# Patient Record
Sex: MALE | Race: BLACK OR AFRICAN AMERICAN | Employment: OTHER | URBAN - METROPOLITAN AREA
[De-identification: names, ages, dates, MRNs, and addresses within clinical notes are randomized per-mention and may not be internally consistent; named-entity substitution may affect disease eponyms.]

---

## 2017-04-11 ENCOUNTER — OFFICE VISIT (OUTPATIENT)
Dept: INTERNAL MEDICINE | Age: 42
End: 2017-04-11
Payer: MEDICARE

## 2017-04-11 ENCOUNTER — HOSPITAL ENCOUNTER (OUTPATIENT)
Age: 42
Setting detail: SPECIMEN
Discharge: HOME | End: 2017-04-11
Payer: MEDICARE

## 2017-04-11 VITALS
BODY MASS INDEX: 24.1 KG/M2 | HEIGHT: 63 IN | SYSTOLIC BLOOD PRESSURE: 146 MMHG | DIASTOLIC BLOOD PRESSURE: 105 MMHG | HEART RATE: 87 BPM | WEIGHT: 136 LBS

## 2017-04-11 DIAGNOSIS — N18.30 CKD (CHRONIC KIDNEY DISEASE) STAGE 3, GFR 30-59 ML/MIN (HCC): Chronic | ICD-10-CM

## 2017-04-11 DIAGNOSIS — K21.9 GASTROESOPHAGEAL REFLUX DISEASE WITHOUT ESOPHAGITIS: ICD-10-CM

## 2017-04-11 DIAGNOSIS — G89.29 CHRONIC BILATERAL LOW BACK PAIN WITHOUT SCIATICA: ICD-10-CM

## 2017-04-11 DIAGNOSIS — M54.50 CHRONIC BILATERAL LOW BACK PAIN WITHOUT SCIATICA: ICD-10-CM

## 2017-04-11 DIAGNOSIS — K56.609 SMALL BOWEL OBSTRUCTION (HCC): ICD-10-CM

## 2017-04-11 DIAGNOSIS — I10 ESSENTIAL HYPERTENSION: Primary | ICD-10-CM

## 2017-04-11 LAB
ANION GAP SERPL CALCULATED.3IONS-SCNC: 12 MMOL/L (ref 9–17)
BUN BLDV-MCNC: 19 MG/DL (ref 6–20)
BUN/CREAT BLD: NORMAL (ref 9–20)
CALCIUM SERPL-MCNC: 9 MG/DL (ref 8.6–10.4)
CHLORIDE BLD-SCNC: 103 MMOL/L (ref 98–107)
CO2: 23 MMOL/L (ref 20–31)
CREAT SERPL-MCNC: 1.11 MG/DL (ref 0.7–1.2)
GFR AFRICAN AMERICAN: >60 ML/MIN
GFR NON-AFRICAN AMERICAN: >60 ML/MIN
GFR SERPL CREATININE-BSD FRML MDRD: NORMAL ML/MIN/{1.73_M2}
GFR SERPL CREATININE-BSD FRML MDRD: NORMAL ML/MIN/{1.73_M2}
GLUCOSE BLD-MCNC: 96 MG/DL (ref 70–99)
POTASSIUM SERPL-SCNC: 4.2 MMOL/L (ref 3.7–5.3)
SODIUM BLD-SCNC: 138 MMOL/L (ref 135–144)

## 2017-04-11 PROCEDURE — 80048 BASIC METABOLIC PNL TOTAL CA: CPT

## 2017-04-11 PROCEDURE — 99213 OFFICE O/P EST LOW 20 MIN: CPT | Performed by: INTERNAL MEDICINE

## 2017-04-11 PROCEDURE — 36415 COLL VENOUS BLD VENIPUNCTURE: CPT

## 2017-04-11 RX ORDER — AMLODIPINE BESYLATE 10 MG/1
10 TABLET ORAL DAILY
Qty: 30 TABLET | Refills: 6 | Status: SHIPPED | OUTPATIENT
Start: 2017-04-11 | End: 2017-07-18 | Stop reason: SDUPTHER

## 2017-04-11 ASSESSMENT — PATIENT HEALTH QUESTIONNAIRE - PHQ9
2. FEELING DOWN, DEPRESSED OR HOPELESS: 0
SUM OF ALL RESPONSES TO PHQ QUESTIONS 1-9: 0
1. LITTLE INTEREST OR PLEASURE IN DOING THINGS: 0
SUM OF ALL RESPONSES TO PHQ9 QUESTIONS 1 & 2: 0

## 2017-06-10 ENCOUNTER — HOSPITAL ENCOUNTER (EMERGENCY)
Age: 42
Discharge: HOME | End: 2017-06-10
Attending: EMERGENCY MEDICINE
Payer: MEDICARE

## 2017-06-10 VITALS
HEART RATE: 98 BPM | BODY MASS INDEX: 24.11 KG/M2 | TEMPERATURE: 99 F | HEIGHT: 62 IN | SYSTOLIC BLOOD PRESSURE: 151 MMHG | RESPIRATION RATE: 18 BRPM | OXYGEN SATURATION: 98 % | DIASTOLIC BLOOD PRESSURE: 87 MMHG | WEIGHT: 131 LBS

## 2017-06-10 DIAGNOSIS — M62.838 MUSCLE SPASMS OF BOTH LOWER EXTREMITIES: Primary | ICD-10-CM

## 2017-06-10 LAB
ABSOLUTE EOS #: 0.1 K/UL (ref 0–0.4)
ABSOLUTE LYMPH #: 1.9 K/UL (ref 1–4.8)
ABSOLUTE MONO #: 0.6 K/UL (ref 0.2–0.8)
ANION GAP SERPL CALCULATED.3IONS-SCNC: 14 MMOL/L (ref 9–17)
BASOPHILS # BLD: 1 %
BASOPHILS ABSOLUTE: 0.1 K/UL (ref 0–0.2)
BUN BLDV-MCNC: 16 MG/DL (ref 6–20)
BUN/CREAT BLD: 14 (ref 9–20)
CALCIUM SERPL-MCNC: 9.5 MG/DL (ref 8.6–10.4)
CHLORIDE BLD-SCNC: 100 MMOL/L (ref 98–107)
CO2: 23 MMOL/L (ref 20–31)
CREAT SERPL-MCNC: 1.11 MG/DL (ref 0.7–1.2)
DIFFERENTIAL TYPE: NORMAL
EOSINOPHILS RELATIVE PERCENT: 2 %
GFR AFRICAN AMERICAN: >60 ML/MIN
GFR NON-AFRICAN AMERICAN: >60 ML/MIN
GFR SERPL CREATININE-BSD FRML MDRD: ABNORMAL ML/MIN/{1.73_M2}
GFR SERPL CREATININE-BSD FRML MDRD: ABNORMAL ML/MIN/{1.73_M2}
GLUCOSE BLD-MCNC: 105 MG/DL (ref 70–99)
HCT VFR BLD CALC: 41.4 % (ref 41–53)
HEMOGLOBIN: 13.8 G/DL (ref 13.5–17.5)
LYMPHOCYTES # BLD: 26 %
MCH RBC QN AUTO: 28.6 PG (ref 26–34)
MCHC RBC AUTO-ENTMCNC: 33.3 G/DL (ref 31–37)
MCV RBC AUTO: 85.9 FL (ref 80–100)
MONOCYTES # BLD: 8 %
PDW BLD-RTO: 14.1 % (ref 11.5–14.5)
PLATELET # BLD: 332 K/UL (ref 130–400)
PLATELET ESTIMATE: NORMAL
PMV BLD AUTO: 6.5 FL (ref 6–12)
POTASSIUM SERPL-SCNC: 4 MMOL/L (ref 3.7–5.3)
RBC # BLD: 4.82 M/UL (ref 4.5–5.9)
RBC # BLD: NORMAL 10*6/UL
SEG NEUTROPHILS: 63 %
SEGMENTED NEUTROPHILS ABSOLUTE COUNT: 4.6 K/UL (ref 1.8–7.7)
SODIUM BLD-SCNC: 137 MMOL/L (ref 135–144)
WBC # BLD: 7.4 K/UL (ref 3.5–11)
WBC # BLD: NORMAL 10*3/UL

## 2017-06-10 PROCEDURE — 6360000002 HC RX W HCPCS: Performed by: NURSE PRACTITIONER

## 2017-06-10 PROCEDURE — 96372 THER/PROPH/DIAG INJ SC/IM: CPT

## 2017-06-10 PROCEDURE — 99283 EMERGENCY DEPT VISIT LOW MDM: CPT

## 2017-06-10 PROCEDURE — 85025 COMPLETE CBC W/AUTO DIFF WBC: CPT

## 2017-06-10 PROCEDURE — 80048 BASIC METABOLIC PNL TOTAL CA: CPT

## 2017-06-10 RX ORDER — DIAZEPAM 5 MG/ML
10 INJECTION, SOLUTION INTRAMUSCULAR; INTRAVENOUS ONCE
Status: COMPLETED | OUTPATIENT
Start: 2017-06-10 | End: 2017-06-10

## 2017-06-10 RX ORDER — DIAZEPAM 5 MG/1
5 TABLET ORAL EVERY 8 HOURS PRN
Qty: 20 TABLET | Refills: 0 | Status: SHIPPED | OUTPATIENT
Start: 2017-06-10 | End: 2017-06-20

## 2017-06-10 RX ADMIN — DIAZEPAM 10 MG: 5 INJECTION, SOLUTION INTRAMUSCULAR; INTRAVENOUS at 02:04

## 2017-06-10 RX ADMIN — HYDROMORPHONE HYDROCHLORIDE 1 MG: 1 INJECTION, SOLUTION INTRAMUSCULAR; INTRAVENOUS; SUBCUTANEOUS at 02:04

## 2017-06-10 ASSESSMENT — PAIN DESCRIPTION - DESCRIPTORS: DESCRIPTORS: SPASM

## 2017-06-10 ASSESSMENT — ENCOUNTER SYMPTOMS
ABDOMINAL PAIN: 0
SORE THROAT: 0
WHEEZING: 0
SINUS PRESSURE: 0
NAUSEA: 0
RHINORRHEA: 0
SHORTNESS OF BREATH: 0
DIARRHEA: 0
CONSTIPATION: 0
VOMITING: 0
COUGH: 0
COLOR CHANGE: 0

## 2017-06-10 ASSESSMENT — PAIN SCALES - GENERAL: PAINLEVEL_OUTOF10: 10

## 2017-06-10 ASSESSMENT — PAIN DESCRIPTION - LOCATION: LOCATION: LEG

## 2017-06-10 ASSESSMENT — PAIN DESCRIPTION - PAIN TYPE: TYPE: ACUTE PAIN

## 2017-06-10 ASSESSMENT — PAIN DESCRIPTION - ORIENTATION: ORIENTATION: RIGHT

## 2017-07-18 ENCOUNTER — OFFICE VISIT (OUTPATIENT)
Dept: INTERNAL MEDICINE | Age: 42
End: 2017-07-18
Payer: MEDICARE

## 2017-07-18 VITALS
SYSTOLIC BLOOD PRESSURE: 135 MMHG | WEIGHT: 132 LBS | DIASTOLIC BLOOD PRESSURE: 92 MMHG | HEART RATE: 87 BPM | HEIGHT: 62 IN | BODY MASS INDEX: 24.29 KG/M2

## 2017-07-18 DIAGNOSIS — S88.912S AMPUTATION OF LEFT LOWER EXTREMITY, SEQUELA (HCC): ICD-10-CM

## 2017-07-18 DIAGNOSIS — E78.00 PURE HYPERCHOLESTEROLEMIA: ICD-10-CM

## 2017-07-18 DIAGNOSIS — I10 ESSENTIAL HYPERTENSION: Primary | ICD-10-CM

## 2017-07-18 DIAGNOSIS — K64.4 HEMORRHOIDS, EXTERNAL: ICD-10-CM

## 2017-07-18 DIAGNOSIS — K64.9 BLEEDING HEMORRHOID: ICD-10-CM

## 2017-07-18 DIAGNOSIS — B18.2 HEP C W/O COMA, CHRONIC (HCC): ICD-10-CM

## 2017-07-18 DIAGNOSIS — K21.9 GASTROESOPHAGEAL REFLUX DISEASE WITHOUT ESOPHAGITIS: ICD-10-CM

## 2017-07-18 PROCEDURE — G8420 CALC BMI NORM PARAMETERS: HCPCS | Performed by: STUDENT IN AN ORGANIZED HEALTH CARE EDUCATION/TRAINING PROGRAM

## 2017-07-18 PROCEDURE — 99213 OFFICE O/P EST LOW 20 MIN: CPT

## 2017-07-18 PROCEDURE — G8427 DOCREV CUR MEDS BY ELIG CLIN: HCPCS | Performed by: STUDENT IN AN ORGANIZED HEALTH CARE EDUCATION/TRAINING PROGRAM

## 2017-07-18 PROCEDURE — 1036F TOBACCO NON-USER: CPT | Performed by: STUDENT IN AN ORGANIZED HEALTH CARE EDUCATION/TRAINING PROGRAM

## 2017-07-18 PROCEDURE — 99214 OFFICE O/P EST MOD 30 MIN: CPT | Performed by: STUDENT IN AN ORGANIZED HEALTH CARE EDUCATION/TRAINING PROGRAM

## 2017-07-18 RX ORDER — ATORVASTATIN CALCIUM 40 MG/1
40 TABLET, FILM COATED ORAL DAILY
Qty: 30 TABLET | Refills: 5 | Status: SHIPPED | OUTPATIENT
Start: 2017-07-18 | End: 2017-10-24

## 2017-07-18 RX ORDER — OMEPRAZOLE 20 MG/1
TABLET, DELAYED RELEASE ORAL
Qty: 30 TABLET | Refills: 3 | Status: SHIPPED | OUTPATIENT
Start: 2017-07-18 | End: 2017-10-24

## 2017-07-18 RX ORDER — OMEPRAZOLE 20 MG/1
20 CAPSULE, DELAYED RELEASE ORAL DAILY
Qty: 30 CAPSULE | Refills: 3 | Status: CANCELLED | OUTPATIENT
Start: 2017-07-18

## 2017-07-18 RX ORDER — FAMOTIDINE 40 MG/1
40 TABLET, FILM COATED ORAL EVERY EVENING
Qty: 30 TABLET | Refills: 3 | Status: SHIPPED | OUTPATIENT
Start: 2017-07-18 | End: 2017-10-24

## 2017-07-18 RX ORDER — AMLODIPINE BESYLATE 10 MG/1
10 TABLET ORAL DAILY
Qty: 30 TABLET | Refills: 6 | Status: SHIPPED | OUTPATIENT
Start: 2017-07-18 | End: 2018-01-04 | Stop reason: SDUPTHER

## 2017-08-21 ENCOUNTER — HOSPITAL ENCOUNTER (OUTPATIENT)
Age: 42
Setting detail: SPECIMEN
Discharge: HOME | End: 2017-08-21
Payer: MEDICAID

## 2017-08-22 ENCOUNTER — HOSPITAL ENCOUNTER (OUTPATIENT)
Age: 42
Setting detail: SPECIMEN
Discharge: HOME | End: 2017-08-22
Payer: MEDICARE

## 2017-08-22 ENCOUNTER — APPOINTMENT (OUTPATIENT)
Dept: GENERAL RADIOLOGY | Age: 42
End: 2017-08-22
Payer: MEDICARE

## 2017-08-22 ENCOUNTER — HOSPITAL ENCOUNTER (OUTPATIENT)
Age: 42
Setting detail: OBSERVATION
Discharge: HOME | End: 2017-08-23
Attending: EMERGENCY MEDICINE | Admitting: EMERGENCY MEDICINE
Payer: MEDICARE

## 2017-08-22 DIAGNOSIS — B18.2 HEP C W/O COMA, CHRONIC (HCC): ICD-10-CM

## 2017-08-22 DIAGNOSIS — E78.00 PURE HYPERCHOLESTEROLEMIA: ICD-10-CM

## 2017-08-22 DIAGNOSIS — R07.9 CHEST PAIN, UNSPECIFIED TYPE: Primary | ICD-10-CM

## 2017-08-22 LAB
ABSOLUTE EOS #: 0.3 K/UL (ref 0–0.4)
ABSOLUTE LYMPH #: 3.1 K/UL (ref 1–4.8)
ABSOLUTE MONO #: 0.5 K/UL (ref 0.1–1.2)
ANION GAP SERPL CALCULATED.3IONS-SCNC: 18 MMOL/L (ref 9–17)
BASOPHILS # BLD: 1 %
BASOPHILS ABSOLUTE: 0.1 K/UL (ref 0–0.2)
BNP INTERPRETATION: NORMAL
BUN BLDV-MCNC: 22 MG/DL (ref 6–20)
BUN/CREAT BLD: ABNORMAL (ref 9–20)
CALCIUM SERPL-MCNC: 9.5 MG/DL (ref 8.6–10.4)
CHLORIDE BLD-SCNC: 101 MMOL/L (ref 98–107)
CHOLESTEROL/HDL RATIO: 3.3
CHOLESTEROL: 192 MG/DL
CO2: 23 MMOL/L (ref 20–31)
CREAT SERPL-MCNC: 1.58 MG/DL (ref 0.7–1.2)
DIFFERENTIAL TYPE: ABNORMAL
EOSINOPHILS RELATIVE PERCENT: 4 %
GFR AFRICAN AMERICAN: 58 ML/MIN
GFR NON-AFRICAN AMERICAN: 48 ML/MIN
GFR SERPL CREATININE-BSD FRML MDRD: ABNORMAL ML/MIN/{1.73_M2}
GFR SERPL CREATININE-BSD FRML MDRD: ABNORMAL ML/MIN/{1.73_M2}
GLUCOSE BLD-MCNC: 111 MG/DL (ref 70–99)
HCT VFR BLD CALC: 39 % (ref 41–53)
HDLC SERPL-MCNC: 58 MG/DL
HEMOGLOBIN: 13.4 G/DL (ref 13.5–17.5)
LDL CHOLESTEROL: 122 MG/DL (ref 0–130)
LYMPHOCYTES # BLD: 42 %
MCH RBC QN AUTO: 29.3 PG (ref 26–34)
MCHC RBC AUTO-ENTMCNC: 34.3 G/DL (ref 31–37)
MCV RBC AUTO: 85.4 FL (ref 80–100)
MONOCYTES # BLD: 7 %
PDW BLD-RTO: 14.9 % (ref 12.5–15.4)
PLATELET # BLD: 363 K/UL (ref 140–450)
PLATELET ESTIMATE: ABNORMAL
PMV BLD AUTO: 6.8 FL (ref 6–12)
POC TROPONIN I: 0 NG/ML (ref 0–0.1)
POC TROPONIN INTERP: NORMAL
POTASSIUM SERPL-SCNC: 4.1 MMOL/L (ref 3.7–5.3)
PRO-BNP: <20 PG/ML
RBC # BLD: 4.56 M/UL (ref 4.5–5.9)
RBC # BLD: ABNORMAL 10*6/UL
SEG NEUTROPHILS: 46 %
SEGMENTED NEUTROPHILS ABSOLUTE COUNT: 3.4 K/UL (ref 1.8–7.7)
SODIUM BLD-SCNC: 142 MMOL/L (ref 135–144)
TRIGL SERPL-MCNC: 62 MG/DL
VLDLC SERPL CALC-MCNC: NORMAL MG/DL (ref 1–30)
WBC # BLD: 7.3 K/UL (ref 3.5–11)
WBC # BLD: ABNORMAL 10*3/UL

## 2017-08-22 PROCEDURE — 87522 HEPATITIS C REVRS TRNSCRPJ: CPT

## 2017-08-22 PROCEDURE — 99285 EMERGENCY DEPT VISIT HI MDM: CPT

## 2017-08-22 PROCEDURE — 83880 ASSAY OF NATRIURETIC PEPTIDE: CPT

## 2017-08-22 PROCEDURE — 85025 COMPLETE CBC W/AUTO DIFF WBC: CPT

## 2017-08-22 PROCEDURE — 84484 ASSAY OF TROPONIN QUANT: CPT

## 2017-08-22 PROCEDURE — 36415 COLL VENOUS BLD VENIPUNCTURE: CPT

## 2017-08-22 PROCEDURE — 71020 XR CHEST STANDARD TWO VW: CPT

## 2017-08-22 PROCEDURE — 6370000000 HC RX 637 (ALT 250 FOR IP): Performed by: EMERGENCY MEDICINE

## 2017-08-22 PROCEDURE — 80061 LIPID PANEL: CPT

## 2017-08-22 PROCEDURE — 80048 BASIC METABOLIC PNL TOTAL CA: CPT

## 2017-08-22 PROCEDURE — 93005 ELECTROCARDIOGRAM TRACING: CPT

## 2017-08-22 RX ORDER — ASPIRIN 81 MG/1
324 TABLET, CHEWABLE ORAL ONCE
Status: COMPLETED | OUTPATIENT
Start: 2017-08-22 | End: 2017-08-22

## 2017-08-22 RX ADMIN — ASPIRIN 81 MG 324 MG: 81 TABLET ORAL at 22:56

## 2017-08-22 ASSESSMENT — PAIN SCALES - GENERAL
PAINLEVEL_OUTOF10: 8
PAINLEVEL_OUTOF10: 9

## 2017-08-22 ASSESSMENT — PAIN DESCRIPTION - PAIN TYPE: TYPE: ACUTE PAIN

## 2017-08-22 ASSESSMENT — PAIN DESCRIPTION - FREQUENCY: FREQUENCY: CONTINUOUS

## 2017-08-22 ASSESSMENT — PAIN DESCRIPTION - DESCRIPTORS: DESCRIPTORS: PRESSURE

## 2017-08-22 ASSESSMENT — PAIN DESCRIPTION - ORIENTATION: ORIENTATION: LEFT

## 2017-08-22 ASSESSMENT — HEART SCORE: ECG: 0

## 2017-08-22 ASSESSMENT — PAIN DESCRIPTION - LOCATION: LOCATION: CHEST

## 2017-08-23 VITALS
OXYGEN SATURATION: 98 % | TEMPERATURE: 97.7 F | SYSTOLIC BLOOD PRESSURE: 126 MMHG | WEIGHT: 129 LBS | DIASTOLIC BLOOD PRESSURE: 90 MMHG | HEIGHT: 62 IN | HEART RATE: 83 BPM | BODY MASS INDEX: 23.74 KG/M2 | RESPIRATION RATE: 16 BRPM

## 2017-08-23 LAB
ANION GAP SERPL CALCULATED.3IONS-SCNC: 15 MMOL/L (ref 9–17)
BUN BLDV-MCNC: 24 MG/DL (ref 6–20)
BUN/CREAT BLD: ABNORMAL (ref 9–20)
CALCIUM SERPL-MCNC: 9 MG/DL (ref 8.6–10.4)
CHLORIDE BLD-SCNC: 100 MMOL/L (ref 98–107)
CO2: 21 MMOL/L (ref 20–31)
CREAT SERPL-MCNC: 1.22 MG/DL (ref 0.7–1.2)
DIRECT EXAM: NORMAL
EKG ATRIAL RATE: 79 BPM
EKG P AXIS: 38 DEGREES
EKG P-R INTERVAL: 152 MS
EKG Q-T INTERVAL: 340 MS
EKG QRS DURATION: 78 MS
EKG QTC CALCULATION (BAZETT): 389 MS
EKG R AXIS: 11 DEGREES
EKG T AXIS: 22 DEGREES
EKG VENTRICULAR RATE: 79 BPM
GFR AFRICAN AMERICAN: >60 ML/MIN
GFR NON-AFRICAN AMERICAN: >60 ML/MIN
GFR SERPL CREATININE-BSD FRML MDRD: ABNORMAL ML/MIN/{1.73_M2}
GFR SERPL CREATININE-BSD FRML MDRD: ABNORMAL ML/MIN/{1.73_M2}
GLUCOSE BLD-MCNC: 91 MG/DL (ref 70–99)
Lab: NORMAL
POC TROPONIN I: 0 NG/ML (ref 0–0.1)
POC TROPONIN INTERP: NORMAL
POTASSIUM SERPL-SCNC: 5.3 MMOL/L (ref 3.7–5.3)
SODIUM BLD-SCNC: 136 MMOL/L (ref 135–144)
SPECIMEN DESCRIPTION: NORMAL
STATUS: NORMAL

## 2017-08-23 PROCEDURE — 6370000000 HC RX 637 (ALT 250 FOR IP): Performed by: EMERGENCY MEDICINE

## 2017-08-23 PROCEDURE — 93005 ELECTROCARDIOGRAM TRACING: CPT

## 2017-08-23 PROCEDURE — 36415 COLL VENOUS BLD VENIPUNCTURE: CPT

## 2017-08-23 PROCEDURE — 6360000002 HC RX W HCPCS: Performed by: EMERGENCY MEDICINE

## 2017-08-23 PROCEDURE — 80048 BASIC METABOLIC PNL TOTAL CA: CPT

## 2017-08-23 PROCEDURE — 96374 THER/PROPH/DIAG INJ IV PUSH: CPT

## 2017-08-23 PROCEDURE — G0378 HOSPITAL OBSERVATION PER HR: HCPCS

## 2017-08-23 PROCEDURE — 84484 ASSAY OF TROPONIN QUANT: CPT

## 2017-08-23 RX ORDER — AMLODIPINE BESYLATE 10 MG/1
10 TABLET ORAL DAILY
Status: DISCONTINUED | OUTPATIENT
Start: 2017-08-23 | End: 2017-08-23 | Stop reason: HOSPADM

## 2017-08-23 RX ORDER — FENTANYL CITRATE 50 UG/ML
25 INJECTION, SOLUTION INTRAMUSCULAR; INTRAVENOUS
Status: DISCONTINUED | OUTPATIENT
Start: 2017-08-23 | End: 2017-08-23 | Stop reason: HOSPADM

## 2017-08-23 RX ORDER — SODIUM CHLORIDE 0.9 % (FLUSH) 0.9 %
10 SYRINGE (ML) INJECTION EVERY 12 HOURS SCHEDULED
Status: DISCONTINUED | OUTPATIENT
Start: 2017-08-23 | End: 2017-08-23 | Stop reason: HOSPADM

## 2017-08-23 RX ORDER — ATORVASTATIN CALCIUM 40 MG/1
40 TABLET, FILM COATED ORAL NIGHTLY
Status: DISCONTINUED | OUTPATIENT
Start: 2017-08-23 | End: 2017-08-23 | Stop reason: HOSPADM

## 2017-08-23 RX ORDER — CYCLOBENZAPRINE HCL 10 MG
10 TABLET ORAL 2 TIMES DAILY PRN
Status: DISCONTINUED | OUTPATIENT
Start: 2017-08-23 | End: 2017-08-23 | Stop reason: HOSPADM

## 2017-08-23 RX ORDER — OXYCODONE HYDROCHLORIDE AND ACETAMINOPHEN 5; 325 MG/1; MG/1
1 TABLET ORAL EVERY 6 HOURS PRN
Status: DISCONTINUED | OUTPATIENT
Start: 2017-08-23 | End: 2017-08-23 | Stop reason: HOSPADM

## 2017-08-23 RX ORDER — SODIUM CHLORIDE 0.9 % (FLUSH) 0.9 %
10 SYRINGE (ML) INJECTION PRN
Status: DISCONTINUED | OUTPATIENT
Start: 2017-08-23 | End: 2017-08-23 | Stop reason: HOSPADM

## 2017-08-23 RX ORDER — ACETAMINOPHEN 325 MG/1
650 TABLET ORAL EVERY 4 HOURS PRN
Status: DISCONTINUED | OUTPATIENT
Start: 2017-08-23 | End: 2017-08-23 | Stop reason: HOSPADM

## 2017-08-23 RX ORDER — ONDANSETRON 2 MG/ML
4 INJECTION INTRAMUSCULAR; INTRAVENOUS EVERY 8 HOURS PRN
Status: DISCONTINUED | OUTPATIENT
Start: 2017-08-23 | End: 2017-08-23 | Stop reason: HOSPADM

## 2017-08-23 RX ADMIN — OXYCODONE HYDROCHLORIDE AND ACETAMINOPHEN 1 TABLET: 5; 325 TABLET ORAL at 00:44

## 2017-08-23 RX ADMIN — AMLODIPINE BESYLATE 10 MG: 10 TABLET ORAL at 08:52

## 2017-08-23 RX ADMIN — CYCLOBENZAPRINE HYDROCHLORIDE 10 MG: 10 TABLET, FILM COATED ORAL at 08:52

## 2017-08-23 RX ADMIN — FENTANYL CITRATE 25 MCG: 50 INJECTION INTRAMUSCULAR; INTRAVENOUS at 01:39

## 2017-08-23 RX ADMIN — OXYCODONE HYDROCHLORIDE AND ACETAMINOPHEN 1 TABLET: 5; 325 TABLET ORAL at 10:25

## 2017-08-23 RX ADMIN — ATORVASTATIN CALCIUM 40 MG: 40 TABLET, FILM COATED ORAL at 01:03

## 2017-08-23 ASSESSMENT — ENCOUNTER SYMPTOMS
CONSTIPATION: 0
ABDOMINAL DISTENTION: 0
CHEST TIGHTNESS: 0
COUGH: 0
NAUSEA: 0
SHORTNESS OF BREATH: 0
ABDOMINAL PAIN: 0
DIARRHEA: 0
VOMITING: 0
BACK PAIN: 0
COLOR CHANGE: 0
TROUBLE SWALLOWING: 0
PHOTOPHOBIA: 0
RHINORRHEA: 0

## 2017-08-23 ASSESSMENT — PAIN SCALES - GENERAL
PAINLEVEL_OUTOF10: 8
PAINLEVEL_OUTOF10: 6
PAINLEVEL_OUTOF10: 7
PAINLEVEL_OUTOF10: 8

## 2017-08-24 ENCOUNTER — CARE COORDINATION (OUTPATIENT)
Dept: CASE MANAGEMENT | Age: 42
End: 2017-08-24

## 2017-08-24 LAB
EKG ATRIAL RATE: 99 BPM
EKG P AXIS: 44 DEGREES
EKG P-R INTERVAL: 144 MS
EKG Q-T INTERVAL: 312 MS
EKG QRS DURATION: 72 MS
EKG QTC CALCULATION (BAZETT): 400 MS
EKG R AXIS: 9 DEGREES
EKG T AXIS: 36 DEGREES
EKG VENTRICULAR RATE: 99 BPM

## 2017-08-30 ENCOUNTER — CARE COORDINATION (OUTPATIENT)
Dept: CASE MANAGEMENT | Age: 42
End: 2017-08-30

## 2017-09-19 ENCOUNTER — TELEPHONE (OUTPATIENT)
Dept: ADMINISTRATIVE | Age: 42
End: 2017-09-19

## 2017-09-25 ENCOUNTER — TELEPHONE (OUTPATIENT)
Dept: ADMINISTRATIVE | Age: 42
End: 2017-09-25

## 2017-10-24 ENCOUNTER — OFFICE VISIT (OUTPATIENT)
Dept: INTERNAL MEDICINE | Age: 42
End: 2017-10-24
Payer: MEDICARE

## 2017-10-24 VITALS
HEART RATE: 94 BPM | BODY MASS INDEX: 24.66 KG/M2 | HEIGHT: 62 IN | SYSTOLIC BLOOD PRESSURE: 126 MMHG | DIASTOLIC BLOOD PRESSURE: 85 MMHG | WEIGHT: 134 LBS

## 2017-10-24 DIAGNOSIS — K21.9 GASTROESOPHAGEAL REFLUX DISEASE WITHOUT ESOPHAGITIS: ICD-10-CM

## 2017-10-24 DIAGNOSIS — Z11.4 SCREENING FOR HIV (HUMAN IMMUNODEFICIENCY VIRUS): ICD-10-CM

## 2017-10-24 DIAGNOSIS — Z23 NEED FOR IMMUNIZATION AGAINST INFLUENZA: ICD-10-CM

## 2017-10-24 DIAGNOSIS — Z23 NEED FOR 23-POLYVALENT PNEUMOCOCCAL POLYSACCHARIDE VACCINE: ICD-10-CM

## 2017-10-24 DIAGNOSIS — B18.2 HEP C W/O COMA, CHRONIC (HCC): ICD-10-CM

## 2017-10-24 DIAGNOSIS — E78.00 PURE HYPERCHOLESTEROLEMIA: ICD-10-CM

## 2017-10-24 DIAGNOSIS — K64.4 HEMORRHOIDS, EXTERNAL: Primary | ICD-10-CM

## 2017-10-24 DIAGNOSIS — I10 ESSENTIAL HYPERTENSION: ICD-10-CM

## 2017-10-24 PROCEDURE — G0008 ADMIN INFLUENZA VIRUS VAC: HCPCS | Performed by: STUDENT IN AN ORGANIZED HEALTH CARE EDUCATION/TRAINING PROGRAM

## 2017-10-24 PROCEDURE — 1036F TOBACCO NON-USER: CPT | Performed by: STUDENT IN AN ORGANIZED HEALTH CARE EDUCATION/TRAINING PROGRAM

## 2017-10-24 PROCEDURE — 99213 OFFICE O/P EST LOW 20 MIN: CPT

## 2017-10-24 PROCEDURE — G8420 CALC BMI NORM PARAMETERS: HCPCS | Performed by: STUDENT IN AN ORGANIZED HEALTH CARE EDUCATION/TRAINING PROGRAM

## 2017-10-24 PROCEDURE — 99214 OFFICE O/P EST MOD 30 MIN: CPT | Performed by: STUDENT IN AN ORGANIZED HEALTH CARE EDUCATION/TRAINING PROGRAM

## 2017-10-24 PROCEDURE — G8484 FLU IMMUNIZE NO ADMIN: HCPCS | Performed by: STUDENT IN AN ORGANIZED HEALTH CARE EDUCATION/TRAINING PROGRAM

## 2017-10-24 PROCEDURE — G8427 DOCREV CUR MEDS BY ELIG CLIN: HCPCS | Performed by: STUDENT IN AN ORGANIZED HEALTH CARE EDUCATION/TRAINING PROGRAM

## 2017-10-24 PROCEDURE — 90732 PPSV23 VACC 2 YRS+ SUBQ/IM: CPT

## 2017-10-24 PROCEDURE — G0009 ADMIN PNEUMOCOCCAL VACCINE: HCPCS

## 2017-10-24 PROCEDURE — 90688 IIV4 VACCINE SPLT 0.5 ML IM: CPT

## 2017-10-24 RX ORDER — ATORVASTATIN CALCIUM 40 MG/1
40 TABLET, FILM COATED ORAL DAILY
Qty: 30 TABLET | Refills: 1 | Status: SHIPPED | OUTPATIENT
Start: 2017-10-24 | End: 2018-02-01 | Stop reason: SDUPTHER

## 2017-10-24 RX ORDER — FAMOTIDINE 40 MG/1
40 TABLET, FILM COATED ORAL EVERY EVENING
Qty: 30 TABLET | Refills: 3 | Status: SHIPPED | OUTPATIENT
Start: 2017-10-24 | End: 2018-02-13 | Stop reason: SDUPTHER

## 2017-11-09 ENCOUNTER — HOSPITAL ENCOUNTER (EMERGENCY)
Age: 42
Discharge: HOME | End: 2017-11-09
Payer: MEDICARE

## 2017-11-09 VITALS
TEMPERATURE: 97.9 F | HEART RATE: 104 BPM | DIASTOLIC BLOOD PRESSURE: 84 MMHG | OXYGEN SATURATION: 95 % | HEIGHT: 62 IN | WEIGHT: 130 LBS | RESPIRATION RATE: 16 BRPM | BODY MASS INDEX: 23.92 KG/M2 | SYSTOLIC BLOOD PRESSURE: 133 MMHG

## 2017-11-09 DIAGNOSIS — H60.392 INFECTIVE OTITIS EXTERNA OF LEFT EAR: Primary | ICD-10-CM

## 2017-11-09 PROCEDURE — 99282 EMERGENCY DEPT VISIT SF MDM: CPT

## 2017-11-09 RX ORDER — NEOMYCIN SULFATE, POLYMYXIN B SULFATE, HYDROCORTISONE 3.5; 10000; 1 MG/ML; [USP'U]/ML; MG/ML
4 SOLUTION/ DROPS AURICULAR (OTIC) EVERY 8 HOURS SCHEDULED
Qty: 10 ML | Refills: 0 | Status: SHIPPED | OUTPATIENT
Start: 2017-11-09 | End: 2017-11-19

## 2017-11-09 ASSESSMENT — PAIN SCALES - GENERAL: PAINLEVEL_OUTOF10: 6

## 2017-11-09 ASSESSMENT — PAIN DESCRIPTION - ORIENTATION: ORIENTATION: LEFT

## 2017-11-09 ASSESSMENT — PAIN DESCRIPTION - FREQUENCY: FREQUENCY: INTERMITTENT

## 2017-11-09 ASSESSMENT — PAIN DESCRIPTION - DESCRIPTORS: DESCRIPTORS: ACHING;SORE

## 2017-11-09 ASSESSMENT — PAIN DESCRIPTION - LOCATION: LOCATION: EAR;THROAT

## 2017-11-09 ASSESSMENT — PAIN SCALES - WONG BAKER: WONGBAKER_NUMERICALRESPONSE: 6

## 2017-11-10 ENCOUNTER — CARE COORDINATION (OUTPATIENT)
Dept: CARE COORDINATION | Age: 42
End: 2017-11-10

## 2017-11-10 NOTE — ED PROVIDER NOTES
Sclera anicteric. ENT: Mucous membranes moist. Nasal exam reveals no swollen turbinates, septum midline. Posterior pharynx pink, without swelling or exudate. Right ear is unremarkable with a flat gray tympanic membrane. Landmarks are easily visualized. Exam of left ear reveals a swollen canal.  There is small amount of purulence. The TM is visualized and is gray. Patient complains of pain with palpation of the tragus. There is no pre-or postauricular lymphadenopathy. Neck: Supple, symmetrical, trachea midline, no adenopathy. Lungs:   Respirations eupneic. Lungs CTA   Chest Wall:  Nontender   Heart:   Abdomen:     Extremities:   Pulses:   Skin:   Neurologic:      RRR  Soft, nontender   Full range of motion. Radial/ulnar pulses 3+   No rashes or lesions to exposed skin. Alert and oriented X 3. Motor grossly normal.  Speech clear. DIAGNOSTIC RESULTS         LABS:  Labs Reviewed - No data to display    All other labs were within normal range or not returned as of this dictation. EMERGENCY DEPARTMENT COURSE and DIFFERENTIAL DIAGNOSIS/MDM:   Vitals:    Vitals:    17 2038   BP: 133/84   Pulse: 104   Resp: 16   Temp: 97.9 °F (36.6 °C)   TempSrc: Oral   SpO2: 95%   Weight: 130 lb (59 kg)   Height: 5' 2\" (1.575 m)       Medical Decision Makin-year-old male with left ear pain. Exam reveals an external otitis. He'll be discharged home with Cortisporin eardrops. He has Tylenol and ibuprofen at home for pain relief. He will follow up with his primary care provider. He has also been referred to ENT for any complications. CONSULTS:  None    PROCEDURES:  None    FINAL IMPRESSION      1.  Infective otitis externa of left ear          DISPOSITION/PLAN   DISPOSITION     PATIENT REFERRED TO:   Keven Jaramillo MD  [address] [facility] [address]  [facility] [address] [place] [address]  [phone]    Schedule an appointment as soon as possible for a visit   If symptoms worsen      DISCHARGE MEDICATIONS:     New Prescriptions    NEOMYCIN-POLYMYXIN-HYDROCORTISONE 1 % SOLN OTIC SOLUTION    Place 4 drops into the left ear every 8 hours for 10 days           (Please note that portions of this note were completed with a voice recognition program.  Efforts were made to edit the dictations but occasionally words are mis-transcribed.)    Daniel Nguyen NP  Certified Nurse Practitioner          Daniel Nguyen NP  11/09/17 2100

## 2018-01-04 ENCOUNTER — HOSPITAL ENCOUNTER (OUTPATIENT)
Age: 43
Setting detail: SPECIMEN
Discharge: HOME | End: 2018-01-04
Payer: COMMERCIAL

## 2018-01-04 ENCOUNTER — OFFICE VISIT (OUTPATIENT)
Dept: INTERNAL MEDICINE | Age: 43
End: 2018-01-04
Payer: MEDICARE

## 2018-01-04 VITALS
SYSTOLIC BLOOD PRESSURE: 144 MMHG | HEART RATE: 92 BPM | WEIGHT: 140 LBS | DIASTOLIC BLOOD PRESSURE: 102 MMHG | BODY MASS INDEX: 25.76 KG/M2 | HEIGHT: 62 IN

## 2018-01-04 DIAGNOSIS — Z00.00 MEDICARE ANNUAL WELLNESS VISIT, INITIAL: ICD-10-CM

## 2018-01-04 DIAGNOSIS — Z89.511 S/P BKA (BELOW KNEE AMPUTATION), RIGHT (HCC): ICD-10-CM

## 2018-01-04 DIAGNOSIS — E78.00 PURE HYPERCHOLESTEROLEMIA: ICD-10-CM

## 2018-01-04 DIAGNOSIS — K21.9 GASTROESOPHAGEAL REFLUX DISEASE WITHOUT ESOPHAGITIS: ICD-10-CM

## 2018-01-04 DIAGNOSIS — B18.2 HEP C W/O COMA, CHRONIC (HCC): ICD-10-CM

## 2018-01-04 DIAGNOSIS — N18.30 STAGE 3 CHRONIC KIDNEY DISEASE (HCC): ICD-10-CM

## 2018-01-04 DIAGNOSIS — Z00.00 HEALTH CARE MAINTENANCE: ICD-10-CM

## 2018-01-04 DIAGNOSIS — Z00.00 ROUTINE GENERAL MEDICAL EXAMINATION AT A HEALTH CARE FACILITY: ICD-10-CM

## 2018-01-04 DIAGNOSIS — I10 ESSENTIAL HYPERTENSION: ICD-10-CM

## 2018-01-04 DIAGNOSIS — I10 ESSENTIAL HYPERTENSION: Primary | ICD-10-CM

## 2018-01-04 LAB
ALBUMIN SERPL-MCNC: 4.1 G/DL (ref 3.5–5.2)
ALBUMIN/GLOBULIN RATIO: 1.2 (ref 1–2.5)
ALP BLD-CCNC: 63 U/L (ref 40–129)
ALT SERPL-CCNC: 12 U/L (ref 5–41)
ANION GAP SERPL CALCULATED.3IONS-SCNC: 17 MMOL/L (ref 9–17)
AST SERPL-CCNC: 19 U/L
BILIRUB SERPL-MCNC: 0.44 MG/DL (ref 0.3–1.2)
BUN BLDV-MCNC: 14 MG/DL (ref 6–20)
BUN/CREAT BLD: NORMAL (ref 9–20)
CALCIUM SERPL-MCNC: 9.2 MG/DL (ref 8.6–10.4)
CHLORIDE BLD-SCNC: 103 MMOL/L (ref 98–107)
CO2: 20 MMOL/L (ref 20–31)
CREAT SERPL-MCNC: 1.14 MG/DL (ref 0.7–1.2)
GFR AFRICAN AMERICAN: >60 ML/MIN
GFR NON-AFRICAN AMERICAN: >60 ML/MIN
GFR SERPL CREATININE-BSD FRML MDRD: NORMAL ML/MIN/{1.73_M2}
GFR SERPL CREATININE-BSD FRML MDRD: NORMAL ML/MIN/{1.73_M2}
GLUCOSE BLD-MCNC: 85 MG/DL (ref 70–99)
HEPATITIS C ANTIBODY: NONREACTIVE
HIV AG/AB: NONREACTIVE
POTASSIUM SERPL-SCNC: 4 MMOL/L (ref 3.7–5.3)
SODIUM BLD-SCNC: 140 MMOL/L (ref 135–144)
TOTAL PROTEIN: 7.6 G/DL (ref 6.4–8.3)

## 2018-01-04 PROCEDURE — G0438 PPPS, INITIAL VISIT: HCPCS | Performed by: INTERNAL MEDICINE

## 2018-01-04 PROCEDURE — 36415 COLL VENOUS BLD VENIPUNCTURE: CPT

## 2018-01-04 PROCEDURE — 87522 HEPATITIS C REVRS TRNSCRPJ: CPT

## 2018-01-04 PROCEDURE — 87389 HIV-1 AG W/HIV-1&-2 AB AG IA: CPT

## 2018-01-04 PROCEDURE — 80053 COMPREHEN METABOLIC PANEL: CPT

## 2018-01-04 PROCEDURE — 87902 NFCT AGT GNTYP ALYS HEP C: CPT

## 2018-01-04 PROCEDURE — 86803 HEPATITIS C AB TEST: CPT

## 2018-01-04 RX ORDER — AMLODIPINE BESYLATE 10 MG/1
10 TABLET ORAL DAILY
Qty: 30 TABLET | Refills: 5 | Status: SHIPPED | OUTPATIENT
Start: 2018-01-04 | End: 2018-02-13 | Stop reason: SDUPTHER

## 2018-01-04 ASSESSMENT — PATIENT HEALTH QUESTIONNAIRE - PHQ9: SUM OF ALL RESPONSES TO PHQ QUESTIONS 1-9: 0

## 2018-01-04 NOTE — PATIENT INSTRUCTIONS
Advance Directives: Care Instructions  Your Care Instructions  An advance directive is a legal way to state your wishes at the end of your life. It tells your family and your doctor what to do if you can no longer say what you want. There are two main types of advance directives. You can change them any time that your wishes change. · A living will tells your family and your doctor your wishes about life support and other treatment. · A durable power of  for health care lets you name a person to make treatment decisions for you when you can't speak for yourself. This person is called a health care agent. If you do not have an advance directive, decisions about your medical care may be made by a doctor or a  who doesn't know you. It may help to think of an advance directive as a gift to the people who care for you. If you have one, they won't have to make tough decisions by themselves. Follow-up care is a key part of your treatment and safety. Be sure to make and go to all appointments, and call your doctor if you are having problems. It's also a good idea to know your test results and keep a list of the medicines you take. How can you care for yourself at home? · Discuss your wishes with your loved ones and your doctor. This way, there are no surprises. · Many states have a unique form. Or you might use a universal form that has been approved by many states. This kind of form can sometimes be completed and stored online. Your electronic copy will then be available wherever you have a connection to the Internet. In most cases, doctors will respect your wishes even if you have a form from a different state. · You don't need a  to do an advance directive. But you may want to get legal advice. · Think about these questions when you prepare an advance directive:  ¨ Who do you want to make decisions about your medical care if you are not able to?  Many people choose a family member or require you to get the form notarized. This means that a person called a  watches you sign the form and then he or she signs the form. Some states also require that two or more witnesses sign the form. Be sure to tell your family members and doctors who your health care agent is. Keep your forms in a safe place. But make sure that your loved ones know where the forms are. This could be in your desk where you keep other important papers. Make sure your doctor has a copy of your forms. Where can you learn more? Go to https://[email or web address].[email or web address] org and sign in to your [employer] account. Enter [id number] in the Cognia box to learn more about \"Learning About Durable Power of  for Health Care. \"     If you do not have an account, please click on the \"Sign Up Now\" link. Current as of: September 24, 2016  Content Version: 11.5  © 0341-1830 [employer]. Care instructions adapted under license by [facility] ([facility]). If you have questions about a medical condition or this instruction, always ask your healthcare professional. [place] [address] any warranty or liability for your use of this information. Learning About Living [place]  What is a living will? A living will is a legal form you use to write down the kind of care you want at the end of your life. It is used by the health professionals who will treat you if you aren't able to decide for yourself. If you put your wishes in writing, your loved ones and others will know what kind of care you want. They won't need to guess. This can ease your mind and be helpful to others. A living will is not the same as an estate or property will. An estate will explains what you want to happen with your money and property after you die. Is a living will a legal document? A living will is a legal document. Each state has its own laws about living [place].  If you move to another state, make sure that your copay.    Some of these benefits include a comprehensive review of your medical history including lifestyle, illnesses that may run in your family, and various assessments and screenings as appropriate. After reviewing your medical record and screening and assessments performed today your provider may have ordered immunizations, labs, imaging, and/or referrals for you. A list of these orders (if applicable) as well as your Preventive Care list are included within your After Visit Summary for your review. Other Preventive Recommendations:    · A preventive eye exam performed by an eye specialist is recommended every 1-2 years to screen for glaucoma; cataracts, macular degeneration, and other eye disorders. · A preventive dental visit is recommended every 6 months. · Try to get at least 150 minutes of exercise per week or 10,000 steps per day on a pedometer . · Order or download the FREE \"Exercise & Physical Activity: Your Everyday Guide\" from [employer]. Call 2-224.543.2658 or search [employer] online. · You need 9777-6493 mg of calcium and 3767-9991 IU of vitamin D per day. It is possible to meet your calcium requirement with diet alone, but a vitamin D supplement is usually necessary to meet this goal.  · When exposed to the sun, use a sunscreen that protects against both UVA and UVB radiation with an SPF of 30 or greater. Reapply every 2 to 3 hours or after sweating, drying off with a towel, or swimming. · Always wear a seat belt when traveling in a car. Always wear a helmet when riding a bicycle or motorcycle. Follow-up appointment scheduled for 2/1/18, AVS given to patient. AWV Forms given to patient to return at next visit. Labs mailed to patient.     Cristopher Gann

## 2018-01-04 NOTE — PROGRESS NOTES
jimy or an eligible witness and provide a signed copy to the practice office.   Time spent (minutes): 30

## 2018-01-08 LAB
DIRECT EXAM: NORMAL
HCV QUANTITATIVE: NORMAL
HEPATITIS C GENOTYPE: NORMAL
Lab: NORMAL
SPECIMEN DESCRIPTION: NORMAL
STATUS: NORMAL

## 2018-02-01 ENCOUNTER — OFFICE VISIT (OUTPATIENT)
Dept: INTERNAL MEDICINE | Age: 43
End: 2018-02-01
Payer: MEDICARE

## 2018-02-01 VITALS
HEART RATE: 96 BPM | SYSTOLIC BLOOD PRESSURE: 127 MMHG | BODY MASS INDEX: 25.21 KG/M2 | HEIGHT: 62 IN | WEIGHT: 137 LBS | DIASTOLIC BLOOD PRESSURE: 83 MMHG

## 2018-02-01 DIAGNOSIS — K64.9 HEMORRHOIDS, UNSPECIFIED HEMORRHOID TYPE: ICD-10-CM

## 2018-02-01 DIAGNOSIS — Z00.00 MEDICARE ANNUAL WELLNESS VISIT, INITIAL: Primary | ICD-10-CM

## 2018-02-01 DIAGNOSIS — Z00.00 ROUTINE GENERAL MEDICAL EXAMINATION AT A HEALTH CARE FACILITY: ICD-10-CM

## 2018-02-01 DIAGNOSIS — E78.00 PURE HYPERCHOLESTEROLEMIA: ICD-10-CM

## 2018-02-01 PROCEDURE — 99214 OFFICE O/P EST MOD 30 MIN: CPT | Performed by: INTERNAL MEDICINE

## 2018-02-01 RX ORDER — ATORVASTATIN CALCIUM 40 MG/1
40 TABLET, FILM COATED ORAL DAILY
Qty: 30 TABLET | Refills: 2 | Status: SHIPPED | OUTPATIENT
Start: 2018-02-01 | End: 2018-02-13 | Stop reason: SDUPTHER

## 2018-02-01 ASSESSMENT — ANXIETY QUESTIONNAIRES: GAD7 TOTAL SCORE: 0

## 2018-02-01 ASSESSMENT — LIFESTYLE VARIABLES: HOW OFTEN DO YOU HAVE A DRINK CONTAINING ALCOHOL: 0

## 2018-02-01 NOTE — PROGRESS NOTES
Visit Information    Have you changed or started any medications since your last visit including any over-the-counter medicines, vitamins, or herbal medicines? no   Are you having any side effects from any of your medications? -  no  Have you stopped taking any of your medications? Is so, why? -  no    Have you seen any other physician or provider since your last visit? No  Have you had any other diagnostic tests since your last visit? No  Have you been seen in the emergency room and/or had an admission to a hospital since we last saw you? No  Have you had your routine dental cleaning in the past 6 months? no    Have you activated your Ever[employer] account? If not, what are your barriers?  Yes     Patient Care Team:  Irasema Cruz MD as PCP - General (Internal Medicine)  Monico Fuentes MD as PCP - [facility] Attributed Provider  Ken Curry MD    Medical History Review  Past Medical, Family, and Social History reviewed and does not contribute to the patient presenting condition    Health Maintenance   Topic Date Due    Potassium monitoring  01/04/2019    Creatinine monitoring  01/04/2019    Lipid screen  08/22/2022    DTaP/Tdap/Td vaccine (2 - Td) 10/19/2025    Flu vaccine  Completed    Pneumococcal med risk  Completed    HIV screen  Completed
FIBULA      SMALL INTESTINE SURGERY       Family History   Problem Relation Age of Onset    High Blood Pressure Mother     Diabetes Mother     High Blood Pressure Father     Cancer Father      prostate    Diabetes Father     Coronary Art Dis Father     Heart Attack Father     Heart Disease Father        CareTeam (Including outside providers/suppliers regularly involved in providing care):   Patient Care Team:  Leila Schaeffer MD as PCP - General (Internal Medicine)  Gemma Ortega MD as PCP - S Attributed Provider  Thomas Farooq MD    Wt Readings from Last 3 Encounters:   02/01/18 137 lb (62.1 kg)   01/04/18 140 lb (63.5 kg)   11/09/17 130 lb (59 kg)     Vitals:    02/01/18 1004   BP: 127/83   Site: Left Arm   Position: Sitting   Cuff Size: Medium Adult   Pulse: 96   Weight: 137 lb (62.1 kg)   Height: 5' 2\" (1.575 m)       General Appearance: alert and oriented to person, place and time, well developed and well- nourished, in no acute distress  Skin: warm and dry, no rash or erythema  Head: normocephalic and atraumatic  Eyes: pupils equal, round, and reactive to light, extraocular eye movements intact, conjunctivae normal  ENT: tympanic membrane, external ear and ear canal normal bilaterally, nose without deformity, nasal mucosa and turbinates normal without polyps  Neck: supple and non-tender without mass, no thyromegaly or thyroid nodules, no cervical lymphadenopathy  Pulmonary/Chest: clear to auscultation bilaterally- no wheezes, rales or rhonchi, normal air movement, no respiratory distress  Cardiovascular: normal rate, regular rhythm, normal S1 and S2, no murmurs, rubs, clicks, or gallops, distal pulses intact, no carotid bruits  Abdomen: soft, non-tender, non-distended, normal bowel sounds, no masses or organomegaly , Healed scar of old Exp laparotomy   Extremities: no cyanosis, clubbing or edema  Musculoskeletal: normal range of motion, no joint swelling, deformity or tenderness.  Right below

## 2018-02-13 ENCOUNTER — OFFICE VISIT (OUTPATIENT)
Dept: INTERNAL MEDICINE | Age: 43
End: 2018-02-13
Payer: MEDICARE

## 2018-02-13 VITALS
SYSTOLIC BLOOD PRESSURE: 132 MMHG | BODY MASS INDEX: 26.9 KG/M2 | WEIGHT: 137 LBS | HEIGHT: 60 IN | HEART RATE: 104 BPM | DIASTOLIC BLOOD PRESSURE: 87 MMHG

## 2018-02-13 DIAGNOSIS — E78.00 PURE HYPERCHOLESTEROLEMIA: ICD-10-CM

## 2018-02-13 DIAGNOSIS — I10 ESSENTIAL HYPERTENSION: ICD-10-CM

## 2018-02-13 DIAGNOSIS — K21.9 GASTROESOPHAGEAL REFLUX DISEASE WITHOUT ESOPHAGITIS: ICD-10-CM

## 2018-02-13 PROBLEM — B18.2 HEP C W/O COMA, CHRONIC (HCC): Chronic | Status: ACTIVE | Noted: 2017-07-18

## 2018-02-13 PROCEDURE — G8419 CALC BMI OUT NRM PARAM NOF/U: HCPCS | Performed by: STUDENT IN AN ORGANIZED HEALTH CARE EDUCATION/TRAINING PROGRAM

## 2018-02-13 PROCEDURE — 1036F TOBACCO NON-USER: CPT | Performed by: STUDENT IN AN ORGANIZED HEALTH CARE EDUCATION/TRAINING PROGRAM

## 2018-02-13 PROCEDURE — G8427 DOCREV CUR MEDS BY ELIG CLIN: HCPCS | Performed by: STUDENT IN AN ORGANIZED HEALTH CARE EDUCATION/TRAINING PROGRAM

## 2018-02-13 PROCEDURE — 99213 OFFICE O/P EST LOW 20 MIN: CPT | Performed by: STUDENT IN AN ORGANIZED HEALTH CARE EDUCATION/TRAINING PROGRAM

## 2018-02-13 PROCEDURE — G8484 FLU IMMUNIZE NO ADMIN: HCPCS | Performed by: STUDENT IN AN ORGANIZED HEALTH CARE EDUCATION/TRAINING PROGRAM

## 2018-02-13 RX ORDER — FAMOTIDINE 40 MG/1
40 TABLET, FILM COATED ORAL EVERY EVENING
Qty: 30 TABLET | Refills: 5 | Status: SHIPPED | OUTPATIENT
Start: 2018-02-13 | End: 2018-06-05 | Stop reason: ALTCHOICE

## 2018-02-13 RX ORDER — ATORVASTATIN CALCIUM 40 MG/1
40 TABLET, FILM COATED ORAL DAILY
Qty: 30 TABLET | Refills: 5 | Status: SHIPPED | OUTPATIENT
Start: 2018-02-13 | End: 2018-04-30 | Stop reason: ALTCHOICE

## 2018-02-13 RX ORDER — AMLODIPINE BESYLATE 10 MG/1
10 TABLET ORAL DAILY
Qty: 30 TABLET | Refills: 5 | Status: SHIPPED | OUTPATIENT
Start: 2018-02-13 | End: 2018-04-30 | Stop reason: ALTCHOICE

## 2018-02-13 ASSESSMENT — ENCOUNTER SYMPTOMS
BLURRED VISION: 0
ORTHOPNEA: 0
VOMITING: 0
COUGH: 0
BACK PAIN: 0
HEARTBURN: 1
SHORTNESS OF BREATH: 0
DOUBLE VISION: 0
NAUSEA: 0
SPUTUM PRODUCTION: 0
PHOTOPHOBIA: 0
HEMOPTYSIS: 0
ABDOMINAL PAIN: 0

## 2018-02-13 NOTE — PROGRESS NOTES
[facility] [address] [facility]  [address] [facility] [address] [facility]  Dept: [phone]  Dept Fax: [phone]    Office Progress/Follow Up Note  Date of patient's visit: 2/13/2018  Patient's Name:  Natty Gutierrez YOB: 1975            Patient Care Team:  Jonathan Chou MD as PCP - General (Internal Medicine)  Luisa Fuentes MD as PCP - [facility] Attributed Provider  Yanni Alvarez MD  ================================================================    REASON FOR VISIT/CHIEF COMPLAINT:  Hepatitis C (pt states no concerns )    HISTORY OF PRESENTING ILLNESS:  History was obtained from: patient. Natty Gutierrez is a 36 y.o. is here for a 3 month follow up appointment for HTN. The patient denies any headache, chest pain, SOB or visual disturbances. He is compliant with his medications. His BP in the clinic today was 132/87 mmHg. His BP at home in the range of 120s-130s. He still has chronic back pain for which he goes to the pain clinic. He has an appointment with surgery next week for hemorrhoids. He still has symptoms of acid reflux here and there for which he takes pepcid. No other complaints as of now. Patient Active Problem List   Diagnosis    History of neuroblastoma    RSD (reflex sympathetic dystrophy)    GERD (gastroesophageal reflux disease)    Chronic low back pain    Lumbar disc disease    Hemorrhoids, external    History of resection of small bowel    History of nephrolithiasis    Essential hypertension    Pure hypercholesterolemia    Hep C w/o coma, chronic (HCC)       There are no preventive care reminders to display for this patient.     Allergies   Allergen Reactions    Penicillins     Shellfish-Derived Products          Current Outpatient Prescriptions   Medication Sig Dispense Refill    atorvastatin (LIPITOR) 40 MG tablet Take 1 tablet by mouth daily 30 tablet 2    amLODIPine (NORVASC) 10 MG tablet Take 1 tablet by mouth daily 30 tablet 5    famotidine (PEPCID) 40 MG tablet Take 1 tablet by mouth every evening 30 tablet 3    Handicap Placard MISC by Does not apply route Patient requires a handicap placard due to right leg below knee amputation    Duration of need:10/24/17 to 10/23/22 1 each 0     No current facility-administered medications for this visit. Social History   Substance Use Topics    Smoking status: Never Smoker    Smokeless tobacco: Never Used    Alcohol use No       Family History   Problem Relation Age of Onset    High Blood Pressure Mother     Diabetes Mother     High Blood Pressure Father     Cancer Father      prostate    Diabetes Father     Coronary Art Dis Father     Heart Attack Father     Heart Disease Father         REVIEW OF SYSTEMS:  Review of Systems   Constitutional: Negative for chills, fever, malaise/fatigue and weight loss. HENT: Negative for ear discharge, ear pain, hearing loss and tinnitus. Eyes: Negative for blurred vision, double vision and photophobia. Respiratory: Negative for cough, hemoptysis, sputum production and shortness of breath. Cardiovascular: Negative for chest pain, palpitations, orthopnea, leg swelling and PND. Gastrointestinal: Positive for heartburn. Negative for abdominal pain, melena, nausea and vomiting. Genitourinary: Negative. Musculoskeletal: Negative for back pain, myalgias and neck pain. Skin: Negative for itching and rash. Neurological: Negative for dizziness, tingling, tremors, focal weakness, seizures and headaches. Endo/Heme/Allergies: Negative. Psychiatric/Behavioral: Negative.         PHYSICAL EXAM:  Vitals:    02/13/18 0946   BP: 132/87   Site: Left Arm   Position: Sitting   Cuff Size: Medium Adult   Pulse: 104   Weight: 137 lb (62.1 kg)   Height: 5' (1.524 m)     BP Readings from Last 3 Encounters:   02/13/18 132/87   02/01/18 127/83   01/04/18 (!) 144/102        Physical Exam   Constitutional: He is oriented to person,

## 2018-02-13 NOTE — PROGRESS NOTES
Attending Physician Statement (Cassandra Figueroa)  Internal Medicine Clinic Progress Note    I have discussed the care of Mary Ortiz, including pertinent history and exam findings,  with the resident. I have reviewed the key elements of all parts of the encounter with the resident. I agree with the assessment, plan and orders as documented by the resident.      Farrukhu Jose Rafael Chandler MD, HILARY  Attending, Internal Medicine  [address] [place] [address]  2/13/2018, 10:24 AM

## 2018-02-13 NOTE — PATIENT INSTRUCTIONS
Return To Clinic in 6 months, you will be called to schedule an appointment, please call if needed any sooner. After Visit Summary given and reviewed. TV    It is very important for your care that you keep your appointment. If for some reason you are unable to keep your appointment it is equally important that you call our office at [phone] to cancel your appointment and reschedule. Failure to do so may result in your termination from our practice.

## 2018-02-19 DIAGNOSIS — K64.9 HEMORRHOIDS, UNSPECIFIED HEMORRHOID TYPE: Primary | ICD-10-CM

## 2018-03-07 ENCOUNTER — OFFICE VISIT (OUTPATIENT)
Dept: SURGERY | Age: 43
End: 2018-03-07
Payer: MEDICARE

## 2018-03-07 VITALS
BODY MASS INDEX: 27.25 KG/M2 | DIASTOLIC BLOOD PRESSURE: 86 MMHG | TEMPERATURE: 98.6 F | WEIGHT: 138.8 LBS | HEART RATE: 109 BPM | HEIGHT: 60 IN | SYSTOLIC BLOOD PRESSURE: 139 MMHG

## 2018-03-07 DIAGNOSIS — Z90.49 HISTORY OF RESECTION OF SMALL BOWEL: ICD-10-CM

## 2018-03-07 DIAGNOSIS — K59.01 CONSTIPATION BY DELAYED COLONIC TRANSIT: ICD-10-CM

## 2018-03-07 DIAGNOSIS — Z80.0 FAMILY HISTORY OF COLON CANCER IN FATHER: ICD-10-CM

## 2018-03-07 DIAGNOSIS — K64.4 EXTERNAL HEMORRHOIDS: Primary | ICD-10-CM

## 2018-03-07 DIAGNOSIS — Z87.19 HISTORY OF SMALL BOWEL OBSTRUCTION: ICD-10-CM

## 2018-03-07 DIAGNOSIS — Z85.858 PERSONAL HISTORY OF NEUROBLASTOMA: ICD-10-CM

## 2018-03-07 PROCEDURE — 99213 OFFICE O/P EST LOW 20 MIN: CPT

## 2018-03-07 PROCEDURE — 1036F TOBACCO NON-USER: CPT | Performed by: SURGERY

## 2018-03-07 PROCEDURE — G8419 CALC BMI OUT NRM PARAM NOF/U: HCPCS | Performed by: SURGERY

## 2018-03-07 PROCEDURE — G8427 DOCREV CUR MEDS BY ELIG CLIN: HCPCS | Performed by: SURGERY

## 2018-03-07 PROCEDURE — G8482 FLU IMMUNIZE ORDER/ADMIN: HCPCS | Performed by: SURGERY

## 2018-03-07 PROCEDURE — 99205 OFFICE O/P NEW HI 60 MIN: CPT | Performed by: SURGERY

## 2018-03-07 RX ORDER — POLYETHYLENE GLYCOL 3350 17 G/17G
POWDER, FOR SOLUTION ORAL
Qty: 238 G | Refills: 0 | Status: SHIPPED | OUTPATIENT
Start: 2018-03-07 | End: 2019-02-01 | Stop reason: ALTCHOICE

## 2018-03-07 RX ORDER — CYCLOBENZAPRINE HCL 10 MG
10 TABLET ORAL 3 TIMES DAILY PRN
COMMUNITY
End: 2019-10-16 | Stop reason: SDUPTHER

## 2018-03-07 RX ORDER — IBUPROFEN 400 MG/1
400 TABLET ORAL EVERY 6 HOURS PRN
COMMUNITY
End: 2019-10-16 | Stop reason: ALTCHOICE

## 2018-03-07 RX ORDER — OXYCODONE AND ACETAMINOPHEN 10; 325 MG/1; MG/1
1 TABLET ORAL EVERY 4 HOURS PRN
COMMUNITY
End: 2019-10-16 | Stop reason: ALTCHOICE

## 2018-03-07 NOTE — PROGRESS NOTES
hot flashes, malaise/lethargy, mood swings, palpitations, polydipsia/polyuria,   Respiratory ROS: no cough, shortness of breath, or wheezing  Cardiovascular ROS: no chest pain or dyspnea on exertion  Gastrointestinal ROS: occasional constipation, hemorrhoids  Genito-Urinary ROS: no dysuria, trouble voiding, or hematuria  Musculoskeletal ROS: negative for - gait disturbance, joint pain, joint stiffness, joint swelling, muscle pain, muscular weakness  Neurological ROS: no TIA or stroke symptoms  Dermatological ROS: negative for - rash or skin lesion changes    Physical Exam:  Vitals:    03/07/18 1025   BP: 139/86   Pulse: 109   Temp: 98.6 °F (37 °C)       General:A & O x3  HEENT:  NCAT, PERRL, EMOI, oral mucus membrane pink and moist, no mass palpated on neck exam  Heart: S1S2  Lungs: bilateral chest rise with normal respiratory effort  Abdomen: soft, nontender, no guarding, no rebound, no masses  RECTAL: external hemorrhoids present without active bleeding or thrombosis, no evidence of ulceration or infection. No internal masses palpated on KACY  Extremity: No peripheral edema  SKIN: Warm, dry  Neuro: CN II-XII grossly intact. No focal deficits. Assessment     1. External hemorrhoids     2. Constipation by delayed colonic transit     3. History of small bowel obstruction     4. Family history of colon cancer in father     11. Personal history of neuroblastoma     6. History of resection of small bowel         Plan   1. Recommend colonoscopy given history of hemorrhoids, constipation and [address] colon cancer. Consent obtained, all questions answered  2. Advised increase dietary fiber and water to soften stools and improve regularity. Discussed with Dr. Víctor Ruiz  3/7/2018     I have reviewed the resident's note and I either performed the key elements of the medical history and physical exam or was present with the resident when the key elements of the medical history and physical exam were performed.  I

## 2018-03-07 NOTE — PATIENT INSTRUCTIONS
Thank you letting us take care of you today. We hope that all your questions were addressed. If a question was overlooked or something else comes to mind after you return home, please call our office at [phone]. If you need to cancel or change an appointment, surgery or procedure, please contact the office at [phone].

## 2018-03-21 ENCOUNTER — TELEPHONE (OUTPATIENT)
Dept: FAMILY MEDICINE CLINIC | Age: 43
End: 2018-03-21

## 2018-04-17 ENCOUNTER — NURSE ONLY (OUTPATIENT)
Dept: INTERNAL MEDICINE | Age: 43
End: 2018-04-17
Payer: MEDICARE

## 2018-04-17 VITALS — HEART RATE: 83 BPM | DIASTOLIC BLOOD PRESSURE: 82 MMHG | SYSTOLIC BLOOD PRESSURE: 130 MMHG

## 2018-04-17 DIAGNOSIS — I10 ESSENTIAL HYPERTENSION: Primary | ICD-10-CM

## 2018-04-17 PROCEDURE — 99211 OFF/OP EST MAY X REQ PHY/QHP: CPT | Performed by: INTERNAL MEDICINE

## 2018-04-17 PROCEDURE — 99213 OFFICE O/P EST LOW 20 MIN: CPT | Performed by: INTERNAL MEDICINE

## 2018-04-30 ENCOUNTER — OFFICE VISIT (OUTPATIENT)
Dept: INTERNAL MEDICINE | Age: 43
End: 2018-04-30
Payer: MEDICARE

## 2018-04-30 VITALS
SYSTOLIC BLOOD PRESSURE: 129 MMHG | DIASTOLIC BLOOD PRESSURE: 83 MMHG | HEART RATE: 98 BPM | BODY MASS INDEX: 26.72 KG/M2 | WEIGHT: 136.8 LBS

## 2018-04-30 DIAGNOSIS — E78.00 PURE HYPERCHOLESTEROLEMIA: ICD-10-CM

## 2018-04-30 DIAGNOSIS — K64.4 HEMORRHOIDS, EXTERNAL: ICD-10-CM

## 2018-04-30 DIAGNOSIS — I10 ESSENTIAL HYPERTENSION: Primary | ICD-10-CM

## 2018-04-30 DIAGNOSIS — K21.9 GASTROESOPHAGEAL REFLUX DISEASE WITHOUT ESOPHAGITIS: ICD-10-CM

## 2018-04-30 PROBLEM — B18.2 HEP C W/O COMA, CHRONIC (HCC): Chronic | Status: RESOLVED | Noted: 2017-07-18 | Resolved: 2018-04-30

## 2018-04-30 PROCEDURE — G8427 DOCREV CUR MEDS BY ELIG CLIN: HCPCS | Performed by: STUDENT IN AN ORGANIZED HEALTH CARE EDUCATION/TRAINING PROGRAM

## 2018-04-30 PROCEDURE — G8419 CALC BMI OUT NRM PARAM NOF/U: HCPCS | Performed by: STUDENT IN AN ORGANIZED HEALTH CARE EDUCATION/TRAINING PROGRAM

## 2018-04-30 PROCEDURE — 99213 OFFICE O/P EST LOW 20 MIN: CPT | Performed by: STUDENT IN AN ORGANIZED HEALTH CARE EDUCATION/TRAINING PROGRAM

## 2018-04-30 PROCEDURE — 99212 OFFICE O/P EST SF 10 MIN: CPT

## 2018-04-30 PROCEDURE — 1036F TOBACCO NON-USER: CPT | Performed by: STUDENT IN AN ORGANIZED HEALTH CARE EDUCATION/TRAINING PROGRAM

## 2018-04-30 RX ORDER — HYDROCHLOROTHIAZIDE 12.5 MG/1
12.5 TABLET ORAL DAILY
Qty: 30 TABLET | Refills: 3 | Status: SHIPPED | OUTPATIENT
Start: 2018-04-30 | End: 2018-10-31 | Stop reason: SDUPTHER

## 2018-04-30 RX ORDER — PANTOPRAZOLE SODIUM 40 MG/1
40 TABLET, DELAYED RELEASE ORAL DAILY
Qty: 30 TABLET | Refills: 0 | Status: SHIPPED | OUTPATIENT
Start: 2018-04-30 | End: 2018-10-31 | Stop reason: SDUPTHER

## 2018-04-30 RX ORDER — AMLODIPINE BESYLATE AND ATORVASTATIN CALCIUM 10; 40 MG/1; MG/1
1 TABLET, FILM COATED ORAL DAILY
Qty: 30 TABLET | Refills: 5 | Status: SHIPPED | OUTPATIENT
Start: 2018-04-30 | End: 2018-10-31 | Stop reason: SDUPTHER

## 2018-06-05 ENCOUNTER — OFFICE VISIT (OUTPATIENT)
Dept: INTERNAL MEDICINE | Age: 43
End: 2018-06-05
Payer: MEDICARE

## 2018-06-05 VITALS
WEIGHT: 133 LBS | DIASTOLIC BLOOD PRESSURE: 88 MMHG | HEIGHT: 62 IN | BODY MASS INDEX: 24.48 KG/M2 | SYSTOLIC BLOOD PRESSURE: 130 MMHG | HEART RATE: 102 BPM

## 2018-06-05 DIAGNOSIS — I10 ESSENTIAL HYPERTENSION: Primary | ICD-10-CM

## 2018-06-05 DIAGNOSIS — M51.9 LUMBAR DISC DISEASE: ICD-10-CM

## 2018-06-05 DIAGNOSIS — K64.4 HEMORRHOIDS, EXTERNAL: ICD-10-CM

## 2018-06-05 DIAGNOSIS — K21.9 GASTROESOPHAGEAL REFLUX DISEASE WITHOUT ESOPHAGITIS: ICD-10-CM

## 2018-06-05 PROCEDURE — 99213 OFFICE O/P EST LOW 20 MIN: CPT | Performed by: STUDENT IN AN ORGANIZED HEALTH CARE EDUCATION/TRAINING PROGRAM

## 2018-06-05 PROCEDURE — 1036F TOBACCO NON-USER: CPT | Performed by: STUDENT IN AN ORGANIZED HEALTH CARE EDUCATION/TRAINING PROGRAM

## 2018-06-05 PROCEDURE — G8420 CALC BMI NORM PARAMETERS: HCPCS | Performed by: STUDENT IN AN ORGANIZED HEALTH CARE EDUCATION/TRAINING PROGRAM

## 2018-06-05 PROCEDURE — G8427 DOCREV CUR MEDS BY ELIG CLIN: HCPCS | Performed by: STUDENT IN AN ORGANIZED HEALTH CARE EDUCATION/TRAINING PROGRAM

## 2018-06-05 ASSESSMENT — ENCOUNTER SYMPTOMS
DIARRHEA: 0
NAUSEA: 0
ABDOMINAL PAIN: 0
HEARTBURN: 0
EYE DISCHARGE: 0
BLURRED VISION: 0
BACK PAIN: 1
SORE THROAT: 0
DOUBLE VISION: 0
ORTHOPNEA: 0
SPUTUM PRODUCTION: 0
COUGH: 0
SINUS PAIN: 0
VOMITING: 0
BLOOD IN STOOL: 0
CONSTIPATION: 0
EYE PAIN: 0

## 2018-06-27 ENCOUNTER — TELEPHONE (OUTPATIENT)
Dept: INTERNAL MEDICINE | Age: 43
End: 2018-06-27

## 2018-06-27 NOTE — TELEPHONE ENCOUNTER
PC from patient requesting new script for Handicap [place] pt last seen on 6/5/18, next appt is 10/5/18. Phone Number confirmed. Order Pended. Next Visit Date:  Future Appointments  Date Time Provider Ximena Goodman   10/5/2018 10:15 AM Kianna Barba MD [address] [facility] Maintenance   Topic Date Due    Potassium monitoring  01/04/2019    Creatinine monitoring  01/04/2019    Lipid screen  08/22/2022    DTaP/Tdap/Td vaccine (2 - Td) 10/19/2025    Flu vaccine  Completed    Pneumococcal med risk  Completed    HIV screen  Completed       Hemoglobin A1C (%)   Date Value   01/13/2015 5.2             ( goal A1C is < 7)   Microalb/Crt.  Ratio (mcg/mg creat)   Date Value   10/20/2015 23     LDL Cholesterol (mg/dL)   Date Value   08/22/2017 122       (goal LDL is <100)   AST (U/L)   Date Value   01/04/2018 19     ALT (U/L)   Date Value   01/04/2018 12     BUN (mg/dL)   Date Value   01/04/2018 14     BP Readings from Last 3 Encounters:   06/05/18 130/88   04/30/18 129/83   04/17/18 130/82          (goal 120/80)    All Future Testing planned in [facility]  Lab Frequency Next Occurrence   Basic Metabolic Panel Once 96/58/7116               Patient Active Problem List:     RSD (reflex sympathetic dystrophy)     GERD (gastroesophageal reflux disease)     Lumbar disc disease     Hemorrhoids, external     Essential hypertension     Pure hypercholesterolemia

## 2018-10-31 ENCOUNTER — OFFICE VISIT (OUTPATIENT)
Dept: INTERNAL MEDICINE | Age: 43
End: 2018-10-31
Payer: MEDICARE

## 2018-10-31 VITALS
DIASTOLIC BLOOD PRESSURE: 91 MMHG | HEART RATE: 96 BPM | BODY MASS INDEX: 23.92 KG/M2 | WEIGHT: 130 LBS | HEIGHT: 62 IN | SYSTOLIC BLOOD PRESSURE: 139 MMHG

## 2018-10-31 DIAGNOSIS — E78.00 PURE HYPERCHOLESTEROLEMIA: ICD-10-CM

## 2018-10-31 DIAGNOSIS — I10 ESSENTIAL HYPERTENSION: Primary | ICD-10-CM

## 2018-10-31 DIAGNOSIS — Z23 NEEDS FLU SHOT: ICD-10-CM

## 2018-10-31 DIAGNOSIS — K21.9 GASTROESOPHAGEAL REFLUX DISEASE WITHOUT ESOPHAGITIS: ICD-10-CM

## 2018-10-31 PROCEDURE — G8482 FLU IMMUNIZE ORDER/ADMIN: HCPCS | Performed by: INTERNAL MEDICINE

## 2018-10-31 PROCEDURE — 99213 OFFICE O/P EST LOW 20 MIN: CPT | Performed by: INTERNAL MEDICINE

## 2018-10-31 PROCEDURE — 99211 OFF/OP EST MAY X REQ PHY/QHP: CPT | Performed by: INTERNAL MEDICINE

## 2018-10-31 PROCEDURE — G8420 CALC BMI NORM PARAMETERS: HCPCS | Performed by: INTERNAL MEDICINE

## 2018-10-31 PROCEDURE — 1036F TOBACCO NON-USER: CPT | Performed by: INTERNAL MEDICINE

## 2018-10-31 PROCEDURE — G8427 DOCREV CUR MEDS BY ELIG CLIN: HCPCS | Performed by: INTERNAL MEDICINE

## 2018-10-31 PROCEDURE — 90686 IIV4 VACC NO PRSV 0.5 ML IM: CPT | Performed by: INTERNAL MEDICINE

## 2018-10-31 RX ORDER — PANTOPRAZOLE SODIUM 40 MG/1
40 TABLET, DELAYED RELEASE ORAL DAILY
Qty: 30 TABLET | Refills: 3 | Status: SHIPPED | OUTPATIENT
Start: 2018-10-31 | End: 2019-02-01 | Stop reason: SDUPTHER

## 2018-10-31 RX ORDER — AMLODIPINE BESYLATE AND ATORVASTATIN CALCIUM 10; 40 MG/1; MG/1
1 TABLET, FILM COATED ORAL DAILY
Qty: 30 TABLET | Refills: 5 | Status: SHIPPED | OUTPATIENT
Start: 2018-10-31 | End: 2019-02-01 | Stop reason: SDUPTHER

## 2018-10-31 RX ORDER — HYDROCHLOROTHIAZIDE 12.5 MG/1
12.5 TABLET ORAL DAILY
Qty: 30 TABLET | Refills: 3 | Status: SHIPPED | OUTPATIENT
Start: 2018-10-31 | End: 2019-02-01 | Stop reason: SDUPTHER

## 2018-10-31 NOTE — PATIENT INSTRUCTIONS
Your medications for this visit were escribed to your preferred pharmacy. You were given your flu vaccination in office today. Avs was given and reviewed appt card given with next appt. MS    Patient Education        Constipation: Care Instructions  Your Care Instructions    Constipation means that you have a hard time passing stools (bowel movements). People pass stools from 3 times a day to once every 3 days. What is normal for you may be different. Constipation may occur with pain in the rectum and cramping. The pain may get worse when you try to pass stools. Sometimes there are small amounts of bright red blood on toilet paper or the surface of stools. This is because of enlarged veins near the rectum (hemorrhoids). A few changes in your diet and lifestyle may help you avoid ongoing constipation. Your doctor may also prescribe medicine to help loosen your stool. Some medicines can cause constipation. These include pain medicines and antidepressants. Tell your doctor about all the medicines you take. Your doctor may want to make a medicine change to ease your symptoms. Follow-up care is a key part of your treatment and safety. Be sure to make and go to all appointments, and call your doctor if you are having problems. It's also a good idea to know your test results and keep a list of the medicines you take. How can you care for yourself at home? · Drink plenty of fluids, enough so that your urine is light yellow or clear like water. If you have kidney, heart, or liver disease and have to limit fluids, talk with your doctor before you increase the amount of fluids you drink. · Include high-fiber foods in your diet each day. These include fruits, vegetables, beans, and whole grains. · Get at least 30 minutes of exercise on most days of the week. Walking is a good choice. You also may want to do other activities, such as running, swimming, cycling, or playing tennis or team sports.   · Take a fiber

## 2018-11-01 ENCOUNTER — TELEPHONE (OUTPATIENT)
Dept: INTERNAL MEDICINE | Age: 43
End: 2018-11-01

## 2018-11-01 NOTE — TELEPHONE ENCOUNTER
PA request received for Amlodipine-Atrovastatin 10-40 mg. This is non-formulary. Pt must try/fail/have contraindication to formulary agent(s): please order medications separately.

## 2019-02-01 ENCOUNTER — OFFICE VISIT (OUTPATIENT)
Dept: INTERNAL MEDICINE | Age: 44
End: 2019-02-01
Payer: MEDICARE

## 2019-02-01 VITALS
HEIGHT: 62 IN | BODY MASS INDEX: 24.8 KG/M2 | HEART RATE: 100 BPM | WEIGHT: 134.8 LBS | SYSTOLIC BLOOD PRESSURE: 130 MMHG | DIASTOLIC BLOOD PRESSURE: 96 MMHG

## 2019-02-01 DIAGNOSIS — I10 ESSENTIAL HYPERTENSION: Primary | ICD-10-CM

## 2019-02-01 DIAGNOSIS — K21.9 GASTROESOPHAGEAL REFLUX DISEASE WITHOUT ESOPHAGITIS: ICD-10-CM

## 2019-02-01 DIAGNOSIS — M51.9 LUMBAR DISC DISEASE: ICD-10-CM

## 2019-02-01 DIAGNOSIS — Z89.619 HISTORY OF LEG AMPUTATION (HCC): ICD-10-CM

## 2019-02-01 DIAGNOSIS — E78.00 PURE HYPERCHOLESTEROLEMIA: ICD-10-CM

## 2019-02-01 DIAGNOSIS — Z89.619 HX OF LEG AMPUTATION (HCC): ICD-10-CM

## 2019-02-01 PROCEDURE — G8482 FLU IMMUNIZE ORDER/ADMIN: HCPCS | Performed by: STUDENT IN AN ORGANIZED HEALTH CARE EDUCATION/TRAINING PROGRAM

## 2019-02-01 PROCEDURE — 1036F TOBACCO NON-USER: CPT | Performed by: STUDENT IN AN ORGANIZED HEALTH CARE EDUCATION/TRAINING PROGRAM

## 2019-02-01 PROCEDURE — G8427 DOCREV CUR MEDS BY ELIG CLIN: HCPCS | Performed by: STUDENT IN AN ORGANIZED HEALTH CARE EDUCATION/TRAINING PROGRAM

## 2019-02-01 PROCEDURE — 99213 OFFICE O/P EST LOW 20 MIN: CPT | Performed by: STUDENT IN AN ORGANIZED HEALTH CARE EDUCATION/TRAINING PROGRAM

## 2019-02-01 PROCEDURE — 99211 OFF/OP EST MAY X REQ PHY/QHP: CPT | Performed by: INTERNAL MEDICINE

## 2019-02-01 PROCEDURE — G8420 CALC BMI NORM PARAMETERS: HCPCS | Performed by: STUDENT IN AN ORGANIZED HEALTH CARE EDUCATION/TRAINING PROGRAM

## 2019-02-01 RX ORDER — AMLODIPINE BESYLATE AND ATORVASTATIN CALCIUM 10; 40 MG/1; MG/1
1 TABLET, FILM COATED ORAL DAILY
Qty: 30 TABLET | Refills: 5 | Status: SHIPPED | OUTPATIENT
Start: 2019-02-01 | End: 2019-02-15 | Stop reason: ALTCHOICE

## 2019-02-01 RX ORDER — HYDROCHLOROTHIAZIDE 12.5 MG/1
12.5 TABLET ORAL DAILY
Qty: 30 TABLET | Refills: 3 | Status: SHIPPED | OUTPATIENT
Start: 2019-02-01 | End: 2019-10-16 | Stop reason: SDUPTHER

## 2019-02-01 RX ORDER — PANTOPRAZOLE SODIUM 40 MG/1
40 TABLET, DELAYED RELEASE ORAL DAILY
Qty: 30 TABLET | Refills: 3 | Status: SHIPPED | OUTPATIENT
Start: 2019-02-01 | End: 2019-10-16 | Stop reason: SDUPTHER

## 2019-02-01 ASSESSMENT — ENCOUNTER SYMPTOMS
SHORTNESS OF BREATH: 0
COLOR CHANGE: 0
ABDOMINAL PAIN: 0
SINUS PAIN: 0
VOMITING: 0
BACK PAIN: 0
RHINORRHEA: 0
EYE DISCHARGE: 0
CHEST TIGHTNESS: 0
SINUS PRESSURE: 0
NAUSEA: 0
DIARRHEA: 0
EYE PAIN: 0
FACIAL SWELLING: 0
STRIDOR: 0
CHOKING: 0
ABDOMINAL DISTENTION: 0

## 2019-02-11 ENCOUNTER — TELEPHONE (OUTPATIENT)
Dept: INTERNAL MEDICINE | Age: 44
End: 2019-02-11

## 2019-02-12 ENCOUNTER — TELEPHONE (OUTPATIENT)
Dept: INTERNAL MEDICINE | Age: 44
End: 2019-02-12

## 2019-02-12 DIAGNOSIS — I10 ESSENTIAL HYPERTENSION: Primary | ICD-10-CM

## 2019-02-12 DIAGNOSIS — E78.00 PURE HYPERCHOLESTEROLEMIA: ICD-10-CM

## 2019-02-15 RX ORDER — AMLODIPINE BESYLATE 5 MG/1
10 TABLET ORAL DAILY
Qty: 30 TABLET | Refills: 3 | Status: SHIPPED | OUTPATIENT
Start: 2019-02-15 | End: 2019-10-16 | Stop reason: SDUPTHER

## 2019-02-15 RX ORDER — ATORVASTATIN CALCIUM 40 MG/1
40 TABLET, FILM COATED ORAL DAILY
Qty: 90 TABLET | Refills: 1 | Status: SHIPPED | OUTPATIENT
Start: 2019-02-15 | End: 2019-10-16 | Stop reason: SDUPTHER

## 2019-03-19 ENCOUNTER — TELEPHONE (OUTPATIENT)
Dept: INTERNAL MEDICINE | Age: 44
End: 2019-03-19

## 2019-10-16 ENCOUNTER — OFFICE VISIT (OUTPATIENT)
Dept: INTERNAL MEDICINE | Age: 44
End: 2019-10-16
Payer: MEDICARE

## 2019-10-16 VITALS
HEART RATE: 71 BPM | HEIGHT: 62 IN | WEIGHT: 131 LBS | SYSTOLIC BLOOD PRESSURE: 143 MMHG | DIASTOLIC BLOOD PRESSURE: 102 MMHG | BODY MASS INDEX: 24.11 KG/M2

## 2019-10-16 DIAGNOSIS — E78.00 PURE HYPERCHOLESTEROLEMIA: ICD-10-CM

## 2019-10-16 DIAGNOSIS — Z23 NEEDS FLU SHOT: Primary | ICD-10-CM

## 2019-10-16 DIAGNOSIS — K21.9 GASTROESOPHAGEAL REFLUX DISEASE WITHOUT ESOPHAGITIS: ICD-10-CM

## 2019-10-16 DIAGNOSIS — M51.9 LUMBAR DISC DISEASE: ICD-10-CM

## 2019-10-16 DIAGNOSIS — I10 ESSENTIAL HYPERTENSION: ICD-10-CM

## 2019-10-16 DIAGNOSIS — Z13.220 LIPID SCREENING: ICD-10-CM

## 2019-10-16 PROCEDURE — G8482 FLU IMMUNIZE ORDER/ADMIN: HCPCS | Performed by: STUDENT IN AN ORGANIZED HEALTH CARE EDUCATION/TRAINING PROGRAM

## 2019-10-16 PROCEDURE — G8427 DOCREV CUR MEDS BY ELIG CLIN: HCPCS | Performed by: STUDENT IN AN ORGANIZED HEALTH CARE EDUCATION/TRAINING PROGRAM

## 2019-10-16 PROCEDURE — 99211 OFF/OP EST MAY X REQ PHY/QHP: CPT | Performed by: INTERNAL MEDICINE

## 2019-10-16 PROCEDURE — G8420 CALC BMI NORM PARAMETERS: HCPCS | Performed by: STUDENT IN AN ORGANIZED HEALTH CARE EDUCATION/TRAINING PROGRAM

## 2019-10-16 PROCEDURE — 99213 OFFICE O/P EST LOW 20 MIN: CPT | Performed by: STUDENT IN AN ORGANIZED HEALTH CARE EDUCATION/TRAINING PROGRAM

## 2019-10-16 PROCEDURE — 90688 IIV4 VACCINE SPLT 0.5 ML IM: CPT | Performed by: STUDENT IN AN ORGANIZED HEALTH CARE EDUCATION/TRAINING PROGRAM

## 2019-10-16 PROCEDURE — 1036F TOBACCO NON-USER: CPT | Performed by: STUDENT IN AN ORGANIZED HEALTH CARE EDUCATION/TRAINING PROGRAM

## 2019-10-16 RX ORDER — PANTOPRAZOLE SODIUM 40 MG/1
40 TABLET, DELAYED RELEASE ORAL DAILY
Qty: 30 TABLET | Refills: 3 | Status: SHIPPED | OUTPATIENT
Start: 2019-10-16 | End: 2020-02-10

## 2019-10-16 RX ORDER — CYCLOBENZAPRINE HCL 10 MG
10 TABLET ORAL 3 TIMES DAILY PRN
Qty: 30 TABLET | Refills: 3 | Status: SHIPPED | OUTPATIENT
Start: 2019-10-16 | End: 2020-01-08 | Stop reason: SDUPTHER

## 2019-10-16 RX ORDER — HYDROCHLOROTHIAZIDE 12.5 MG/1
12.5 TABLET ORAL DAILY
Qty: 30 TABLET | Refills: 3 | Status: SHIPPED | OUTPATIENT
Start: 2019-10-16 | End: 2020-01-09

## 2019-10-16 RX ORDER — AMLODIPINE BESYLATE 10 MG/1
10 TABLET ORAL DAILY
Qty: 30 TABLET | Refills: 3 | Status: SHIPPED | OUTPATIENT
Start: 2019-10-16 | End: 2020-02-10

## 2019-10-16 RX ORDER — IBUPROFEN 400 MG/1
400 TABLET ORAL EVERY 6 HOURS PRN
Qty: 120 TABLET | Status: CANCELLED | OUTPATIENT
Start: 2019-10-16

## 2019-10-16 RX ORDER — ATORVASTATIN CALCIUM 40 MG/1
40 TABLET, FILM COATED ORAL DAILY
Qty: 90 TABLET | Refills: 1 | Status: SHIPPED | OUTPATIENT
Start: 2019-10-16 | End: 2020-04-09

## 2019-10-16 ASSESSMENT — PATIENT HEALTH QUESTIONNAIRE - PHQ9
SUM OF ALL RESPONSES TO PHQ QUESTIONS 1-9: 0
1. LITTLE INTEREST OR PLEASURE IN DOING THINGS: 0
2. FEELING DOWN, DEPRESSED OR HOPELESS: 0
SUM OF ALL RESPONSES TO PHQ9 QUESTIONS 1 & 2: 0
SUM OF ALL RESPONSES TO PHQ QUESTIONS 1-9: 0

## 2019-10-16 ASSESSMENT — ENCOUNTER SYMPTOMS
CHEST TIGHTNESS: 0
EYE DISCHARGE: 0
BACK PAIN: 0
FACIAL SWELLING: 0
COLOR CHANGE: 0
ABDOMINAL DISTENTION: 0
STRIDOR: 0
EYE PAIN: 0
SINUS PAIN: 0
VOMITING: 0
NAUSEA: 0
ABDOMINAL PAIN: 0
DIARRHEA: 0
RHINORRHEA: 0
CHOKING: 0
SHORTNESS OF BREATH: 0
SINUS PRESSURE: 0

## 2019-10-21 ENCOUNTER — HOSPITAL ENCOUNTER (OUTPATIENT)
Age: 44
Setting detail: SPECIMEN
Discharge: HOME | End: 2019-10-21
Payer: MEDICARE

## 2019-10-21 DIAGNOSIS — I10 ESSENTIAL HYPERTENSION: ICD-10-CM

## 2019-10-21 DIAGNOSIS — Z13.220 LIPID SCREENING: ICD-10-CM

## 2019-10-21 LAB
ANION GAP SERPL CALCULATED.3IONS-SCNC: 15 MMOL/L (ref 9–17)
BUN BLDV-MCNC: 23 MG/DL (ref 6–20)
BUN/CREAT BLD: ABNORMAL (ref 9–20)
CALCIUM SERPL-MCNC: 9.6 MG/DL (ref 8.6–10.4)
CHLORIDE BLD-SCNC: 104 MMOL/L (ref 98–107)
CHOLESTEROL/HDL RATIO: 3.5
CHOLESTEROL: 218 MG/DL
CO2: 19 MMOL/L (ref 20–31)
CREAT SERPL-MCNC: 1.22 MG/DL (ref 0.7–1.2)
GFR AFRICAN AMERICAN: >60 ML/MIN
GFR NON-AFRICAN AMERICAN: >60 ML/MIN
GFR SERPL CREATININE-BSD FRML MDRD: ABNORMAL ML/MIN/{1.73_M2}
GFR SERPL CREATININE-BSD FRML MDRD: ABNORMAL ML/MIN/{1.73_M2}
GLUCOSE BLD-MCNC: 93 MG/DL (ref 70–99)
HDLC SERPL-MCNC: 62 MG/DL
LDL CHOLESTEROL: 142 MG/DL (ref 0–130)
POTASSIUM SERPL-SCNC: 4.1 MMOL/L (ref 3.7–5.3)
SODIUM BLD-SCNC: 138 MMOL/L (ref 135–144)
TRIGL SERPL-MCNC: 68 MG/DL
VLDLC SERPL CALC-MCNC: ABNORMAL MG/DL (ref 1–30)

## 2019-10-21 PROCEDURE — 36415 COLL VENOUS BLD VENIPUNCTURE: CPT

## 2019-10-21 PROCEDURE — 80048 BASIC METABOLIC PNL TOTAL CA: CPT

## 2019-10-21 PROCEDURE — 80061 LIPID PANEL: CPT

## 2019-10-22 ENCOUNTER — TELEPHONE (OUTPATIENT)
Dept: INTERNAL MEDICINE | Age: 44
End: 2019-10-22

## 2019-11-06 ENCOUNTER — OFFICE VISIT (OUTPATIENT)
Dept: INTERNAL MEDICINE | Age: 44
End: 2019-11-06
Payer: MEDICARE

## 2019-11-06 VITALS
HEART RATE: 93 BPM | DIASTOLIC BLOOD PRESSURE: 85 MMHG | BODY MASS INDEX: 24.14 KG/M2 | WEIGHT: 132 LBS | SYSTOLIC BLOOD PRESSURE: 132 MMHG

## 2019-11-06 DIAGNOSIS — E78.00 PURE HYPERCHOLESTEROLEMIA: ICD-10-CM

## 2019-11-06 DIAGNOSIS — I10 ESSENTIAL HYPERTENSION: Primary | ICD-10-CM

## 2019-11-06 PROCEDURE — 99213 OFFICE O/P EST LOW 20 MIN: CPT | Performed by: STUDENT IN AN ORGANIZED HEALTH CARE EDUCATION/TRAINING PROGRAM

## 2019-11-06 PROCEDURE — 1036F TOBACCO NON-USER: CPT | Performed by: STUDENT IN AN ORGANIZED HEALTH CARE EDUCATION/TRAINING PROGRAM

## 2019-11-06 PROCEDURE — G8482 FLU IMMUNIZE ORDER/ADMIN: HCPCS | Performed by: STUDENT IN AN ORGANIZED HEALTH CARE EDUCATION/TRAINING PROGRAM

## 2019-11-06 PROCEDURE — G8427 DOCREV CUR MEDS BY ELIG CLIN: HCPCS | Performed by: STUDENT IN AN ORGANIZED HEALTH CARE EDUCATION/TRAINING PROGRAM

## 2019-11-06 PROCEDURE — G8420 CALC BMI NORM PARAMETERS: HCPCS | Performed by: STUDENT IN AN ORGANIZED HEALTH CARE EDUCATION/TRAINING PROGRAM

## 2019-11-06 PROCEDURE — 99211 OFF/OP EST MAY X REQ PHY/QHP: CPT | Performed by: INTERNAL MEDICINE

## 2019-11-06 ASSESSMENT — ENCOUNTER SYMPTOMS
VOMITING: 0
STRIDOR: 0
EYE DISCHARGE: 0
SINUS PRESSURE: 0
SHORTNESS OF BREATH: 0
NAUSEA: 0
RHINORRHEA: 0
ABDOMINAL PAIN: 0
EYE PAIN: 0
FACIAL SWELLING: 0
BACK PAIN: 0
ABDOMINAL DISTENTION: 0
DIARRHEA: 0
CHEST TIGHTNESS: 0
COLOR CHANGE: 0
CHOKING: 0
SINUS PAIN: 0

## 2019-12-24 NOTE — TELEPHONE ENCOUNTER
E-scribing request for cyclobenzaprine. Pt has future appt. Health Maintenance   Topic Date Due    Annual Wellness Visit (AWV)  06/19/2019    Lipid screen  10/21/2020    Potassium monitoring  10/21/2020    Creatinine monitoring  10/21/2020    DTaP/Tdap/Td vaccine (2 - Td) 10/19/2025    Flu vaccine  Completed    HIV screen  Completed    Pneumococcal 0-64 years Vaccine  Aged Out             (applicable per patient's age: Cancer Screenings, Depression Screening, Fall Risk Screening, Immunizations)    Hemoglobin A1C (%)   Date Value   01/13/2015 5.2     Microalb/Crt.  Ratio (mcg/mg creat)   Date Value   10/20/2015 23     LDL Cholesterol (mg/dL)   Date Value   10/21/2019 142 (H)     AST (U/L)   Date Value   01/04/2018 19     ALT (U/L)   Date Value   01/04/2018 12     BUN (mg/dL)   Date Value   10/21/2019 23 (H)      (goal A1C is < 7)   (goal LDL is <100) need 30-50% reduction from baseline     BP Readings from Last 3 Encounters:   11/06/19 132/85   10/16/19 (!) 143/102   02/01/19 (!) 130/96    (goal /80)      All Future Testing planned in [facility]:      Next Visit Date:  Future Appointments   Date Time Provider Ximena Goodman   5/6/2020  9:30 AM Shadia Lui MD [facility]            Patient Active Problem List:     RSD (reflex sympathetic dystrophy)     GERD (gastroesophageal reflux disease)     Lumbar disc disease     Hemorrhoids, external     Essential hypertension     Pure hypercholesterolemia

## 2019-12-29 ENCOUNTER — TELEPHONE (OUTPATIENT)
Dept: INTERNAL MEDICINE CLINIC | Age: 44
End: 2019-12-29

## 2019-12-29 ENCOUNTER — HOSPITAL ENCOUNTER (EMERGENCY)
Age: 44
Discharge: HOME | End: 2019-12-29
Attending: EMERGENCY MEDICINE
Payer: MEDICARE

## 2019-12-29 VITALS
HEIGHT: 62 IN | OXYGEN SATURATION: 98 % | SYSTOLIC BLOOD PRESSURE: 122 MMHG | HEART RATE: 97 BPM | TEMPERATURE: 98.2 F | RESPIRATION RATE: 18 BRPM | WEIGHT: 130 LBS | DIASTOLIC BLOOD PRESSURE: 79 MMHG | BODY MASS INDEX: 23.92 KG/M2

## 2019-12-29 PROCEDURE — 99283 EMERGENCY DEPT VISIT LOW MDM: CPT

## 2019-12-29 PROCEDURE — 6370000000 HC RX 637 (ALT 250 FOR IP): Performed by: EMERGENCY MEDICINE

## 2019-12-29 RX ORDER — IBUPROFEN 600 MG/1
600 TABLET ORAL EVERY 6 HOURS PRN
Qty: 30 TABLET | Refills: 0 | Status: SHIPPED | OUTPATIENT
Start: 2019-12-29 | End: 2020-03-09 | Stop reason: ALTCHOICE

## 2019-12-29 RX ORDER — IBUPROFEN 800 MG/1
800 TABLET ORAL ONCE
Status: COMPLETED | OUTPATIENT
Start: 2019-12-29 | End: 2019-12-29

## 2019-12-29 RX ADMIN — IBUPROFEN 800 MG: 800 TABLET, FILM COATED ORAL at 16:42

## 2019-12-29 ASSESSMENT — ENCOUNTER SYMPTOMS
CHEST TIGHTNESS: 0
ABDOMINAL PAIN: 0
COUGH: 0
VOMITING: 0
NAUSEA: 0
BACK PAIN: 1
SHORTNESS OF BREATH: 0

## 2019-12-29 ASSESSMENT — PAIN DESCRIPTION - DESCRIPTORS: DESCRIPTORS: NUMBNESS

## 2019-12-29 ASSESSMENT — PAIN DESCRIPTION - PAIN TYPE: TYPE: ACUTE PAIN

## 2019-12-29 ASSESSMENT — PAIN SCALES - GENERAL
PAINLEVEL_OUTOF10: 8
PAINLEVEL_OUTOF10: 6

## 2019-12-29 ASSESSMENT — PAIN DESCRIPTION - LOCATION: LOCATION: LEG

## 2019-12-29 ASSESSMENT — PAIN DESCRIPTION - FREQUENCY: FREQUENCY: CONTINUOUS

## 2019-12-29 ASSESSMENT — PAIN DESCRIPTION - ORIENTATION: ORIENTATION: LEFT

## 2019-12-29 NOTE — ED PROVIDER NOTES
[facility] ED  Emergency Department Encounter  EmergencyMedicine Resident     Pt Name:Vincent Seth  MRN: [id number]  [place] 1975  Date of evaluation: 12/29/19  PCP:  Ilan Conroy MD    [address] [place] [address]       Chief Complaint   Patient presents with    Numbness         HISTORY OF PRESENT ILLNESS  (Location/Symptom, Timing/Onset, Context/Setting, Quality, Duration,Modifying Factors, Severity.)      Linn Jefferson is a 38 y.o. male who presents with back pain and left leg numbness. The patient says that he is been having left leg numbness constantly for the past 2 days. Says that it is a pins and needle sensation from about the calf down. Tells me he has a longstanding history of back problems but is never had this numbness before. 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 Says that his back pain does not seem to be worse than normal.  He has had no difficulty with ambulation. Back pain is of gradual onset, not associated with numbness or tingling or weakness. No abdominal pain. No tearing sensation. Not associated with perineal paresthesias. No bilateral sciatica. No urinary incontinence or retention. No fecal incontinence or retention. No difficulty with ambulation. No numbness or tingling. No focal neurological deficits. No history of HIV, blood thinners, chemotherapy, IV drug use. No Trauma.   Severity is mild duration is constant context is numbness and back pain          PAST MEDICAL / SURGICAL / SOCIAL / FAMILY HISTORY      has a past medical history of JOELLEN (acute kidney injury) ([address] Allergic rhinitis, Chronic abdominal pain, Chronic LBP, Constipation, ED (erectile dysfunction) of organic origin, GERD (gastroesophageal reflux disease), Gynecomastia, male, Hep C w/o coma, chronic ([address] Hypertension, Lumbar disc disease, Neuroblastoma ([address] Neurogenic dysfunction of the urinary bladder, Osteoarthritis, Phantom limb pain ([facility] Pure hypercholesterolemia, and RSD (reflex sympathetic dystrophy).      has a past surgical history that includes Leg amputation, lower tibia/fibula; bladder repair; Abdominal adhesion surgery; Small intestine surgery; and Leg amputation below knee (Right). Social History     Socioeconomic History    Marital status:      Spouse name: Not on file    Number of children: Not on file    Years of education: Not on file    Highest education level: Not on file   Occupational History    Not on file   Social Needs    Financial resource strain: Not on file    Food insecurity:     Worry: Not on file     Inability: Not on file    Transportation needs:     Medical: Not on file     Non-medical: Not on file   Tobacco Use    Smoking status: Never Smoker    Smokeless tobacco: Never Used   Substance and Sexual Activity    Alcohol use: No     Alcohol/week: 0.0 standard drinks    Drug use: No    Sexual activity: Yes     Partners: Female   Lifestyle    Physical activity:     Days per week: Not on file     Minutes per session: Not on file    Stress: Not on file   Relationships    Social connections:     Talks on phone: Not on file     Gets together: Not on file     Attends Scientologist service: Not on file     Active member of club or organization: Not on file     Attends meetings of clubs or organizations: Not on file     Relationship status: Not on file    Intimate partner violence:     Fear of current or ex partner: Not on file     Emotionally abused: Not on file     Physically abused: Not on file     Forced sexual activity: Not on file   Other Topics Concern    Not on file   Social History Narrative    ** Merged History Encounter **            Patient advised to stop smoking or to avoid tobacco use.      Family History   Problem Relation Age of Onset    High Blood Pressure Mother     Diabetes Mother     High Blood Pressure Father     Cancer Father         prostate    Diabetes Father     Coronary Art Dis Father     Heart Attack Father     Heart Disease Father         Allergies:  Penicillins and Shellfish-derived products    HomeMedications:  Prior to Admission medications    Medication Sig Start Date End Date Taking? Authorizing Provider   ibuprofen (ADVIL;MOTRIN) 600 MG tablet Take 1 tablet by mouth every 6 hours as needed for Pain 12/29/19  Yes Guanako Allred,    pantoprazole (PROTONIX) 40 MG tablet Take 1 tablet by mouth daily 10/16/19   Kenny Shaw MD   hydrochlorothiazide (HYDRODIURIL) 12.5 MG tablet Take 1 tablet by mouth daily 10/16/19   Kenny Shaw MD   atorvastatin (LIPITOR) 40 MG tablet Take 1 tablet by mouth daily 10/16/19   Kenny Shaw MD   amLODIPine (NORVASC) 10 MG tablet Take 1 tablet by mouth daily 10/16/19   Kenny Shaw MD   cyclobenzaprine (FLEXERIL) 10 MG tablet Take 1 tablet by mouth 3 times daily as needed for Muscle spasms 10/16/19   Kenny Shaw MD       REVIEW OF SYSTEMS    (2-9 systems for level 4, 10 or more for level 5)      Review of Systems   Constitutional: Negative for chills and fever. Respiratory: Negative for cough, chest tightness and shortness of breath. Cardiovascular: Negative for chest pain and leg swelling. Gastrointestinal: Negative for abdominal pain, nausea and vomiting. Musculoskeletal: Positive for back pain. Negative for gait problem and neck pain. Skin: Negative for rash and wound. Neurological: Positive for numbness. Negative for dizziness, syncope, weakness and headaches. PHYSICAL EXAM   (up to 7 for level 4, 8or more for level 5)      INITIAL VITALS:   /79   Pulse 97   Temp 98.2 °F (36.8 °C) (Oral)   Resp 18   Ht 5' 2\" (1.575 m)   Wt 130 lb (59 kg)   SpO2 98%   BMI 23.78 kg/m²     Physical Exam  Constitutional:       General: He is not in acute distress. Appearance: Normal appearance. He is well-developed. HENT:      Head: Normocephalic and atraumatic.       Right Ear: External ear normal.      Left Ear: External ear normal.      Nose: No nasal deformity or laceration. Eyes:      Conjunctiva/sclera: Conjunctivae normal.   Neck:      Musculoskeletal: Normal range of motion. Vascular: No JVD. Trachea: No tracheal deviation. Cardiovascular:      Rate and Rhythm: Normal rate and regular rhythm. Heart sounds: No murmur. Pulmonary:      Effort: Pulmonary effort is normal.      Breath sounds: Normal breath sounds. Abdominal:      Tenderness: There is no tenderness. Musculoskeletal: Normal range of motion. Skin:     General: Skin is warm. Neurological:      Mental Status: He is alert and oriented to person, place, and time. Comments: Cranial nerves II through XII grossly intact bilaterally. Muscle strength 5 out of 5 in upper and lower extremities bilaterally with exception of right lower extremity which has BKA. Subjective sensory change to the left lower extremity past the knee sensation otherwise intact to face and extremities, cerebellar testing normal including normal finger-nose-finger and heel-to-shin. DTRs 2+. Gait steady without assistance. Straight leg test negative         DIFFERENTIAL  DIAGNOSIS     PLAN (LABS / IMAGING / EKG):  No orders of the defined types were placed in this encounter. MEDICATIONS ORDERED:  Orders Placed This Encounter   Medications    ibuprofen (ADVIL;MOTRIN) 600 MG tablet     Sig: Take 1 tablet by mouth every 6 hours as needed for Pain     Dispense:  30 tablet     Refill:  0    ibuprofen (ADVIL;MOTRIN) tablet 800 mg       DIAGNOSTIC RESULTS / EMERGENCY DEPARTMENT COURSE / MDM     LABS:  No results found for this visit on 12/29/19. IMPRESSION: This is a 69-year-old male with a longstanding history of back problems presenting with left leg numbness over the past several days.   On my exam he has stable vital signs he is afebrile subjective sensory change to the left lower extremity he is otherwise neuro intact sparing the right lower

## 2019-12-31 ENCOUNTER — APPOINTMENT (OUTPATIENT)
Dept: GENERAL RADIOLOGY | Age: 44
End: 2019-12-31
Payer: MEDICARE

## 2019-12-31 ENCOUNTER — HOSPITAL ENCOUNTER (EMERGENCY)
Age: 44
Discharge: HOME | End: 2019-12-31
Attending: EMERGENCY MEDICINE
Payer: MEDICARE

## 2019-12-31 VITALS
OXYGEN SATURATION: 98 % | DIASTOLIC BLOOD PRESSURE: 86 MMHG | WEIGHT: 135 LBS | SYSTOLIC BLOOD PRESSURE: 126 MMHG | BODY MASS INDEX: 23.92 KG/M2 | RESPIRATION RATE: 16 BRPM | HEART RATE: 103 BPM | TEMPERATURE: 98.1 F | HEIGHT: 63 IN

## 2019-12-31 LAB — D-DIMER QUANTITATIVE: 0.2 MG/L FEU

## 2019-12-31 PROCEDURE — 72100 X-RAY EXAM L-S SPINE 2/3 VWS: CPT

## 2019-12-31 PROCEDURE — 99284 EMERGENCY DEPT VISIT MOD MDM: CPT

## 2019-12-31 PROCEDURE — 85379 FIBRIN DEGRADATION QUANT: CPT

## 2019-12-31 RX ORDER — LIDOCAINE 50 MG/G
1 PATCH TOPICAL DAILY
Qty: 10 PATCH | Refills: 0 | Status: SHIPPED | OUTPATIENT
Start: 2019-12-31 | End: 2020-01-10

## 2019-12-31 ASSESSMENT — PAIN SCALES - GENERAL
PAINLEVEL_OUTOF10: 9
PAINLEVEL_OUTOF10: 8

## 2019-12-31 ASSESSMENT — PAIN DESCRIPTION - ORIENTATION: ORIENTATION: LEFT;LOWER

## 2019-12-31 ASSESSMENT — PAIN DESCRIPTION - LOCATION: LOCATION: LEG;FOOT

## 2019-12-31 ASSESSMENT — PAIN DESCRIPTION - DESCRIPTORS: DESCRIPTORS: NUMBNESS;PRESSURE

## 2019-12-31 ASSESSMENT — PAIN DESCRIPTION - FREQUENCY: FREQUENCY: CONTINUOUS

## 2020-01-06 ENCOUNTER — TELEPHONE (OUTPATIENT)
Dept: INTERNAL MEDICINE | Age: 45
End: 2020-01-06

## 2020-01-08 RX ORDER — CYCLOBENZAPRINE HCL 10 MG
TABLET ORAL
Qty: 30 TABLET | Refills: 3 | Status: SHIPPED | OUTPATIENT
Start: 2020-01-08 | End: 2020-03-04

## 2020-01-09 ENCOUNTER — OFFICE VISIT (OUTPATIENT)
Dept: INTERNAL MEDICINE | Age: 45
End: 2020-01-09
Payer: MEDICARE

## 2020-01-09 VITALS
BODY MASS INDEX: 23.74 KG/M2 | HEIGHT: 63 IN | HEART RATE: 87 BPM | SYSTOLIC BLOOD PRESSURE: 147 MMHG | DIASTOLIC BLOOD PRESSURE: 103 MMHG | WEIGHT: 134 LBS

## 2020-01-09 PROCEDURE — G8427 DOCREV CUR MEDS BY ELIG CLIN: HCPCS | Performed by: STUDENT IN AN ORGANIZED HEALTH CARE EDUCATION/TRAINING PROGRAM

## 2020-01-09 PROCEDURE — G8482 FLU IMMUNIZE ORDER/ADMIN: HCPCS | Performed by: STUDENT IN AN ORGANIZED HEALTH CARE EDUCATION/TRAINING PROGRAM

## 2020-01-09 PROCEDURE — 99211 OFF/OP EST MAY X REQ PHY/QHP: CPT | Performed by: INTERNAL MEDICINE

## 2020-01-09 PROCEDURE — 1036F TOBACCO NON-USER: CPT | Performed by: STUDENT IN AN ORGANIZED HEALTH CARE EDUCATION/TRAINING PROGRAM

## 2020-01-09 PROCEDURE — 99213 OFFICE O/P EST LOW 20 MIN: CPT | Performed by: STUDENT IN AN ORGANIZED HEALTH CARE EDUCATION/TRAINING PROGRAM

## 2020-01-09 PROCEDURE — G8420 CALC BMI NORM PARAMETERS: HCPCS | Performed by: STUDENT IN AN ORGANIZED HEALTH CARE EDUCATION/TRAINING PROGRAM

## 2020-01-09 RX ORDER — HYDROCHLOROTHIAZIDE 12.5 MG/1
25 TABLET ORAL DAILY
Qty: 30 TABLET | Refills: 3 | Status: SHIPPED | OUTPATIENT
Start: 2020-01-09 | End: 2020-07-14

## 2020-01-09 RX ORDER — GABAPENTIN 100 MG/1
100 CAPSULE ORAL 3 TIMES DAILY
Qty: 270 CAPSULE | Refills: 1 | Status: SHIPPED | OUTPATIENT
Start: 2020-01-09 | End: 2020-02-07 | Stop reason: DRUGHIGH

## 2020-01-09 ASSESSMENT — PATIENT HEALTH QUESTIONNAIRE - PHQ9
SUM OF ALL RESPONSES TO PHQ9 QUESTIONS 1 & 2: 0
SUM OF ALL RESPONSES TO PHQ QUESTIONS 1-9: 0
1. LITTLE INTEREST OR PLEASURE IN DOING THINGS: 0
2. FEELING DOWN, DEPRESSED OR HOPELESS: 0
SUM OF ALL RESPONSES TO PHQ QUESTIONS 1-9: 0

## 2020-01-09 NOTE — PROGRESS NOTES
1 tablet by mouth three times a day if needed for muscle spasm 30 tablet 3    lidocaine (LIDODERM) 5 % Place 1 patch onto the skin daily for 10 days 12 hours on, 12 hours off. 10 patch 0    ibuprofen (ADVIL;MOTRIN) 600 MG tablet Take 1 tablet by mouth every 6 hours as needed for Pain 30 tablet 0    pantoprazole (PROTONIX) 40 MG tablet Take 1 tablet by mouth daily 30 tablet 3    hydrochlorothiazide (HYDRODIURIL) 12.5 MG tablet Take 1 tablet by mouth daily 30 tablet 3    atorvastatin (LIPITOR) 40 MG tablet Take 1 tablet by mouth daily 90 tablet 1    amLODIPine (NORVASC) 10 MG tablet Take 1 tablet by mouth daily 30 tablet 3     No current facility-administered medications for this visit. SOCIAL HISTORY    Reviewed and no change from previous record. Vincent  reports that he has never smoked. He has never used smokeless tobacco.    FAMILY HISTORY:    Reviewed and No change from previous visit    REVIEW OF SYSTEMS:    CONSTITUTIONAL: Denies: fever, chills  PSYCH: Denies: anxiety, depression  ALLERGIES: Denies: urticaria  EYES: Denies: blurry vision, decreased vision, photophobia  ENT: Denies: sore throat, nasal congestion  CARDIOVASCULAR: Denies: chest pain, dyspnea on exertion  RESPIRATORY: Denies: cough, hemoptysis, shortness of breath  GI: Denies: Denies: abdominal pain, flank pain  : Denies: Denies: dysuria, frequency/urgency  NEURO: Denies: dizzy/vertigo, headache  MUSCULOSKELETAL: Positive for back pain, positive for left leg weakness  SKIN: Denies: rash, itching    PHYSICAL EXAM:    There were no vitals filed for this visit.   BP Readings from Last 3 Encounters:   12/31/19 126/86   12/29/19 122/79   11/06/19 132/85      General appearance - alert, well appearing, and in no distress  Mental status - alert, oriented to person, place, and time  Mouth - mucous membranes moist, pharynx normal without lesions  Neck - supple, no significant adenopathy  Chest - clear to auscultation, no wheezes, rales or rhonchi, symmetric air entry  Heart - normal rate, regular rhythm, normal S1, S2, no murmurs, rubs, clicks or gallops  Abdomen - soft, nontender, nondistended, no masses or organomegaly  Neurological - alert, oriented, normal speech. Positive Romberg sign. Decreased sensation in lateral aspect of foot, reflexes 1+ decrease in left foot, decreased proprioception in toes, reported paresthesias but decreased sensation  Extremities - RL extremity prosthetic, LLE decreased strength of dorsiflexion, decreased sensation in plantar surface of foot   Skin - normal coloration and turgor, no rashes, no suspicious skin lesions noted    LABORATORY FINDINGS:    CBC:  Lab Results   Component Value Date    WBC 7.3 08/22/2017    HGB 13.4 08/22/2017     08/22/2017       BMP:    Lab Results   Component Value Date     10/21/2019    K 4.1 10/21/2019     10/21/2019    CO2 19 10/21/2019    BUN 23 10/21/2019    CREATININE 1.22 10/21/2019    GLUCOSE 93 10/21/2019       HEMOGLOBIN A1C:   Lab Results   Component Value Date    LABA1C 5.2 01/13/2015       FASTING LIPID PANEL:  Lab Results   Component Value Date    CHOL 218 (H) 10/21/2019    HDL 62 10/21/2019    TRIG 68 10/21/2019       ASSESSMENT AND PLAN:    Ruben Amador was seen today for follow-up from hospital and health maintenance.     Diagnoses and all orders for this visit:    Lumbar disc disease  New onset weakness with minimal improvement in the setting of scoliosis and previously reported spinal stenosis  MRI lumbar indicated  Referral to neurology  Patient is scheduling with pain management clinic, previously in [facility] for pain management  We will prescribe Neurontin 100 mg 3 times a day, continue Flexeril    Essential hypertension  BP today elevated to 147/103  On amlodipine 10, increase hydrochlorothiazide to 25    Gastroesophageal reflux disease without esophagitis  On Protonix 40    Pure hypercholesterolemia  Continue statin    FOLLOW UP AND INSTRUCTIONS:   · No

## 2020-01-14 ENCOUNTER — HOSPITAL ENCOUNTER (OUTPATIENT)
Dept: MRI IMAGING | Age: 45
Discharge: HOME | End: 2020-01-16
Payer: MEDICARE

## 2020-01-14 PROCEDURE — 72158 MRI LUMBAR SPINE W/O & W/DYE: CPT

## 2020-01-14 PROCEDURE — 6360000004 HC RX CONTRAST MEDICATION: Performed by: STUDENT IN AN ORGANIZED HEALTH CARE EDUCATION/TRAINING PROGRAM

## 2020-01-14 PROCEDURE — A9579 GAD-BASE MR CONTRAST NOS,1ML: HCPCS | Performed by: STUDENT IN AN ORGANIZED HEALTH CARE EDUCATION/TRAINING PROGRAM

## 2020-01-14 RX ADMIN — GADOTERIDOL 13 ML: 279.3 INJECTION, SOLUTION INTRAVENOUS at 08:03

## 2020-01-16 NOTE — TELEPHONE ENCOUNTER
Patients wife calling again, lisa - please take care of this referral. We will handle all of the faxing & printing of documentation. Thank you.

## 2020-01-17 ENCOUNTER — TELEPHONE (OUTPATIENT)
Dept: INTERNAL MEDICINE | Age: 45
End: 2020-01-17

## 2020-01-17 NOTE — TELEPHONE ENCOUNTER
[facility] scheduling calling asking for clarification on MRI ordered on 01/16/20 because pt just had this done on 01/14/20 , do you still want this done , if not please cancel order if you do , schedule johnie need to be called       Health Maintenance   Topic Date Due    Annual Wellness Visit (AWV)  06/19/2019    Lipid screen  10/21/2020    Potassium monitoring  10/21/2020    Creatinine monitoring  10/21/2020    DTaP/Tdap/Td vaccine (2 - Td) 10/19/2025    Flu vaccine  Completed    HIV screen  Completed    Pneumococcal 0-64 years Vaccine  Aged Out             (applicable per patient's age: Cancer Screenings, Depression Screening, Fall Risk Screening, Immunizations)    Hemoglobin A1C (%)   Date Value   01/13/2015 5.2     Microalb/Crt.  Ratio (mcg/mg creat)   Date Value   10/20/2015 23     LDL Cholesterol (mg/dL)   Date Value   10/21/2019 142 (H)     AST (U/L)   Date Value   01/04/2018 19     ALT (U/L)   Date Value   01/04/2018 12     BUN (mg/dL)   Date Value   10/21/2019 23 (H)      (goal A1C is < 7)   (goal LDL is <100) need 30-50% reduction from baseline     BP Readings from Last 3 Encounters:   01/09/20 (!) 147/103   12/31/19 126/86   12/29/19 122/79    (goal /80)      All Future Testing planned in [facility]:  Lab Frequency Next Occurrence   CT LUMBAR SPINE WO CONTRAST Once 01/16/2020   MRI LUMBAR SPINE WO CONTRAST Once 01/16/2020   XR LUMBAR SPINE (2-3 VIEWS) Once 01/16/2020       Next Visit Date:  Future Appointments   Date Time Provider Ximena Goodman   2/7/2020  9:30 AM Ellwood Boeck, MD [facility]   2/12/2020 10:50 AM Effie Santiago MD [facility] IM [place] [facility]   5/6/2020  9:30 AM Effie Santiago MD [facility] IM [id number]            Patient Active Problem List:     RSD (reflex sympathetic dystrophy)     GERD (gastroesophageal reflux disease)     Lumbar disc disease     Hemorrhoids, external     Essential hypertension     Pure hypercholesterolemia

## 2020-01-25 ENCOUNTER — HOSPITAL ENCOUNTER (OUTPATIENT)
Dept: CT IMAGING | Age: 45
Discharge: HOME | End: 2020-01-27
Payer: MEDICARE

## 2020-01-25 ENCOUNTER — HOSPITAL ENCOUNTER (OUTPATIENT)
Dept: GENERAL RADIOLOGY | Age: 45
Discharge: HOME | End: 2020-01-27
Payer: MEDICARE

## 2020-01-25 ENCOUNTER — HOSPITAL ENCOUNTER (OUTPATIENT)
Age: 45
Discharge: HOME | End: 2020-01-27
Payer: MEDICARE

## 2020-01-25 PROCEDURE — 72131 CT LUMBAR SPINE W/O DYE: CPT

## 2020-01-25 PROCEDURE — 72100 X-RAY EXAM L-S SPINE 2/3 VWS: CPT

## 2020-01-27 NOTE — TELEPHONE ENCOUNTER
It was needed as part of pain management and they requested us to order it. If they don't need it, I am okay with it.

## 2020-02-05 ENCOUNTER — INITIAL CONSULT (OUTPATIENT)
Dept: PAIN MANAGEMENT | Age: 45
End: 2020-02-05
Payer: MEDICARE

## 2020-02-05 VITALS
WEIGHT: 136.2 LBS | HEART RATE: 101 BPM | SYSTOLIC BLOOD PRESSURE: 123 MMHG | HEIGHT: 63 IN | BODY MASS INDEX: 24.13 KG/M2 | DIASTOLIC BLOOD PRESSURE: 81 MMHG | OXYGEN SATURATION: 96 %

## 2020-02-05 PROBLEM — M47.816 SPONDYLOSIS WITHOUT MYELOPATHY OR RADICULOPATHY, LUMBAR REGION: Status: ACTIVE | Noted: 2020-02-05

## 2020-02-05 PROBLEM — Z89.511 HX OF RIGHT BKA (HCC): Status: ACTIVE | Noted: 2020-02-05

## 2020-02-05 PROCEDURE — 1036F TOBACCO NON-USER: CPT | Performed by: ANESTHESIOLOGY

## 2020-02-05 PROCEDURE — G8482 FLU IMMUNIZE ORDER/ADMIN: HCPCS | Performed by: ANESTHESIOLOGY

## 2020-02-05 PROCEDURE — G8420 CALC BMI NORM PARAMETERS: HCPCS | Performed by: ANESTHESIOLOGY

## 2020-02-05 PROCEDURE — 99205 OFFICE O/P NEW HI 60 MIN: CPT | Performed by: ANESTHESIOLOGY

## 2020-02-05 PROCEDURE — G8427 DOCREV CUR MEDS BY ELIG CLIN: HCPCS | Performed by: ANESTHESIOLOGY

## 2020-02-05 RX ORDER — GABAPENTIN 300 MG/1
300 CAPSULE ORAL 3 TIMES DAILY
Qty: 90 CAPSULE | Refills: 0 | Status: SHIPPED | OUTPATIENT
Start: 2020-02-05 | End: 2020-03-06

## 2020-02-05 ASSESSMENT — ENCOUNTER SYMPTOMS
BACK PAIN: 1
GASTROINTESTINAL NEGATIVE: 1
EYES NEGATIVE: 1
RESPIRATORY NEGATIVE: 1

## 2020-02-05 NOTE — PROGRESS NOTES
yes  -Where at:left leg  -Down into finger tips or toes (specify which finger or toes):all toes  -constant or intermitting: constant  11. Red Flags: (weight loss / chills / loss of bladder or bowel control):no    Previous management history  1. Previous diagnostic workup: (Imaging/EMG)   CT, MRI, or Xray: MRI, CT, xray  What part of the body:lumbar spine  What facility did they have it at:[facility]  What year or specific date: 2020  EMG:  yes    2. Previous non interventional treatments tried:  chiropractor or physical therapy:yes  What part of the body:back  What facility was it done at: [facility]  How long ago was it last tried:years  Did it work: No  Did they complete it:Yes    3. Previous Medications tried  NSAID's: yes  Neurontin: yes  Lyrica: yes  Trycyclic antidepressant (Ellavil / Pamelor ): yes  Cymbalta: no  Opioids (Ultram / Vicodin / Percocet / Morphine / Dilaudid / Oramorph/ Fentanyl etc.):Ultram, Vicodin, Percocet, Morphine, and Dilaudid  Last Pain medication taken (name of med and date): Flexeril last taken 2/5/20    4. Previous Interventional pain procedures tried:  What kind of injection:steroid   Who did the injection: unknown  did the injection help: no  Last time injection was done: 2014    5.  Previous surgeries for pain  What part of the body did they have the [facility] [address]  What [place] did the [facility] [place] done[place]  Date of Surgery:2013    Social History:  Marital status:  Employment History:n/a  Working  No  Full time Or Part time: n/a  Disability  Yes   Legal Issues related to pain complaint: No     Pain Disability Index score :87    Informant: patient        Past Medical History:   Diagnosis Date    JOELLEN (acute kidney injury) ([facility] [address] 2/12/2015    Allergic rhinitis     Chronic abdominal pain     Chronic LBP     Constipation     ED (erectile dysfunction) of organic origin     GERD (gastroesophageal reflux disease)     Gynecomastia, Father         prostate    Diabetes Father     Coronary Art Dis Father     Heart Attack Father     Heart Disease Father      Allergies   Allergen Reactions    Penicillins     Shellfish-Derived Products      Penicillins and Shellfish-derived products   Vitals:    02/05/20 1506   BP: 123/81   Pulse: 101   SpO2: 96%     Current Outpatient Medications   Medication Sig Dispense Refill    gabapentin (NEURONTIN) 300 MG capsule Take 1 capsule by mouth 3 times daily for 30 days. Intended supply: 90 days 90 capsule 0    hydrochlorothiazide (HYDRODIURIL) 12.5 MG tablet Take 2 tablets by mouth daily 30 tablet 3    gabapentin (NEURONTIN) 100 MG capsule Take 1 capsule by mouth 3 times daily for 180 days. Will cause drowsiness. Do not drive if taking. Intended supply: 90 days 270 capsule 1    cyclobenzaprine (FLEXERIL) 10 MG tablet take 1 tablet by mouth three times a day if needed for muscle spasm 30 tablet 3    ibuprofen (ADVIL;MOTRIN) 600 MG tablet Take 1 tablet by mouth every 6 hours as needed for Pain 30 tablet 0    pantoprazole (PROTONIX) 40 MG tablet Take 1 tablet by mouth daily 30 tablet 3    atorvastatin (LIPITOR) 40 MG tablet Take 1 tablet by mouth daily 90 tablet 1    amLODIPine (NORVASC) 10 MG tablet Take 1 tablet by mouth daily 30 tablet 3     No current facility-administered medications for this visit. Review of Systems   Constitutional: Negative. Negative for fever. HENT: Negative. Eyes: Negative. Respiratory: Negative. Cardiovascular: Negative. Gastrointestinal: Negative. Endocrine: Negative. Genitourinary: Negative. Musculoskeletal: Positive for arthralgias, back pain, myalgias, neck pain and neck stiffness. Skin: Negative. Allergic/Immunologic: Positive for food allergies. Neurological: Positive for weakness and numbness. Hematological: Negative. Psychiatric/Behavioral: Negative. All other systems reviewed and are negative.       Objective:  General Appearance:  Uncomfortable and in pain. Vital signs: (most recent): Blood pressure 123/81, pulse 101, height 5' 3\" (1.6 m), weight 136 lb 3.2 oz (61.8 kg), SpO2 96 %. Vital signs are normal.  No fever. HEENT: Normal HEENT exam.    Lungs:  Normal effort. Breath sounds clear to auscultation. Heart: Normal rate. No murmur. Chest: Symmetric chest wall expansion. No chest wall tenderness. Abdomen: Abdomen is soft and non-distended. Extremities: Decreased range of motion. There is deformity. Neurological: Patient is alert and oriented to person, place and time. Normal strength. Patient has normal reflexes, normal muscle tone and normal coordination. Pupils:  Pupils are equal, round, and reactive to light. Skin:  Warm, dry and pale. No rash, ecchymosis, cyanosis or ulceration.       Lumbar spine examination  No apparent deformity on inspection  Range of motion is marked limited and associated with pain  Gait is antalgic  Patient ambulating with a cane  Positive tenderness to palpation over lower lumbar area  Aggravation of pain with forward flexion  Straight leg raise positive on left side    Neurological examination  Normal muscle mass  Right below-knee amputation  Muscle strength 4/5 in left foot dorsiflexion and left EHL tendon, rest of the muscle group is 5/5  Deep tendon reflexes 2+ at left knee and difficult to elicit in the left ankle  Clonus is negative    Assessment & Plan   Pain History    -Chronic lower back pain onset many years ago located in the lumbar area typically radiated down the right side  Is ago without any particular injury or identifiable cause he developed severe flareup of his back pain with radiation down the left leg over posterior thigh and anterolateral lower extremity of the foot  Patient developed severe left leg numbness and left foot drop also  Patient went to the ER  Had diagnostic work-up with x-ray lumbar spine, MRI lumbar spine and CT scan lumbar spine  Have not seen neurosurgeon or spine surgeon  Denies any loss of bladder or bowel control  No history of fever chills or weight loss    Pain is constant severe sharp shooting throbbing sensation radiating from lumbar area all the way to the foot  Have difficulty in walking  Pain interferes with daily life activities  Aggravated with movement lifting bending twisting turning and walking  Nothing seems to alleviate the pain  Patient have recently been started on gabapentin 100 mg once a day do not find it much helpful yet  Denies any side effects    -Patient was seeing pain management in [place]. Was on high-dose opioid until a year ago    -History of right below-knee amputation related to a surgical complication for neuroblastoma as a child    -Patient developed severe left lower extremity pain 4 months back  Pain score today  7\    EXAMINATION: CT OF THE LUMBAR SPINE WITHOUT CONTRAST  1/25/2020    L4-L5: There is a circumferential disc bulge with facet and ligamentous hypertrophy. There is no significant canal stenosis. There is mild left and moderate right foraminal narrowing. L5-S1: There is a circumferential disc bulge with facet hypertrophy. There is no significant canal stenosis. There is moderate left and moderate to severe right foraminal narrowing. SOFT TISSUES/RETROPERITONEUM: The posterior paraspinal soft tissues are unremarkable. The visualized abdominal structures are grossly unremarkable. There are nonobstructing renal stones bilaterally. EXAMINATION: THREE XRAY VIEWS OF THE LUMBAR SPINE  1/25/2020 8:21 am   Impression Multilevel degenerative disc disease and multilevel degenerative facet hypertrophy most pronounced in the lower lumbar spine without acute abnormality. EXAMINATION: MRI OF THE LUMBAR SPINE WITHOUT AND WITH CONTRAST  1/14/2020 6:49 am    L1-L2: There is no disc bulge or herniation. There is facet and ligamentous hypertrophy.   There is no canal stenosis or left foraminal narrowing. There is mild right foraminal narrowing. L2-L3: There is no disc bulge or herniation. There is facet and ligamentous hypertrophy. There is no canal stenosis or foraminal narrowing. L3-L4: There is a mild circumferential disc bulge with facet and ligamentous hypertrophy. There is no canal stenosis or significant foraminal narrowing. L4-L5: There is a circumferential disc bulge with facet and ligamentous hypertrophy. There is no significant canal stenosis. There is mild left and moderate right foraminal narrowing. L5-S1: There is a circumferential disc bulge with facet hypertrophy. There is no significant canal stenosis. There is moderate left and moderate to severe right foraminal narrowing. 1. Left lumbar radiculitis    2. Chronic bilateral low back pain with right-sided sciatica    3. Spondylosis without myelopathy or radiculopathy, lumbar region    4. Hx of right BKA ([address]    5. Left foot drop    6.  Lumbar disc herniation        Recent diagnostic imaging including MRI lumbar spine, x-ray lumbar spine and CT scan lumbar spine  Reports were reviewed  Images reviewed independently  Images were shown to the patient  Findings were discussed with patient in detail    -Chronic low back pain and extension over the right leg is likely related to lumbar disc herniation at L5-S1 level right paracentral  I think this recent flareup and shooting of the pain on the left side with left leg weakness and left foot numbness is a progression of that L5-S1 herniated disc with severe compromise of the spinal canal  I feel the reason he is not able to report any weakness on the right leg is because of right below-knee amputation    I do not think patient can tolerate physical therapy at this point    I think the dose of new gabapentin is subtherapeutic  We will increase it to gabapentin 3 times a day  Patient denies any illicit substance use  We will collect urine for toxicology  May

## 2020-02-06 NOTE — TELEPHONE ENCOUNTER
Request for amlodipine, pantoprazole - medications pended. Please fill if appropriate. Next Visit Date:  Future Appointments   Date Time Provider Ximena Goodman   2/7/2020  9:30 AM Jocelyne Nava MD [facility]   2/12/2020  8:30 AM Cedric Wall [facility]   2/12/2020 10:50 AM Juventino Birmingham MD [facility]   3/10/2020  8:40 AM Carlos Kelly MD [facility]   5/6/2020  9:30 AM Juventino Birmingham MD [facility] IM [address] Maintenance   Topic Date Due    Annual Wellness Visit (AWV)  06/19/2019    Lipid screen  10/21/2020    Potassium monitoring  10/21/2020    Creatinine monitoring  10/21/2020    Shingles Vaccine (1 of 2) 06/01/2024    DTaP/Tdap/Td vaccine (2 - Td) 10/19/2025    Flu vaccine  Completed    HIV screen  Completed    Hepatitis A vaccine  Aged Out    Hepatitis B vaccine  Aged Out    Hib vaccine  Aged Out    Meningococcal (ACWY) vaccine  Aged Out    Pneumococcal 0-64 years Vaccine  Aged Out       Hemoglobin A1C (%)   Date Value   01/13/2015 5.2             ( goal A1C is < 7)   Microalb/Crt.  Ratio (mcg/mg creat)   Date Value   10/20/2015 23     LDL Cholesterol (mg/dL)   Date Value   10/21/2019 142 (H)       (goal LDL is <100)   AST (U/L)   Date Value   01/04/2018 19     ALT (U/L)   Date Value   01/04/2018 12     BUN (mg/dL)   Date Value   10/21/2019 23 (H)     BP Readings from Last 3 Encounters:   02/05/20 123/81   01/09/20 (!) 147/103   12/31/19 126/86          (goal 120/80)    All Future Testing planned in [facility]  Lab Frequency Next Occurrence   UT INJECT ANES/STEROID FORAMEN LUMBAR/SACRAL W IMG GUIDE ,1 LEVEL Once 02/06/2020   EMG Once 02/05/2020         Patient Active Problem List:     RSD (reflex sympathetic dystrophy)     GERD (gastroesophageal reflux disease)     Chronic bilateral low back pain with right-sided sciatica     Lumbar disc disease     Hemorrhoids, external     Essential hypertension     Pure hypercholesterolemia     Spondylosis without myelopathy or radiculopathy, lumbar region     Hx of right BKA ([facility] [address]

## 2020-02-07 ENCOUNTER — OFFICE VISIT (OUTPATIENT)
Dept: NEUROLOGY | Age: 45
End: 2020-02-07
Payer: MEDICARE

## 2020-02-07 VITALS
HEART RATE: 116 BPM | SYSTOLIC BLOOD PRESSURE: 143 MMHG | DIASTOLIC BLOOD PRESSURE: 91 MMHG | BODY MASS INDEX: 24.52 KG/M2 | WEIGHT: 138.4 LBS

## 2020-02-07 PROCEDURE — G8482 FLU IMMUNIZE ORDER/ADMIN: HCPCS | Performed by: STUDENT IN AN ORGANIZED HEALTH CARE EDUCATION/TRAINING PROGRAM

## 2020-02-07 PROCEDURE — 99205 OFFICE O/P NEW HI 60 MIN: CPT | Performed by: STUDENT IN AN ORGANIZED HEALTH CARE EDUCATION/TRAINING PROGRAM

## 2020-02-07 PROCEDURE — 1036F TOBACCO NON-USER: CPT | Performed by: STUDENT IN AN ORGANIZED HEALTH CARE EDUCATION/TRAINING PROGRAM

## 2020-02-07 PROCEDURE — G8420 CALC BMI NORM PARAMETERS: HCPCS | Performed by: STUDENT IN AN ORGANIZED HEALTH CARE EDUCATION/TRAINING PROGRAM

## 2020-02-07 PROCEDURE — G8427 DOCREV CUR MEDS BY ELIG CLIN: HCPCS | Performed by: STUDENT IN AN ORGANIZED HEALTH CARE EDUCATION/TRAINING PROGRAM

## 2020-02-07 ASSESSMENT — ENCOUNTER SYMPTOMS
ABDOMINAL PAIN: 0
CONSTIPATION: 0
COUGH: 0
BACK PAIN: 1
VOMITING: 0
EYE PAIN: 0
NAUSEA: 0
SHORTNESS OF BREATH: 0
PHOTOPHOBIA: 0
DIARRHEA: 0

## 2020-02-07 NOTE — PROGRESS NOTES
degenerative disc disease with mild to moderate neuroforaminal narrowing at L4-L5 and severe foraminal narrowing at L5-S1. He saw pain physician on 2/5/2020. Patient is referred to neurosurgery and EMG of bilateral lower extremities ordered. His dose of gabapentin was increased to 300 mg 3 times a day. Patient also scheduled for lumbar epidural steroid injections to help with the inflammation and pain.        PATIENT HISTORY:     Past Medical History:   Diagnosis Date    JOELLEN (acute kidney injury) ([facility] [address] 2/12/2015    Allergic rhinitis     Chronic abdominal pain     Chronic LBP     Constipation     ED (erectile dysfunction) of organic origin     GERD (gastroesophageal reflux disease)     Gynecomastia, male     Hep C w/o coma, chronic ([facility] [address] 7/18/2017    Hypertension     Lumbar disc disease     Neuroblastoma ([facility] [address]     Neurogenic dysfunction of the urinary bladder     Osteoarthritis     Phantom limb pain ([facility] [address]     Pure hypercholesterolemia 7/18/2017    RSD (reflex sympathetic dystrophy)         Past Surgical History:   Procedure Laterality Date    ABDOMINAL ADHESION SURGERY      BLADDER REPAIR      LEG AMPUTATION BELOW KNEE Right     as a child d/t cancer    LEG AMPUTATION THROUGH LOWER TIBIA AND FIBULA      SMALL INTESTINE SURGERY          Social History     Socioeconomic History    Marital status:      Spouse name: Not on file    Number of children: Not on file    Years of education: Not on file    Highest education level: Not on file   Occupational History    Not on file   Social Needs    Financial resource strain: Not on file    Food insecurity:     Worry: Not on file     Inability: Not on file    Transportation needs:     Medical: Not on file     Non-medical: Not on file   Tobacco Use    Smoking status: Never Smoker    Smokeless tobacco: Never Used   Substance and Sexual Activity    Alcohol use: No     Alcohol/week: 0.0 standard drinks    Drug use: No    Sexual activity: cough and shortness of breath. Cardiovascular: Negative for chest pain and palpitations. Gastrointestinal: Negative for abdominal pain, constipation, diarrhea, nausea and vomiting. Endocrine: Negative for polydipsia and polyuria. Genitourinary: Negative for difficulty urinating, dysuria and hematuria. Musculoskeletal: Positive for arthralgias, back pain and gait problem. Skin: Negative for pallor and rash. Neurological: Positive for weakness and numbness. Negative for dizziness, tremors, seizures, syncope, facial asymmetry, speech difficulty, light-headedness and headaches. Psychiatric/Behavioral: Negative for behavioral problems and hallucinations. VITALS  BP (!) 143/91 (Site: Right Upper Arm, Position: Sitting, Cuff Size: Medium Adult)   Pulse 116   Wt 138 lb 6.4 oz (62.8 kg)   BMI 24.52 kg/m²      PHYSICAL EXAMINATION:     Constitutional: Well developed, well nourished and in no acute distress. Head:  normocephalic, atraumatic. Neck: supple, no carotid bruits, thyroid not palpable  Respiratory: Clear to auscultation bilaterally with no use of accessory muscles during respiration. Cardiovascular: normal rate, regular rhythm, no murmur, gallop, rub.   Abdomen: Soft, nontender, nondistended, normal bowel sounds, no hepatomegaly or splenomegaly  Extremities:  peripheral pulses palpable, no pedal edema or calf pain with palpation  Psych: normal affect      NEUROLOGICAL EXAMINATION:     Mental status   Alert and oriented; intact memory with no confusion, speech or language problems; no hallucinations or delusions     Cranial nerves   II - visual fields intact to confrontation                                                III, IV, VI - extra-ocular muscles full: no pupillary defect; no ADRIANE, no nystagmus, no ptosis   V - normal facial sensation                                                               VII - normal facial symmetry VIII - intact hearing                                                                             IX, X - symmetrical palate                                                                  XI - symmetrical shoulder shrug                                                       XII - midline tongue without atrophy or fasciculation     Motor function  Normal muscle bulk and tone  Muscle strength: normal power 5/5 bilateral upper extremities  Right lower extremity-patient has BKA, hip flexion and adduction 5/5  Left lower extremity hip flexion and adduction 5/5 knee extension 5/5 knee flexion 5-/5, left ankle-patient unable to dorsiflex, plantar flexion 3+/5, restricted inversion and eversion       Sensory function Intact to vibration, proprioception in bilateral upper and left lower extremities. Light touch and pinprick decreased in the left lower extremity in the  L5-S1 distribution, mainly S1       Cerebellar  finger-to-nose intact bilaterally. No involuntary movements or tremors     Reflex function  reflexes 2+ in bilateral upper extremities. Unable to perform patellar on the right side due to BKA,  Left lower extremity patellar 2+, ankle absent. Negative Babinski on the left side.      Gait                   high steppage gait           PRIOR TESTS AND IMAGING: Following images and Labs were reviewed by the examiner     MRI of the lumbar spine with and without contrast 1/14/2020  FINDINGS:   BONES/ALIGNMENT: There is mild levocurvature of the lumbar spine.  The   vertebral body heights are maintained.  There is mildly heterogeneous bone   marrow signal.  There is degenerative disc disease primarily at the L4-5   level with loss of disc signal and mild disc space narrowing.  There is no   spondylolisthesis.  There is no abnormal postcontrast enhancement of the   lumbar spine.       SPINAL CORD:  The conus medullaris is normal in caliber and signal and   terminates at the L1 level.  Cauda equina is bulge or herniation.  There is facet and ligamentous   hypertrophy.  There is no canal stenosis or left foraminal narrowing.  There   is mild right foraminal narrowing.       L2-L3: There is no disc bulge or herniation.  There is facet and ligamentous   hypertrophy.  There is no canal stenosis or foraminal narrowing.       L3-L4: There is a mild circumferential disc bulge with facet and ligamentous   hypertrophy.  There is no canal stenosis or significant foraminal narrowing.       L4-L5: There is a circumferential disc bulge with facet and ligamentous   hypertrophy.  There is no significant canal stenosis.  There is mild left and   moderate right foraminal narrowing.       L5-S1: There is a circumferential disc bulge with facet hypertrophy.  There   is no significant canal stenosis.  There is moderate left and moderate to   severe right foraminal narrowing.       SOFT TISSUES/RETROPERITONEUM: The posterior paraspinal soft tissues are   unremarkable.  The visualized abdominal structures are grossly unremarkable. There are nonobstructing renal stones bilaterally.           Impression   Levocurvature of the lumbar spine with multilevel degenerative disc disease   and multilevel degenerative facet hypertrophy resulting in bilateral   foraminal narrowing as described above. ASSESSMENT / PLAN:       Gwenlyn Ahumada is a 38 y.o. right handed  male  was seen in the clinic for chronic back pain and new onset weakness on the left lower extremity.       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 Emily Dangelo on 2/12/2020  -EMG nerve conduction studies of bilateral lower extremity ordered  -Patient seen by pain physician is scheduled for lumbar epidural injections  -May consider PT depending on neurosurgical evaluation  -Patient currently on gabapentin 300 mg 3 times a day  - Follow up in the clinic after neurosurgical evaluation  - Instructed patient to call the clinic if symptoms worsen or develop any new symptoms. I have spent 60 minutes face to face with the patient more than 50% of this time was spent counseling and coordinating care.       Electronically signed by Chris Barton MD on 2/7/2020 at 11:06 AM

## 2020-02-10 RX ORDER — PANTOPRAZOLE SODIUM 40 MG/1
TABLET, DELAYED RELEASE ORAL
Qty: 30 TABLET | Refills: 3 | Status: SHIPPED | OUTPATIENT
Start: 2020-02-10 | End: 2020-07-14

## 2020-02-10 RX ORDER — AMLODIPINE BESYLATE 10 MG/1
TABLET ORAL
Qty: 30 TABLET | Refills: 3 | Status: SHIPPED | OUTPATIENT
Start: 2020-02-10 | End: 2020-06-22 | Stop reason: SDUPTHER

## 2020-02-11 ENCOUNTER — HOSPITAL ENCOUNTER (OUTPATIENT)
Age: 45
Discharge: HOME | End: 2020-02-11
Payer: MEDICARE

## 2020-02-11 LAB
AMPHETAMINE SCREEN URINE: NEGATIVE
BARBITURATE SCREEN URINE: NEGATIVE
BENZODIAZEPINE SCREEN, URINE: NEGATIVE
BUPRENORPHINE URINE: NORMAL
CANNABINOID SCREEN URINE: NEGATIVE
COCAINE METABOLITE, URINE: NEGATIVE
MDMA URINE: NORMAL
METHADONE SCREEN, URINE: NEGATIVE
METHAMPHETAMINE, URINE: NORMAL
OPIATES, URINE: NEGATIVE
OXYCODONE SCREEN URINE: NEGATIVE
PHENCYCLIDINE, URINE: NEGATIVE
PROPOXYPHENE, URINE: NORMAL
TEST INFORMATION: NORMAL
TRICYCLIC ANTIDEPRESSANTS, UR: NORMAL

## 2020-02-11 PROCEDURE — 80307 DRUG TEST PRSMV CHEM ANLYZR: CPT

## 2020-02-12 ENCOUNTER — OFFICE VISIT (OUTPATIENT)
Dept: INTERNAL MEDICINE | Age: 45
End: 2020-02-12
Payer: MEDICARE

## 2020-02-12 ENCOUNTER — OFFICE VISIT (OUTPATIENT)
Dept: NEUROSURGERY | Age: 45
End: 2020-02-12
Payer: MEDICARE

## 2020-02-12 VITALS
DIASTOLIC BLOOD PRESSURE: 92 MMHG | HEART RATE: 95 BPM | WEIGHT: 141 LBS | BODY MASS INDEX: 24.98 KG/M2 | HEIGHT: 63 IN | SYSTOLIC BLOOD PRESSURE: 133 MMHG

## 2020-02-12 VITALS — SYSTOLIC BLOOD PRESSURE: 138 MMHG | RESPIRATION RATE: 16 BRPM | DIASTOLIC BLOOD PRESSURE: 84 MMHG | HEART RATE: 93 BPM

## 2020-02-12 PROCEDURE — 1036F TOBACCO NON-USER: CPT | Performed by: STUDENT IN AN ORGANIZED HEALTH CARE EDUCATION/TRAINING PROGRAM

## 2020-02-12 PROCEDURE — 99213 OFFICE O/P EST LOW 20 MIN: CPT | Performed by: STUDENT IN AN ORGANIZED HEALTH CARE EDUCATION/TRAINING PROGRAM

## 2020-02-12 PROCEDURE — G8420 CALC BMI NORM PARAMETERS: HCPCS | Performed by: NEUROLOGICAL SURGERY

## 2020-02-12 PROCEDURE — G8427 DOCREV CUR MEDS BY ELIG CLIN: HCPCS | Performed by: NEUROLOGICAL SURGERY

## 2020-02-12 PROCEDURE — 1036F TOBACCO NON-USER: CPT | Performed by: NEUROLOGICAL SURGERY

## 2020-02-12 PROCEDURE — G8482 FLU IMMUNIZE ORDER/ADMIN: HCPCS | Performed by: NEUROLOGICAL SURGERY

## 2020-02-12 PROCEDURE — G8482 FLU IMMUNIZE ORDER/ADMIN: HCPCS | Performed by: STUDENT IN AN ORGANIZED HEALTH CARE EDUCATION/TRAINING PROGRAM

## 2020-02-12 PROCEDURE — G8427 DOCREV CUR MEDS BY ELIG CLIN: HCPCS | Performed by: STUDENT IN AN ORGANIZED HEALTH CARE EDUCATION/TRAINING PROGRAM

## 2020-02-12 PROCEDURE — G8420 CALC BMI NORM PARAMETERS: HCPCS | Performed by: STUDENT IN AN ORGANIZED HEALTH CARE EDUCATION/TRAINING PROGRAM

## 2020-02-12 PROCEDURE — 99203 OFFICE O/P NEW LOW 30 MIN: CPT | Performed by: NEUROLOGICAL SURGERY

## 2020-02-12 PROCEDURE — 99211 OFF/OP EST MAY X REQ PHY/QHP: CPT | Performed by: INTERNAL MEDICINE

## 2020-02-12 RX ORDER — PREDNISONE 20 MG/1
TABLET ORAL
Qty: 30 TABLET | Refills: 0 | Status: SHIPPED | OUTPATIENT
Start: 2020-02-12 | End: 2020-02-27

## 2020-02-12 ASSESSMENT — ENCOUNTER SYMPTOMS
EYE DISCHARGE: 0
SINUS PRESSURE: 0
FACIAL SWELLING: 0
RHINORRHEA: 0
STRIDOR: 0
SINUS PAIN: 0
SHORTNESS OF BREATH: 0
CHEST TIGHTNESS: 0
ABDOMINAL DISTENTION: 0
VOMITING: 0
EYE PAIN: 0
BACK PAIN: 0
COLOR CHANGE: 0
CHOKING: 0
DIARRHEA: 0
ABDOMINAL PAIN: 0
NAUSEA: 0

## 2020-02-12 NOTE — PROGRESS NOTES
Diagnosis    RSD (reflex sympathetic dystrophy)    GERD (gastroesophageal reflux disease)    Chronic bilateral low back pain with right-sided sciatica    Lumbar disc disease    Hemorrhoids, external    Essential hypertension    Pure hypercholesterolemia    Spondylosis without myelopathy or radiculopathy, lumbar region    Hx of right BKA ([address]       Health Maintenance Due   Topic Date Due    Annual Wellness Visit (AWV)  06/19/2019       Allergies   Allergen Reactions    Penicillins     Shellfish-Derived Products          Current Outpatient Medications   Medication Sig Dispense Refill    predniSONE (DELTASONE) 20 MG tablet Take 3 tablets PO daily for first 5 days, then take 2 tablets PO daily for next 5 days, then take 1 tablet PO daily for last 5 days 30 tablet 0    pantoprazole (PROTONIX) 40 MG tablet take 1 tablet by mouth once daily 30 tablet 3    amLODIPine (NORVASC) 10 MG tablet take 1 tablet by mouth once daily 30 tablet 3    gabapentin (NEURONTIN) 300 MG capsule Take 1 capsule by mouth 3 times daily for 30 days. Intended supply: 90 days 90 capsule 0    hydrochlorothiazide (HYDRODIURIL) 12.5 MG tablet Take 2 tablets by mouth daily 30 tablet 3    cyclobenzaprine (FLEXERIL) 10 MG tablet take 1 tablet by mouth three times a day if needed for muscle spasm 30 tablet 3    atorvastatin (LIPITOR) 40 MG tablet Take 1 tablet by mouth daily 90 tablet 1    ibuprofen (ADVIL;MOTRIN) 600 MG tablet Take 1 tablet by mouth every 6 hours as needed for Pain (Patient not taking: Reported on 2/12/2020) 30 tablet 0     No current facility-administered medications for this visit.         Social History     Tobacco Use    Smoking status: Never Smoker    Smokeless tobacco: Never Used   Substance Use Topics    Alcohol use: No     Alcohol/week: 0.0 standard drinks    Drug use: No       Family History   Problem Relation Age of Onset    High Blood Pressure Mother     Diabetes Mother     High Blood Pressure Father 10/21/2019     10/21/2019    CO2 19 10/21/2019    BUN 23 10/21/2019    CREATININE 1.22 10/21/2019    GLUCOSE 93 10/21/2019       HEMOGLOBIN A1C:   Lab Results   Component Value Date    LABA1C 5.2 01/13/2015       FASTING LIPID PANEL:  Lab Results   Component Value Date    CHOL 218 (H) 10/21/2019    HDL 62 10/21/2019    TRIG 68 10/21/2019       ASSESSMENT AND PLAN:    Beth Connell was seen today for 1 month follow-up. Diagnoses and all orders for this visit:    Essential hypertension    Lumbar disc disease         FOLLOW UP AND INSTRUCTIONS:    Return in about 4 weeks (around 3/11/2020), or if symptoms worsen or fail to improve. · Vincent received counseling on the following healthy behaviors: nutrition, exercise and medication adherence    · Discussed use, benefit, and side effects of prescribed medications. Barriers to medication compliance addressed. All patient questions answered. Pt voiced understanding.     · Patient given educational materials - see patient instructions    Emily Rios MD      Department of Internal Medicine  [facility] [place]         2/12/2020, 11:55 AM

## 2020-02-12 NOTE — PROGRESS NOTES
Visit Information    Have you changed or started any medications since your last visit including any over-the-counter medicines, vitamins, or herbal medicines? no   Are you having any side effects from any of your medications? -  no  Have you stopped taking any of your medications? Is so, why? -  no    Have you seen any other physician or provider since your last visit? No  Have you had any other diagnostic tests since your last visit? No  Have you been seen in the emergency room and/or had an admission to a hospital since we last saw you? No  Have you had your routine dental cleaning in the past 6 months? yes     Have you activated your [employer] account? If not, what are your barriers?  Yes     Patient Care Team:  Agatha Kaufman MD as PCP - General (Internal Medicine)  MD Pretty Soto DO as Consulting Physician (General Surgery)    Medical History Review  Past Medical, Family, and Social History reviewed and does contribute to the patient presenting condition    Health Maintenance   Topic Date Due    Annual Wellness Visit (AWV)  06/19/2019    Lipid screen  10/21/2020    Potassium monitoring  10/21/2020    Creatinine monitoring  10/21/2020    Shingles Vaccine (1 of 2) 06/01/2024    DTaP/Tdap/Td vaccine (2 - Td) 10/19/2025    Flu vaccine  Completed    HIV screen  Completed    Hepatitis A vaccine  Aged Out    Hepatitis B vaccine  Aged Out    Hib vaccine  Aged Out    Meningococcal (ACWY) vaccine  Aged Out    Pneumococcal 0-64 years Vaccine  Aged Out

## 2020-02-12 NOTE — PROGRESS NOTES
Patient is here for follow-up. He has hypertension. Unfortunately he has not taken his medications in a few days as he was told to stop all medications prior to the lumbar epidural injection. Patient is advised to restart antihypertensives and to hold NSAIDs and aspirins. He has seen neurosurgery today. He is scheduled for an MRI of his thoracic spine. He was started on a prednisone taper because of his weakness. He is advised to follow-up closely with neurosurgery and go to the ER if symptoms worsen. Attending Physician Statement  I have discussed the care of Florina Palmer, including pertinent history and exam findings,  with the resident. I have reviewed the key elements of all parts of the encounter with the resident. I agree with the assessment, plan and orders as documented by the resident.   (GE Modifier)

## 2020-02-12 NOTE — PROGRESS NOTES
[facility] [address] Michi Porras  [address] [place] [id number]  Dept: [phone]    Patient:  Perdo Stephenson  YOB: 1975  Date: 2/12/20    The patient is a 38 y.o. male who presents today for consult of the following problems:     Chief Complaint   Patient presents with    Leg Pain     left lumbar radiculitis    Back Pain             HPI:     Pedro Stephenson is a 38 y.o. male on whom neurosurgical consultation was requested by Sammi Longoria MD for management of lumbar radiculopathy. The patient states that [place] Year's that he woke up with left-sided radicular pain numbness as well as weakness. At onset he states that he was unable to dorsiflex his foot at all. He uses a cane at baseline and has a BKA since the age of 14 for childhood neoplasm. Over the course of the last few months he has had persistent numbness predominately in an S1 distribution over the dorsum lateral aspect of the foot and plantar region. Pain radiates down the left buttock posterior hamstring lateral aspect of the lower leg into the lateral foot. Exacerbating factors including motion and changes in position as well as sitting to standing. No significant palliation. Has not been on any type of steroid taper tapers but has been getting regular physical therapy. The patient states that his cousin is a physical therapist has been coming over routinely and helping him with exercises and other strengthening maneuvers as far as the left foot. He states that overall his weakness in the left foot has improved steadily to where he can lift it now but it is still significantly weak and noticeable during gait. However the numbness has been persistent without any improvement and he still has intermittent episodes of severe radiating sharp pain ranging anywhere from 7-10 out of 10. No right leg radiculopathy. Also some axial pain corresponding with the radicular symptoms.   No bowel bladder incontinence saddle symptoms. .      History:     Past Medical History:   Diagnosis Date    JOELLEN (acute kidney injury) ([facility] 2/12/2015    Allergic rhinitis     Chronic abdominal pain     Chronic LBP     Constipation     ED (erectile dysfunction) of organic origin     GERD (gastroesophageal reflux disease)     Gynecomastia, male     Hep C w/o coma, chronic ([facility] 7/18/2017    Hypertension     Lumbar disc disease     Neuroblastoma ([facility]     Neurogenic dysfunction of the urinary bladder     Osteoarthritis     [facility]     Pure hypercholesterolemia 7/18/2017    RSD (reflex sympathetic dystrophy)      Past Surgical History:   Procedure Laterality Date    ABDOMINAL ADHESION SURGERY      BLADDER REPAIR      LEG AMPUTATION BELOW KNEE Right     as a child d/t cancer    LEG AMPUTATION THROUGH LOWER TIBIA AND FIBULA      SMALL INTESTINE SURGERY       Family History   Problem Relation Age of Onset    High Blood Pressure Mother     Diabetes Mother     High Blood Pressure Father     Cancer Father         prostate    Diabetes Father     Coronary Art Dis Father     Heart Attack Father     Heart Disease Father      Current Outpatient Medications on File Prior to Visit   Medication Sig Dispense Refill    pantoprazole (PROTONIX) 40 MG tablet take 1 tablet by mouth once daily 30 tablet 3    amLODIPine (NORVASC) 10 MG tablet take 1 tablet by mouth once daily 30 tablet 3    gabapentin (NEURONTIN) 300 MG capsule Take 1 capsule by mouth 3 times daily for 30 days.  Intended supply: 90 days 90 capsule 0    hydrochlorothiazide (HYDRODIURIL) 12.5 MG tablet Take 2 tablets by mouth daily 30 tablet 3    cyclobenzaprine (FLEXERIL) 10 MG tablet take 1 tablet by mouth three times a day if needed for muscle spasm 30 tablet 3    ibuprofen (ADVIL;MOTRIN) 600 MG tablet Take 1 tablet by mouth every 6 hours as needed for Pain 30 tablet 0    atorvastatin (LIPITOR) 40 MG tablet Take 1 tablet by mouth daily 90 tablet 1     No current facility-administered medications on file prior to visit. Social History     Tobacco Use    Smoking status: Never Smoker    Smokeless tobacco: Never Used   Substance Use Topics    Alcohol use: No     Alcohol/week: 0.0 standard drinks    Drug use: No       Allergies   Allergen Reactions    Penicillins     Shellfish-Derived Products        Review of Systems  Constitutional: Negative for activity change and appetite change. HENT: Negative for ear pain and facial swelling. Eyes: Negative for discharge and itching. Respiratory: Negative for choking and chest tightness. Cardiovascular: Negative for chest pain and leg swelling. Gastrointestinal: Negative for nausea and abdominal pain. Endocrine: Negative for cold intolerance and heat intolerance. Genitourinary: Negative for frequency and flank pain. Musculoskeletal: Negative for myalgias and joint swelling. Skin: Negative for rash and wound. Allergic/Immunologic: Negative for environmental allergies and food allergies. Hematological: Negative for adenopathy. Does not bruise/bleed easily. Psychiatric/Behavioral: Negative for self-injury. The patient is not nervous/anxious. Physical Exam:      /84   Pulse 93   Resp 16   Estimated body mass index is 24.52 kg/m² as calculated from the following:    Height as of 2/5/20: 5' 3\" (1.6 m). Weight as of 2/7/20: 138 lb 6.4 oz (62.8 kg). General:  Darrell Gordillo is a 38y.o. year old male who appears his stated age. HEENT: Normocephalic atraumatic. Neck supple. Chest: regular rate; pulses equal  Abdomen: Soft nontender nondistended. Normoactive bowel sounds.   Ext: DP and PT pulses 2+, good cap refill  Neuro    Mentation  Appropriate affect  Registration intact  Orientation intact  3 item recall intact  Judgement intact to situation    Cranial Nerves:   Pupils equal and reactive to light  Extraocular motion intact  Face and shrug symmetric  Tongue midline  No dysarthria  v1-3 sensation symmetric, masseter tone symmetric  Hearing symmetric and intact to finger rub    Sensation:   Diminished sensation predominantly S1 as well as L5 dermatome on the left side. Motor  L deltoid 5/5; R deltoid 5/5  L biceps 5/5; R biceps 5/5  L triceps 5/5; R triceps 5/5  L wrist extension 5/5; R wrist extension 5/5  L intrinsics 5/5; R intrinsics 5/5     Right BKA with 5 out of 5 quadriceps as well as iliopsoas. Left-sided quadriceps and iliopsoas 5 out of 5. Plantar flexion 4+. Dorsiflexion 4- EHL 4-. Reflexes  3 out of 4 patellar's and 2 out of 4 Achilles left. 2 out of 4 right patellar. hoffmans L: neg  hoffmans R: neg  Clonus L: neg  Clonus R: neg  Babinski L: up  Babinski R; up    Studies Review:     MRI lumbar spine shows evident dextroscoliosis along with right-sided foraminal disc protrusion at L5-S1 causing significant stenosis. Moderate central stenosis with no evidence of significant left-sided disease. Assessment and Plan:      1. Spinal stenosis of lumbar region with radiculopathy    2. Left leg weakness    3. Sacroiliac joint pain          Plan: Patient's imaging and clinical picture are discordant with a predominantly right-sided L4 and foraminal disc but mainly left-sided symptoms. I believe this warrants further evaluation with MRI thoracic spine in the setting of hyperreflexia as well as an EMG of the bilateral lower extremities to also evaluate for peroneal disease that is peripheral in nature. I did contact the neurologist directly to see if they could get him in for the MRI EMG more urgently. Patient is scheduled to have L5-S1 interlaminar block which I believe it is okay to move forward with however at this time I do not have a distinct etiology for the patient's symptoms. We will also provide prednisone taper for inflammatory control. Followup: No follow-ups on file.     Prescriptions Ordered:  No orders of the

## 2020-02-17 ENCOUNTER — TELEPHONE (OUTPATIENT)
Dept: NEUROLOGY | Age: 45
End: 2020-02-17

## 2020-02-17 ENCOUNTER — HOSPITAL ENCOUNTER (OUTPATIENT)
Dept: MRI IMAGING | Facility: CLINIC | Age: 45
Discharge: HOME | End: 2020-02-19
Payer: MEDICARE

## 2020-02-17 PROCEDURE — 72146 MRI CHEST SPINE W/O DYE: CPT

## 2020-02-20 ENCOUNTER — HOSPITAL ENCOUNTER (OUTPATIENT)
Age: 45
Setting detail: OUTPATIENT SURGERY
Discharge: HOME | End: 2020-02-20
Attending: ANESTHESIOLOGY | Admitting: ANESTHESIOLOGY
Payer: MEDICARE

## 2020-02-20 ENCOUNTER — APPOINTMENT (OUTPATIENT)
Dept: GENERAL RADIOLOGY | Age: 45
End: 2020-02-20
Attending: ANESTHESIOLOGY
Payer: MEDICARE

## 2020-02-20 VITALS
HEIGHT: 63 IN | BODY MASS INDEX: 24.45 KG/M2 | WEIGHT: 138 LBS | DIASTOLIC BLOOD PRESSURE: 97 MMHG | TEMPERATURE: 97.5 F | SYSTOLIC BLOOD PRESSURE: 134 MMHG | HEART RATE: 86 BPM | OXYGEN SATURATION: 98 % | RESPIRATION RATE: 16 BRPM

## 2020-02-20 PROCEDURE — 3600000050 HC PAIN LEVEL 1 BASE: Performed by: ANESTHESIOLOGY

## 2020-02-20 PROCEDURE — 3600000051 HC PAIN LEVEL 1 ADDL 15 MIN: Performed by: ANESTHESIOLOGY

## 2020-02-20 PROCEDURE — 99152 MOD SED SAME PHYS/QHP 5/>YRS: CPT | Performed by: ANESTHESIOLOGY

## 2020-02-20 PROCEDURE — 2580000003 HC RX 258: Performed by: ANESTHESIOLOGY

## 2020-02-20 PROCEDURE — 7100000011 HC PHASE II RECOVERY - ADDTL 15 MIN: Performed by: ANESTHESIOLOGY

## 2020-02-20 PROCEDURE — 3209999900 FLUORO FOR SURGICAL PROCEDURES

## 2020-02-20 PROCEDURE — 6360000004 HC RX CONTRAST MEDICATION: Performed by: ANESTHESIOLOGY

## 2020-02-20 PROCEDURE — 7100000010 HC PHASE II RECOVERY - FIRST 15 MIN: Performed by: ANESTHESIOLOGY

## 2020-02-20 PROCEDURE — 64483 NJX AA&/STRD TFRM EPI L/S 1: CPT | Performed by: ANESTHESIOLOGY

## 2020-02-20 PROCEDURE — 6360000002 HC RX W HCPCS: Performed by: ANESTHESIOLOGY

## 2020-02-20 PROCEDURE — 2500000003 HC RX 250 WO HCPCS: Performed by: ANESTHESIOLOGY

## 2020-02-20 PROCEDURE — 2709999900 HC NON-CHARGEABLE SUPPLY: Performed by: ANESTHESIOLOGY

## 2020-02-20 RX ORDER — LIDOCAINE HYDROCHLORIDE 10 MG/ML
INJECTION, SOLUTION INFILTRATION; PERINEURAL PRN
Status: DISCONTINUED | OUTPATIENT
Start: 2020-02-20 | End: 2020-02-20 | Stop reason: ALTCHOICE

## 2020-02-20 RX ORDER — FENTANYL CITRATE 50 UG/ML
INJECTION, SOLUTION INTRAMUSCULAR; INTRAVENOUS PRN
Status: DISCONTINUED | OUTPATIENT
Start: 2020-02-20 | End: 2020-02-20 | Stop reason: ALTCHOICE

## 2020-02-20 RX ORDER — SODIUM CHLORIDE 0.9 % (FLUSH) 0.9 %
10 SYRINGE (ML) INJECTION PRN
Status: DISCONTINUED | OUTPATIENT
Start: 2020-02-20 | End: 2020-02-20 | Stop reason: HOSPADM

## 2020-02-20 RX ORDER — SODIUM CHLORIDE 0.9 % (FLUSH) 0.9 %
10 SYRINGE (ML) INJECTION EVERY 12 HOURS SCHEDULED
Status: DISCONTINUED | OUTPATIENT
Start: 2020-02-20 | End: 2020-02-20 | Stop reason: HOSPADM

## 2020-02-20 RX ORDER — MIDAZOLAM HYDROCHLORIDE 1 MG/ML
INJECTION INTRAMUSCULAR; INTRAVENOUS PRN
Status: DISCONTINUED | OUTPATIENT
Start: 2020-02-20 | End: 2020-02-20 | Stop reason: ALTCHOICE

## 2020-02-20 RX ORDER — DEXAMETHASONE SODIUM PHOSPHATE 10 MG/ML
INJECTION INTRAMUSCULAR; INTRAVENOUS PRN
Status: DISCONTINUED | OUTPATIENT
Start: 2020-02-20 | End: 2020-02-20 | Stop reason: ALTCHOICE

## 2020-02-20 RX ADMIN — Medication 10 ML: at 11:58

## 2020-02-20 ASSESSMENT — PAIN - FUNCTIONAL ASSESSMENT: PAIN_FUNCTIONAL_ASSESSMENT: 0-10

## 2020-02-20 ASSESSMENT — PAIN DESCRIPTION - DESCRIPTORS: DESCRIPTORS: ACHING

## 2020-02-20 ASSESSMENT — PAIN DESCRIPTION - LOCATION: LOCATION: BACK

## 2020-02-20 ASSESSMENT — PAIN SCALES - GENERAL
PAINLEVEL_OUTOF10: 10

## 2020-02-20 ASSESSMENT — PAIN DESCRIPTION - PAIN TYPE: TYPE: CHRONIC PAIN

## 2020-02-20 NOTE — H&P
all the way to the foot  Have difficulty in walking  Pain interferes with daily life activities  Aggravated with movement lifting bending twisting turning and walking  Nothing seems to alleviate the pain  Patient have recently been started on gabapentin 100 mg once a day do not find it much helpful yet  Denies any side effects     -Patient was seeing pain management in [place]. 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 Location:back  2. Radiation:left leg  3. Character:numb, aching, shooting, throbbing, tender, burning  5. Duration:10 years, worsened 4 months ago  10. Onset: years  7. Did an injury cause pain: no  8. Aggravating factors:walking, standing  9. Alleviating factors:nothing  10. Associated symptoms (numbness / tingling / weakness):  yes  -Where at:left leg  -Down into finger tips or toes (specify which finger or toes):all toes  -constant or intermitting: constant  11. Red Flags: (weight loss / chills / loss of bladder or bowel control):no     Previous management history  1. Previous diagnostic workup: (Imaging/EMG)   CT, MRI, or Xray: MRI, CT, xray  What part of the body:lumbar spine  What facility did they have it [facility]  What year or specific date: 2020  EMG:  yes     2. Previous non interventional treatments tried:  chiropractor or physical therapy:yes  What part of the body:back  What facility [facility]: [facility]  How long ago was it last tried:years  Did it work: No  Did they complete it:Yes     3.  Previous Medications tried  NSAID's: yes  Neurontin: yes  Lyrica: yes  Trycyclic antidepressant (Ellavil / Pamelor ): yes  Cymbalta: no  Opioids (Ultram / Vicodin / Percocet / Morphine / Dilaudid / Oramorph/ Fentanyl etc.):Ultram, Vicodin, Percocet, Morphine, and Dilaudid  Last Pain Worry: None       Inability: None    Transportation needs:       Medical: None       Non-medical: None   Tobacco Use    Smoking status: Never Smoker    Smokeless tobacco: Never Used   Substance and Sexual Activity    Alcohol use: No       Alcohol/week: 0.0 standard drinks    Drug use: No    Sexual activity: Yes       Partners: Female   Lifestyle    Physical activity:       Days per week: None       Minutes per session: None    Stress: None   Relationships    Social connections:       Talks on phone: None       Gets together: None       Attends Restorationist service: None       Active member of club or organization: None       Attends meetings of clubs or organizations: None       Relationship status: None    Intimate partner violence:       Fear of current or ex partner: None       Emotionally abused: None       Physically abused: None       Forced sexual activity: None   Other Topics Concern    None   Social History Narrative     ** Merged History Encounter **               Family History         Family History   Problem Relation Age of Onset    High Blood Pressure Mother      Diabetes Mother      High Blood Pressure Father      Cancer Father           prostate    Diabetes Father      Coronary Art Dis Father      Heart Attack Father      Heart Disease Father                Allergies   Allergen Reactions    Penicillins      Shellfish-Derived Products        Penicillins and Shellfish-derived products       Vitals:     02/05/20 1506   BP: 123/81   Pulse: 101   SpO2: 96%      Current Facility-Administered Medications          Current Outpatient Medications   Medication Sig Dispense Refill    gabapentin (NEURONTIN) 300 MG capsule Take 1 capsule by mouth 3 times daily for 30 days. Intended supply: 90 days 90 capsule 0    hydrochlorothiazide (HYDRODIURIL) 12.5 MG tablet Take 2 tablets by mouth daily 30 tablet 3    gabapentin (NEURONTIN) 100 MG capsule Take 1 capsule by mouth 3 times daily for 180 days. Will cause drowsiness. Do not drive if taking. Intended supply: 90 days 270 capsule 1    cyclobenzaprine (FLEXERIL) 10 MG tablet take 1 tablet by mouth three times a day if needed for muscle spasm 30 tablet 3    ibuprofen (ADVIL;MOTRIN) 600 MG tablet Take 1 tablet by mouth every 6 hours as needed for Pain 30 tablet 0    pantoprazole (PROTONIX) 40 MG tablet Take 1 tablet by mouth daily 30 tablet 3    atorvastatin (LIPITOR) 40 MG tablet Take 1 tablet by mouth daily 90 tablet 1    amLODIPine (NORVASC) 10 MG tablet Take 1 tablet by mouth daily 30 tablet 3      No current facility-administered medications for this visit. Review of Systems   Constitutional: Negative. Negative for fever. HENT: Negative. Eyes: Negative. Respiratory: Negative. Cardiovascular: Negative. Gastrointestinal: Negative. Endocrine: Negative. Genitourinary: Negative. Musculoskeletal: Positive for arthralgias, back pain, myalgias, neck pain and neck stiffness. Skin: Negative. Allergic/Immunologic: Positive for food allergies. Neurological: Positive for weakness and numbness. Hematological: Negative. Psychiatric/Behavioral: Negative. All other systems reviewed and are negative. Objective:  General Appearance:  Uncomfortable and in pain. Vital signs: (most recent): Blood pressure 123/81, pulse 101, height 5' 3\" (1.6 m), weight 136 lb 3.2 oz (61.8 kg), SpO2 96 %. Vital signs are normal.  No fever. HEENT: Normal HEENT exam.    Lungs:  Normal effort. Breath sounds clear to auscultation. Heart: Normal rate. No murmur. Chest: Symmetric chest wall expansion. No chest wall tenderness. Abdomen: Abdomen is soft and non-distended. Extremities: Decreased range of motion. There is deformity. Neurological: Patient is alert and oriented to person, place and time. Normal strength. Patient has normal reflexes, normal muscle tone and normal coordination.     Pupils:  Pupils are equal, round, and reactive to light. Skin:  Warm, dry and pale. No rash, ecchymosis, cyanosis or ulceration. Lumbar spine examination  No apparent deformity on inspection  Range of motion is marked limited and associated with pain  Gait is antalgic  Patient ambulating with a cane  Positive tenderness to palpation over lower lumbar area  Aggravation of pain with forward flexion  Straight leg raise positive on left side     Neurological examination  Normal muscle mass  Right below-knee amputation  Muscle strength 4/5 in left foot dorsiflexion and left EHL tendon, rest of the muscle group is 5/5  Deep tendon reflexes 2+ at left knee and difficult to elicit in the left ankle  Clonus is negative     Assessment & Plan   Pain History     -Chronic lower back pain onset many years ago located in the lumbar area typically radiated down the right side  Is ago without any particular injury or identifiable cause he developed severe flareup of his back pain with radiation down the left leg over posterior thigh and anterolateral lower extremity of the foot  Patient developed severe left leg numbness and left foot drop also  Patient went to the ER  Had diagnostic work-up with x-ray lumbar spine, MRI lumbar spine and CT scan lumbar spine  Have not seen neurosurgeon or spine surgeon  Denies any loss of bladder or bowel control  No history of fever chills or weight loss     Pain is constant severe sharp shooting throbbing sensation radiating from lumbar area all the way to the foot  Have difficulty in walking  Pain interferes with daily life activities  Aggravated with movement lifting bending twisting turning and walking  Nothing seems to alleviate the pain  Patient have recently been started on gabapentin 100 mg once a day do not find it much helpful yet  Denies any side effects     -Patient was seeing pain management in [place].   Was on high-dose opioid until a year ago     -History of right below-knee amputation to severe right foraminal narrowing. 1. Left lumbar radiculitis    2. Chronic bilateral low back pain with right-sided sciatica    3. Spondylosis without myelopathy or radiculopathy, lumbar region    4. Hx of right BKA ([address]    5. Left foot drop    6.  Lumbar disc herniation        Recent diagnostic imaging including MRI lumbar spine, x-ray lumbar spine and CT scan lumbar spine  Reports were reviewed  Images reviewed independently  Images were shown to the patient  Findings were discussed with patient in detail     -Chronic low back pain and extension over the right leg is likely related to lumbar disc herniation at L5-S1 level right paracentral  I think this recent flareup and shooting of the pain on the left side with left leg weakness and left foot numbness is a progression of that L5-S1 herniated disc with severe compromise of the spinal canal  I feel the reason he is not able to report any weakness on the right leg is because of right below-knee amputation     I do not think patient can tolerate physical therapy at this point     I think the dose of new gabapentin is subtherapeutic  We will increase it to gabapentin 3 times a day  Patient denies any illicit substance use  We will collect urine for toxicology  May consider for opioid contract in future     I will schedule him for lumbar epidural steroid injection to help reduce inflammation and pain     I think patient need surgical evaluation  Refer patient to neurosurgery     Will obtain EMG nerve conduction testing         Orders Placed This Encounter   Procedures    Urine Drug Screen, Comprehensive    Ambulatory referral to Physical Therapy   [address] [facility] Karen Gambino DO, Neurosurgery, [facility]    EMG    NH INJECT ANES/STEROID FORAMEN LUMBAR/SACRAL W IMG GUIDE ,1 LEVEL      Encounter Medications         Orders Placed This Encounter   Medications    gabapentin (NEURONTIN) 300 MG capsule       Sig: Take 1 capsule by mouth 3 times daily for 30 days. Intended supply: 90 days       Dispense:  90 capsule       Refill:  0         In today's visit , total time spent face to face with patient was 61 MINUTES , in which  50% or more of the time was spent in counseling and coordinating care. The patient was counseled about  1. Diagnostic Impression  2. Recommended Diagnostic Studies  3. Treatment options  4. Risks and benefits of treatment options  5. Importance of compliance with treatment options. 6. Follow up instructions.            Electronically signed by Jenn Agustin MD on 2/5/2020 at 3:42 PM               Revision History

## 2020-02-20 NOTE — OP NOTE
SEDATION NOTE:    ASA CLASSIFICATION  2  MP   CLASSIFICATION  2    · Moderate intravenous conscious sedation was supervised by Dr. Racquel Baer  . The patient was independently monitored by a Registered Nurse assigned to the Procedure Room  . Monitoring included automated blood pressure, continuous EKG, Capnography and continuous pulse oximetry. . The detailed Conscious Record is permanently stored in the [place] [id number]. The following is the conscious sedation record;  Start Time:  1230  End times:  1247  Duration:  17 minutes  MEDS GIVEN 1 MG VERSED AND 50 [address]      Patient Name: Saul Abrams   YOB: 1975  Room/Bed: STAZ OR Pool/NONE  Medical Record Number: [id number]  Date: 2/20/2020       Preoperative Diagnosis:    chronic lower back pain with right-sided sciatica  Lumbar disc herniation    Postoperative diagnosis:   chronic lower back pain with right-sided sciatica  Lumbar disc herniation    Blood Loss: none    Procedure Performed:  Transforaminal lumbar epidural steroid injection at the levels of S1  on the Bilateral side under fluoroscopic guidance. Procedure: The Patient was seen in the preop area, chart was reviewed, informed consent was obtained. Patient was taken to procedure room and was placed in prone position. Vital signs were monitored through out the  Procedure. A time out was completed. The skin over the back was prepped and draped in sterile manner. The target point was marked in ipsilateral obliques just below the pedicle at the target levels. Skin and deep tissues were anesthetized with 1 % lidocaine. A 20-gauge, spinal needle was advanced  under fluoroscopy guidance in AP / Oblique and lateral views, such that the tip of the needle lies in the neuroforamina at about the 6 o'clock position under the pedicle of the target vertebra. This was confirmed with AP and lateral views of the fluoroscopy.      Then after negative aspiration contrast dye [id number] was injected with live fluoroscopy in AP views that showed  spread of the contrast in the epidural space  and no vascular runoff or intrathecal spread. Finally 3 ml of treatment solution containing 5 ml of  1 % PF lidocaine and 1 ml of dexamethasone 10 mg / ml was injected. The same procedure was then repeated at left at S 1 level with same technique. The remaining 3 ml of treatment solution was injected at that. The total dose of steroid injected today was dexamethasone 10 mg. The needle was removed and a Band-Aid was placed over the needle  insertion site. The patient's vital signs remained stable and the patient tolerated the procedure well.         Electronically signed by Emiliano Gross MD on 2/20/2020 at 12:46 PM

## 2020-02-26 ENCOUNTER — HOSPITAL ENCOUNTER (OUTPATIENT)
Dept: NEUROLOGY | Age: 45
Discharge: HOME | End: 2020-02-26
Payer: MEDICARE

## 2020-02-26 PROCEDURE — 95908 NRV CNDJ TST 3-4 STUDIES: CPT | Performed by: PHYSICAL MEDICINE & REHABILITATION

## 2020-02-26 PROCEDURE — 95886 MUSC TEST DONE W/N TEST COMP: CPT | Performed by: PHYSICAL MEDICINE & REHABILITATION

## 2020-02-26 PROCEDURE — 95885 MUSC TST DONE W/NERV TST LIM: CPT | Performed by: PHYSICAL MEDICINE & REHABILITATION

## 2020-03-02 ENCOUNTER — APPOINTMENT (OUTPATIENT)
Dept: GENERAL RADIOLOGY | Age: 45
End: 2020-03-02
Attending: ANESTHESIOLOGY
Payer: MEDICARE

## 2020-03-02 ENCOUNTER — HOSPITAL ENCOUNTER (OUTPATIENT)
Age: 45
Setting detail: OUTPATIENT SURGERY
Discharge: HOME | End: 2020-03-02
Attending: ANESTHESIOLOGY | Admitting: ANESTHESIOLOGY
Payer: MEDICARE

## 2020-03-02 VITALS
SYSTOLIC BLOOD PRESSURE: 127 MMHG | DIASTOLIC BLOOD PRESSURE: 88 MMHG | HEIGHT: 62 IN | BODY MASS INDEX: 26.26 KG/M2 | TEMPERATURE: 97.5 F | HEART RATE: 89 BPM | OXYGEN SATURATION: 99 % | RESPIRATION RATE: 15 BRPM | WEIGHT: 142.7 LBS

## 2020-03-02 PROCEDURE — 2580000003 HC RX 258: Performed by: ANESTHESIOLOGY

## 2020-03-02 PROCEDURE — 3600000050 HC PAIN LEVEL 1 BASE: Performed by: ANESTHESIOLOGY

## 2020-03-02 PROCEDURE — 2500000003 HC RX 250 WO HCPCS: Performed by: ANESTHESIOLOGY

## 2020-03-02 PROCEDURE — 2709999900 HC NON-CHARGEABLE SUPPLY: Performed by: ANESTHESIOLOGY

## 2020-03-02 PROCEDURE — 6360000002 HC RX W HCPCS: Performed by: ANESTHESIOLOGY

## 2020-03-02 PROCEDURE — 3209999900 FLUORO FOR SURGICAL PROCEDURES

## 2020-03-02 PROCEDURE — 7100000010 HC PHASE II RECOVERY - FIRST 15 MIN: Performed by: ANESTHESIOLOGY

## 2020-03-02 PROCEDURE — 99152 MOD SED SAME PHYS/QHP 5/>YRS: CPT | Performed by: ANESTHESIOLOGY

## 2020-03-02 PROCEDURE — 99153 MOD SED SAME PHYS/QHP EA: CPT | Performed by: ANESTHESIOLOGY

## 2020-03-02 PROCEDURE — 6360000004 HC RX CONTRAST MEDICATION: Performed by: ANESTHESIOLOGY

## 2020-03-02 PROCEDURE — 64484 NJX AA&/STRD TFRM EPI L/S EA: CPT | Performed by: ANESTHESIOLOGY

## 2020-03-02 PROCEDURE — 3600000051 HC PAIN LEVEL 1 ADDL 15 MIN: Performed by: ANESTHESIOLOGY

## 2020-03-02 PROCEDURE — 7100000011 HC PHASE II RECOVERY - ADDTL 15 MIN: Performed by: ANESTHESIOLOGY

## 2020-03-02 PROCEDURE — 64483 NJX AA&/STRD TFRM EPI L/S 1: CPT | Performed by: ANESTHESIOLOGY

## 2020-03-02 RX ORDER — DEXAMETHASONE SODIUM PHOSPHATE 10 MG/ML
INJECTION INTRAMUSCULAR; INTRAVENOUS PRN
Status: DISCONTINUED | OUTPATIENT
Start: 2020-03-02 | End: 2020-03-02 | Stop reason: ALTCHOICE

## 2020-03-02 RX ORDER — MIDAZOLAM HYDROCHLORIDE 1 MG/ML
INJECTION INTRAMUSCULAR; INTRAVENOUS PRN
Status: DISCONTINUED | OUTPATIENT
Start: 2020-03-02 | End: 2020-03-02 | Stop reason: ALTCHOICE

## 2020-03-02 RX ORDER — LIDOCAINE HYDROCHLORIDE 10 MG/ML
INJECTION, SOLUTION INFILTRATION; PERINEURAL PRN
Status: DISCONTINUED | OUTPATIENT
Start: 2020-03-02 | End: 2020-03-02 | Stop reason: ALTCHOICE

## 2020-03-02 RX ORDER — FENTANYL CITRATE 50 UG/ML
INJECTION, SOLUTION INTRAMUSCULAR; INTRAVENOUS PRN
Status: DISCONTINUED | OUTPATIENT
Start: 2020-03-02 | End: 2020-03-02 | Stop reason: ALTCHOICE

## 2020-03-02 RX ORDER — SODIUM CHLORIDE 0.9 % (FLUSH) 0.9 %
10 SYRINGE (ML) INJECTION EVERY 12 HOURS SCHEDULED
Status: DISCONTINUED | OUTPATIENT
Start: 2020-03-02 | End: 2020-03-02 | Stop reason: HOSPADM

## 2020-03-02 RX ORDER — SODIUM CHLORIDE 0.9 % (FLUSH) 0.9 %
10 SYRINGE (ML) INJECTION PRN
Status: DISCONTINUED | OUTPATIENT
Start: 2020-03-02 | End: 2020-03-02 | Stop reason: HOSPADM

## 2020-03-02 RX ORDER — MORPHINE SULFATE 2 MG/ML
1 INJECTION, SOLUTION INTRAMUSCULAR; INTRAVENOUS ONCE
Status: COMPLETED | OUTPATIENT
Start: 2020-03-02 | End: 2020-03-02

## 2020-03-02 RX ADMIN — MORPHINE SULFATE 1 MG: 2 INJECTION, SOLUTION INTRAMUSCULAR; INTRAVENOUS at 13:26

## 2020-03-02 RX ADMIN — SODIUM CHLORIDE, PRESERVATIVE FREE 10 ML: 5 INJECTION INTRAVENOUS at 11:31

## 2020-03-02 ASSESSMENT — PAIN SCALES - GENERAL
PAINLEVEL_OUTOF10: 10
PAINLEVEL_OUTOF10: 2
PAINLEVEL_OUTOF10: 9
PAINLEVEL_OUTOF10: 10
PAINLEVEL_OUTOF10: 9
PAINLEVEL_OUTOF10: 2
PAINLEVEL_OUTOF10: 2

## 2020-03-02 ASSESSMENT — PAIN DESCRIPTION - ONSET
ONSET: ON-GOING
ONSET: ON-GOING

## 2020-03-02 ASSESSMENT — PAIN DESCRIPTION - PAIN TYPE
TYPE: CHRONIC PAIN
TYPE: CHRONIC PAIN

## 2020-03-02 ASSESSMENT — PAIN DESCRIPTION - ORIENTATION
ORIENTATION: LOWER;MID
ORIENTATION: LOWER;MID

## 2020-03-02 ASSESSMENT — PAIN DESCRIPTION - PROGRESSION
CLINICAL_PROGRESSION: NOT CHANGED
CLINICAL_PROGRESSION: NOT CHANGED

## 2020-03-02 ASSESSMENT — PAIN DESCRIPTION - FREQUENCY
FREQUENCY: CONTINUOUS
FREQUENCY: CONTINUOUS

## 2020-03-02 ASSESSMENT — PAIN DESCRIPTION - DESCRIPTORS
DESCRIPTORS: ACHING;BURNING
DESCRIPTORS: ACHING;BURNING
DESCRIPTORS: ACHING;BURNING;THROBBING;STABBING;SHARP

## 2020-03-02 ASSESSMENT — PAIN DESCRIPTION - LOCATION
LOCATION: BACK
LOCATION: BACK

## 2020-03-02 ASSESSMENT — PAIN - FUNCTIONAL ASSESSMENT: PAIN_FUNCTIONAL_ASSESSMENT: 0-10

## 2020-03-02 NOTE — H&P
History and Physical Update    Pt Name: Marisa Wiley  MRN: [id number]  YOB: 1975  Date of evaluation: 3/2/2020      [x] I have reviewed the Progress note found in [facility] dated 2/5/20 by Dr. Dmitri Wade which meets the criteria for an Interval History and Physical note and is attached below. Procedure: Epidural steroid injection bilateral L5 S1  Dx: left lumbar radiculitis, lumbar disc herniation  [x] I have examined  Vincent Steiner and there are no changes to the patient or plans. Denies the use of any blood thinners. Last ate and drank yesterday at 9:00pm.     Vital signs: BP (!) 149/89   Pulse 104   Temp 98.1 °F (36.7 °C) (Oral)   Resp 20   Ht 5' 2\" (1.575 m)   Wt 142 lb 11.2 oz (64.7 kg)   SpO2 98%   BMI 26.10 kg/m²     Allergies:  Penicillins and Shellfish-derived products    Medications:   No current facility-administered medications on file prior to encounter. Current Outpatient Medications on File Prior to Encounter   Medication Sig Dispense Refill    pantoprazole (PROTONIX) 40 MG tablet take 1 tablet by mouth once daily 30 tablet 3    amLODIPine (NORVASC) 10 MG tablet take 1 tablet by mouth once daily 30 tablet 3    gabapentin (NEURONTIN) 300 MG capsule Take 1 capsule by mouth 3 times daily for 30 days. Intended supply: 90 days 90 capsule 0    hydrochlorothiazide (HYDRODIURIL) 12.5 MG tablet Take 2 tablets by mouth daily 30 tablet 3    cyclobenzaprine (FLEXERIL) 10 MG tablet take 1 tablet by mouth three times a day if needed for muscle spasm 30 tablet 3    atorvastatin (LIPITOR) 40 MG tablet Take 1 tablet by mouth daily 90 tablet 1    ibuprofen (ADVIL;MOTRIN) 600 MG tablet Take 1 tablet by mouth every 6 hours as needed for Pain (Patient not taking: Reported on 2/12/2020) 30 tablet 0            Prior to Admission medications    Medication Sig Start Date End Date Taking?  Authorizing Provider   pantoprazole (PROTONIX) 40 MG tablet take 1 tablet by mouth once daily 2/10/20  Yes Date    ABDOMINAL ADHESION SURGERY        BLADDER REPAIR        LEG AMPUTATION BELOW KNEE Right       as a child d/t cancer    LEG AMPUTATION THROUGH LOWER TIBIA AND FIBULA        SMALL INTESTINE SURGERY             Social History   Social History            Socioeconomic History    Marital status:        Spouse name: None    Number of children: None    Years of education: None    Highest education level: None   Occupational History    None   Social Needs    Financial resource strain: None    Food insecurity:       Worry: None       Inability: None    Transportation needs:       Medical: None       Non-medical: None   Tobacco Use    Smoking status: Never Smoker    Smokeless tobacco: Never Used   Substance and Sexual Activity    Alcohol use: No       Alcohol/week: 0.0 standard drinks    Drug use: No    Sexual activity: Yes       Partners: Female   Lifestyle    Physical activity:       Days per week: None       Minutes per session: None    Stress: None   Relationships    Social connections:       Talks on phone: None       Gets together: None       Attends Restoration service: None       Active member of club or organization: None       Attends meetings of clubs or organizations: None       Relationship status: None    Intimate partner violence:       Fear of current or ex partner: None       Emotionally abused: None       Physically abused: None       Forced sexual activity: None   Other Topics Concern    None   Social History Narrative     ** Merged History Encounter **               Family History         Family History   Problem Relation Age of Onset    High Blood Pressure Mother      Diabetes Mother      High Blood Pressure Father      Cancer Father           prostate    Diabetes Father      Coronary Art Dis Father      Heart Attack Father      Heart Disease Father                Allergies   Allergen Reactions    Penicillins      Shellfish-Derived Products        Penicillins and Shellfish-derived products   Vitals:     02/05/20 1506   BP: 123/81   Pulse: 101   SpO2: 96%      Current Facility-Administered Medications          Current Outpatient Medications   Medication Sig Dispense Refill    gabapentin (NEURONTIN) 300 MG capsule Take 1 capsule by mouth 3 times daily for 30 days. Intended supply: 90 days 90 capsule 0    hydrochlorothiazide (HYDRODIURIL) 12.5 MG tablet Take 2 tablets by mouth daily 30 tablet 3    gabapentin (NEURONTIN) 100 MG capsule Take 1 capsule by mouth 3 times daily for 180 days. Will cause drowsiness. Do not drive if taking. Intended supply: 90 days 270 capsule 1    cyclobenzaprine (FLEXERIL) 10 MG tablet take 1 tablet by mouth three times a day if needed for muscle spasm 30 tablet 3    ibuprofen (ADVIL;MOTRIN) 600 MG tablet Take 1 tablet by mouth every 6 hours as needed for Pain 30 tablet 0    pantoprazole (PROTONIX) 40 MG tablet Take 1 tablet by mouth daily 30 tablet 3    atorvastatin (LIPITOR) 40 MG tablet Take 1 tablet by mouth daily 90 tablet 1    amLODIPine (NORVASC) 10 MG tablet Take 1 tablet by mouth daily 30 tablet 3      No current facility-administered medications for this visit. Review of Systems   Constitutional: Negative. Negative for fever. HENT: Negative. Eyes: Negative. Respiratory: Negative. Cardiovascular: Negative. Gastrointestinal: Negative. Endocrine: Negative. Genitourinary: Negative. Musculoskeletal: Positive for arthralgias, back pain, myalgias, neck pain and neck stiffness. Skin: Negative. Allergic/Immunologic: Positive for food allergies. Neurological: Positive for weakness and numbness. Hematological: Negative. Psychiatric/Behavioral: Negative. All other systems reviewed and are negative. Objective:  General Appearance:  Uncomfortable and in pain. Vital signs: (most recent): Blood pressure 123/81, pulse 101, height 5' 3\" (1.6 m), weight 136 lb 3.2 oz (61.8 kg), SpO2 96 %. Vital signs are normal.  No fever. HEENT: Normal HEENT exam.    Lungs:  Normal effort. Breath sounds clear to auscultation. Heart: Normal rate. No murmur. Chest: Symmetric chest wall expansion. No chest wall tenderness. Abdomen: Abdomen is soft and non-distended. Extremities: Decreased range of motion. There is deformity. Neurological: Patient is alert and oriented to person, place and time. Normal strength. Patient has normal reflexes, normal muscle tone and normal coordination. Pupils:  Pupils are equal, round, and reactive to light. Skin:  Warm, dry and pale. No rash, ecchymosis, cyanosis or ulceration.       Lumbar spine examination  No apparent deformity on inspection  Range of motion is marked limited and associated with pain  Gait is antalgic  Patient ambulating with a cane  Positive tenderness to palpation over lower lumbar area  Aggravation of pain with forward flexion  Straight leg raise positive on left side     Neurological examination  Normal muscle mass  Right below-knee amputation  Muscle strength 4/5 in left foot dorsiflexion and left EHL tendon, rest of the muscle group is 5/5  Deep tendon reflexes 2+ at left knee and difficult to elicit in the left ankle  Clonus is negative     Assessment & Plan   Pain History     -Chronic lower back pain onset many years ago located in the lumbar area typically radiated down the right side  Is ago without any particular injury or identifiable cause he developed severe flareup of his back pain with radiation down the left leg over posterior thigh and anterolateral lower extremity of the foot  Patient developed severe left leg numbness and left foot drop also  Patient went to the ER  Had diagnostic work-up with x-ray lumbar spine, MRI lumbar spine and CT scan lumbar spine  Have not seen neurosurgeon or spine surgeon  Denies any loss of bladder or bowel control  No history of fever chills or weight loss     Pain is constant severe sharp surgical evaluation  Refer patient to neurosurgery     Will obtain EMG nerve conduction testing         Orders Placed This Encounter   Procedures    Urine Drug Screen, Comprehensive    Ambulatory referral to Physical Therapy   [facility] Artemio Rebolledo DO, Neurosurgery, [facility]    EMG    SC INJECT ANES/STEROID FORAMEN LUMBAR/SACRAL W IMG GUIDE ,1 LEVEL      Encounter Medications         Orders Placed This Encounter   Medications    gabapentin (NEURONTIN) 300 MG capsule       Sig: Take 1 capsule by mouth 3 times daily for 30 days. Intended supply: 90 days       Dispense:  90 capsule       Refill:  0         In today's visit , total time spent face to face with patient was 61 MINUTES , in which  50% or more of the time was spent in counseling and coordinating care. The patient was counseled about  1. Diagnostic Impression  2. Recommended Diagnostic Studies  3. Treatment options  4. Risks and benefits of treatment options  5. Importance of compliance with treatment options. 6. Follow up instructions.            Electronically signed by Jenn Agustin MD on 2/5/2020 at 3:42 PM

## 2020-03-02 NOTE — OP NOTE
the contrast in the epidural space  and no vascular runoff or intrathecal spread. Finally 3 ml of treatment solution containing 5 ml of  1 % PF lidocaine and 1 ml of dexamethasone 10 mg / ml was injected. The same procedure was then repeated at left at L 5level with same technique. The remaining 3 ml of treatment solution was injected at that. The total dose of steroid injected today was dexamethasone 10 mg. The needle was removed and a Band-Aid was placed over the needle  insertion site. The patient's vital signs remained stable and the patient tolerated the procedure well.         Electronically signed by Loulou Russ MD on 3/2/2020 at 12:23 PM

## 2020-03-04 ENCOUNTER — HOSPITAL ENCOUNTER (OUTPATIENT)
Age: 45
Discharge: HOME | End: 2020-03-06
Payer: MEDICARE

## 2020-03-04 ENCOUNTER — OFFICE VISIT (OUTPATIENT)
Dept: NEUROSURGERY | Age: 45
End: 2020-03-04
Payer: MEDICARE

## 2020-03-04 ENCOUNTER — HOSPITAL ENCOUNTER (OUTPATIENT)
Dept: GENERAL RADIOLOGY | Age: 45
Discharge: HOME | End: 2020-03-06
Payer: MEDICARE

## 2020-03-04 VITALS
RESPIRATION RATE: 16 BRPM | DIASTOLIC BLOOD PRESSURE: 92 MMHG | SYSTOLIC BLOOD PRESSURE: 139 MMHG | BODY MASS INDEX: 26.31 KG/M2 | HEIGHT: 62 IN | HEART RATE: 109 BPM | WEIGHT: 143 LBS

## 2020-03-04 PROCEDURE — G8427 DOCREV CUR MEDS BY ELIG CLIN: HCPCS | Performed by: NEUROLOGICAL SURGERY

## 2020-03-04 PROCEDURE — G8482 FLU IMMUNIZE ORDER/ADMIN: HCPCS | Performed by: NEUROLOGICAL SURGERY

## 2020-03-04 PROCEDURE — 99214 OFFICE O/P EST MOD 30 MIN: CPT | Performed by: NEUROLOGICAL SURGERY

## 2020-03-04 PROCEDURE — 72100 X-RAY EXAM L-S SPINE 2/3 VWS: CPT

## 2020-03-04 PROCEDURE — 1036F TOBACCO NON-USER: CPT | Performed by: NEUROLOGICAL SURGERY

## 2020-03-04 PROCEDURE — G8419 CALC BMI OUT NRM PARAM NOF/U: HCPCS | Performed by: NEUROLOGICAL SURGERY

## 2020-03-04 RX ORDER — CYCLOBENZAPRINE HCL 10 MG
TABLET ORAL
Qty: 30 TABLET | Refills: 3 | Status: SHIPPED | OUTPATIENT
Start: 2020-03-04 | End: 2020-05-12 | Stop reason: ALTCHOICE

## 2020-03-04 NOTE — PROGRESS NOTES
dysfunction) of organic origin     GERD (gastroesophageal reflux disease)     Gynecomastia, male     Hep C w/o coma, chronic ([facility] [address] 7/18/2017    Hypertension     Lumbar disc disease     Neuroblastoma ([facility] [address] [facility]     Neurogenic dysfunction of the urinary bladder     Osteoarthritis     Phantom limb pain ([facility] [address]     Pure hypercholesterolemia 7/18/2017    RSD (reflex sympathetic dystrophy)      Past Surgical History:   Procedure Laterality Date    ABDOMINAL ADHESION SURGERY      BLADDER REPAIR      LEG AMPUTATION BELOW KNEE Right     as a child d/t cancer    LEG AMPUTATION THROUGH LOWER TIBIA AND FIBULA      OTHER SURGICAL HISTORY  02/20/2020    lumbar WILL    PAIN MANAGEMENT PROCEDURE Bilateral 2/20/2020    EPIDURAL STEROID INJECTION MOIZ L5S1 performed by Shilo Schmitz MD at [facility] Bilateral 3/2/2020    EPIDURAL STEROID INJECTION MOIZ L5S1 performed by Shilo Schmitz MD at [facility]       Family History   Problem Relation Age of Onset    High Blood Pressure Mother     Diabetes Mother     High Blood Pressure Father     Cancer Father         prostate    Diabetes Father     Coronary Art Dis Father     Heart Attack Father     Heart Disease Father      Current Outpatient Medications on File Prior to Visit   Medication Sig Dispense Refill    pantoprazole (PROTONIX) 40 MG tablet take 1 tablet by mouth once daily 30 tablet 3    amLODIPine (NORVASC) 10 MG tablet take 1 tablet by mouth once daily 30 tablet 3    gabapentin (NEURONTIN) 300 MG capsule Take 1 capsule by mouth 3 times daily for 30 days.  Intended supply: 90 days 90 capsule 0    hydrochlorothiazide (HYDRODIURIL) 12.5 MG tablet Take 2 tablets by mouth daily 30 tablet 3    cyclobenzaprine (FLEXERIL) 10 MG tablet take 1 tablet by mouth three times a day if needed for muscle spasm 30 tablet 3    ibuprofen (ADVIL;MOTRIN) 600 MG tablet Take 1 tablet by mouth every 6 hours as needed for Pain 30 refill  Neuro    Mentation  Appropriate affect  Registration intact  Orientation intact  3 item recall intact  Judgement intact to situation    Cranial Nerves:   Pupils equal and reactive to light  Extraocular motion intact  Face and shrug symmetric  Tongue midline  No dysarthria  v1-3 sensation symmetric, masseter tone symmetric  Hearing symmetric and intact to finger rub    Sensation:   Significant diminished in station left S1 dermatome along with some diminished sensation L5 on the left side as well. Motor  L deltoid 5/5; R deltoid 5/5  L biceps 5/5; R biceps 5/5  L triceps 5/5; R triceps 5/5  L wrist extension 5/5; R wrist extension 5/5  L intrinsics 5/5; R intrinsics 5/5     5 out of 5 really right iliopsoas and quadriceps. 5 out of 5 left iliopsoas quadriceps. 4+ plantar flexion and 4+ dorsiflexion. 4- EHL left. Reflexes  L Brachioradialis 2+/4; R brachioradialis 2+/4  L Biceps 2+/4; R Biceps 2+/4  L Triceps 2+/4; R Triceps 2+/4  L Patellar 2+/4: R Patellar 2+/4  L Achilles 2+/4; R Achilles 2+/4    hoffmans L: neg  hoffmans R: neg  Clonus L: neg  Clonus R: neg  Babinski L: up  Babinski R; up    Positive straight leg raise left. Studies Review:     MRI thoracic spine unremarkable. EMG shows L4-S1 radiculopathy on the left. MRI lumbar spine does show significant large right L5-S1 right paracentral and lateral recess disc protrusion contacting traversing S1 root with some foraminal stenosis that is evident as well. Assessment and Plan:      1. Spinal stenosis of lumbar region with radiculopathy          Plan:   Extensive discussion with the patient considering his overall improvement since our last visit. He still has significant weakness on my exam of the left lower extremity and subjectively he still has a noticeable amount of difficulty with mobilization and ambulating with his cane.   Considering he still has a motor deficit with a right-sided mass lesion and left-sided symptoms I do believe

## 2020-03-06 ENCOUNTER — HOSPITAL ENCOUNTER (EMERGENCY)
Age: 45
Discharge: HOME | End: 2020-03-06
Attending: EMERGENCY MEDICINE
Payer: MEDICARE

## 2020-03-06 VITALS
BODY MASS INDEX: 25.76 KG/M2 | OXYGEN SATURATION: 96 % | WEIGHT: 140 LBS | HEIGHT: 62 IN | RESPIRATION RATE: 16 BRPM | DIASTOLIC BLOOD PRESSURE: 83 MMHG | HEART RATE: 119 BPM | SYSTOLIC BLOOD PRESSURE: 154 MMHG | TEMPERATURE: 98.5 F

## 2020-03-06 PROCEDURE — 99282 EMERGENCY DEPT VISIT SF MDM: CPT

## 2020-03-06 PROCEDURE — 6360000002 HC RX W HCPCS: Performed by: EMERGENCY MEDICINE

## 2020-03-06 PROCEDURE — 96372 THER/PROPH/DIAG INJ SC/IM: CPT

## 2020-03-06 RX ORDER — ORPHENADRINE CITRATE 30 MG/ML
60 INJECTION INTRAMUSCULAR; INTRAVENOUS ONCE
Status: COMPLETED | OUTPATIENT
Start: 2020-03-06 | End: 2020-03-06

## 2020-03-06 RX ORDER — KETOROLAC TROMETHAMINE 30 MG/ML
60 INJECTION, SOLUTION INTRAMUSCULAR; INTRAVENOUS ONCE
Status: COMPLETED | OUTPATIENT
Start: 2020-03-06 | End: 2020-03-06

## 2020-03-06 RX ORDER — METHOCARBAMOL 750 MG/1
750 TABLET, FILM COATED ORAL 4 TIMES DAILY
Qty: 40 TABLET | Refills: 0 | Status: SHIPPED | OUTPATIENT
Start: 2020-03-06 | End: 2020-03-16

## 2020-03-06 RX ADMIN — KETOROLAC TROMETHAMINE 60 MG: 30 INJECTION, SOLUTION INTRAMUSCULAR at 21:05

## 2020-03-06 RX ADMIN — ORPHENADRINE CITRATE 60 MG: 30 INJECTION INTRAMUSCULAR; INTRAVENOUS at 21:05

## 2020-03-06 ASSESSMENT — ENCOUNTER SYMPTOMS
BACK PAIN: 1
VOMITING: 0
EYE REDNESS: 0
EYE DISCHARGE: 0
COLOR CHANGE: 0
CONSTIPATION: 0
DIARRHEA: 0
ABDOMINAL PAIN: 0
SHORTNESS OF BREATH: 0
FACIAL SWELLING: 0
COUGH: 0

## 2020-03-06 ASSESSMENT — PAIN DESCRIPTION - ONSET: ONSET: ON-GOING

## 2020-03-06 ASSESSMENT — PAIN DESCRIPTION - PROGRESSION: CLINICAL_PROGRESSION: NOT CHANGED

## 2020-03-06 ASSESSMENT — PAIN DESCRIPTION - FREQUENCY: FREQUENCY: CONTINUOUS

## 2020-03-06 ASSESSMENT — PAIN DESCRIPTION - DESCRIPTORS: DESCRIPTORS: TIGHTNESS;SHARP;SHOOTING

## 2020-03-06 ASSESSMENT — PAIN DESCRIPTION - LOCATION: LOCATION: BACK

## 2020-03-06 ASSESSMENT — PAIN DESCRIPTION - PAIN TYPE: TYPE: CHRONIC PAIN

## 2020-03-06 ASSESSMENT — PAIN DESCRIPTION - ORIENTATION: ORIENTATION: LOWER

## 2020-03-06 ASSESSMENT — PAIN SCALES - GENERAL
PAINLEVEL_OUTOF10: 10
PAINLEVEL_OUTOF10: 10

## 2020-03-07 NOTE — ED PROVIDER NOTES
[address] [place] [address] ED  EMERGENCY DEPARTMENT ENCOUNTER      Pt Name: Vikash Fernandes  MRN: [id number]  [place] 1975  Date of evaluation: 3/6/2020  Provider: Haris Thomas MD    [address] [place] [address]       Chief Complaint   Patient presents with    Back Pain     lower back- injection x 2 on 2/20 & 3/2. Surgery scheduled for 3/16 (herniated discs)    Spasms     lower back & bilat leg         HISTORY OF PRESENT ILLNESS  (Location/Symptom, Timing/Onset, Context/Setting, Quality, Duration, Modifying Factors, Severity.)   Vikash Fernandes is a 38 y.o. male who presents to the emergency department for lower back pain. He is having spasms. He is scheduled for surgery within the next week or 2. He is having sharp spasms in his lower back periodically and it started this time today. He rated the pain as a 10 and it feels like it is a tightness. Nursing Notes were reviewed. ALLERGIES     Penicillins and Shellfish-derived products    CURRENT MEDICATIONS       Previous Medications    AMLODIPINE (NORVASC) 10 MG TABLET    take 1 tablet by mouth once daily    ATORVASTATIN (LIPITOR) 40 MG TABLET    Take 1 tablet by mouth daily    CYCLOBENZAPRINE (FLEXERIL) 10 MG TABLET    take 1 tablet by mouth three times a day if needed for muscle spasm    GABAPENTIN (NEURONTIN) 300 MG CAPSULE    Take 1 capsule by mouth 3 times daily for 30 days.  Intended supply: 90 days    HYDROCHLOROTHIAZIDE (HYDRODIURIL) 12.5 MG TABLET    Take 2 tablets by mouth daily    IBUPROFEN (ADVIL;MOTRIN) 600 MG TABLET    Take 1 tablet by mouth every 6 hours as needed for Pain    PANTOPRAZOLE (PROTONIX) 40 MG TABLET    take 1 tablet by mouth once daily       PAST MEDICAL HISTORY         Diagnosis Date    JOELLEN (acute kidney injury) ([facility] [address] 2/12/2015    Allergic rhinitis     Chronic abdominal pain     Chronic LBP     Constipation     ED (erectile dysfunction) of organic origin     GERD (gastroesophageal reflux disease)     Gynecomastia, male     Hep C w/o Negative for color change and rash. Neurological: Negative for syncope, numbness and headaches. Hematological: Negative for adenopathy. Psychiatric/Behavioral: Negative for confusion. The patient is not nervous/anxious. Except as noted above the remainder of the review of systems was reviewed and negative. PHYSICAL EXAM    (up to 7 for level 4, 8 or more for level 5)     Vitals:    03/06/20 2044   BP: (!) 154/83   Pulse: 119   Resp: 16   Temp: 98.5 °F (36.9 °C)   TempSrc: Oral   SpO2: 96%   Weight: 140 lb (63.5 kg)   Height: 5' 2\" (1.575 m)       Physical Exam  Vitals signs reviewed. Constitutional:       General: He is not in acute distress. Appearance: He is well-developed. He is not diaphoretic. HENT:      Head: Normocephalic and atraumatic. Eyes:      General: No scleral icterus. Right eye: No discharge. Left eye: No discharge. Neck:      Musculoskeletal: Neck supple. Cardiovascular:      Rate and Rhythm: Normal rate and regular rhythm. Pulmonary:      Effort: Pulmonary effort is normal. No respiratory distress. Breath sounds: Normal breath sounds. No stridor. No wheezing or rales. Abdominal:      General: There is no distension. Palpations: Abdomen is soft. Tenderness: There is no abdominal tenderness. Musculoskeletal: Normal range of motion. Comments: Back is not palpably tender. No bruise or rash. Lymphadenopathy:      Cervical: No cervical adenopathy. Skin:     General: Skin is warm and dry. Findings: No erythema or rash. Neurological:      Mental Status: He is alert and oriented to person, place, and time.    Psychiatric:         Behavior: Behavior normal.             DIAGNOSTIC RESULTS     EKG: All EKG's are interpreted by the Emergency Department Physician who either signs or Co-signs this chart in the absence of a cardiologist.    Not indicated    RADIOLOGY:   Non-plain film images such as CT, Ultrasound and MRI are read by the radiologist. Plain radiographic images are visualized and preliminarily interpreted by the emergency physician with the below findings:    Not indicated    Interpretation per the Radiologist below, if available at the time of this note:        LABS:  Labs Reviewed - No data to display    All other labs were within normal range or not returned as of this dictation. EMERGENCY DEPARTMENT COURSE and DIFFERENTIAL DIAGNOSIS/MDM:   Vitals:    Vitals:    03/06/20 2044   BP: (!) 154/83   Pulse: 119   Resp: 16   Temp: 98.5 °F (36.9 °C)   TempSrc: Oral   SpO2: 96%   Weight: 140 lb (63.5 kg)   Height: 5' 2\" (1.575 m)       Orders Placed This Encounter   Medications    ketorolac (TORADOL) injection 60 mg    orphenadrine (NORFLEX) injection 60 mg    methocarbamol (ROBAXIN-750) 750 MG tablet     Sig: Take 1 tablet by mouth 4 times daily for 10 days     Dispense:  40 tablet     Refill:  0       Medical Decision Making: The patient was given IM Toradol and Norflex and prescribed Robaxin. Treatment diagnosis and follow-up were discussed with the patient. CONSULTS:  None    PROCEDURES:  None    FINAL IMPRESSION      1.  Acute exacerbation of chronic low back pain          DISPOSITION/PLAN   DISPOSITION Decision To Discharge 03/06/2020 09:00:26 PM      PATIENT REFERRED TO:   Katarina Weston MD  [facility] [address] [phone]      As needed    [facility] ED  [address] [facility]  [phone]    If symptoms worsen      DISCHARGE MEDICATIONS:     New Prescriptions    METHOCARBAMOL (ROBAXIN-750) 750 MG TABLET    Take 1 tablet by mouth 4 times daily for 10 days         (Please note that portions of this note were completed with a voice recognition program.  Efforts were made to edit the dictations but occasionally words are mis-transcribed.)    Lucia Bingham MD  Attending Emergency Physician           Lucia Bingham MD  03/06/20 9507

## 2020-03-09 ENCOUNTER — HOSPITAL ENCOUNTER (OUTPATIENT)
Dept: PREADMISSION TESTING | Age: 45
Discharge: HOME | End: 2020-03-13
Payer: MEDICARE

## 2020-03-09 VITALS
BODY MASS INDEX: 26.37 KG/M2 | WEIGHT: 143.3 LBS | DIASTOLIC BLOOD PRESSURE: 86 MMHG | RESPIRATION RATE: 16 BRPM | TEMPERATURE: 98.2 F | HEIGHT: 62 IN | HEART RATE: 104 BPM | SYSTOLIC BLOOD PRESSURE: 122 MMHG | OXYGEN SATURATION: 97 %

## 2020-03-09 LAB
ANION GAP SERPL CALCULATED.3IONS-SCNC: 13 MMOL/L (ref 9–17)
BUN BLDV-MCNC: 19 MG/DL (ref 6–20)
CHLORIDE BLD-SCNC: 103 MMOL/L (ref 98–107)
CO2: 22 MMOL/L (ref 20–31)
CREAT SERPL-MCNC: 1.25 MG/DL (ref 0.7–1.2)
GFR AFRICAN AMERICAN: >60 ML/MIN
GFR NON-AFRICAN AMERICAN: >60 ML/MIN
GFR SERPL CREATININE-BSD FRML MDRD: ABNORMAL ML/MIN/{1.73_M2}
GFR SERPL CREATININE-BSD FRML MDRD: ABNORMAL ML/MIN/{1.73_M2}
GLUCOSE BLD-MCNC: 89 MG/DL (ref 70–99)
HCT VFR BLD CALC: 41.8 % (ref 40.7–50.3)
HEMOGLOBIN: 13.7 G/DL (ref 13–17)
MCH RBC QN AUTO: 29 PG (ref 25.2–33.5)
MCHC RBC AUTO-ENTMCNC: 32.8 G/DL (ref 28.4–34.8)
MCV RBC AUTO: 88.6 FL (ref 82.6–102.9)
NRBC AUTOMATED: 0 PER 100 WBC
PDW BLD-RTO: 14.2 % (ref 11.8–14.4)
PLATELET # BLD: 284 K/UL (ref 138–453)
PMV BLD AUTO: 8.9 FL (ref 8.1–13.5)
POTASSIUM SERPL-SCNC: 4 MMOL/L (ref 3.7–5.3)
RBC # BLD: 4.72 M/UL (ref 4.21–5.77)
SODIUM BLD-SCNC: 138 MMOL/L (ref 135–144)
WBC # BLD: 7.4 K/UL (ref 3.5–11.3)

## 2020-03-09 PROCEDURE — 80051 ELECTROLYTE PANEL: CPT

## 2020-03-09 PROCEDURE — 82565 ASSAY OF CREATININE: CPT

## 2020-03-09 PROCEDURE — 93005 ELECTROCARDIOGRAM TRACING: CPT | Performed by: ANESTHESIOLOGY

## 2020-03-09 PROCEDURE — 86900 BLOOD TYPING SEROLOGIC ABO: CPT

## 2020-03-09 PROCEDURE — 85027 COMPLETE CBC AUTOMATED: CPT

## 2020-03-09 PROCEDURE — 86901 BLOOD TYPING SEROLOGIC RH(D): CPT

## 2020-03-09 PROCEDURE — 86850 RBC ANTIBODY SCREEN: CPT

## 2020-03-09 PROCEDURE — 36415 COLL VENOUS BLD VENIPUNCTURE: CPT

## 2020-03-09 PROCEDURE — 82947 ASSAY GLUCOSE BLOOD QUANT: CPT

## 2020-03-09 PROCEDURE — 84520 ASSAY OF UREA NITROGEN: CPT

## 2020-03-09 RX ORDER — SODIUM CHLORIDE, SODIUM LACTATE, POTASSIUM CHLORIDE, CALCIUM CHLORIDE 600; 310; 30; 20 MG/100ML; MG/100ML; MG/100ML; MG/100ML
1000 INJECTION, SOLUTION INTRAVENOUS CONTINUOUS
Status: CANCELLED | OUTPATIENT
Start: 2020-03-09

## 2020-03-09 ASSESSMENT — PAIN DESCRIPTION - ORIENTATION: ORIENTATION: RIGHT;LEFT;LOWER

## 2020-03-09 ASSESSMENT — PAIN DESCRIPTION - LOCATION: LOCATION: BACK;LEG

## 2020-03-09 ASSESSMENT — PAIN SCALES - GENERAL: PAINLEVEL_OUTOF10: 8

## 2020-03-09 ASSESSMENT — PAIN DESCRIPTION - PAIN TYPE: TYPE: CHRONIC PAIN

## 2020-03-09 NOTE — PROGRESS NOTES
Anesthesia Focused Assessment    STOP-BANG Sleep Apnea Questionnaire    SNORE loudly (heard through closed doors)? No  TIRED, fatigued, sleepy during daytime? No  OBSERVED stopping breathing during sleep? No  High blood PRESSURE being treated? Yes    BMI over 35? No  AGE over 48? No  NECK circumference over 16\"? No  GENDER (male)? Yes             Total 1  High risk 5-8  Intermediate risk 3-4  Low risk 0-2    Obstructive Sleep Apnea: denies  If YES, machine used: no     Type 1 DM:   no  T2DM:  no    Coronary Artery Disease:  no  Hypertension:  yes    Active smoker:  no  Drinks Alcohol:  no    Dentition: benign    Defib / AICD / Pacemaker: no      Renal Failure/dialysis:  no    Patient was evaluated in PAT & anesthesia guidelines were applied. NPO guidelines, medication instructions and scheduled arrival time were reviewed with patient. Hx of anesthesia complications:  no  Family hx of anesthesia complications:  no                                                                                                                     Anesthesia contacted:   Yes, Dr. Samaria Baker or cardiac clearance ordered: yes, medical with cardiac attention.     Tammie Ac PA-C  3/9/20  9:57 AM

## 2020-03-09 NOTE — H&P
(7/18/2017), and RSD (reflex sympathetic dystrophy). PLANS:   1.  Lumbar discectomy    FILEMON BORGES PA-C  Electronically signed 3/9/2020 at 10:56 AM

## 2020-03-10 ENCOUNTER — OFFICE VISIT (OUTPATIENT)
Dept: PAIN MANAGEMENT | Age: 45
End: 2020-03-10
Payer: MEDICARE

## 2020-03-10 VITALS
SYSTOLIC BLOOD PRESSURE: 147 MMHG | DIASTOLIC BLOOD PRESSURE: 88 MMHG | BODY MASS INDEX: 26.5 KG/M2 | HEIGHT: 62 IN | HEART RATE: 105 BPM | OXYGEN SATURATION: 95 % | WEIGHT: 144 LBS

## 2020-03-10 PROBLEM — M54.16 CHRONIC LUMBAR RADICULOPATHY: Status: ACTIVE | Noted: 2020-03-10

## 2020-03-10 PROBLEM — M48.062 SPINAL STENOSIS OF LUMBAR REGION WITH NEUROGENIC CLAUDICATION: Status: ACTIVE | Noted: 2020-03-10

## 2020-03-10 LAB
EKG ATRIAL RATE: 97 BPM
EKG P AXIS: 28 DEGREES
EKG P-R INTERVAL: 150 MS
EKG Q-T INTERVAL: 330 MS
EKG QRS DURATION: 72 MS
EKG QTC CALCULATION (BAZETT): 419 MS
EKG R AXIS: -4 DEGREES
EKG T AXIS: 12 DEGREES
EKG VENTRICULAR RATE: 97 BPM

## 2020-03-10 PROCEDURE — G8419 CALC BMI OUT NRM PARAM NOF/U: HCPCS | Performed by: ANESTHESIOLOGY

## 2020-03-10 PROCEDURE — G8427 DOCREV CUR MEDS BY ELIG CLIN: HCPCS | Performed by: ANESTHESIOLOGY

## 2020-03-10 PROCEDURE — G8482 FLU IMMUNIZE ORDER/ADMIN: HCPCS | Performed by: ANESTHESIOLOGY

## 2020-03-10 PROCEDURE — 99213 OFFICE O/P EST LOW 20 MIN: CPT | Performed by: ANESTHESIOLOGY

## 2020-03-10 PROCEDURE — 1036F TOBACCO NON-USER: CPT | Performed by: ANESTHESIOLOGY

## 2020-03-10 RX ORDER — GABAPENTIN 400 MG/1
400 CAPSULE ORAL 4 TIMES DAILY
Qty: 120 CAPSULE | Refills: 1 | Status: SHIPPED | OUTPATIENT
Start: 2020-03-10 | End: 2020-05-12 | Stop reason: SDUPTHER

## 2020-03-10 ASSESSMENT — ENCOUNTER SYMPTOMS
BACK PAIN: 1
COUGH: 0

## 2020-03-10 NOTE — PROGRESS NOTES
Neuroblastoma ([facility] [address] [facility] 1975    Neurogenic dysfunction of the urinary bladder     Osteoarthritis     Phantom limb pain ([facility]     Pure hypercholesterolemia 7/18/2017    RSD (reflex sympathetic dystrophy)       Past Surgical History:   Procedure Laterality Date    ABDOMINAL ADHESION SURGERY      BLADDER REPAIR      LEG AMPUTATION THROUGH LOWER TIBIA AND FIBULA Right 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    PAIN MANAGEMENT PROCEDURE Bilateral 2/20/2020    EPIDURAL STEROID INJECTION MOIZ L5S1 performed by Emiliano Gross MD at [address] Bilateral 3/2/2020    EPIDURAL STEROID INJECTION MOIZ L5S1 performed by Emiliano Gross MD at [facility]      r/t bowel obstruction     Social History     Socioeconomic History    Marital status:      Spouse name: Not on file    Number of children: Not on file    Years of education: Not on file    Highest education level: Not on file   Occupational History    Not on file   Social Needs    Financial resource strain: Not on file    Food insecurity     Worry: Not on file     Inability: Not on file    Transportation needs     Medical: Not on file     Non-medical: Not on file   Tobacco Use    Smoking status: Never Smoker    Smokeless tobacco: Never Used   Substance and Sexual Activity    Alcohol use: No     Alcohol/week: 0.0 standard drinks    Drug use: No    Sexual activity: Yes     Partners: Female   Lifestyle    Physical activity     Days per week: Not on file     Minutes per session: Not on file    Stress: Not on file   Relationships    Social connections     Talks on phone: Not on file     Gets together: Not on file     Attends Muslim service: Not on file     Active member of club or organization: Not on file     Attends meetings of clubs or organizations: Not on file     Relationship status: Not on file    Intimate partner violence     Fear of current or ex partner: Not on file     Emotionally weight 144 lb (65.3 kg), SpO2 95 %. Vital signs are normal.  No fever. Output: Producing urine and producing stool. HEENT: Normal HEENT exam.    Lungs:  Normal effort and normal respiratory rate. Breath sounds clear to auscultation. He is not in respiratory distress. Heart: Normal rate. Abdomen: Abdomen is soft. Extremities: Normal range of motion. Neurological: Patient is alert and oriented to person, place and time. Normal strength. Patient has normal reflexes, normal muscle tone and normal coordination. Pupils:  Pupils are equal, round, and reactive to light. Pupils are equal.   Skin:  Warm, dry and pale. No rash, ecchymosis, cyanosis or ulceration. Assessment & Plan     This is a 58-year-old man with history of right BKA at age 14  He also have history of chronic right-sided lower back pain extending into the right buttock region  Recently he developed flareup of low back pain with radiation down left leg all the way to the foot associated with foot numbness and tingling  MRI lumbar spine showed foraminal stenosis at L5-S1 level  Patient had lumbar epidural steroid injection  Today here for postprocedure follow-up report overall 40% improvement in back and left leg pain  Had seen neurosurgeon at [place] and is planning for lumbar spine surgery this month for decompression    Have completed EMG nerve testing  Currently taking gabapentin 3 times a day  Denies any side effect  Find it somewhat helpful  Having side effect from injection of leg spasm    No other changes in health history    EMG bilateral lower extremity 02/2020  Chronic right L4-S1 radiculopathy     Neurosurgery consultation note February 12, 2020    MRI  lumbar spine and CT scan lumbar spine January 2020  MRI thoracic spine February 2020  1. Chronic bilateral low back pain with right-sided sciatica    2. Hx of right BKA ([address]    3. Lumbar disc disease    4. RSD (reflex sympathetic dystrophy)    5.

## 2020-03-11 ENCOUNTER — TELEPHONE (OUTPATIENT)
Dept: INTERNAL MEDICINE | Age: 45
End: 2020-03-11

## 2020-03-11 ENCOUNTER — OFFICE VISIT (OUTPATIENT)
Dept: INTERNAL MEDICINE | Age: 45
End: 2020-03-11
Payer: MEDICARE

## 2020-03-11 VITALS
BODY MASS INDEX: 26.06 KG/M2 | HEIGHT: 62 IN | SYSTOLIC BLOOD PRESSURE: 138 MMHG | WEIGHT: 141.6 LBS | DIASTOLIC BLOOD PRESSURE: 95 MMHG | HEART RATE: 98 BPM

## 2020-03-11 PROCEDURE — G8482 FLU IMMUNIZE ORDER/ADMIN: HCPCS | Performed by: STUDENT IN AN ORGANIZED HEALTH CARE EDUCATION/TRAINING PROGRAM

## 2020-03-11 PROCEDURE — G8419 CALC BMI OUT NRM PARAM NOF/U: HCPCS | Performed by: STUDENT IN AN ORGANIZED HEALTH CARE EDUCATION/TRAINING PROGRAM

## 2020-03-11 PROCEDURE — 1036F TOBACCO NON-USER: CPT | Performed by: STUDENT IN AN ORGANIZED HEALTH CARE EDUCATION/TRAINING PROGRAM

## 2020-03-11 PROCEDURE — 99213 OFFICE O/P EST LOW 20 MIN: CPT | Performed by: STUDENT IN AN ORGANIZED HEALTH CARE EDUCATION/TRAINING PROGRAM

## 2020-03-11 PROCEDURE — G8427 DOCREV CUR MEDS BY ELIG CLIN: HCPCS | Performed by: STUDENT IN AN ORGANIZED HEALTH CARE EDUCATION/TRAINING PROGRAM

## 2020-03-11 PROCEDURE — 99211 OFF/OP EST MAY X REQ PHY/QHP: CPT | Performed by: INTERNAL MEDICINE

## 2020-03-11 ASSESSMENT — ENCOUNTER SYMPTOMS
BACK PAIN: 0
VOMITING: 0
CHOKING: 0
FACIAL SWELLING: 0
CHEST TIGHTNESS: 0
SINUS PRESSURE: 0
ABDOMINAL PAIN: 0
RHINORRHEA: 0
COLOR CHANGE: 0
DIARRHEA: 0
SINUS PAIN: 0
SHORTNESS OF BREATH: 0
EYE PAIN: 0
STRIDOR: 0
ABDOMINAL DISTENTION: 0
NAUSEA: 0
EYE DISCHARGE: 0

## 2020-03-11 NOTE — PROGRESS NOTES
[facility]  [facility] [address] [address]  Dept: [phone]  Dept Fax: [phone]    Office Progress/Follow Up Note  Date ofpatient's visit: 3/11/2020  Patient's Name:  Ania Chowdhury YOB: 1975            Patient Care Team:  Katarina Weston MD as PCP - General (Internal Medicine)  Gregoria Severin, MD Renea Riddles, DO as Consulting Physician (General Surgery)  ================================================================    REASON FOR VISIT/CHIEF COMPLAINT:  Procedure (surgerical clearance ) and Hypertension    HISTORY OF PRESENTING ILLNESS:    History was obtained from: patient, electronic medical record. Melina Urbina a 38 y.o. is here for surgical clearance for L5-S1 right discectomy and right L5 foraminotomy. He had abnormal EKG on pretesting. Patient does not have any history of cardiac disease. He does suffer from heartburn, states that it has been feeling like it has been occurring more often recently, he has been dealing with this for couple of years. Is been getting more and more frequent. Denies any association with exertion or associated diaphoresis with it. Patient has not had any anginal episodes. His last surgery was for small bowel obstruction which was more than 10 years ago. Denies any other symptoms associated with chest pain or pressure. Will get cardiology for clearance.      Patient Active Problem List   Diagnosis    GERD (gastroesophageal reflux disease)    Chronic bilateral low back pain with right-sided sciatica    Lumbar disc disease    Hemorrhoids, external    Essential hypertension    Pure hypercholesterolemia    Spondylosis without myelopathy or radiculopathy, lumbar region    Hx of right BKA ([address]    Chronic lumbar radiculopathy    Spinal stenosis of lumbar region with neurogenic claudication       Health Maintenance Due   Topic Date Due    Annual Wellness Visit (AWV)  03/06/2020 Normal heart sounds. No murmur. No friction rub. Pulmonary:      Effort: Pulmonary effort is normal. No respiratory distress. Breath sounds: Normal breath sounds. No wheezing or rales. Abdominal:      General: Bowel sounds are normal. There is no distension. Palpations: Abdomen is soft. Tenderness: There is no abdominal tenderness. Hernia: No hernia is present. Comments: Long midline scar due to SBO   Musculoskeletal: Normal range of motion. General: No tenderness or deformity. Comments: Right BKA   Skin:     General: Skin is warm and dry. Coloration: Skin is not pale. Findings: No erythema or rash. Neurological:      Mental Status: He is alert and oriented to person, place, and time. Cranial Nerves: No cranial nerve deficit. Motor: No abnormal muscle tone. Comments: Left foot weakness has improved significantly from before. Psychiatric:         Behavior: Behavior normal.         Thought Content: Thought content normal.         Judgment: Judgment normal.       DIAGNOSTIC FINDINGS:  CBC:  Lab Results   Component Value Date    WBC 7.4 03/09/2020    HGB 13.7 03/09/2020     03/09/2020       BMP:    Lab Results   Component Value Date     03/09/2020    K 4.0 03/09/2020     03/09/2020    CO2 22 03/09/2020    BUN 19 03/09/2020    CREATININE 1.25 03/09/2020    GLUCOSE 89 03/09/2020       HEMOGLOBIN A1C:   Lab Results   Component Value Date    LABA1C 5.2 01/13/2015       FASTING LIPID PANEL:  Lab Results   Component Value Date    CHOL 218 (H) 10/21/2019    HDL 62 10/21/2019    TRIG 68 10/21/2019       ASSESSMENT AND PLAN:    Myrna Olmedo was seen today for procedure and hypertension. Diagnoses and all orders for this visit:    Encounter for medical clearance for patient hold         FOLLOW UP AND INSTRUCTIONS:    No follow-ups on file.     · Vincent received counseling on the following healthy behaviors: nutrition, exercise and medication

## 2020-03-11 NOTE — PROGRESS NOTES
Visit Information    Have you changed or started any medications since your last visit including any over-the-counter medicines, vitamins, or herbal medicines? no   Have you stopped taking any of your medications? Is so, why? -  no  Are you having any side effects from any of your medications? - no    Have you seen any other physician or provider since your last visit?  no   Have you had any other diagnostic tests since your last visit?  no   Have you been seen in the emergency room and/or had an admission in a hospital since we last saw you?  yes -  [facility] back pain   Have you had your routine dental cleaning in the past 6 months?  no     Do you have an active MyChart account? If no, what is the barrier?   Yes    Patient Care Team:  Kaitlyn Abad MD as PCP - General (Internal Medicine)  MD Willis Kelley DO as Consulting Physician (General Surgery)    Medical History Review  Past Medical, Family, and Social History reviewed and does contribute to the patient presenting condition    Health Maintenance   Topic Date Due    Annual Wellness Visit (AWV)  03/06/2020    Lipid screen  10/21/2020    Potassium monitoring  03/09/2021    Creatinine monitoring  03/09/2021    Shingles Vaccine (1 of 2) 06/01/2024    DTaP/Tdap/Td vaccine (2 - Td) 10/19/2025    Flu vaccine  Completed    HIV screen  Completed    Hepatitis A vaccine  Aged Out    Hepatitis B vaccine  Aged Out    Hib vaccine  Aged Out    Meningococcal (ACWY) vaccine  Aged Out    Pneumococcal 0-64 years Vaccine  Aged Out

## 2020-03-11 NOTE — PATIENT INSTRUCTIONS
Follow-up appointment scheduled for 6/3/20 at 9:30a, AVS given to patient.     Referral to Cardiology was placed, summary of care printed and faxed to office, phone numbers given to pt, they will contact office for an appt      major      AWV is schedule for 4/22/20 at 10:10a    major

## 2020-03-13 ENCOUNTER — TELEPHONE (OUTPATIENT)
Dept: NEUROSURGERY | Age: 45
End: 2020-03-13

## 2020-03-15 LAB
ABO/RH: NORMAL
ANTIBODY SCREEN: NEGATIVE
ARM BAND NUMBER: NORMAL
EXPIRATION DATE: NORMAL

## 2020-04-09 RX ORDER — ATORVASTATIN CALCIUM 40 MG/1
TABLET, FILM COATED ORAL
Qty: 90 TABLET | Refills: 1 | Status: SHIPPED | OUTPATIENT
Start: 2020-04-09 | End: 2020-10-11

## 2020-05-08 ENCOUNTER — TELEPHONE (OUTPATIENT)
Dept: PAIN MANAGEMENT | Age: 45
End: 2020-05-08

## 2020-05-11 ENCOUNTER — TELEPHONE (OUTPATIENT)
Dept: PAIN MANAGEMENT | Age: 45
End: 2020-05-11

## 2020-05-12 ENCOUNTER — VIRTUAL VISIT (OUTPATIENT)
Dept: PAIN MANAGEMENT | Age: 45
End: 2020-05-12
Payer: MEDICARE

## 2020-05-12 VITALS — WEIGHT: 140 LBS | HEIGHT: 63 IN | BODY MASS INDEX: 24.8 KG/M2

## 2020-05-12 PROBLEM — M48.061 LUMBAR FORAMINAL STENOSIS: Status: ACTIVE | Noted: 2020-05-12

## 2020-05-12 PROCEDURE — G8427 DOCREV CUR MEDS BY ELIG CLIN: HCPCS | Performed by: ANESTHESIOLOGY

## 2020-05-12 PROCEDURE — 99214 OFFICE O/P EST MOD 30 MIN: CPT | Performed by: ANESTHESIOLOGY

## 2020-05-12 RX ORDER — GABAPENTIN 400 MG/1
400 CAPSULE ORAL 4 TIMES DAILY
Qty: 120 CAPSULE | Refills: 1 | Status: SHIPPED | OUTPATIENT
Start: 2020-05-12 | End: 2020-07-07 | Stop reason: SDUPTHER

## 2020-05-12 RX ORDER — BACLOFEN 10 MG/1
10 TABLET ORAL 2 TIMES DAILY
Qty: 60 TABLET | Refills: 1 | Status: SHIPPED | OUTPATIENT
Start: 2020-05-12 | End: 2020-06-11

## 2020-05-12 RX ORDER — DULOXETIN HYDROCHLORIDE 20 MG/1
20 CAPSULE, DELAYED RELEASE ORAL DAILY
Qty: 30 CAPSULE | Refills: 1 | Status: SHIPPED | OUTPATIENT
Start: 2020-05-12 | End: 2021-02-02

## 2020-05-12 ASSESSMENT — ENCOUNTER SYMPTOMS
BACK PAIN: 1
RESPIRATORY NEGATIVE: 1

## 2020-05-12 NOTE — PROGRESS NOTES
 Allergic rhinitis     Chronic abdominal pain     ED (erectile dysfunction) of organic origin     GERD (gastroesophageal reflux disease)     Gynecomastia, male     History of hepatitis 07/18/2017    PT DENIES    Hypertension     Lumbar disc disease     Neuroblastoma ([facility] [address] 1975    Neurogenic dysfunction of the urinary bladder     Osteoarthritis     Phantom limb pain ([facility] [address] [facility]     Pure hypercholesterolemia 7/18/2017    RSD (reflex sympathetic dystrophy)       Past Surgical History:   Procedure Laterality Date    ABDOMINAL ADHESION SURGERY      BLADDER REPAIR      LEG AMPUTATION THROUGH LOWER TIBIA AND FIBULA Right 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    PAIN MANAGEMENT PROCEDURE Bilateral 2/20/2020    EPIDURAL STEROID INJECTION MOIZ L5S1 performed by Yolanda Rosenbaum MD at [address] Bilateral 3/2/2020    EPIDURAL STEROID INJECTION MOIZ L5S1 performed by Yolanda Rosenbaum MD at [place] [address]      r/t bowel obstruction     Social History     Socioeconomic History    Marital status:      Spouse name: Not on file    Number of children: Not on file    Years of education: Not on file    Highest education level: Not on file   Occupational History    Not on file   Social Needs    Financial resource strain: Not on file    Food insecurity     Worry: Not on file     Inability: Not on file    Transportation needs     Medical: Not on file     Non-medical: Not on file   Tobacco Use    Smoking status: Never Smoker    Smokeless tobacco: Never Used   Substance and Sexual Activity    Alcohol use: No     Alcohol/week: 0.0 standard drinks    Drug use: No    Sexual activity: Yes     Partners: Female   Lifestyle    Physical activity     Days per week: Not on file     Minutes per session: Not on file    Stress: Not on file   Relationships    Social connections     Talks on phone: Not on file     Gets together: Not on file     Attends [employer] service: Not on file     Active member of club or organization: Not on file     Attends meetings of clubs or organizations: Not on file     Relationship status: Not on file    Intimate partner violence     Fear of current or ex partner: Not on file     Emotionally abused: Not on file     Physically abused: Not on file     Forced sexual activity: Not on file   Other Topics Concern    Not on file   Social History Narrative    ** Merged History Encounter **          Family History   Problem Relation Age of Onset    High Blood Pressure Mother     Diabetes Mother     High Blood Pressure Father     Cancer Father         prostate    Diabetes Father     Coronary Art Dis Father     Heart Attack Father     Heart Disease Father     No Known Problems Sister     No Known Problems Brother      Allergies   Allergen Reactions    Shellfish-Derived Products Anaphylaxis    Penicillins Other (See Comments)     UNKNOWN REACTION     Shellfish-derived products and Penicillins   There were no vitals filed for this visit. Current Outpatient Medications   Medication Sig Dispense Refill    gabapentin (NEURONTIN) 400 MG capsule Take 1 capsule by mouth 4 times daily for 30 days. 120 capsule 1    baclofen (LIORESAL) 10 MG tablet Take 1 tablet by mouth 2 times daily 60 tablet 1    DULoxetine (CYMBALTA) 20 MG extended release capsule Take 1 capsule by mouth daily 30 capsule 1    atorvastatin (LIPITOR) 40 MG tablet take 1 tablet by mouth once daily 90 tablet 1    pantoprazole (PROTONIX) 40 MG tablet take 1 tablet by mouth once daily 30 tablet 3    amLODIPine (NORVASC) 10 MG tablet take 1 tablet by mouth once daily 30 tablet 3    hydrochlorothiazide (HYDRODIURIL) 12.5 MG tablet Take 2 tablets by mouth daily 30 tablet 3     No current facility-administered medications for this visit. Review of Systems   Constitutional: Negative. Respiratory: Negative.     Musculoskeletal: Positive for arthralgias, back pain, myalgias and

## 2020-06-23 RX ORDER — AMLODIPINE BESYLATE 10 MG/1
TABLET ORAL
Qty: 30 TABLET | Refills: 3 | Status: SHIPPED | OUTPATIENT
Start: 2020-06-23 | End: 2021-01-05 | Stop reason: SDUPTHER

## 2020-06-25 RX ORDER — AMLODIPINE BESYLATE 10 MG/1
TABLET ORAL
Qty: 30 TABLET | Refills: 3 | Status: ON HOLD | OUTPATIENT
Start: 2020-06-25 | End: 2020-08-06

## 2020-07-07 ENCOUNTER — VIRTUAL VISIT (OUTPATIENT)
Dept: PAIN MANAGEMENT | Age: 45
End: 2020-07-07
Payer: MEDICARE

## 2020-07-07 PROCEDURE — 99214 OFFICE O/P EST MOD 30 MIN: CPT | Performed by: ANESTHESIOLOGY

## 2020-07-07 PROCEDURE — G8427 DOCREV CUR MEDS BY ELIG CLIN: HCPCS | Performed by: ANESTHESIOLOGY

## 2020-07-07 RX ORDER — BACLOFEN 10 MG/1
10 TABLET ORAL 2 TIMES DAILY
Qty: 60 TABLET | Refills: 1 | Status: CANCELLED | OUTPATIENT
Start: 2020-07-11 | End: 2020-08-10

## 2020-07-07 RX ORDER — BACLOFEN 10 MG/1
10 TABLET ORAL 2 TIMES DAILY
COMMUNITY
Start: 2020-05-12 | End: 2020-08-19 | Stop reason: ALTCHOICE

## 2020-07-07 RX ORDER — DULOXETIN HYDROCHLORIDE 20 MG/1
20 CAPSULE, DELAYED RELEASE ORAL DAILY
Qty: 30 CAPSULE | Refills: 1 | Status: CANCELLED | OUTPATIENT
Start: 2020-07-07

## 2020-07-07 RX ORDER — GABAPENTIN 400 MG/1
400 CAPSULE ORAL 4 TIMES DAILY
Qty: 120 CAPSULE | Refills: 1 | Status: SHIPPED | OUTPATIENT
Start: 2020-07-07 | End: 2021-01-05 | Stop reason: SDUPTHER

## 2020-07-07 RX ORDER — CYCLOBENZAPRINE HCL 10 MG
10 TABLET ORAL 3 TIMES DAILY PRN
Qty: 90 TABLET | Refills: 0 | Status: ON HOLD | OUTPATIENT
Start: 2020-07-07 | End: 2020-08-06

## 2020-07-07 RX ORDER — CYCLOBENZAPRINE HCL 10 MG
10 TABLET ORAL 3 TIMES DAILY PRN
COMMUNITY
Start: 2020-03-31 | End: 2020-09-12 | Stop reason: ALTCHOICE

## 2020-07-07 ASSESSMENT — ENCOUNTER SYMPTOMS
RESPIRATORY NEGATIVE: 1
BACK PAIN: 1

## 2020-07-07 NOTE — PROGRESS NOTES
The patient is a 54 y. o. Non-/non  male. Chief Complaint   Patient presents with    Back Pain    Medication Refill          HPI    58-year-old man with a past medical history significant for right below-knee amputation at age 14  He is seen in our clinic for chronic back pain  Onset of symptoms several years ago  Pain is located in the lower lumbar area across midline  Pain radiates down both legs left more than right  He is diagnosed with lumbar disc disease and foraminal stenosis  EMG had shown chronic lumbar radiculopathy  Patient had an epidural injection in March 2020 with 50% relief lasting for 3 months  Pain is now back to the baseline  Pain aggravated with daily activities routine bending lifting twisting turning aggravates the pain  He denies any changes in bladder or bowel control  He was seen by the neurosurgeon at [place] and was not advised for any surgery at that time  He is tried conservative measures in past    On last evaluation he was started on Cymbalta and baclofen  Reports side effect of nausea and vomiting  Wants to switch back to her his previous medications    Medication Refill: Gabapentin, Cymbalta, Baclofen. Pt thinks Baclofen gives him upset stomach    Pain score Today:  8  Adverse effects (Constipation / Nausea / Sedation / sexual Dysfunction / others) : N&V  Mood: good  Sleep pattern and quality: fair  Activity level: fair    Pill count Today:no  Last dose taken  7/7/20  OARRS report reviewed today: yes  ER/Hospitalizations/PCP visit related to pain since last visit:no   Any legal problems e.g. DUI etc.:No  Satisfied with current management: Yes    Opioid Contract:none  Last Urine Dug screen dated:none    Past Medical History, Past Surgical History, Social History, Allergies and Medications reviewed and updated in EPIC as indicated    Family History reviewed and is noncontributory.         Past Medical History:   Diagnosis Date    JOELLEN (acute kidney injury) injection  Automated prescription report reviewed  Safe use of medication discussed with patient    Orders Placed This Encounter   Procedures    [place] INJECT ANES/STEROID FORAMEN LUMBAR/SACRAL W IMG GUIDE ,1 LEVEL      Orders Placed This Encounter   Medications    gabapentin (NEURONTIN) 400 MG capsule     Sig: Take 1 capsule by mouth 4 times daily for 30 days. Dispense:  120 capsule     Refill:  1    cyclobenzaprine (FLEXERIL) 10 MG tablet     Sig: Take 1 tablet by mouth 3 times daily as needed for Muscle spasms     Dispense:  90 tablet     Refill:  0        Controlled Substance Monitoring:    Acute and Chronic Pain Monitoring:   RX Monitoring 7/7/2020   Attestation -   Periodic Controlled Substance Monitoring Possible medication side effects, risk of tolerance/dependence & alternative treatments discussed. ;No signs of potential drug abuse or diversion identified. ;Assessed functional status. Gisele Woodard is a 54 y.o. male being evaluated by a Virtual Visit (video visit) encounter to address concerns as mentioned above. A caregiver was present when appropriate. Due to this being a TeleHealth encounter (During [facility] public health emergency), evaluation of the following organ systems was limited: Vitals/Constitutional/EENT/Resp/CV/GI//MS/Neuro/Skin/Heme-Lymph-Imm. Pursuant to the emergency declaration under the [address] [place] [address] authority and the [employer] and [employer] Act, this Virtual Visit was conducted with patient's (and/or legal guardian's) consent, to reduce the patient's risk of exposure to COVID-19 and provide necessary medical care. The patient (and/or legal guardian) has also been advised to contact this office for worsening conditions or problems, and seek emergency medical treatment and/or call 911 if deemed necessary.      Patient identification was verified at the start of the visit: Yes    Total time spent for this encounter: Not billed by time    Services were provided through a video synchronous discussion virtually to substitute for in-person clinic visit. Patient and provider were located at their individual homes. --Kim Wells MD on 7/7/2020 at 1:49 PM    An electronic signature was used to authenticate this note.     Electronically signed by Kim Wells MD on 7/7/2020 at 1:49 PM

## 2020-07-10 NOTE — TELEPHONE ENCOUNTER
Request for HCTZ, pantoprazole - meds pended. Please fill if appropriate. Next Visit Date:  No future appointments. Health Maintenance   Topic Date Due    Annual Wellness Visit (AWV)  05/31/2020    Flu vaccine (1) 09/01/2020    Lipid screen  10/21/2020    Potassium monitoring  03/09/2021    Creatinine monitoring  03/09/2021    DTaP/Tdap/Td vaccine (2 - Td) 10/19/2025    HIV screen  Completed    Hepatitis A vaccine  Aged Out    Hepatitis B vaccine  Aged Out    Hib vaccine  Aged Out    Meningococcal (ACWY) vaccine  Aged Out    Pneumococcal 0-64 years Vaccine  Aged Out       Hemoglobin A1C (%)   Date Value   01/13/2015 5.2             ( goal A1C is < 7)   Microalb/Crt.  Ratio (mcg/mg creat)   Date Value   10/20/2015 23     LDL Cholesterol (mg/dL)   Date Value   10/21/2019 142 (H)       (goal LDL is <100)   AST (U/L)   Date Value   01/04/2018 19     ALT (U/L)   Date Value   01/04/2018 12     BUN (mg/dL)   Date Value   03/09/2020 19     BP Readings from Last 3 Encounters:   03/09/20 122/86   03/11/20 (!) 138/95   03/10/20 (!) 147/88          (goal 120/80)    All Future Testing planned in [facility]  Lab Frequency Next Occurrence   CA INJECT ANES/STEROID FORAMEN LUMBAR/SACRAL W IMG GUIDE ,1 LEVEL Once 07/08/2020         Patient Active Problem List:     GERD (gastroesophageal reflux disease)     Chronic bilateral low back pain with right-sided sciatica     Lumbar disc disease     Hemorrhoids, external     Essential hypertension     Pure hypercholesterolemia     Spondylosis without myelopathy or radiculopathy, lumbar region     Hx of right BKA (HCC)     Chronic lumbar radiculopathy     Spinal stenosis of lumbar region with neurogenic claudication     Lumbar foraminal stenosis

## 2020-07-14 RX ORDER — HYDROCHLOROTHIAZIDE 12.5 MG/1
TABLET ORAL
Qty: 30 TABLET | Refills: 3 | Status: SHIPPED | OUTPATIENT
Start: 2020-07-14 | End: 2020-10-11

## 2020-07-14 RX ORDER — PANTOPRAZOLE SODIUM 40 MG/1
TABLET, DELAYED RELEASE ORAL
Qty: 30 TABLET | Refills: 3 | Status: SHIPPED | OUTPATIENT
Start: 2020-07-14 | End: 2021-02-02

## 2020-08-02 ENCOUNTER — HOSPITAL ENCOUNTER (OUTPATIENT)
Dept: PREADMISSION TESTING | Age: 45
Setting detail: SPECIMEN
Discharge: HOME | End: 2020-08-06
Payer: MEDICARE

## 2020-08-06 ENCOUNTER — HOSPITAL ENCOUNTER (OUTPATIENT)
Age: 45
Setting detail: OUTPATIENT SURGERY
Discharge: HOME | End: 2020-08-06
Attending: ANESTHESIOLOGY | Admitting: ANESTHESIOLOGY
Payer: MEDICARE

## 2020-08-06 ENCOUNTER — APPOINTMENT (OUTPATIENT)
Dept: GENERAL RADIOLOGY | Age: 45
End: 2020-08-06
Attending: ANESTHESIOLOGY
Payer: MEDICARE

## 2020-08-06 VITALS
RESPIRATION RATE: 16 BRPM | HEIGHT: 62 IN | WEIGHT: 130 LBS | OXYGEN SATURATION: 99 % | SYSTOLIC BLOOD PRESSURE: 153 MMHG | BODY MASS INDEX: 23.92 KG/M2 | TEMPERATURE: 97.2 F | HEART RATE: 89 BPM | DIASTOLIC BLOOD PRESSURE: 97 MMHG

## 2020-08-06 PROCEDURE — 2580000003 HC RX 258: Performed by: ANESTHESIOLOGY

## 2020-08-06 PROCEDURE — 6360000002 HC RX W HCPCS: Performed by: ANESTHESIOLOGY

## 2020-08-06 PROCEDURE — 64483 NJX AA&/STRD TFRM EPI L/S 1: CPT | Performed by: ANESTHESIOLOGY

## 2020-08-06 PROCEDURE — 2709999900 HC NON-CHARGEABLE SUPPLY: Performed by: ANESTHESIOLOGY

## 2020-08-06 PROCEDURE — 3209999900 FLUORO FOR SURGICAL PROCEDURES

## 2020-08-06 PROCEDURE — 3600000050 HC PAIN LEVEL 1 BASE: Performed by: ANESTHESIOLOGY

## 2020-08-06 PROCEDURE — 7100000010 HC PHASE II RECOVERY - FIRST 15 MIN: Performed by: ANESTHESIOLOGY

## 2020-08-06 PROCEDURE — 2500000003 HC RX 250 WO HCPCS: Performed by: ANESTHESIOLOGY

## 2020-08-06 PROCEDURE — 99153 MOD SED SAME PHYS/QHP EA: CPT | Performed by: ANESTHESIOLOGY

## 2020-08-06 PROCEDURE — 99152 MOD SED SAME PHYS/QHP 5/>YRS: CPT | Performed by: ANESTHESIOLOGY

## 2020-08-06 PROCEDURE — 64484 NJX AA&/STRD TFRM EPI L/S EA: CPT | Performed by: ANESTHESIOLOGY

## 2020-08-06 PROCEDURE — 6360000004 HC RX CONTRAST MEDICATION: Performed by: ANESTHESIOLOGY

## 2020-08-06 PROCEDURE — 7100000011 HC PHASE II RECOVERY - ADDTL 15 MIN: Performed by: ANESTHESIOLOGY

## 2020-08-06 PROCEDURE — 3600000051 HC PAIN LEVEL 1 ADDL 15 MIN: Performed by: ANESTHESIOLOGY

## 2020-08-06 RX ORDER — SODIUM CHLORIDE 0.9 % (FLUSH) 0.9 %
10 SYRINGE (ML) INJECTION EVERY 12 HOURS SCHEDULED
Status: DISCONTINUED | OUTPATIENT
Start: 2020-08-06 | End: 2020-08-06

## 2020-08-06 RX ORDER — LIDOCAINE HYDROCHLORIDE 10 MG/ML
INJECTION, SOLUTION INFILTRATION; PERINEURAL PRN
Status: DISCONTINUED | OUTPATIENT
Start: 2020-08-06 | End: 2020-08-06 | Stop reason: ALTCHOICE

## 2020-08-06 RX ORDER — MIDAZOLAM HYDROCHLORIDE 1 MG/ML
INJECTION INTRAMUSCULAR; INTRAVENOUS PRN
Status: DISCONTINUED | OUTPATIENT
Start: 2020-08-06 | End: 2020-08-06 | Stop reason: ALTCHOICE

## 2020-08-06 RX ORDER — SODIUM CHLORIDE 0.9 % (FLUSH) 0.9 %
10 SYRINGE (ML) INJECTION PRN
Status: DISCONTINUED | OUTPATIENT
Start: 2020-08-06 | End: 2020-08-06 | Stop reason: HOSPADM

## 2020-08-06 RX ORDER — FENTANYL CITRATE 50 UG/ML
INJECTION, SOLUTION INTRAMUSCULAR; INTRAVENOUS PRN
Status: DISCONTINUED | OUTPATIENT
Start: 2020-08-06 | End: 2020-08-06 | Stop reason: ALTCHOICE

## 2020-08-06 RX ORDER — DEXAMETHASONE SODIUM PHOSPHATE 10 MG/ML
INJECTION INTRAMUSCULAR; INTRAVENOUS PRN
Status: DISCONTINUED | OUTPATIENT
Start: 2020-08-06 | End: 2020-08-06 | Stop reason: ALTCHOICE

## 2020-08-06 RX ADMIN — Medication 10 ML: at 07:55

## 2020-08-06 ASSESSMENT — PAIN SCALES - GENERAL
PAINLEVEL_OUTOF10: 0
PAINLEVEL_OUTOF10: 0
PAINLEVEL_OUTOF10: 6
PAINLEVEL_OUTOF10: 0
PAINLEVEL_OUTOF10: 2
PAINLEVEL_OUTOF10: 8
PAINLEVEL_OUTOF10: 6
PAINLEVEL_OUTOF10: 5

## 2020-08-06 ASSESSMENT — PAIN DESCRIPTION - LOCATION: LOCATION: BACK

## 2020-08-06 ASSESSMENT — PAIN DESCRIPTION - DESCRIPTORS
DESCRIPTORS: ACHING
DESCRIPTORS: ACHING

## 2020-08-06 ASSESSMENT — PAIN - FUNCTIONAL ASSESSMENT: PAIN_FUNCTIONAL_ASSESSMENT: 0-10

## 2020-08-06 ASSESSMENT — PAIN DESCRIPTION - PAIN TYPE: TYPE: CHRONIC PAIN

## 2020-08-06 NOTE — OP NOTE
Operative Note      Patient: Luz Marquez  YOB: 1975  MRN: [id number]    Date of Procedure: 8/6/2020    Pre-Op Diagnosis: DX CHRONIC LUMBAR RADIUCOPATHY , CHRONIC MOIZ LOW BACK PAIN WITH BILATERAL SCIATICA    Post-Op Diagnosis: Same       Procedure(s):  EPIDURAL STEROID INJECTION BILATERAL L5    Surgeon(s):  Kristin Rose MD    Assistant:   * No surgical staff found *    Anesthesia: IV Sedation    Estimated Blood Loss (mL): Minimal    Complications: None    Specimens:   * No specimens in log *    Implants:  * No implants in log *      Drains: * No LDAs found *    Findings: n/a    Detailed Description of Procedure:     Patient Name: Luz Marquez   YOB: 1975  Room/Bed: STAZ OR Pool/NONE  Medical Record Number: [id number]  Date: 8/6/2020       Preoperative Diagnosis:    Lumbar radicular pain  Low back pain with right-sided lumbar radiculitis  Lumbar spinal stenosis    Postoperative diagnosis: Lumbar radicular pain  Low back pain with right-sided lumbar radiculitis  Lumbar spinal stenosis      Blood Loss: none    Procedure Performed:  Transforaminal lumbar epidural steroid injection at the levels of right L5 and left L4  under fluoroscopic guidance. Procedure: The Patient was seen in the preop area, chart was reviewed, informed consent was obtained. Patient was taken to procedure room and was placed in prone position. Vital signs were monitored through out the  Procedure. A time out was completed. The skin over the back was prepped and draped in sterile manner. The target point was marked in ipsilateral obliques just below the pedicle at the target levels. Skin and deep tissues were anesthetized with 1 % lidocaine. A 20-gauge, spinal needle was advanced  under fluoroscopy guidance in AP / Oblique and lateral views, such that the tip of the needle lies in the neuroforamina at about the 6 o'clock position under the pedicle of the target vertebra.   This was confirmed with AP and lateral views of the fluoroscopy. Then after negative aspiration contrast dye Omnipaque-180 was injected with live fluoroscopy in AP views that showed  spread of the contrast in the epidural space  and no vascular runoff or intrathecal spread. Finally 3 ml of treatment solution containing 5 ml of  1 % PF lidocaine and 1 ml of dexamethasone 10 mg / ml was injected. The needle was removed and a Band-Aid was placed over the needle  insertion site. The patient's vital signs remained stable and the patient tolerated the procedure well. The same procedure was then repeated at left at L 4level with same technique. The remaining 3 ml of treatment solution was injected at that. The total dose of steroid injected today was dexamethasone 10 mg. Electronically signed by Alexis Dominguez MD on 8/6/2020 at 8:51 AM    SEDATION NOTE:    ASA CLASSIFICATION  2  MP   CLASSIFICATION  2    · Moderate intravenous conscious sedation was supervised by Dr. Beto Jain  . The patient was independently monitored by a Registered Nurse assigned to the Procedure Room  . Monitoring included automated blood pressure, continuous EKG, Capnography and continuous pulse oximetry. . The detailed Conscious Record is permanently stored in the [place] [address].      The following is the conscious sedation record;  Start Time:  08023  End times:  0844  Duration:  21 MINUTES  MEDS GIVEN 2 MG VERSED  MCG FENTANYL        Electronically signed by Alexis Dominguez MD on 8/6/2020 at 8:51 AM

## 2020-08-06 NOTE — H&P
Heart sounds are normal.  HR regular rate and rhythm without murmur, gallop or rub. Lungs: Normal respiratory effort with equal expansion, good air exchange, unlabored and clear to auscultation without wheezes or rales bilaterally   Abdomen: soft, nontender, nondistended with bowel sounds AUDIBLE X 4   PEDAL PULSES LEFT FOOT + 2 , RIGHT BK PROSTHESIS IN PLACE. .       Labs:  No results for input(s): HGB, HCT, WBC, MCV, PLT, NA, K, CL, CO2, BUN, CREATININE, GLUCOSE, INR, PROTIME, APTT, AST, ALT, LABALBU, HCG in the last 720 hours. No results for input(s): COVID19 in the last 720 hours. [id number] Cuellar, LELIA-BC  Electronically signed 8/6/2020 at 7:55 AM         Encounter Date:  7/7/2020          Related encounter: Virtual Visit from 7/7/2020 in [facility] [place]          Signed        Expand All Collapse All     Show:Clear all  [x]Manual[x]Template[x]Copied    Added by:  [x]Destini Samuel MD    [][place] for details   The patient is a 54 y. o. Non-/non  male.      Chief Complaint   Patient presents with    Back Pain    Medication Refill           HPI     58-year-old man with a past medical history significant for right below-knee amputation at age 14  He is seen in our clinic for chronic back pain  Onset of symptoms several years ago  Pain is located in the lower lumbar area across midline  Pain radiates down both legs left more than right  He is diagnosed with lumbar disc disease and foraminal stenosis  EMG had shown chronic lumbar radiculopathy  Patient had an epidural injection in March 2020 with 50% relief lasting for 3 months  Pain is now back to the baseline  Pain aggravated with daily activities routine bending lifting twisting turning aggravates the pain  He denies any changes in bladder or bowel control  He was seen by the neurosurgeon at [place] and was not advised for any surgery at that time  He is tried conservative measures in past On last evaluation he was started on Cymbalta and baclofen  Reports side effect of nausea and vomiting  Wants to switch back to her his previous medications     Medication Refill: Gabapentin, Cymbalta, Baclofen. Pt thinks Baclofen gives him upset stomach     Pain score Today:  8  Adverse effects (Constipation / Nausea / Sedation / sexual Dysfunction / others) : N&V  Mood: good  Sleep pattern and quality: fair  Activity level: fair     Pill count Today:no  Last dose taken  7/7/20  OARRS report reviewed today: yes  ER/Hospitalizations/PCP visit related to pain since last visit:no   Any legal problems e.g. DUI etc.:No  Satisfied with current management: Yes     Opioid Contract:none  Last Urine Dug screen dated:none     Past Medical History, Past Surgical History, Social History, Allergies and Medications reviewed and updated in EPIC as indicated     Family History reviewed and is noncontributory.            Past Medical History        Past Medical History:   Diagnosis Date    JOELLEN (acute kidney injury) ([address] 2/12/2015    Allergic rhinitis      Chronic abdominal pain      ED (erectile dysfunction) of organic origin      GERD (gastroesophageal reflux disease)      Gynecomastia, male      History of hepatitis 07/18/2017     PT DENIES    Hypertension      Lumbar disc disease      Neuroblastoma ([address] 1975    Neurogenic dysfunction of the urinary bladder      Osteoarthritis      Phantom limb pain ([address]      Pure hypercholesterolemia 7/18/2017    RSD (reflex sympathetic dystrophy)           Past Surgical History         Past Surgical History:   Procedure Laterality Date    ABDOMINAL ADHESION SURGERY        BLADDER REPAIR        LEG AMPUTATION THROUGH LOWER TIBIA AND FIBULA Right 1986     r/t nerve damage from neuroblastoma surgery    PAIN MANAGEMENT PROCEDURE Bilateral 2/20/2020     EPIDURAL STEROID INJECTION MOIZ L5S1 performed by Nanette Perkins MD at [place] [id number] Bilateral 3/2/2020 EPIDURAL STEROID INJECTION MOIZ L5S1 performed by Kobi Pham MD at [address] [place] [address]         r/t bowel obstruction        Social History   Social History            Socioeconomic History    Marital status:        Spouse name: Not on file    Number of children: Not on file    Years of education: Not on file    Highest education level: Not on file   Occupational History    Not on file   Social Needs    Financial resource strain: Not on file    Food insecurity       Worry: Not on file       Inability: Not on file    Transportation needs       Medical: Not on file       Non-medical: Not on file   Tobacco Use    Smoking status: Never Smoker    Smokeless tobacco: Never Used   Substance and Sexual Activity    Alcohol use: No       Alcohol/week: 0.0 standard drinks    Drug use: No    Sexual activity: Yes       Partners: Female   Lifestyle    Physical activity       Days per week: Not on file       Minutes per session: Not on file    Stress: Not on file   Relationships    Social connections       Talks on phone: Not on file       Gets together: Not on file       Attends Nondenominational service: Not on file       Active member of club or organization: Not on file       Attends meetings of clubs or organizations: Not on file       Relationship status: Not on file    Intimate partner violence       Fear of current or ex partner: Not on file       Emotionally abused: Not on file       Physically abused: Not on file       Forced sexual activity: Not on file   Other Topics Concern    Not on file   Social History Narrative     ** Merged History Encounter **              Family History         Family History   Problem Relation Age of Onset    High Blood Pressure Mother      Diabetes Mother      High Blood Pressure Father      Cancer Father           prostate    Diabetes Father      Coronary Art Dis Father      Heart Attack Father      Heart Disease Father      No Known Problems Sister      No Known Problems Brother                Allergies   Allergen Reactions    Shellfish-Derived Products Anaphylaxis    Penicillins Other (See Comments)       UNKNOWN REACTION     Shellfish-derived products and Penicillins   There were no vitals filed for this visit. Current Facility-Administered Medications          Current Outpatient Medications   Medication Sig Dispense Refill    aluminum chloride (DRYSOL) 20 % external solution Apply topically daily as needed        baclofen (LIORESAL) 10 MG tablet Take 10 mg by mouth 2 times daily        cyclobenzaprine (FLEXERIL) 10 MG tablet Take 10 mg by mouth 3 times daily as needed        gabapentin (NEURONTIN) 400 MG capsule Take 1 capsule by mouth 4 times daily for 30 days. 120 capsule 1    cyclobenzaprine (FLEXERIL) 10 MG tablet Take 1 tablet by mouth 3 times daily as needed for Muscle spasms 90 tablet 0    amLODIPine (NORVASC) 10 MG tablet take 1 tablet by mouth once daily 30 tablet 3    amLODIPine (NORVASC) 10 MG tablet take 1 tablet by mouth once daily 30 tablet 3    DULoxetine (CYMBALTA) 20 MG extended release capsule Take 1 capsule by mouth daily 30 capsule 1    atorvastatin (LIPITOR) 40 MG tablet take 1 tablet by mouth once daily 90 tablet 1    pantoprazole (PROTONIX) 40 MG tablet take 1 tablet by mouth once daily 30 tablet 3    hydrochlorothiazide (HYDRODIURIL) 12.5 MG tablet Take 2 tablets by mouth daily 30 tablet 3      No current facility-administered medications for this visit. Review of Systems   Constitutional: Negative. Respiratory: Negative. Musculoskeletal: Positive for back pain, neck pain and neck stiffness. Neurological: Negative. Objective:  General Appearance:  Well-appearing and in no acute distress. Vital signs: (most recent): There were no vitals taken for this visit. Output: Producing urine and producing stool.     HEENT: (No visible masses in neck  Range of motion appears normal on cervical spine  External ears appears normal)    Lungs:  Normal effort. He is not in respiratory distress. Neurological: Patient is alert and oriented to person, place and time.  (Able to follow command     Psych  Mood is good  Affect is normal). Skin:  No rash or cyanosis. Assessment & Plan   EXAMINATION: MRI OF THE LUMBAR SPINE WITHOUT AND WITH CONTRAST  1/14/2020 6:49 am    hypertrophy. There is no significant canal stenosis. There is mild left and moderate right foraminal narrowing. L5-S1: There is a circumferential  disc bulge with facet hypertrophy. There is no significant canal stenosis. There is moderate left and moderate to severe right foraminal narrowing. EMG bilateral lower extremity 02/2020  Chronic right L4-S1 radiculopathy     Neurosurgery consultation note February 12, 2020     MRI  lumbar spine and CT scan lumbar spine January 2020  MRI thoracic spine February 2020        1. Encounter for chronic pain management    2. Chronic lumbar radiculopathy    3. Chronic bilateral low back pain with bilateral sciatica    4. Hx of right BKA ([address]    5. Medication management        -Discontinue Cymbalta and baclofen  Restart Flexeril  Continue gabapentin  We will schedule patient for lumbar epidural steroid injection  Automated prescription report reviewed  Safe use of medication discussed with patient         Orders Placed This Encounter   Procedures    MI INJECT ANES/STEROID FORAMEN LUMBAR/SACRAL W IMG GUIDE ,1 LEVEL      Encounter Medications         Orders Placed This Encounter   Medications    gabapentin (NEURONTIN) 400 MG capsule       Sig: Take 1 capsule by mouth 4 times daily for 30 days.        Dispense:  120 capsule       Refill:  1    cyclobenzaprine (FLEXERIL) 10 MG tablet       Sig: Take 1 tablet by mouth 3 times daily as needed for Muscle spasms       Dispense:  90 tablet       Refill:  0            Controlled Substance Monitoring:     Acute and Chronic Pain Monitoring:   RX Monitoring 7/7/2020   Attestation -   Periodic Controlled Substance Monitoring Possible medication side effects, risk of tolerance/dependence & alternative treatments discussed. ;No signs of potential drug abuse or diversion identified. ;Assessed functional status. Tal Velázquez is a 54 y.o. male being evaluated by a Virtual Visit (video visit) encounter to address concerns as mentioned above. A caregiver was present when appropriate. Due to this being a TeleHealth encounter (During [facility] public health emergency), evaluation of the following organ systems was limited: Vitals/Constitutional/EENT/Resp/CV/GI//MS/Neuro/Skin/Heme-Lymph-Imm. Pursuant to the emergency declaration under the [address] [place] [address] authority and the [employer] Act, this Virtual Visit was conducted with patient's (and/or legal guardian's) consent, to reduce the patient's risk of exposure to COVID-19 and provide necessary medical care. The patient (and/or legal guardian) has also been advised to contact this office for worsening conditions or problems, and seek emergency medical treatment and/or call 911 if deemed necessary. Patient identification was verified at the start of the visit: Yes     Total time spent for this encounter: Not billed by time     Services were provided through a video synchronous discussion virtually to substitute for in-person clinic visit. Patient and provider were located at their individual homes. --aMrk Christina MD on 7/7/2020 at 1:49 PM     An electronic signature was used to authenticate this note.      Electronically signed by Mark Christina MD on 7/7/2020 at 1:49 PM               Revision History

## 2020-08-19 ENCOUNTER — OFFICE VISIT (OUTPATIENT)
Dept: NEUROSURGERY | Age: 45
End: 2020-08-19
Payer: MEDICARE

## 2020-08-19 VITALS
WEIGHT: 135 LBS | SYSTOLIC BLOOD PRESSURE: 130 MMHG | TEMPERATURE: 98 F | OXYGEN SATURATION: 97 % | DIASTOLIC BLOOD PRESSURE: 88 MMHG | HEIGHT: 62 IN | BODY MASS INDEX: 24.84 KG/M2 | HEART RATE: 94 BPM

## 2020-08-19 PROCEDURE — G8427 DOCREV CUR MEDS BY ELIG CLIN: HCPCS | Performed by: NEUROLOGICAL SURGERY

## 2020-08-19 PROCEDURE — G8420 CALC BMI NORM PARAMETERS: HCPCS | Performed by: NEUROLOGICAL SURGERY

## 2020-08-19 PROCEDURE — 99214 OFFICE O/P EST MOD 30 MIN: CPT | Performed by: NEUROLOGICAL SURGERY

## 2020-08-19 PROCEDURE — 1036F TOBACCO NON-USER: CPT | Performed by: NEUROLOGICAL SURGERY

## 2020-08-19 NOTE — PROGRESS NOTES
Elías Fry  [facility] [address] [facility] [address]  Dept: [phone]    Patient:  Leno Aguirre  YOB: 1975  Date: 8/19/20    The patient is a 54 y.o. male who presents today for consult of the following problems:     Chief Complaint   Patient presents with    Follow-up     discuss surgery             HPI:     Leno Aguirre is a 54 y.o. male on whom neurosurgical consultation was requested by Vaibhav Chen MD for management of significant spasmodic and numbness in the left lower extremity. Roughly in the L5 dermatome rating down the back of the leg into the dorsum first toe and some of the plantar surface as well. No distinct dermatomal distribution per the patient. Similar symptoms in the right leg to the popliteal region he does have a BKA on the right side. Overall the symptoms occur approximately 1-2 times per week and are sporadic unpredictable but can bother him for days when they do come on. Did have foraminal injections at L5-S1 and S1 on the left side with moderate relief most recent epidural did not help him at all. Also with significant minor groin pain and bilateral upper gluteal pain. Brooklyn Mcguire       History:     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    Neurogenic dysfunction of the urinary bladder     Osteoarthritis     Phantom limb pain ([address]     Pure hypercholesterolemia 7/18/2017    RSD (reflex sympathetic dystrophy)      Past Surgical History:   Procedure Laterality Date    ABDOMINAL ADHESION SURGERY      BLADDER REPAIR      LEG AMPUTATION THROUGH LOWER TIBIA AND FIBULA Right 1986 r/t nerve damage from neuroblastoma surgery    PAIN MANAGEMENT PROCEDURE Bilateral 2/20/2020    EPIDURAL STEROID INJECTION MOIZ L5S1 performed by Maxi Renee MD at [address] [place] [address] Bilateral 3/2/2020    EPIDURAL STEROID INJECTION MOIZ L5S1 performed by Maxi Renee MD at [address] [place] [address] Bilateral 8/6/2020    EPIDURAL STEROID INJECTION BILATERAL L5 performed by Maxi Renee MD at [address] [place] [address]      r/t bowel obstruction     Family History   Problem Relation Age of Onset    High Blood Pressure Mother     Diabetes Mother     High Blood Pressure Father     Cancer Father         prostate    Diabetes Father     Coronary Art Dis Father     Heart Attack Father     Heart Disease Father     No Known Problems Sister     No Known Problems Brother      Current Outpatient Medications on File Prior to Visit   Medication Sig Dispense Refill    hydrochlorothiazide (HYDRODIURIL) 12.5 MG tablet take 2 tablets by mouth once daily 30 tablet 3    pantoprazole (PROTONIX) 40 MG tablet take 1 tablet by mouth once daily 30 tablet 3    cyclobenzaprine (FLEXERIL) 10 MG tablet Take 10 mg by mouth 3 times daily as needed      gabapentin (NEURONTIN) 400 MG capsule Take 1 capsule by mouth 4 times daily for 30 days. 120 capsule 1    amLODIPine (NORVASC) 10 MG tablet take 1 tablet by mouth once daily 30 tablet 3    atorvastatin (LIPITOR) 40 MG tablet take 1 tablet by mouth once daily 90 tablet 1    DULoxetine (CYMBALTA) 20 MG extended release capsule Take 1 capsule by mouth daily (Patient not taking: Reported on 8/19/2020) 30 capsule 1     No current facility-administered medications on file prior to visit.       Social History     Tobacco Use    Smoking status: Never Smoker    Smokeless tobacco: Never Used   Substance Use Topics    Alcohol use: No     Alcohol/week: 0.0 standard drinks    Drug use: No       Allergies   Allergen Reactions    Shellfish-Derived rub    Sensation:   Intact    Motor  L deltoid 5/5; R deltoid 5/5  L biceps 5/5; R biceps 5/5  L triceps 5/5; R triceps 5/5  L wrist extension 5/5; R wrist extension 5/5  L intrinsics 5/5; R intrinsics 5/5     L iliopsoas 5/5 , R iliopsoas 5/5  L quadriceps 5/5; R quadriceps 5/5  L Dorsiflexion 5/5; R dorsiflexion 5/5  L Plantarflexion 5/5; R plantarflexion 5/5  L EHL 5/5; R EHL 5/5    Reflexes  L Brachioradialis 2+/4; R brachioradialis 2+/4  L Biceps 2+/4; R Biceps 2+/4  L Triceps 2+/4; R Triceps 2+/4  L Patellar 2+/4: R Patellar 2+/4  L Achilles 2+/4; R Achilles 2+/4    hoffmans L: neg  hoffmans R: neg  Clonus L: neg  Clonus R: neg  Babinski L: up  Babinski R; up    Is over both SI joints. Negative [place] on the left. Positive on the right. Negative tenderness over greater trochanters. Studies Review:     MRI shows a foraminal disc protrusion at L5-S1 on the right side with some discogenic disease. Assessment and Plan:      1. Bilateral sacroiliitis ([address]    2. Arthropathy following intestinal bypass, right hip    3. Scoliosis of thoracolumbar spine, unspecified scoliosis type    4. Lumbar radiculopathy          Plan: Extensive discussion regarding the multifactorial issues that are going on. I do believe he has a sacroiliitis and is worth an SI injection RFA if successful. In addition his deep groin pain positive [place] as well as likely leg length discrepancy be due to BKA correlate with high likelihood of right sided hip arthropathy. Will refer to orthopedic surgery. Regarding the left leg symptoms I do not see any distinct mass lesion or compression that directly correlates with an S1 radiculopathy however he did have significant improvement that he states with the bilateral S1 foraminal injections. In my opinion I do not see a very fruitful outcome with decompression involving a right-sided foraminal versus lateral recess disc with left-sided S1 radicular symptoms.      We will obtain SI joint traction followed by possible isolated left S1 followed by orthopedic referral and scoliosis x-rays. Followup: No follow-ups on file. Prescriptions Ordered:  No orders of the defined types were placed in this encounter. Orders Placed:  No orders of the defined types were placed in this encounter. Electronically signed by Italia Coe DO on 8/19/2020 at 10:43 AM    Please note that this chart was generated using voice recognition Dragon dictation software. Although every effort was made to ensure the accuracy of this automated transcription, some errors in transcription may have occurred.

## 2020-08-24 ENCOUNTER — OFFICE VISIT (OUTPATIENT)
Dept: ORTHOPEDIC SURGERY | Age: 45
End: 2020-08-24
Payer: MEDICARE

## 2020-08-24 VITALS — WEIGHT: 141 LBS | BODY MASS INDEX: 25.95 KG/M2 | HEIGHT: 62 IN

## 2020-08-24 PROCEDURE — G8428 CUR MEDS NOT DOCUMENT: HCPCS | Performed by: ORTHOPAEDIC SURGERY

## 2020-08-24 PROCEDURE — 99203 OFFICE O/P NEW LOW 30 MIN: CPT | Performed by: ORTHOPAEDIC SURGERY

## 2020-08-24 PROCEDURE — G8419 CALC BMI OUT NRM PARAM NOF/U: HCPCS | Performed by: ORTHOPAEDIC SURGERY

## 2020-08-24 ASSESSMENT — ENCOUNTER SYMPTOMS
APNEA: 0
CHEST TIGHTNESS: 0
VOMITING: 0
COUGH: 0
ABDOMINAL PAIN: 0

## 2020-08-24 NOTE — PROGRESS NOTES
[facility]  [facility]  [address] [facility] [address]  Dept: [phone]    Ambulatory Orthopedic Consult      CHIEF COMPLAINT:    Chief Complaint   Patient presents with    Hip Pain     right       HISTORY OF PRESENT ILLNESS:      The patient is a 54 y.o. male who is being seen at the request of  Geni Araya DO for consultation and evaluation of right hip pain. Jaguar Pain presents with a 7 years history of pain in the right hip and lower back. The pain does radiate into the thigh and into the groin. The pain is   made worse with weightbearing. The pain is  worse at night. The pain is  worse when laying on the affected side. The pain is made better with resting and activity modification. 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 Patient has seen Dr. Eduardo Saunders for pain management as well. The patient had corticosteroid injections to the back without relief.              Past Medical History:    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    Neurogenic dysfunction of the urinary bladder     Osteoarthritis     Phantom limb pain ([address]     Pure hypercholesterolemia 7/18/2017    RSD (reflex sympathetic dystrophy)        Past Surgical History:    Past Surgical History:   Procedure Laterality Date    ABDOMINAL ADHESION SURGERY      BLADDER REPAIR      LEG AMPUTATION THROUGH LOWER TIBIA AND FIBULA Right 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    PAIN MANAGEMENT PROCEDURE Bilateral 2/20/2020    EPIDURAL STEROID INJECTION MOIZ L5S1 performed by Kobi Pham MD at [facility]    PAIN MANAGEMENT PROCEDURE Bilateral 3/2/2020    EPIDURAL STEROID INJECTION MOIZ L5S1 performed by Izabella Cox MD at [address] Bilateral 8/6/2020    EPIDURAL STEROID INJECTION BILATERAL L5 performed by Izabella Cox MD at [place] [address]      r/t bowel obstruction       Current Medications:   Current Outpatient Medications   Medication Sig Dispense Refill    hydrochlorothiazide (HYDRODIURIL) 12.5 MG tablet take 2 tablets by mouth once daily 30 tablet 3    pantoprazole (PROTONIX) 40 MG tablet take 1 tablet by mouth once daily 30 tablet 3    cyclobenzaprine (FLEXERIL) 10 MG tablet Take 10 mg by mouth 3 times daily as needed      gabapentin (NEURONTIN) 400 MG capsule Take 1 capsule by mouth 4 times daily for 30 days. 120 capsule 1    amLODIPine (NORVASC) 10 MG tablet take 1 tablet by mouth once daily 30 tablet 3    DULoxetine (CYMBALTA) 20 MG extended release capsule Take 1 capsule by mouth daily (Patient not taking: Reported on 8/19/2020) 30 capsule 1    atorvastatin (LIPITOR) 40 MG tablet take 1 tablet by mouth once daily 90 tablet 1     No current facility-administered medications for this visit.         Allergies:    Shellfish-derived products and Penicillins    Social History:   Social History     Socioeconomic History    Marital status:      Spouse name: Not on file    Number of children: Not on file    Years of education: Not on file    Highest education level: Not on file   Occupational History    Not on file   Social Needs    Financial resource strain: Not on file    Food insecurity     Worry: Not on file     Inability: Not on file   [employer] needs     Medical: Not on file     Non-medical: Not on file   Tobacco Use    Smoking status: Never Smoker    Smokeless tobacco: Never Used   Substance and Sexual Activity    Alcohol use: No     Alcohol/week: 0.0 standard drinks    Drug use: No    Sexual activity: Yes Partners: Female   Lifestyle    Physical activity     Days per week: Not on file     Minutes per session: Not on file    Stress: Not on file   Relationships    Social connections     Talks on phone: Not on file     Gets together: Not on file     Attends [employer] service: Not on file     Active member of club or organization: Not on file     Attends meetings of clubs or organizations: Not on file     Relationship status: Not on file    Intimate partner violence     Fear of current or ex partner: Not on file     Emotionally abused: Not on file     Physically abused: Not on file     Forced sexual activity: Not on file   Other Topics Concern    Not on file   Social History Narrative    ** Merged History Encounter **            Family History:  Family History   Problem Relation Age of Onset    High Blood Pressure Mother     Diabetes Mother     High Blood Pressure Father     Cancer Father         prostate    Diabetes Father     Coronary Art Dis Father     Heart Attack Father     Heart Disease Father     No Known Problems Sister     No Known Problems Brother          REVIEW OF SYSTEMS:  Review of Systems   Constitutional: Negative for chills and fever. Respiratory: Negative for apnea, cough and chest tightness. Cardiovascular: Negative for chest pain and palpitations. Gastrointestinal: Negative for abdominal pain and vomiting. Genitourinary: Negative for difficulty urinating. Musculoskeletal: Positive for arthralgias (right hip). Negative for gait problem, joint swelling and myalgias. Neurological: Negative for dizziness, weakness and numbness. I have reviewed the CC, HPI, ROS, PMH, FHX, Social History, and if not present in this note, I have reviewed in the patient's chart. I agree with the documentation provided by other staff and have reviewed their documentation prior to providing my signature indicating agreement.     PHYSICAL EXAM:  Ht 5' 2\" (1.575 m)   Wt 141 lb (64 kg)   BMI with the patient today in the office. Discussed etiology and natural history of mild right hip arthritis and groin pain. The treatment options may include oral anti-inflammatories, bracing, injections, advanced imaging, activity modification, physical therapy and/or surgical intervention. The patient would like to proceed with right hip corticosteroid injection under fluoroscopy. This is being done hopefully for therapeutic reasons but also diagnostic reasons to determine if the patient's pain is coming from the hip joint or mostly from his back with radicular symptoms. The patient will follow up in 2 weeks after injection. We discussed that the patient should call us with any concerns or questions. Return 2 weeks after corticosteriod injection. No orders of the defined types were placed in this encounter. No orders of the defined types were placed in this encounter. Mike Mera LPN am scribing for and in the presence of Dr. Jesus Jaramillo  8/24/2020 8:48 PM    I have reviewed and made changes accordingly to the work scribed by Zachery Dean LPN. The documentation accurately reflects work and decisions made by me. I have also reviewed documentation completed by clinical staff.     Jesus Jaramillo DO, [facility]  8/24/2020 8:50 PM    This note is created with the assistance of a speech recognition program.  While intending to generate a document that actually reflects the content of the visit, the document can still have some errors including those of syntax and sound a like substitutions which may escape proof reading.  In such instances, actual meaning can be extrapolated by contextual diversion      Electronically signed by Krish Cuninngham on 8/24/2020 at 8:48 PM

## 2020-08-24 NOTE — LETTER
Dr. João Nogueira  [address] [address]  Dept: [phone]  Dept Fax: [phone]        8/24/20    Patient: Murray Vázquez  YOB: 1975    Dear Dannielle Hills MD,    I had the pleasure of seeing one of your patients, Jelani Shelley today in the office. Below are the relevant portions of my assessment and plan of care. IMPRESSION:  1. Arthritis of right hip    2. Right hip pain      PLAN:  Reviewed xray films with the patient today in the office. Discussed etiology and natural history of mild right hip arthritis and groin pain. The treatment options may include oral anti-inflammatories, bracing, injections, advanced imaging, activity modification, physical therapy and/or surgical intervention. The patient would like to proceed with right hip corticosteroid injection under fluoroscopy. This is being done hopefully for therapeutic reasons but also diagnostic reasons to determine if the patient's pain is coming from the hip joint or mostly from his back with radicular symptoms. The patient will follow up in 2 weeks after injection. We discussed that the patient should call us with any concerns or questions. Thank you for allowing me to participate in the care of this patient. I will keep you updated on this patient's follow up and I look forward to serving you and your patients again in the future. Please don't hesitate to contact me at my mobile number [phone].         Oswald Jorge

## 2020-08-27 ENCOUNTER — HOSPITAL ENCOUNTER (OUTPATIENT)
Dept: GENERAL RADIOLOGY | Age: 45
Discharge: HOME | End: 2020-08-29
Payer: MEDICARE

## 2020-08-27 ENCOUNTER — HOSPITAL ENCOUNTER (OUTPATIENT)
Age: 45
End: 2020-08-27
Payer: MEDICARE

## 2020-08-27 PROCEDURE — 2500000003 HC RX 250 WO HCPCS: Performed by: ORTHOPAEDIC SURGERY

## 2020-08-27 PROCEDURE — 6360000002 HC RX W HCPCS: Performed by: ORTHOPAEDIC SURGERY

## 2020-08-27 PROCEDURE — 27093 INJECTION FOR HIP X-RAY: CPT

## 2020-08-27 PROCEDURE — 73525 CONTRAST X-RAY OF HIP: CPT | Performed by: ORTHOPAEDIC SURGERY

## 2020-08-27 PROCEDURE — 27093 INJECTION FOR HIP X-RAY: CPT | Performed by: ORTHOPAEDIC SURGERY

## 2020-08-27 PROCEDURE — 6360000004 HC RX CONTRAST MEDICATION: Performed by: ORTHOPAEDIC SURGERY

## 2020-08-27 RX ORDER — METHYLPREDNISOLONE ACETATE 40 MG/ML
80 INJECTION, SUSPENSION INTRA-ARTICULAR; INTRALESIONAL; INTRAMUSCULAR; SOFT TISSUE ONCE
Status: COMPLETED | OUTPATIENT
Start: 2020-08-27 | End: 2020-08-27

## 2020-08-27 RX ORDER — LIDOCAINE HYDROCHLORIDE 10 MG/ML
10 INJECTION, SOLUTION EPIDURAL; INFILTRATION; INTRACAUDAL; PERINEURAL ONCE
Status: COMPLETED | OUTPATIENT
Start: 2020-08-27 | End: 2020-08-27

## 2020-08-27 RX ORDER — BUPIVACAINE HYDROCHLORIDE 2.5 MG/ML
2 INJECTION, SOLUTION EPIDURAL; INFILTRATION; INTRACAUDAL ONCE
Status: COMPLETED | OUTPATIENT
Start: 2020-08-27 | End: 2020-08-27

## 2020-08-27 RX ADMIN — LIDOCAINE HYDROCHLORIDE 10 ML: 10 INJECTION, SOLUTION EPIDURAL; INFILTRATION; INTRACAUDAL; PERINEURAL at 12:50

## 2020-08-27 RX ADMIN — IOPAMIDOL 5 ML: 408 INJECTION, SOLUTION INTRATHECAL at 12:50

## 2020-08-27 RX ADMIN — METHYLPREDNISOLONE ACETATE 80 MG: 40 INJECTION, SUSPENSION INTRA-ARTICULAR; INTRALESIONAL; INTRAMUSCULAR; SOFT TISSUE at 12:50

## 2020-08-27 RX ADMIN — BUPIVACAINE HYDROCHLORIDE 2 ML: 2.5 INJECTION, SOLUTION EPIDURAL; INFILTRATION; INTRACAUDAL; PERINEURAL at 12:50

## 2020-08-27 ASSESSMENT — PAIN SCALES - GENERAL: PAINLEVEL_OUTOF10: 6

## 2020-08-27 NOTE — OP NOTE
Operative Report    Worthy Lack  YOB: 1975  [id number]  [id number]    Pre-operative Diagnosis:  Right hip DJD    Post-operative Diagnosis: Right hip DJD    Procedure: Right hip CS injection under fluoroscopy    Anesthesia: Local with 1% lidocaine     Surgeons/Assistants: /    Estimated Blood Loss: 0 cc    Complications: None    Specimens: Was Not Obtained    Indication for operation:  Patient is a 54 y.o. male who presented to the clinic with right hip pain. Patient's pain is not controlled with oral anti-inflammatories. The patient only has degenerative changes on x-ray. At this time, the risks, benefits and alternatives to a diagnostic and therapeutic corticosteroid injection with local anesthesia was discussed with the patient. All questions were answered patient wishes to proceed. Operative summary:  Patient was met in the preoperative holding area and the correct extremity was identified and marked. Informed consent was obtained. The risks included but not limited to infection, bleeding, nerve damage, skin pigment changes, and systemic manifestations were discussed in detail. The patient was escorted to the fluoroscopy suite. The patient was positioned then prepped and draped in the standard sterile fashion. 8 mL of 1% plain lidocaine was injected for anesthesia. A 22-gauge spinal needle was introduced in fluoroscopy into the hip joint capsule. 2 mL's of radiopaque dye were injected and intra-articular position was confirmed under fluoroscopy. A mixture containing 2 ml 0.25% Marcaine and 80 mg of Depo-Medrol were injected. The injection site was cleaned and a Band-Aid was applied. The patient tolerated the procedure well and had immediate improvement of symptoms. Dr. Jc Thomas was present during all key portions procedure.     Saurabh Negron DO  PGY-1 Department of Orthopaedic   [place]  1:11 PM 8/27/2020

## 2020-09-12 ENCOUNTER — HOSPITAL ENCOUNTER (EMERGENCY)
Age: 45
Discharge: HOME | End: 2020-09-12
Attending: EMERGENCY MEDICINE
Payer: MEDICARE

## 2020-09-12 VITALS
WEIGHT: 135 LBS | TEMPERATURE: 98.9 F | OXYGEN SATURATION: 96 % | BODY MASS INDEX: 24.84 KG/M2 | DIASTOLIC BLOOD PRESSURE: 96 MMHG | SYSTOLIC BLOOD PRESSURE: 141 MMHG | HEART RATE: 91 BPM | RESPIRATION RATE: 18 BRPM | HEIGHT: 62 IN

## 2020-09-12 VITALS
HEART RATE: 114 BPM | BODY MASS INDEX: 22.5 KG/M2 | RESPIRATION RATE: 14 BRPM | DIASTOLIC BLOOD PRESSURE: 111 MMHG | SYSTOLIC BLOOD PRESSURE: 158 MMHG | WEIGHT: 140 LBS | HEIGHT: 66 IN | TEMPERATURE: 98.5 F | OXYGEN SATURATION: 94 %

## 2020-09-12 LAB
ABSOLUTE EOS #: 0.2 K/UL (ref 0–0.4)
ABSOLUTE IMMATURE GRANULOCYTE: ABNORMAL K/UL (ref 0–0.3)
ABSOLUTE LYMPH #: 2.1 K/UL (ref 1–4.8)
ABSOLUTE MONO #: 0.8 K/UL (ref 0.1–1.3)
ANION GAP SERPL CALCULATED.3IONS-SCNC: 8 MMOL/L (ref 9–17)
BASOPHILS # BLD: 1 % (ref 0–2)
BASOPHILS ABSOLUTE: 0.1 K/UL (ref 0–0.2)
BUN BLDV-MCNC: 20 MG/DL (ref 6–20)
BUN/CREAT BLD: ABNORMAL (ref 9–20)
CALCIUM SERPL-MCNC: 9.2 MG/DL (ref 8.6–10.4)
CHLORIDE BLD-SCNC: 108 MMOL/L (ref 98–107)
CO2: 24 MMOL/L (ref 20–31)
CREAT SERPL-MCNC: 1.48 MG/DL (ref 0.7–1.2)
DIFFERENTIAL TYPE: ABNORMAL
EOSINOPHILS RELATIVE PERCENT: 2 % (ref 0–4)
GFR AFRICAN AMERICAN: >60 ML/MIN
GFR NON-AFRICAN AMERICAN: 51 ML/MIN
GFR SERPL CREATININE-BSD FRML MDRD: ABNORMAL ML/MIN/{1.73_M2}
GFR SERPL CREATININE-BSD FRML MDRD: ABNORMAL ML/MIN/{1.73_M2}
GLUCOSE BLD-MCNC: 106 MG/DL (ref 70–99)
HCT VFR BLD CALC: 39.9 % (ref 41–53)
HEMOGLOBIN: 13.6 G/DL (ref 13.5–17.5)
IMMATURE GRANULOCYTES: ABNORMAL %
LYMPHOCYTES # BLD: 28 % (ref 24–44)
MAGNESIUM: 1.9 MG/DL (ref 1.6–2.6)
MCH RBC QN AUTO: 29.1 PG (ref 26–34)
MCHC RBC AUTO-ENTMCNC: 33.9 G/DL (ref 31–37)
MCV RBC AUTO: 85.7 FL (ref 80–100)
MONOCYTES # BLD: 10 % (ref 1–7)
NRBC AUTOMATED: ABNORMAL PER 100 WBC
PDW BLD-RTO: 15 % (ref 11.5–14.9)
PLATELET # BLD: 319 K/UL (ref 150–450)
PLATELET ESTIMATE: ABNORMAL
PMV BLD AUTO: 6.7 FL (ref 6–12)
POTASSIUM SERPL-SCNC: 3.6 MMOL/L (ref 3.7–5.3)
RBC # BLD: 4.66 M/UL (ref 4.5–5.9)
RBC # BLD: ABNORMAL 10*6/UL
SEG NEUTROPHILS: 59 % (ref 36–66)
SEGMENTED NEUTROPHILS ABSOLUTE COUNT: 4.3 K/UL (ref 1.3–9.1)
SODIUM BLD-SCNC: 140 MMOL/L (ref 135–144)
WBC # BLD: 7.5 K/UL (ref 3.5–11)
WBC # BLD: ABNORMAL 10*3/UL

## 2020-09-12 PROCEDURE — 99284 EMERGENCY DEPT VISIT MOD MDM: CPT

## 2020-09-12 PROCEDURE — 96372 THER/PROPH/DIAG INJ SC/IM: CPT

## 2020-09-12 PROCEDURE — 36415 COLL VENOUS BLD VENIPUNCTURE: CPT

## 2020-09-12 PROCEDURE — 80048 BASIC METABOLIC PNL TOTAL CA: CPT

## 2020-09-12 PROCEDURE — 6360000002 HC RX W HCPCS: Performed by: EMERGENCY MEDICINE

## 2020-09-12 PROCEDURE — 99283 EMERGENCY DEPT VISIT LOW MDM: CPT

## 2020-09-12 PROCEDURE — 6370000000 HC RX 637 (ALT 250 FOR IP): Performed by: EMERGENCY MEDICINE

## 2020-09-12 PROCEDURE — 83735 ASSAY OF MAGNESIUM: CPT

## 2020-09-12 PROCEDURE — 85025 COMPLETE CBC W/AUTO DIFF WBC: CPT

## 2020-09-12 RX ORDER — KETOROLAC TROMETHAMINE 30 MG/ML
60 INJECTION, SOLUTION INTRAMUSCULAR; INTRAVENOUS ONCE
Status: COMPLETED | OUTPATIENT
Start: 2020-09-12 | End: 2020-09-12

## 2020-09-12 RX ORDER — POTASSIUM CHLORIDE 20 MEQ/1
40 TABLET, EXTENDED RELEASE ORAL ONCE
Status: COMPLETED | OUTPATIENT
Start: 2020-09-12 | End: 2020-09-12

## 2020-09-12 RX ORDER — ORPHENADRINE CITRATE 30 MG/ML
60 INJECTION INTRAMUSCULAR; INTRAVENOUS ONCE
Status: COMPLETED | OUTPATIENT
Start: 2020-09-12 | End: 2020-09-12

## 2020-09-12 RX ORDER — METHOCARBAMOL 750 MG/1
750 TABLET, FILM COATED ORAL 4 TIMES DAILY
Qty: 40 TABLET | Refills: 0 | Status: SHIPPED | OUTPATIENT
Start: 2020-09-12 | End: 2020-09-12 | Stop reason: ALTCHOICE

## 2020-09-12 RX ORDER — DIAZEPAM 2 MG/1
2 TABLET ORAL EVERY 8 HOURS PRN
Qty: 10 TABLET | Refills: 0 | Status: SHIPPED | OUTPATIENT
Start: 2020-09-12 | End: 2020-09-22

## 2020-09-12 RX ADMIN — ORPHENADRINE CITRATE 60 MG: 30 INJECTION INTRAMUSCULAR; INTRAVENOUS at 21:17

## 2020-09-12 RX ADMIN — POTASSIUM CHLORIDE 40 MEQ: 1500 TABLET, EXTENDED RELEASE ORAL at 21:17

## 2020-09-12 RX ADMIN — KETOROLAC TROMETHAMINE 60 MG: 30 INJECTION, SOLUTION INTRAMUSCULAR at 13:55

## 2020-09-12 RX ADMIN — ORPHENADRINE CITRATE 60 MG: 30 INJECTION INTRAMUSCULAR; INTRAVENOUS at 13:55

## 2020-09-12 RX ADMIN — KETOROLAC TROMETHAMINE 60 MG: 30 INJECTION, SOLUTION INTRAMUSCULAR at 21:18

## 2020-09-12 ASSESSMENT — ENCOUNTER SYMPTOMS
VOMITING: 0
SHORTNESS OF BREATH: 0
COUGH: 0
ABDOMINAL PAIN: 0
COLOR CHANGE: 0
EYE DISCHARGE: 0
COLOR CHANGE: 0
SHORTNESS OF BREATH: 0
DIARRHEA: 0
NAUSEA: 0
COUGH: 0
TROUBLE SWALLOWING: 0
BACK PAIN: 0
FACIAL SWELLING: 0
BACK PAIN: 1
EYE REDNESS: 0
VOMITING: 0
DIARRHEA: 0
SORE THROAT: 0
CONSTIPATION: 0
ABDOMINAL PAIN: 0
CONSTIPATION: 0
BLOOD IN STOOL: 0

## 2020-09-12 ASSESSMENT — PAIN SCALES - GENERAL
PAINLEVEL_OUTOF10: 10

## 2020-09-12 ASSESSMENT — PAIN DESCRIPTION - ORIENTATION: ORIENTATION: RIGHT;LEFT

## 2020-09-12 ASSESSMENT — PAIN DESCRIPTION - LOCATION: LOCATION: BACK;LEG

## 2020-09-12 ASSESSMENT — PAIN DESCRIPTION - PAIN TYPE: TYPE: ACUTE PAIN

## 2020-09-12 NOTE — ED PROVIDER NOTES
[address] [place] [address] ED  EMERGENCY DEPARTMENT ENCOUNTER      Pt Name: Juliette Graves  MRN: [id number]  [place] 1975  Date of evaluation: 9/12/2020  Provider: Vasiliy Lewis MD    CHIEF COMPLAINT       Chief Complaint   Patient presents with    Back Pain    Spasms         HISTORY OF PRESENT ILLNESS  (Location/Symptom, Timing/Onset, Context/Setting, Quality, Duration, Modifying Factors, Severity.)   Juliette Graves is a 54 y.o. male who presents to the emergency department for muscle spasms. He states his lower back and his hips and his legs are spasming on and off for the last 2 days. He has been taking his Flexeril but it is not helping. No injury. No fever cough or chest pain and he rates the pain as a 10. It is intermittent. He is in pain management. Nursing Notes were reviewed. ALLERGIES     Shellfish-derived products and Penicillins    CURRENT MEDICATIONS       Previous Medications    AMLODIPINE (NORVASC) 10 MG TABLET    take 1 tablet by mouth once daily    ATORVASTATIN (LIPITOR) 40 MG TABLET    take 1 tablet by mouth once daily    CYCLOBENZAPRINE (FLEXERIL) 10 MG TABLET    Take 10 mg by mouth 3 times daily as needed    DULOXETINE (CYMBALTA) 20 MG EXTENDED RELEASE CAPSULE    Take 1 capsule by mouth daily    GABAPENTIN (NEURONTIN) 400 MG CAPSULE    Take 1 capsule by mouth 4 times daily for 30 days.     HYDROCHLOROTHIAZIDE (HYDRODIURIL) 12.5 MG TABLET    take 2 tablets by mouth once daily    PANTOPRAZOLE (PROTONIX) 40 MG TABLET    take 1 tablet by mouth once daily       PAST MEDICAL HISTORY         Diagnosis Date    [facility]ELLEN (acute [facility] injury) ([facility] 2/12/2015    Allergic rhinitis     Chronic abdominal pain     ED (erectile dysfunction) of organic origin     GERD (gastroesophageal reflux disease)     Gynecomastia, male     History of hepatitis 07/18/2017    PT DENIES    Hypertension     Lumbar disc disease     Neuroblastoma ([facility] 1975    Neurogenic dysfunction of the urinary bladder     Osteoarthritis     [facility]     Pure hypercholesterolemia 7/18/2017    RSD (reflex sympathetic dystrophy)        SURGICAL HISTORY           Procedure Laterality Date    ABDOMINAL ADHESION SURGERY      BLADDER REPAIR      LEG AMPUTATION THROUGH LOWER TIBIA AND FIBULA Right 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    PAIN MANAGEMENT PROCEDURE Bilateral 2/20/2020    EPIDURAL STEROID INJECTION MOIZ L5S1 performed by Kim Wells MD at [address] [facility] Bilateral 3/2/2020    EPIDURAL STEROID INJECTION MOIZ L5S1 performed by Kim Wells MD at [address] [facility] Bilateral 8/6/2020    EPIDURAL STEROID INJECTION BILATERAL L5 performed by Kim Wells MD at [address] [facility]      r/t bowel obstruction         FAMILY HISTORY           Problem Relation Age of Onset    High Blood Pressure Mother     Diabetes Mother     High Blood Pressure Father     Cancer Father         prostate    Diabetes Father     Coronary Art Dis Father     Heart Attack Father     Heart Disease Father     No Known Problems Sister     No Known Problems Brother      Family Status   Relation Name Status    Mother Gertrudis Martinez Father Myrna Lopez Alive    Sister  Alive    Brother  Alive        SOCIAL HISTORY      reports that he has never smoked. He has never used smokeless tobacco. He reports that he does not drink alcohol or use drugs. REVIEW OF SYSTEMS    (2-9 systems for level 4, 10 or more for level 5)     Review of Systems   Constitutional: Negative for chills, fatigue and fever. HENT: Negative for congestion, ear discharge and facial swelling. Eyes: Negative for discharge and redness. Respiratory: Negative for cough and shortness of breath. Cardiovascular: Negative for chest pain. Gastrointestinal: Negative for abdominal pain, constipation, diarrhea and vomiting. Genitourinary: Negative for dysuria and hematuria.    Musculoskeletal: Positive for back pain. Negative for arthralgias. Skin: Negative for color change and rash. Neurological: Negative for syncope, numbness and headaches. Hematological: Negative for adenopathy. Psychiatric/Behavioral: Negative for confusion. The patient is not nervous/anxious. Except as noted above the remainder of the review of systems was reviewed and negative. PHYSICAL EXAM    (up to 7 for level 4, 8 or more for level 5)     Vitals:    09/12/20 1311 09/12/20 1312   BP: (!) 158/111    Pulse: 114    Resp: 14    Temp: 98.5 °F (36.9 °C)    SpO2: 94%    Weight:  140 lb (63.5 kg)   Height:  5' 6\" (1.676 m)       Physical Exam  Vitals signs reviewed. Constitutional:       General: He is not in acute distress. Appearance: He is well-developed. He is not diaphoretic. HENT:      Head: Normocephalic and atraumatic. Eyes:      General: No scleral icterus. Right eye: No discharge. Left eye: No discharge. Neck:      Musculoskeletal: Neck supple. Cardiovascular:      Rate and Rhythm: Normal rate and regular rhythm. Pulmonary:      Effort: Pulmonary effort is normal. No respiratory distress. Breath sounds: Normal breath sounds. No stridor. No wheezing or rales. Abdominal:      General: There is no distension. Palpations: Abdomen is soft. Tenderness: There is no abdominal tenderness. Musculoskeletal: Normal range of motion. Comments: Back is examined. No bruise or rash or focal area of tenderness to palpation. Legs are not swollen. He periodically has spasms in his legs. Lymphadenopathy:      Cervical: No cervical adenopathy. Skin:     General: Skin is warm and dry. Findings: No erythema or rash. Neurological:      Mental Status: He is alert and oriented to person, place, and time.    Psychiatric:         Behavior: Behavior normal.             DIAGNOSTIC RESULTS     EKG: All EKG's are interpreted by the Emergency Department Physician who either signs or Co-signs this chart in the absence of a cardiologist.    Not indicated    RADIOLOGY:   Non-plain film images such as CT, Ultrasound and MRI are read by the radiologist. Plain radiographic images are visualized and preliminarily interpreted by the emergency physician with the below findings:    Not indicated    Interpretation per the Radiologist below, if available at the time of this note:        LABS:  Labs Reviewed - No data to display    All other labs were within normal range or not returned as of this dictation. EMERGENCY DEPARTMENT COURSE and DIFFERENTIAL DIAGNOSIS/MDM:   Vitals:    Vitals:    09/12/20 1311 09/12/20 1312   BP: (!) 158/111    Pulse: 114    Resp: 14    Temp: 98.5 °F (36.9 °C)    SpO2: 94%    Weight:  140 lb (63.5 kg)   Height:  5' 6\" (1.676 m)       Orders Placed This Encounter   Medications    ketorolac (TORADOL) injection 60 mg    orphenadrine (NORFLEX) injection 60 mg    methocarbamol (ROBAXIN-750) 750 MG tablet     Sig: Take 1 tablet by mouth 4 times daily for 10 days     Dispense:  40 tablet     Refill:  0       Medical Decision Making: He will be given IM Toradol and Norflex and will be switched from Flexeril to Robaxin. He was instructed to stop the Flexeril while he is on Robaxin.   Treatment diagnosis and follow-up were discussed with the patient      CONSULTS:  None    PROCEDURES:  None    FINAL IMPRESSION      1. Spasm of muscle          DISPOSITION/PLAN   DISPOSITION Decision To Discharge 09/12/2020 01:31:51 PM      PATIENT REFERRED TO:   Vaibhav Chen MD  [address] [place] [address]  [phone]      As needed    [facility] ED  [address] [facility]  [phone]    If symptoms worsen      DISCHARGE MEDICATIONS:     New Prescriptions    METHOCARBAMOL (ROBAXIN-750) 750 MG TABLET    Take 1 tablet by mouth 4 times daily for 10 days         (Please note that portions of this note were completed with a voice recognition program.  Efforts were made to edit the dictations but occasionally words are mis-transcribed.)    Jana Klein MD  Attending Emergency Physician            Jana Klein MD  09/12/20 5839

## 2020-09-12 NOTE — ED PROVIDER NOTES
[address]  eMERGENCY dEPARTMENT eNCOUnter    Pt Name: Jim Jordan  MRN: [id number]  YOB: 1975  Date of evaluation:9/12/20  PCP: Jing Diaz MD    [address] [place] [address]       Chief Complaint   Patient presents with    Spasms     started 2.5 days ago. was seen at [facility] ER today for spasms       HISTORY OF PRESENT ILLNESS    Jim Jordan is a 54 y.o. male who presents with a chief complaint of muscle spasms. Patient states for the past 2 or 3 days he has had spasms in his lower back and bilateral lower extremities. Patient states he has a long history of this. He has chronically been on Flexeril but Flexeril has not been helping his symptoms. No pain in his back. No abdominal pain. No numbness, tingling, weakness in his lower extremities. He states he has a long chronic history of this and it is worse than usual however there are no other new associated symptoms. No falls or trauma. No difficulty with bowel or bladder control. Symptoms are acute on chronic. Symptoms are moderate. Nothing else make symptoms better or worse. Patient states he was seen earlier today at another hospital and given another medication for this and he took it twice and it did not help. Patient has no other complaints at this time. REVIEW OF SYSTEMS       Review of Systems   Constitutional: Negative for chills, fatigue and fever. HENT: Negative for congestion, ear pain, sore throat and trouble swallowing. Eyes: Negative for visual disturbance. Respiratory: Negative for cough and shortness of breath. Cardiovascular: Negative for chest pain, palpitations and leg swelling. Gastrointestinal: Negative for abdominal pain, blood in stool, constipation, diarrhea, nausea and vomiting. Genitourinary: Negative for dysuria and flank pain. Musculoskeletal: Negative for arthralgias, back pain, myalgias and neck pain. Skin: Negative for color change, rash and wound.    Neurological: Positive for tremors (Muscle spasms). Negative for dizziness, weakness, light-headedness, numbness and headaches. Psychiatric/Behavioral: Negative for confusion. All other systems reviewed and are negative. Negativein 10 essential Systems except as mentioned above and in the HPI. PAST MEDICAL HISTORY     Past Medical History:   Diagnosis Date    JOELLEN (acute kidney injury) ([facility] [address] 2/12/2015    Allergic rhinitis     Chronic abdominal pain     ED (erectile dysfunction) of organic origin     GERD (gastroesophageal reflux disease)     Gynecomastia, male     History of hepatitis 07/18/2017    PT DENIES    Hypertension     Lumbar disc disease     Neuroblastoma ([facility] [address] 1975    Neurogenic dysfunction of the urinary bladder     Osteoarthritis     Phantom limb pain ([facility] [address]     Pure hypercholesterolemia 7/18/2017    RSD (reflex sympathetic dystrophy)          SURGICAL HISTORY      has a past surgical history that includes Leg amputation, lower tibia/fibula (Right, 1986); bladder repair; Abdominal adhesion surgery; Small intestine surgery; Pain management procedure (Bilateral, 2/20/2020); Pain management procedure (Bilateral, 3/2/2020); and Pain management procedure (Bilateral, 8/6/2020). CURRENT MEDICATIONS       Previous Medications    AMLODIPINE (NORVASC) 10 MG TABLET    take 1 tablet by mouth once daily    ATORVASTATIN (LIPITOR) 40 MG TABLET    take 1 tablet by mouth once daily    DULOXETINE (CYMBALTA) 20 MG EXTENDED RELEASE CAPSULE    Take 1 capsule by mouth daily    GABAPENTIN (NEURONTIN) 400 MG CAPSULE    Take 1 capsule by mouth 4 times daily for 30 days. HYDROCHLOROTHIAZIDE (HYDRODIURIL) 12.5 MG TABLET    take 2 tablets by mouth once daily    PANTOPRAZOLE (PROTONIX) 40 MG TABLET    take 1 tablet by mouth once daily       ALLERGIES     is allergic to shellfish-derived products and penicillins. FAMILY HISTORY     He indicated that his mother is alive. He indicated that his father is alive.  He indicated that his sister is alive. He indicated that his brother is alive. family history includes Cancer in his father; Coronary Art Dis in his father; Diabetes in his father and mother; Heart Attack in his father; Heart Disease in his father; High Blood Pressure in his father and mother; No Known Problems in his brother and sister. SOCIAL HISTORY      reports that he has never smoked. He has never used smokeless tobacco. He reports that he does not drink alcohol or use drugs. PHYSICAL EXAM     INITIAL VITALS:  height is 5' 2\" (1.575 m) and weight is 135 lb (61.2 kg). His oral temperature is 98.9 °F (37.2 °C). His blood pressure is 141/87 (abnormal) and his pulse is 100. His respiration is 20 and oxygen saturation is 97%. Physical Exam  Vitals signs and nursing note reviewed. Constitutional:       General: He is not in acute distress. HENT:      Head: Normocephalic and atraumatic. Eyes:      Conjunctiva/sclera: Conjunctivae normal.      Pupils: Pupils are equal, round, and reactive to light. Neck:      Musculoskeletal: Neck supple. Cardiovascular:      Rate and Rhythm: Normal rate and regular rhythm. Heart sounds: Normal heart sounds. No murmur. Pulmonary:      Effort: Pulmonary effort is normal. No respiratory distress. Breath sounds: Normal breath sounds. Abdominal:      General: Bowel sounds are normal. There is no distension. Palpations: Abdomen is soft. Tenderness: There is no abdominal tenderness. Musculoskeletal:         General: No tenderness, deformity or signs of injury. Comments: Right leg below the knee amputation noted   Lymphadenopathy:      Cervical: No cervical adenopathy. Skin:     General: Skin is warm and dry. Findings: No rash. Neurological:      General: No focal deficit present. Mental Status: He is alert and oriented to person, place, and time. Sensory: No sensory deficit. Motor: No weakness.    Psychiatric:         Judgment: Judgment normal.           DIFFERENTIAL DIAGNOSIS/MDM:   65-year-old male presents with spasming of his lower back and bilateral lower extremities. Chronic issue for him however worse over the past several weeks. This is his second visit today to 2 different emergency departments for the same issue. On exam he is afebrile, nontoxic, normal vital signs. No acute distress. He has no gross neurologic deficit to my exam.  He does intermittently have some spasm activity noted in his lower extremities. I am going to check lab work to make sure he does not have any obvious electrolyte imbalance contributing to the symptoms. We will treat symptomatically with Toradol, Norflex. Will consider switching patient to a benzodiazepine for symptomatic relief at home. DIAGNOSTIC RESULTS     EKG: All EKG's are interpreted by the Emergency Department Physician who either signs or Co-signs this chart in the absence of a cardiologist.        RADIOLOGY:   I directly visualized the following  images and reviewed the radiologist interpretations:  No orders to display           ED BEDSIDE ULTRASOUND:      LABS:  Labs Reviewed   CBC WITH AUTO DIFFERENTIAL - Abnormal; Notable for the following components:       Result Value    Hematocrit 39.9 (*)     RDW 15.0 (*)     Monocytes 10 (*)     All other components within normal limits   BASIC METABOLIC PANEL - Abnormal; Notable for the following components:    Glucose 106 (*)     CREATININE 1.48 (*)     Potassium 3.6 (*)     Chloride 108 (*)     Anion Gap 8 (*)     GFR Non- 51 (*)     All other components within normal limits   MAGNESIUM         EMERGENCY DEPARTMENT COURSE:   Vitals:    Vitals:    09/12/20 1927   BP: (!) 141/87   Pulse: 100   Resp: 20   Temp: 98.9 °F (37.2 °C)   TempSrc: Oral   SpO2: 97%   Weight: 135 lb (61.2 kg)   Height: 5' 2\" (1.575 m)     Patient's creatinine is slightly elevated 1.48. His potassium is slightly on the lower side at 3.6.   I think his symptoms are probably related to some mild dehydration. He is tolerating p.o. here. We will replace the potassium, discharge with Valium for symptomatic relief over the next several days. He was advised to keep himself well-hydrated in the neck several days, drink plenty of fluids. Advised to return here if any symptoms worsen. He is agreeable to this plan will be discharged home today. CRITICALCARE:      CONSULTS:  None      PROCEDURES:      FINAL IMPRESSION      1. Spasm of muscle            DISPOSITION/PLAN   DISPOSITION Decision To Discharge 09/12/2020 09:10:27 PM          PATIENT REFERRED TO:  Misael Lara MD  [address] [phone]    Schedule an appointment as soon as possible for a visit       [facility] ED  [place] [address] [phone]    As needed, If symptoms worsen      DISCHARGE MEDICATIONS:  New Prescriptions    DIAZEPAM (VALIUM) 2 MG TABLET    Take 1 tablet by mouth every 8 hours as needed (Spasms) for up to 10 doses.        (Please note that portions ofthis note were completed with a voice recognition program.  Efforts were made to edit the dictations but occasionally words are mis-transcribed.)    Elena Dow DO  Attending Emergency Physician          Elena Dow DO  09/12/20 [id number]

## 2020-09-12 NOTE — ED NOTES
Mode of arrival:  Significant other drove pt in      Residence prior to admit: home      Chief complaint on admission: muscle spasms  Pt states that spasms have been present for the last 2.5 days and was seen at [place] [address] ER earlier in the day. Pt states that medications were changed at [place] [address]. Pt states that spasms have not improved after visit and new prescription medications. Pt is A&Ox4, ambulates per self, yet using wheelchair in ER, and is having active spasms during triage. C= \"Have you ever felt that you should Cut down on your drinking? \"  No  A= \"Have people Annoyed you by criticizing your drinking? \"  No  G= \"Have you ever felt bad or Guilty about your drinking? \"  No  E= \"Have you ever had a drink as an Eye-opener first thing in the morning to steady your nerves or to help a hangover? \"  No      Deferred []      Reason for deferring: N/A    *If yes to two or more: probable alcohol abuse. [address] [facility], RN  09/12/20 1933

## 2020-09-28 ENCOUNTER — TELEPHONE (OUTPATIENT)
Dept: PAIN MANAGEMENT | Age: 45
End: 2020-09-28

## 2020-09-28 NOTE — TELEPHONE ENCOUNTER
Attempted to reach pt to schedule OV appointment with Dr. Mikie Guardado.  Pt did not answer so writer SHANTA requesting call back

## 2020-10-06 ENCOUNTER — HOSPITAL ENCOUNTER (OUTPATIENT)
Dept: GENERAL RADIOLOGY | Age: 45
Discharge: HOME | End: 2020-10-08
Payer: MEDICARE

## 2020-10-06 ENCOUNTER — HOSPITAL ENCOUNTER (OUTPATIENT)
Age: 45
Discharge: HOME | End: 2020-10-08
Payer: MEDICARE

## 2020-10-06 PROCEDURE — 72082 X-RAY EXAM ENTIRE SPI 2/3 VW: CPT

## 2020-10-08 NOTE — TELEPHONE ENCOUNTER
Request for atorvastatin - med pended. Please fill if appropriate. Next Visit Date:  Future Appointments   Date Time Provider Ximena Goodman   10/14/2020  9:10 AM Jenn [facility] Neuro Harlan Horton   11/12/2020  9:30 AM Miriam Aldana MD [facility] Maintenance   Topic Date Due    Annual Wellness Visit (AWV)  05/31/2020    Flu vaccine (1) 09/01/2020    Lipid screen  10/21/2020    Potassium monitoring  09/12/2021    Creatinine monitoring  09/12/2021    DTaP/Tdap/Td vaccine (2 - Td) 10/19/2025    HIV screen  Completed    Hepatitis A vaccine  Aged Out    Hepatitis B vaccine  Aged Out    Hib vaccine  Aged Out    Meningococcal (ACWY) vaccine  Aged Out    Pneumococcal 0-64 years Vaccine  Aged Out       Hemoglobin A1C (%)   Date Value   01/13/2015 5.2             ( goal A1C is < 7)   Microalb/Crt.  Ratio (mcg/mg creat)   Date Value   10/20/2015 23     LDL Cholesterol (mg/dL)   Date Value   10/21/2019 142 (H)       (goal LDL is <100)   AST (U/L)   Date Value   01/04/2018 19     ALT (U/L)   Date Value   01/04/2018 12     BUN (mg/dL)   Date Value   09/12/2020 20     BP Readings from Last 3 Encounters:   09/12/20 (!) 141/96   09/12/20 (!) 158/111   08/19/20 130/88          (goal 120/80)    All Future Testing planned in [facility]  Lab Frequency Next Occurrence         Patient Active Problem List:     GERD (gastroesophageal reflux disease)     Chronic bilateral low back pain with right-sided sciatica     Lumbar disc disease     Hemorrhoids, external     Essential hypertension     Pure hypercholesterolemia     Spondylosis without myelopathy or radiculopathy, lumbar region     Hx of right BKA (HCC)     Chronic lumbar radiculopathy     Spinal stenosis of lumbar region with neurogenic claudication     Lumbar foraminal stenosis     Arthritis of right hip     Right hip pain

## 2020-10-11 RX ORDER — ATORVASTATIN CALCIUM 40 MG/1
TABLET, FILM COATED ORAL
Qty: 90 TABLET | Refills: 1 | Status: SHIPPED | OUTPATIENT
Start: 2020-10-11 | End: 2021-03-10 | Stop reason: SDUPTHER

## 2020-10-11 RX ORDER — HYDROCHLOROTHIAZIDE 12.5 MG/1
TABLET ORAL
Qty: 180 TABLET | Refills: 2 | Status: SHIPPED | OUTPATIENT
Start: 2020-10-11 | End: 2021-03-10 | Stop reason: SDUPTHER

## 2020-10-31 ENCOUNTER — APPOINTMENT (OUTPATIENT)
Dept: CT IMAGING | Age: 45
End: 2020-10-31
Payer: MEDICARE

## 2020-10-31 ENCOUNTER — HOSPITAL ENCOUNTER (EMERGENCY)
Age: 45
Discharge: HOME | End: 2020-10-31
Attending: EMERGENCY MEDICINE
Payer: MEDICARE

## 2020-10-31 VITALS
DIASTOLIC BLOOD PRESSURE: 83 MMHG | WEIGHT: 135 LBS | RESPIRATION RATE: 16 BRPM | OXYGEN SATURATION: 96 % | HEIGHT: 62 IN | SYSTOLIC BLOOD PRESSURE: 130 MMHG | HEART RATE: 100 BPM | TEMPERATURE: 98.6 F | BODY MASS INDEX: 24.84 KG/M2

## 2020-10-31 LAB
-: ABNORMAL
ABSOLUTE EOS #: 0 K/UL (ref 0–0.4)
ABSOLUTE IMMATURE GRANULOCYTE: ABNORMAL K/UL (ref 0–0.3)
ABSOLUTE LYMPH #: 1.5 K/UL (ref 1–4.8)
ABSOLUTE MONO #: 0.6 K/UL (ref 0.1–1.3)
ALBUMIN SERPL-MCNC: 4.3 G/DL (ref 3.5–5.2)
ALBUMIN/GLOBULIN RATIO: ABNORMAL (ref 1–2.5)
ALP BLD-CCNC: 85 U/L (ref 40–129)
ALT SERPL-CCNC: 8 U/L (ref 5–41)
AMORPHOUS: ABNORMAL
ANION GAP SERPL CALCULATED.3IONS-SCNC: 15 MMOL/L (ref 9–17)
AST SERPL-CCNC: 13 U/L
BACTERIA: ABNORMAL
BASOPHILS # BLD: 1 % (ref 0–2)
BASOPHILS ABSOLUTE: 0.1 K/UL (ref 0–0.2)
BILIRUB SERPL-MCNC: 0.49 MG/DL (ref 0.3–1.2)
BILIRUBIN URINE: NEGATIVE
BUN BLDV-MCNC: 24 MG/DL (ref 6–20)
BUN/CREAT BLD: ABNORMAL (ref 9–20)
CALCIUM SERPL-MCNC: 9.6 MG/DL (ref 8.6–10.4)
CASTS UA: ABNORMAL /LPF
CHLORIDE BLD-SCNC: 99 MMOL/L (ref 98–107)
CO2: 22 MMOL/L (ref 20–31)
COLOR: YELLOW
COMMENT UA: ABNORMAL
CREAT SERPL-MCNC: 1.24 MG/DL (ref 0.7–1.2)
CRYSTALS, UA: ABNORMAL /HPF
DIFFERENTIAL TYPE: ABNORMAL
EOSINOPHILS RELATIVE PERCENT: 0 % (ref 0–4)
EPITHELIAL CELLS UA: ABNORMAL /HPF
GFR AFRICAN AMERICAN: >60 ML/MIN
GFR NON-AFRICAN AMERICAN: >60 ML/MIN
GFR SERPL CREATININE-BSD FRML MDRD: ABNORMAL ML/MIN/{1.73_M2}
GFR SERPL CREATININE-BSD FRML MDRD: ABNORMAL ML/MIN/{1.73_M2}
GLUCOSE BLD-MCNC: 133 MG/DL (ref 70–99)
GLUCOSE URINE: NEGATIVE
HCT VFR BLD CALC: 45.4 % (ref 41–53)
HEMOGLOBIN: 15.6 G/DL (ref 13.5–17.5)
IMMATURE GRANULOCYTES: ABNORMAL %
KETONES, URINE: NEGATIVE
LEUKOCYTE ESTERASE, URINE: NEGATIVE
LIPASE: 30 U/L (ref 13–60)
LYMPHOCYTES # BLD: 14 % (ref 24–44)
MCH RBC QN AUTO: 28.7 PG (ref 26–34)
MCHC RBC AUTO-ENTMCNC: 34.4 G/DL (ref 31–37)
MCV RBC AUTO: 83.4 FL (ref 80–100)
MONOCYTES # BLD: 6 % (ref 1–7)
MUCUS: ABNORMAL
NITRITE, URINE: NEGATIVE
NRBC AUTOMATED: ABNORMAL PER 100 WBC
OTHER OBSERVATIONS UA: ABNORMAL
PDW BLD-RTO: 15.3 % (ref 11.5–14.9)
PH UA: 6 (ref 5–8)
PLATELET # BLD: 343 K/UL (ref 150–450)
PLATELET ESTIMATE: ABNORMAL
PMV BLD AUTO: 7.3 FL (ref 6–12)
POTASSIUM SERPL-SCNC: 3.4 MMOL/L (ref 3.7–5.3)
PROTEIN UA: ABNORMAL
RBC # BLD: 5.44 M/UL (ref 4.5–5.9)
RBC # BLD: ABNORMAL 10*6/UL
RBC UA: ABNORMAL /HPF
RENAL EPITHELIAL, UA: ABNORMAL /HPF
SEG NEUTROPHILS: 79 % (ref 36–66)
SEGMENTED NEUTROPHILS ABSOLUTE COUNT: 8.2 K/UL (ref 1.3–9.1)
SODIUM BLD-SCNC: 136 MMOL/L (ref 135–144)
SPECIFIC GRAVITY UA: 1.02 (ref 1–1.03)
TOTAL PROTEIN: 8.3 G/DL (ref 6.4–8.3)
TRICHOMONAS: ABNORMAL
TURBIDITY: ABNORMAL
URINE HGB: NEGATIVE
UROBILINOGEN, URINE: NORMAL
WBC # BLD: 10.4 K/UL (ref 3.5–11)
WBC # BLD: ABNORMAL 10*3/UL
WBC UA: ABNORMAL /HPF
YEAST: ABNORMAL

## 2020-10-31 PROCEDURE — 85025 COMPLETE CBC W/AUTO DIFF WBC: CPT

## 2020-10-31 PROCEDURE — 81001 URINALYSIS AUTO W/SCOPE: CPT

## 2020-10-31 PROCEDURE — 83690 ASSAY OF LIPASE: CPT

## 2020-10-31 PROCEDURE — 2580000003 HC RX 258: Performed by: EMERGENCY MEDICINE

## 2020-10-31 PROCEDURE — 80053 COMPREHEN METABOLIC PANEL: CPT

## 2020-10-31 PROCEDURE — 96375 TX/PRO/DX INJ NEW DRUG ADDON: CPT

## 2020-10-31 PROCEDURE — 74176 CT ABD & PELVIS W/O CONTRAST: CPT

## 2020-10-31 PROCEDURE — 36415 COLL VENOUS BLD VENIPUNCTURE: CPT

## 2020-10-31 PROCEDURE — 6360000002 HC RX W HCPCS: Performed by: EMERGENCY MEDICINE

## 2020-10-31 PROCEDURE — 96374 THER/PROPH/DIAG INJ IV PUSH: CPT

## 2020-10-31 PROCEDURE — 96376 TX/PRO/DX INJ SAME DRUG ADON: CPT

## 2020-10-31 PROCEDURE — 99284 EMERGENCY DEPT VISIT MOD MDM: CPT

## 2020-10-31 RX ORDER — HYDROCODONE BITARTRATE AND ACETAMINOPHEN 5; 325 MG/1; MG/1
1 TABLET ORAL EVERY 6 HOURS PRN
Qty: 10 TABLET | Refills: 0 | Status: SHIPPED | OUTPATIENT
Start: 2020-10-31 | End: 2020-11-03

## 2020-10-31 RX ORDER — ONDANSETRON 2 MG/ML
4 INJECTION INTRAMUSCULAR; INTRAVENOUS ONCE
Status: COMPLETED | OUTPATIENT
Start: 2020-10-31 | End: 2020-10-31

## 2020-10-31 RX ORDER — MORPHINE SULFATE 4 MG/ML
4 INJECTION, SOLUTION INTRAMUSCULAR; INTRAVENOUS ONCE
Status: COMPLETED | OUTPATIENT
Start: 2020-10-31 | End: 2020-10-31

## 2020-10-31 RX ORDER — DIPHENHYDRAMINE HYDROCHLORIDE 50 MG/ML
25 INJECTION INTRAMUSCULAR; INTRAVENOUS ONCE
Status: COMPLETED | OUTPATIENT
Start: 2020-10-31 | End: 2020-10-31

## 2020-10-31 RX ORDER — DOCUSATE SODIUM 100 MG/1
100 CAPSULE, LIQUID FILLED ORAL 2 TIMES DAILY
Qty: 30 CAPSULE | Refills: 0 | Status: SHIPPED | OUTPATIENT
Start: 2020-10-31 | End: 2021-03-10 | Stop reason: SDUPTHER

## 2020-10-31 RX ORDER — 0.9 % SODIUM CHLORIDE 0.9 %
1000 INTRAVENOUS SOLUTION INTRAVENOUS ONCE
Status: COMPLETED | OUTPATIENT
Start: 2020-10-31 | End: 2020-10-31

## 2020-10-31 RX ADMIN — MORPHINE SULFATE 4 MG: 4 INJECTION, SOLUTION INTRAMUSCULAR; INTRAVENOUS at 12:31

## 2020-10-31 RX ADMIN — MORPHINE SULFATE 4 MG: 4 INJECTION, SOLUTION INTRAMUSCULAR; INTRAVENOUS at 10:31

## 2020-10-31 RX ADMIN — ONDANSETRON 4 MG: 2 INJECTION INTRAMUSCULAR; INTRAVENOUS at 10:31

## 2020-10-31 RX ADMIN — DIPHENHYDRAMINE HYDROCHLORIDE 25 MG: 50 INJECTION, SOLUTION INTRAMUSCULAR; INTRAVENOUS at 11:01

## 2020-10-31 RX ADMIN — SODIUM CHLORIDE 1000 ML: 9 INJECTION, SOLUTION INTRAVENOUS at 10:35

## 2020-10-31 ASSESSMENT — ENCOUNTER SYMPTOMS
ABDOMINAL PAIN: 1
VOMITING: 0
SHORTNESS OF BREATH: 0
BACK PAIN: 0
BLOOD IN STOOL: 0
SORE THROAT: 0
FACIAL SWELLING: 0
SINUS PRESSURE: 0
RHINORRHEA: 0
CHEST TIGHTNESS: 0
NAUSEA: 1
CONSTIPATION: 0
WHEEZING: 0
COUGH: 0
EYE REDNESS: 0
TROUBLE SWALLOWING: 0
DIARRHEA: 0
COLOR CHANGE: 0
EYE DISCHARGE: 0
EYE PAIN: 0

## 2020-10-31 ASSESSMENT — PAIN SCALES - GENERAL
PAINLEVEL_OUTOF10: 9
PAINLEVEL_OUTOF10: 10

## 2020-10-31 ASSESSMENT — PAIN DESCRIPTION - LOCATION: LOCATION: ABDOMEN

## 2020-10-31 ASSESSMENT — PAIN DESCRIPTION - PAIN TYPE: TYPE: ACUTE PAIN

## 2020-10-31 NOTE — ED PROVIDER NOTES
[address] ED  eMERGENCY dEPARTMENT eNCOUnter      Pt Name: Rosaria Cooks  MRN: [id number]  [place] 1975  Date of evaluation: 10/31/20      CHIEF COMPLAINT       Chief Complaint   Patient presents with    Abdominal Pain    Emesis         HISTORY OF PRESENT ILLNESS    Rosaria Cooks is a 54 y.o. male who presents complaining of abdominal pain. Patient states that since last night he has been having severe right lower quadrant abdominal pain. Patient states it does radiate around to his back some. Patient felt nauseous and try to make himself vomit but was unable to. Patient has had no diarrhea no fevers no cough no shortness of breath. Patient has a significant history of multiple bowel obstructions requiring surgical repair. Patient is unsure whether he still has an appendix or not. REVIEW OF SYSTEMS       Review of Systems   Constitutional: Negative for activity change, appetite change, chills, diaphoresis and fever. HENT: Negative for congestion, ear pain, facial swelling, nosebleeds, rhinorrhea, sinus pressure, sore throat and trouble swallowing. Eyes: Negative for pain, discharge and redness. Respiratory: Negative for cough, chest tightness, shortness of breath and wheezing. Cardiovascular: Negative for chest pain, palpitations and leg swelling. Gastrointestinal: Positive for abdominal pain and nausea. Negative for blood in stool, constipation, diarrhea and vomiting. Genitourinary: Negative for difficulty urinating, dysuria, flank pain, frequency, genital sores and hematuria. Musculoskeletal: Negative for arthralgias, back pain, gait problem, joint swelling, myalgias and neck pain. Skin: Negative for color change, pallor, rash and wound. Neurological: Negative for dizziness, tremors, seizures, syncope, speech difficulty, weakness, numbness and headaches.    Psychiatric/Behavioral: Negative for confusion, decreased concentration, hallucinations, self-injury, sleep disturbance and suicidal ideas. PAST MEDICAL HISTORY     Past Medical History:   Diagnosis Date    JOELLEN (acute kidney injury) ([facility] [address] 2/12/2015    Allergic rhinitis     Chronic abdominal pain     ED (erectile dysfunction) of organic origin     GERD (gastroesophageal reflux disease)     Gynecomastia, male     History of hepatitis 07/18/2017    PT DENIES    Hypertension     Lumbar disc disease     Neuroblastoma ([facility] [address] 1975    Neurogenic dysfunction of the urinary bladder     Osteoarthritis     Phantom limb pain ([facility] [address]     Pure hypercholesterolemia 7/18/2017    RSD (reflex sympathetic dystrophy)        SURGICAL HISTORY       Past Surgical History:   Procedure Laterality Date    ABDOMINAL ADHESION SURGERY      BLADDER REPAIR      LEG AMPUTATION THROUGH LOWER TIBIA AND FIBULA Right 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    PAIN MANAGEMENT PROCEDURE Bilateral 2/20/2020    EPIDURAL STEROID INJECTION MOIZ L5S1 performed by Kalli Dudley MD at [address] Bilateral 3/2/2020    EPIDURAL STEROID INJECTION MOIZ L5S1 performed by Kalli Dudley MD at [address] Bilateral 8/6/2020    EPIDURAL STEROID INJECTION BILATERAL L5 performed by Kalli Dudley MD at [facility]      r/t bowel obstruction       CURRENT MEDICATIONS       Previous Medications    AMLODIPINE (NORVASC) 10 MG TABLET    take 1 tablet by mouth once daily    ATORVASTATIN (LIPITOR) 40 MG TABLET    take 1 tablet by mouth once daily    DULOXETINE (CYMBALTA) 20 MG EXTENDED RELEASE CAPSULE    Take 1 capsule by mouth daily    GABAPENTIN (NEURONTIN) 400 MG CAPSULE    Take 1 capsule by mouth 4 times daily for 30 days.     HYDROCHLOROTHIAZIDE (HYDRODIURIL) 12.5 MG TABLET    take 2 tablet by mouth once daily    PANTOPRAZOLE (PROTONIX) 40 MG TABLET    take 1 tablet by mouth once daily       ALLERGIES     is allergic to shellfish-derived products and penicillins. SOCIAL HISTORY      reports that he has never smoked. He has never used smokeless tobacco. He reports that he does not drink alcohol or use drugs. PHYSICAL EXAM     INITIAL VITALS: BP (!) 135/97   Pulse 100   Temp 98.6 °F (37 °C) (Oral)   Resp 16   Ht 5' 2\" (1.575 m)   Wt 135 lb (61.2 kg)   SpO2 92%   BMI 24.69 kg/m²      Physical Exam  Vitals signs and nursing note reviewed. Constitutional:       General: He is not in acute distress. Appearance: He is well-developed. He is not diaphoretic. HENT:      Head: Normocephalic and atraumatic. Eyes:      General: No scleral icterus. Right eye: No discharge. Left eye: No discharge. Conjunctiva/sclera: Conjunctivae normal.      Pupils: Pupils are equal, round, and reactive to light. Cardiovascular:      Rate and Rhythm: Normal rate and regular rhythm. Heart sounds: Normal heart sounds. No murmur. No friction rub. No gallop. Pulmonary:      Effort: Pulmonary effort is normal. No respiratory distress. Breath sounds: Normal breath sounds. No wheezing or rales. Chest:      Chest wall: No tenderness. Abdominal:      General: Bowel sounds are normal. There is no distension. Palpations: Abdomen is soft. There is no mass. Tenderness: There is abdominal tenderness in the right lower quadrant. There is guarding. There is no rebound. Musculoskeletal: Normal range of motion. General: No tenderness. Skin:     General: Skin is warm and dry. Coloration: Skin is not pale. Findings: No erythema or rash. Neurological:      Mental Status: He is alert and oriented to person, place, and time. Cranial Nerves: No cranial nerve deficit. Sensory: No sensory deficit. Motor: No abnormal muscle tone. Coordination: Coordination normal.      Deep Tendon Reflexes: Reflexes normal.   Psychiatric:         Behavior: Behavior normal.         Thought Content:  Thought content normal. Judgment: Judgment normal.         DIAGNOSTIC RESULTS     RADIOLOGY:All plain film, CT,MRI, and formal ultrasound images (except ED bedside ultrasound) are read by the radiologist and the interpretations are directly viewed by the emergency physician. Ct Abdomen Pelvis Wo Contrast    Result Date: 10/31/2020  EXAMINATION: CT OF THE ABDOMEN AND PELVIS WITHOUT CONTRAST 10/31/2020 10:54 am TECHNIQUE: CT of the abdomen and pelvis was performed without the administration of intravenous contrast. Multiplanar reformatted images are provided for review. Dose modulation, iterative reconstruction, and/or weight based adjustment of the mA/kV was utilized to reduce the radiation dose to as low as reasonably achievable. COMPARISON: Abdomen and pelvis CT 04/28/2016 HISTORY: ORDERING SYSTEM PROVIDED HISTORY: Abdominal Pain TECHNOLOGIST PROVIDED HISTORY: Abdominal Pain Reason for Exam: abd pain Acuity: Unknown Additional signs and symptoms: hx bowel obstruction FINDINGS: Lack of intravenous contrast administration, partially limiting evaluation of the soft tissues and vasculature. LOWER CHEST:  Minimal gravity dependent atelectasis in the bilateral lower lobes. Normal heart size. No pleural nor pericardial effusions. ORGANS:  Mild to moderate pancreatic atrophy. Mild right renal atrophy. Lobulated contours of each kidney suggestive underlying parenchymal scarring. 2.0 cm simple appearing cyst in the medial pole of the left kidney (Bosniak category 2). Nonobstructing calculi in the bilateral renal collecting systems possibly associated with medullary nephrocalcinosis. No hydroureteronephrosis. GI/BOWEL:  Changes of partial small bowel resection in the left lower quadrant with primary anastomosis. Moderate dilation of the ileum proximal to the anastomosis with fecalized contents. No definite visualization of the appendix if present. Mild stool.  PELVIS:  Unchanged atypical contour of the urinary bladder especially near the anterior dome with underlying submucosal fat suggestive of prior inflammation. Normal appearance of the prostate. PERITONEUM/RETROPERITONEUM:  Minimal systemic atherosclerotic calcifications. No abdominal nor pelvic lymphadenopathy. No free intraperitoneal fluid nor gas. SOFT TISSUES/BONES:  Healed midline laparotomy incision. 1.7 cm x 0.9 cm epidermal inclusion cyst in the left lower back. No inguinal lymphadenopathy. No acute fractures nor suspicious osseous lesions. 1. Moderate dilation of the ileum proximal to an anastomosis from prior resection. Fecalized contents of the dilated ileum suggest a degree of underlying stenosis. 2. No definite visualization of the appendix if present. However, no findings to suggest acute appendicitis. 3. Nonobstructing bilateral nephrolithiasis possibly associated with medullary nephrocalcinosis. LABS: All lab results were reviewed by myself, and all abnormals are listed below. Labs Reviewed   CBC WITH AUTO DIFFERENTIAL - Abnormal; Notable for the following components:       Result Value    RDW 15.3 (*)     Seg Neutrophils 79 (*)     Lymphocytes 14 (*)     All other components within normal limits   COMPREHENSIVE METABOLIC PANEL - Abnormal; Notable for the following components:    Glucose 133 (*)     BUN 24 (*)     CREATININE 1.24 (*)     Potassium 3.4 (*)     All other components within normal limits   URINE RT REFLEX TO CULTURE - Abnormal; Notable for the following components:    Turbidity UA CLOUDY (*)     Protein, UA TRACE (*)     All other components within normal limits   MICROSCOPIC URINALYSIS - Abnormal; Notable for the following components:    Bacteria, UA FEW (*)     All other components within normal limits   LIPASE         MEDICAL DECISION MAKING:     Due to his significant history we will get a CT scan check some lab works and try to get him feeling better.       EMERGENCY DEPARTMENT COURSE:   Vitals:    Vitals:    10/31/20 0935 10/31/20 1233 BP: (!) 153/104 (!) 135/97   Pulse: 115 100   Resp: 18 16   Temp: 98.6 °F (37 °C)    TempSrc: Oral    SpO2: 96% 92%   Weight: 135 lb (61.2 kg)    Height: 5' 2\" (1.575 m)        The patient was given the following medications while in the emergency department:  Orders Placed This Encounter   Medications    morphine sulfate (PF) injection 4 mg    0.9 % sodium chloride bolus    ondansetron (ZOFRAN) injection 4 mg    diphenhydrAMINE (BENADRYL) injection 25 mg    morphine sulfate (PF) injection 4 mg    docusate sodium (COLACE) 100 MG capsule     Sig: Take 1 capsule by mouth 2 times daily     Dispense:  30 capsule     Refill:  0    HYDROcodone-acetaminophen (NORCO) 5-325 MG per tablet     Sig: Take 1 tablet by mouth every 6 hours as needed for Pain for up to 3 days. Dispense:  10 tablet     Refill:  0       -------------------------  1:21 PM EDT  Patient was reevaluated and updated on the results. Patient states he still has abdominal pain. I gave him the option of trying to go home and waits to be able to talk to his doctor on Monday versus admitting him and he wants to go home. We will give him pain medicine and a stool softener for home. CONSULTS:  None    PROCEDURES:  None    FINAL IMPRESSION      1. Right lower quadrant abdominal pain          DISPOSITION/PLAN   DISPOSITION Decision To Discharge 10/31/2020 01:20:37 PM      PATIENT REFERREDTO:  Yoni Buckley MD  [address] [facility] [address]  [phone]    In 1 week      [facility] ED  [place] [address]  [phone]    If symptoms worsen      DISCHARGEMEDICATIONS:  New Prescriptions    DOCUSATE SODIUM (COLACE) 100 MG CAPSULE    Take 1 capsule by mouth 2 times daily    HYDROCODONE-ACETAMINOPHEN (NORCO) 5-325 MG PER TABLET    Take 1 tablet by mouth every 6 hours as needed for Pain for up to 3 days.        (Please note that portions of this note were completed with a voice recognition program.  Efforts were made to edit thedictations but occasionally words are mis-transcribed.)    Clayton Olmedo MD  Attending Emergency Physician                        Clayton Olmedo MD  10/31/20 4450

## 2021-01-05 ENCOUNTER — VIRTUAL VISIT (OUTPATIENT)
Dept: INTERNAL MEDICINE | Age: 46
End: 2021-01-05
Payer: MEDICARE

## 2021-01-05 DIAGNOSIS — M54.42 CHRONIC BILATERAL LOW BACK PAIN WITH BILATERAL SCIATICA: ICD-10-CM

## 2021-01-05 DIAGNOSIS — I10 ESSENTIAL HYPERTENSION: Primary | ICD-10-CM

## 2021-01-05 DIAGNOSIS — E04.1 THYROID NODULE: ICD-10-CM

## 2021-01-05 DIAGNOSIS — M54.16 CHRONIC LUMBAR RADICULOPATHY: ICD-10-CM

## 2021-01-05 DIAGNOSIS — N18.2 CKD (CHRONIC KIDNEY DISEASE) STAGE 2, GFR 60-89 ML/MIN: ICD-10-CM

## 2021-01-05 DIAGNOSIS — K21.9 GASTROESOPHAGEAL REFLUX DISEASE, UNSPECIFIED WHETHER ESOPHAGITIS PRESENT: ICD-10-CM

## 2021-01-05 DIAGNOSIS — Z13.1 DIABETES MELLITUS SCREENING: ICD-10-CM

## 2021-01-05 DIAGNOSIS — K21.9 GASTROESOPHAGEAL REFLUX DISEASE WITHOUT ESOPHAGITIS: ICD-10-CM

## 2021-01-05 DIAGNOSIS — E83.59 NEPHROCALCINOSIS: ICD-10-CM

## 2021-01-05 DIAGNOSIS — M54.41 CHRONIC BILATERAL LOW BACK PAIN WITH BILATERAL SCIATICA: ICD-10-CM

## 2021-01-05 DIAGNOSIS — G89.29 CHRONIC BILATERAL LOW BACK PAIN WITH BILATERAL SCIATICA: ICD-10-CM

## 2021-01-05 DIAGNOSIS — N29 NEPHROCALCINOSIS: ICD-10-CM

## 2021-01-05 DIAGNOSIS — Z13.220 SCREENING FOR HYPERLIPIDEMIA: ICD-10-CM

## 2021-01-05 PROCEDURE — G8427 DOCREV CUR MEDS BY ELIG CLIN: HCPCS | Performed by: STUDENT IN AN ORGANIZED HEALTH CARE EDUCATION/TRAINING PROGRAM

## 2021-01-05 PROCEDURE — 99213 OFFICE O/P EST LOW 20 MIN: CPT | Performed by: STUDENT IN AN ORGANIZED HEALTH CARE EDUCATION/TRAINING PROGRAM

## 2021-01-05 RX ORDER — CYCLOBENZAPRINE HCL 10 MG
10 TABLET ORAL 3 TIMES DAILY PRN
COMMUNITY
End: 2021-01-05 | Stop reason: SDUPTHER

## 2021-01-05 RX ORDER — GABAPENTIN 400 MG/1
400 CAPSULE ORAL 4 TIMES DAILY
Qty: 120 CAPSULE | Refills: 1 | Status: SHIPPED | OUTPATIENT
Start: 2021-01-05 | End: 2021-02-17 | Stop reason: ALTCHOICE

## 2021-01-05 RX ORDER — PANTOPRAZOLE SODIUM 40 MG/1
TABLET, DELAYED RELEASE ORAL
Qty: 30 TABLET | Refills: 3 | Status: CANCELLED | OUTPATIENT
Start: 2021-01-05

## 2021-01-05 RX ORDER — CYCLOBENZAPRINE HCL 10 MG
10 TABLET ORAL 3 TIMES DAILY PRN
Qty: 30 TABLET | Refills: 0 | Status: SHIPPED | OUTPATIENT
Start: 2021-01-05 | End: 2021-02-20 | Stop reason: SDUPTHER

## 2021-01-05 RX ORDER — AMLODIPINE BESYLATE 10 MG/1
TABLET ORAL
Qty: 30 TABLET | Refills: 3 | Status: SHIPPED | OUTPATIENT
Start: 2021-01-05 | End: 2021-03-10 | Stop reason: SDUPTHER

## 2021-01-05 RX ORDER — FAMOTIDINE 20 MG/1
20 TABLET, FILM COATED ORAL 2 TIMES DAILY
Qty: 60 TABLET | Refills: 3 | Status: SHIPPED | OUTPATIENT
Start: 2021-01-05 | End: 2021-03-10 | Stop reason: SDUPTHER

## 2021-01-05 SDOH — ECONOMIC STABILITY: FOOD INSECURITY: WITHIN THE PAST 12 MONTHS, THE FOOD YOU BOUGHT JUST DIDN'T LAST AND YOU DIDN'T HAVE MONEY TO GET MORE.: NEVER TRUE

## 2021-01-05 SDOH — ECONOMIC STABILITY: TRANSPORTATION INSECURITY
IN THE PAST 12 MONTHS, HAS LACK OF TRANSPORTATION KEPT YOU FROM MEETINGS, WORK, OR FROM GETTING THINGS NEEDED FOR DAILY LIVING?: NO

## 2021-01-05 ASSESSMENT — PATIENT HEALTH QUESTIONNAIRE - PHQ9: 1. LITTLE INTEREST OR PLEASURE IN DOING THINGS: 0

## 2021-01-05 NOTE — PATIENT INSTRUCTIONS
Medications e-scribed to pharmacy of patient's choice. Your doctor has ordered fasting blood work. It can be done at [facility] or at the hospital. Sterling Carlos will need to fast for 12 hours and bring order with you to registration department. Order for PT was given to patient along with phone numbers to different facilities to choose from.  Pt will call and schedule    Annual wellness visit for medicare scheduled for January 14th 2021 at 1 pm ON THE PHONE     Referral to nephrology Dr Sukhi Fuentes [phone]     tv

## 2021-01-05 NOTE — PROGRESS NOTES
Attending Physician Statement  I have discussed the care of [address] [place] [address] Juan Manuel Philip including pertinent history and exam findings,  with the resident. I have reviewed the key elements of all parts of the encounter with the resident. I agree with the assessment, plan and orders as documented by the resident. (GE Modifier)    C/o flank pain/? Back pain  radiating to the groin , acute on chronic , he is scheduled for laminectomy for disc herniation and foraminal narrowing at l4-l5 levels : he also f/u with pain management for the same pain    chart review indicates presence of medullary nephrocalcinosis : I feel that this is the primary reason for the groin pain sec to ureteric colic from medullary nephrocalcinosis. Also his L2-L3 lumbar disc has mild disease only and hence does not explain the excruciating groin pain ( L4-L5 does not usually involve this dermatome)    Recommend : nephrology and urology eval for stone study and management of minimizing renal calculi and urine calciuria . ( referral sent to stone clinic at [facility])  Also sec causes of nephrocalcinosis should be sought including PTH, phos, VITAMIN D   Dexa scan to evaluate fracture risk is recommended but was not discussed in this visit with the patient. It is unknown that if his nephrocalcinosis is congenital or secondary acquired from high calcium absorption since bowel resection. Will discuss dexa scan in the next visit.    Further work up would be based on results of the stone study and urine analysis      MD ANGI Bourne  Attending Physician, [address] [place] [address], Internal Medicine Residency Program  [address] [place] [address]  1/5/2021, 1:40 PM

## 2021-01-05 NOTE — PROGRESS NOTES
@[facility]@    [facility]    Progress Note    Date of patient's visit: 1/5/2021    Patient's Name:  Ally Jacobs    YOB: 1975            Patient Care Team:  Katelyn Porter MD as PCP - General (Internal Medicine)  Marianela [place] MD as PCP - [facility]  MD Carla Naylor DO as Consulting Physician (General Surgery)    REASON FOR VISIT: Routine outpatient follow     Chief Complaint   Patient presents with    Hypertension    Gastroesophageal Reflux    Health Maintenance     wants nurse visit for flu shot          HISTORY OF PRESENT ILLNESS:    History was obtained from the patient. Ally Jacobs is a 54 y.o. is here for hypertension and GERD follow-up. 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 Patient had last bowel resection surgery done 10 years back due to bowel obstruction. Patient complains of lower back pain, radiating to groin, 9 on 10 intensity. Worsened with activity relieved with rest.  He has had steroid injections to his hip for pain relief in the past.    Follows up with Ortho physician for arthritis of right hip. And has received right hip corticosteroid injection under fluoroscopy. Patient was requested to follow-up in 2 weeks after injection on 8/24/2020. He also follows up with pain clinic. Patient was scheduled for L5-S1 right discectomy and right L5 foraminotomy in March 2020 but was not done due to Covid. Compliant with his blood pressure pills. Stated that blood pressure runs around 120/80s at home. Denies abdominal distention or constipation.     Denies alcohol consumption, cigarette smoking or substance use    Past Medical History:   Diagnosis Date    JOELLEN [facility]acute kidney [facility]) ([facility] 2/12/2015    Allergic rhinitis     Chronic abdominal pain     ED (erectile dysfunction) of organic origin     GERD (gastroesophageal reflux disease)     Gynecomastia, male     History of hepatitis 07/18/2017    PT DENIES    Hypertension     Lumbar disc disease     Neuroblastoma ([facility] 1975    Neurogenic dysfunction of the urinary bladder     Osteoarthritis     Phantom limb pain ([facility]     Pure hypercholesterolemia 7/18/2017    RSD (reflex sympathetic dystrophy)        Past Surgical History:   Procedure Laterality Date    ABDOMINAL ADHESION SURGERY      BLADDER REPAIR      LEG AMPUTATION THROUGH LOWER TIBIA AND FIBULA Right 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    PAIN MANAGEMENT PROCEDURE Bilateral 2/20/2020    EPIDURAL STEROID INJECTION MOIZ L5S1 performed by Tamica Awad MD at [address] [place] [address] Bilateral 3/2/2020    EPIDURAL STEROID INJECTION MOIZ L5S1 performed by Tamica Awad MD at [address] [place] [address] Bilateral 8/6/2020    EPIDURAL STEROID INJECTION BILATERAL L5 performed by Tamica Awad MD at [place] [address]      r/t bowel obstruction         ALLERGIES      Allergies   Allergen Reactions    Shellfish-Derived Products Anaphylaxis    Penicillins Other (See Comments)     UNKNOWN REACTION       MEDICATIONS:      Current Outpatient Medications on File Prior to Visit   Medication Sig Dispense Refill    cyclobenzaprine (FLEXERIL) 10 MG tablet Take 10 mg by mouth 3 times daily as needed for Muscle spasms      atorvastatin (LIPITOR) 40 MG tablet take 1 tablet by mouth once daily 90 tablet 1  hydroCHLOROthiazide (HYDRODIURIL) 12.5 MG tablet take 2 tablet by mouth once daily 180 tablet 2    amLODIPine (NORVASC) 10 MG tablet take 1 tablet by mouth once daily 30 tablet 3    docusate sodium (COLACE) 100 MG capsule Take 1 capsule by mouth 2 times daily 30 capsule 0    pantoprazole (PROTONIX) 40 MG tablet take 1 tablet by mouth once daily (Patient not taking: Reported on 1/5/2021) 30 tablet 3    gabapentin (NEURONTIN) 400 MG capsule Take 1 capsule by mouth 4 times daily for 30 days. (Patient not taking: Reported on 1/5/2021) 120 capsule 1    DULoxetine (CYMBALTA) 20 MG extended release capsule Take 1 capsule by mouth daily (Patient not taking: Reported on 8/19/2020) 30 capsule 1     No current facility-administered medications on file prior to visit. SOCIAL HISTORY    Reviewed and no change from previous record. Vincent  reports that he has never smoked. He has never used smokeless tobacco.    FAMILY HISTORY:    Reviewed and No change from previous visit    HEALTH MAINTENANCE DUE:      Health Maintenance Due   Topic Date Due    Annual Wellness Visit (AWV)  05/31/2020    Flu vaccine (1) 09/01/2020    Lipid screen  10/21/2020       REVIEW OF SYSTEMS:    12 point review of symptoms completed and found to be normal except noted in the HPI    · Constitutional: Negative for Fever, chills  · Eyes: Negative for visual changes, diplopia  · ENT: Negative for mouth sores, sore throat. · Cardiovascular: Negative for lightheadedness ,chest pain, palpitations   · Respiratory:Negative for Shortness of breath,cough or wheezing. · Gastrointestinal: Negative for nausea/vomiting, change in bowel habits, abdominal pain  · Genitourinary:Negative for change in bladder habits, dysuria, hematuria. · Musculoskeletal: Negative for joint pain   · Neurological: Negative for headache, change in muscle strength numbness/tingling       PHYSICAL EXAM:    There were no vitals filed for this visit. There is no height or weight on file to calculate BMI. BP Readings from Last 3 Encounters:   10/31/20 130/83   09/12/20 (!) 141/96   09/12/20 (!) 158/111        Wt Readings from Last 3 Encounters:   10/31/20 135 lb (61.2 kg)   09/12/20 135 lb (61.2 kg)   09/12/20 140 lb (63.5 kg)           · General appearance: awake, alert, cooperative  · HEENT: Head: Normocephalic, no lesions, without obvious abnormality. · Lungs: clear to auscultation bilaterally  · Heart: regular rate and rhythm, S1, S2 normal, no murmur  · Abdomen: soft, non-tender; bowel sounds normal; no masses,  no organomegaly  · Extremities: extremities normal, atraumatic, no cyanosis or edema  · Neurological:  Awake, alert, oriented to name, place and time. Cranial nerves II-XII are grossly intact. Reflexes normal and symmetric.  Sensation grossly normal  · Eye no icterus no redness    LABORATORY FINDINGS:    CBC:  Lab Results   Component Value Date    WBC 10.4 10/31/2020    HGB 15.6 10/31/2020     10/31/2020     BMP:    Lab Results   Component Value Date     10/31/2020    K 3.4 10/31/2020    CL 99 10/31/2020    CO2 22 10/31/2020    BUN 24 10/31/2020    CREATININE 1.24 10/31/2020    GLUCOSE 133 10/31/2020     HEMOGLOBIN A1C:   Lab Results   Component Value Date    LABA1C 5.2 01/13/2015     MICROALBUMIN URINE:   Lab Results   Component Value Date    MICROALBUR 55 10/20/2015     FASTING LIPID PANEL:  Lab Results   Component Value Date    CHOL 218 (H) 10/21/2019    HDL 62 10/21/2019    TRIG 68 10/21/2019     Lab Results   Component Value Date    LDLCHOLESTEROL 142 (H) 10/21/2019       LIVER PROFILE:  Lab Results   Component Value Date    ALT 8 10/31/2020    AST 13 10/31/2020    PROT 8.3 10/31/2020    BILITOT 0.49 10/31/2020    LABALBU 4.3 10/31/2020      THYROID FUNCTION:   Lab Results   Component Value Date    TSH 1.45 10/20/2015      URINE ANALYSIS: No results found for: LABURIN  ASSESSMENT AND PLAN: 1. Essential hypertension-well-controlled    - amLODIPine (NORVASC) 10 MG tablet; take 1 tablet by mouth once daily  Dispense: 30 tablet; Refill: 3    2. Gastroesophageal reflux disease without esophagitis   - pantoprazole (PROTONIX) 40 MG tablet; take 1 tablet by mouth once daily  Dispense: 30 tablet; Refill: 3    3. Chronic lumbar radiculopathy    - gabapentin (NEURONTIN) 400 MG capsule; Take 1 capsule by mouth 4 times daily for 30 days. Dispense: 120 capsule; Refill: 1    4. Chronic bilateral low back pain with bilateral sciatica  - gabapentin (NEURONTIN) 400 MG capsule; Take 1 capsule by mouth 4 times daily for 30 days. Dispense: 120 capsule; Refill: 1    5. Medication management  - gabapentin (NEURONTIN) 400 MG capsule; Take 1 capsule by mouth 4 times daily for 30 days. Dispense: 120 capsule; Refill: 1    6. Screening for hyperlipidemia    - Lipid, Fasting; Future    7. CKD stage II likely secondary to bilateral nephrocalcinosis    External referral to nephrologist in [address] as patient needs stone clinic follow-up as well. Health Maintenance      FOLLOW UP AND INSTRUCTIONS:   1. No follow-ups on file. 2. Vincent received counseling on the following healthy behaviors: nutrition, exercise and medication adherence    3. Reviewed prior labs and health maintenance. 4. Discussed use, benefit, and side effects of prescribed medications. Barriers to medication compliance addressed. All patient questions answered. Pt voiced understanding.      5. Patient given educational materials - see patient instructions         Kylie Hicks MD  PGY-2, Internal Medicine Resident  [facility]  1/5/2021 10:55 AM      Attending Physician Statement I have discussed the care of [address] [place] [address] Juan Manuel Philip including pertinent history and exam findings,  with the resident. I have reviewed the key elements of all parts of the encounter with the resident. I agree with the assessment, plan and orders as documented by the resident. (GE Modifier)    C/o flank pain/? Back pain  radiating to the groin , acute on chronic , he is scheduled for laminectomy for disc herniation and foraminal narrowing at l4-l5 levels : he also f/u with pain management for the same pain    chart review indicates presence of medullary nephrocalcinosis : I feel that this is the primary reason for the groin pain sec to ureteric colic from medullary nephrocalcinosis. Also his L2-L3 lumbar disc has mild disease only and hence does not explain the excruciating groin pain ( L4-L5 does not usually involve this dermatome)    Recommend : nephrology and urology eval for stone study and management of minimizing renal calculi and urine calciuria . ( referral sent to [facility] clinic at [facility])  Also sec causes of nephrocalcinosis should be sought including PTH, phos, VITAMIN D and urine oxalic acid  Dexa scan to evaluate fracture risk is recommended but was not discussed in this visit with the patient. It is unknown that if his nephrocalcinosis is congenital or secondary acquired from high calcium absorption since bowel resection. Will discuss dexa scan in the next visit. Further work up would be based on results of the stone study and urine analysis.    Will not start potassium citrate till full stone evaluation is complete      MD ANGI Arreaga  Attending Physician, [address] [place] [address], Internal Medicine Residency Program  [address] [place] [address]  1/5/2021, 1:40 PM

## 2021-01-08 ENCOUNTER — HOSPITAL ENCOUNTER (OUTPATIENT)
Dept: PHYSICAL THERAPY | Age: 46
Setting detail: THERAPIES SERIES
Discharge: HOME | End: 2021-01-08
Payer: MEDICARE

## 2021-01-08 PROCEDURE — 97110 THERAPEUTIC EXERCISES: CPT

## 2021-01-08 PROCEDURE — 97163 PT EVAL HIGH COMPLEX 45 MIN: CPT

## 2021-01-08 NOTE — CONSULTS
[x] [facility]  [address]  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 Approx 1 year ago Pt woke up w/L leg numb and foot drop. Pt has been told has bulging discs, was to have surgery, L5-S1 right discectomy and right L5 foraminotomy in 2020, postponed due to Covid-19 crisis. After approx 4 months, foot drop resolved, but cont w/pain LB and B hips. Pt is unsure if will reschedule surgery. Pt also complains of muscle spasms in B post calf. Pt reports burning L foot plantar surface and lat foot comes and goes, tingling a couple times a week, bottom of L foot. Pain increases w/standing, walking, up to 10/10. At times hip/back will lock up then has to sit down immediately. Pain shoots down into R groin when hip is bad and after intercourse. Pt reports when walking in from parking garage feels like hips will pop out, when using cart at grocery store is ok as able to lean on. Pain decreases w/hot bath, but returns immediately afterward. Pain is worst at end of day. Pt got new prosthesis 2 months ago. 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 \"  Per Ortho not Pt w/mild hip dysplasia R, moderate on L.       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  However, it seems to reduce itself mildly in the extension   position.  Redemonstration of moderate multilevel degenerative disc disease   and facet arthropathy. Hips 08/23/2020  Impression: Dysplasia mild right hip and moderate left hip as described    above. From MRI reports:  Thoracic 02/12/2020    Impression   1. There is a dextrocurvature of the thoracic spine, apex at T7.  No critical   central spinal canal narrowing or disc herniation. Lumbar 01/09/2020  Impression   Multilevel degenerative disc disease with associated multilevel degenerative   facet and ligamentous hypertrophy resulting in mild right foraminal narrowing   at L1-2, mild left and moderate right foraminal narrowing at L4-5, as well as   moderate left and moderate to severe right foraminal narrowing at L5-S1.       Mildly heterogeneous bone marrow signal that is nonspecific and differential   considerations include anemia, osteopenia, or a bone marrow replacing process.            Medications: [x] Refer to full medical record [] None [] Other:  Allergies:      [x] Refer to full medical record [] None [x] Other: shellfish, penicillins    Function:  Hand Dominance  [x] Right  [] Left  Patient lives with: Son-16 years old   In what type of home [x]  One story   [] Two story   [] Split level   Number of stairs to enter 6   With handrail on the [x]  Right to enter   [] Left to enter   Bathroom has a []  Tub only  [x] Tub/shower combo   [] Walk in shower    []  Grab bars   Washing machine is on []  Main level   [] Second level   [x] Basement   Employer NA   Job Status []  Normal duty   [] Light duty   [] Off due to condition    []  Retired   [] Not employed   [x] Disability  [] Other:  []  Return to work:    Work activities/duties Household activities, 13year old son       ADL/IADL Previous level of function Current level of function Who currently assists the patient with task   Bathing  [x] Independent  [] Assist [x] Independent  [] Assist    Dress/grooming [x] Independent  [] Assist [x] Independent  [] Assist    Transfer/mobility [x] Independent  [] Assist [] Independent  [x] Assist Walking is terrible    Feeding [x] Independent  [] Assist [x] Independent  [] Assist    Toileting [x] Independent  [] Assist [x] Independent  [] Assist    Driving [x] Independent  [] Assist [x] Independent  [] Assist Difficulty getting comfortable   Housekeeping [x] Independent  [] Assist [] Independent  [x] Assist son   Grocery shop/meal prep [x] Independent  [] Assist [] Independent  [x] Assist son     Gait Prior level of function Current level of function    [x] Independent  [] Assist [x] Independent  [] Assist   Device: [x] Independent [] Independent    [] Straight Cane [] Quad cane [x] Straight Cane [] Quad cane    [] Standard walker [] Rolling walker   [] 4 wheeled walker [] Standard walker [] Rolling walker   [] 4 wheeled walker    [] Wheelchair [] Wheelchair     Pain:  [x] Yes  [] No Location: LB, B hips Pain Rating: (0-10 scale) 8/10  Pain altered Tx:  [] Yes  [x] No  Action:    Symptoms:  [] Improving [x] Worsening [] Same     Sleep: [] OK    [x] Disturbed-difficulty getting comfortable, prefers to sleep L side, has to change positions frequently due to pain    Objective:      STRENGTH  STRENGTH  ROM    Left Right  Left Right Cervical    C5 Shld Abd   L1-2 Hip Flex 4p 3+pp Flexion    Shld Flexion   Hip Abd Extension    Shld IR   L3-4 Knee Ext 4p 4- Rotation L R   Shld ER   L4 Ankle DF 4 -- Sidebend L R   C6 Elb Flex   L5 EHL   Retraction    C7 Elb Ext   S1 Plant. Flex   Lumbar    C8 EPL   Abdominals   Flexion 52°pp   T1 Fing Abd   Erector Spinae   Extension 14°p         Rotation L  R      HS 4 3+ Sidebend L R      Hip ext 3+p 3+p UE/LE                                                            p=pain      TESTS (+/-) LEFT RIGHT Not Tested   SLR [] sit [] supine   []   Hamstring (SLR)   []   SKTC + + []   DKTC + + []   Slump/Dural   []   SI JT   []   ANIBAL   []   Joint Mobility   []   LTR + hip ++ hip & R groin -      -      -      -   Mack Tests ? Pain ? Pain No Change Not Tested   RFIS [] [] [] [x]   YVONNE [] [] [] [x]   RFIL [] [] [] [x]   REIL [] [] [] [x]   Rep Prot [] [] [] []   Rep Retract [] [] [] []   Supine \"this is the worst\"  Hooklying 8/10  Prone better than on back, 6/10. ANGELA a little better, 5/10  Press ups are painful         OBSERVATION No Deficit Deficit Not Tested Comments   Posture       Forward Head [] [x] []    Rounded Shoulders [] [x] []    Slumped Sitting [] [x] []    Palpation [] [x] [] Tender B lumbar, upper sacral, lower thoracic; no glut or thigh tenderness   Sensation [] [x] [] Intact light touch, burning, tingling L foot   Edema [] [] [x]    Neurological [] [] [x]    Gait  X   decreased stance time R, increased hip hike, circumduction R       Functional Test: Oswestry  Score: 82% functionally impaired     Comments:    Assessment:  Patient would benefit from skilled physical therapy services in order to: decrease pain, increase strength and ROM, and increase functional mobility, including increased tolerance to standing and walking. Problems:    [x] ? Pain: LB, B hips 8/10 this date up to 10/10  [x] ? ROM: Lumbar  [x] ? Strength: B LE  [x] ?  Function: Oswestry 82% loss of function  [] Other:      STG: (to be met in 10 treatments)  1. ? Pain: LB, B hips 5/10 average pain, 7/10 at worst   2. ? ROM: Lumbar flex 60°, ext 20°  3. ? Strength: R hip 4-/5, L hip ext 4-/5, R knee 4/5, L knee 4+/5  4. ? Function: Oswestry 66% loss of function  5. Patient to be independent with home exercise program as demonstrated by performance with correct form without cues. LTG: (to be met in 18 treatments)   6. ? Pain: LB, B hips 2/10 average pain, 5/10 at worst   7. ? ROM: Lumbar AROM WFL without increased pain, B hip AROM, SKTC WFL without increased pain  8. ? Strength: R hip 4/5, L hip ext 4/5, B knees 4+/5  9. ? Function: Oswestry 40% loss of function      Patient goals: \"Hopefully make pain better\"    Rehab Potential:  [x] Good  [] Fair  [] Poor   Suggested Professional Referral:  [x] No  [] Yes:  Barriers to Goal Achievement:  [x] No  [] Yes:  Domestic Concerns:  [x] No  [] Yes:    Pt. Education:  [x] Plans/Goals, Risks/Benefits discussed  [x] Home exercise program  Method of Education: [x] Verbal  [x] Demo  [x] Written  Comprehension of Education:  [x] Verbalizes understanding. [] Demonstrates understanding. [x] Needs Review. [] Demonstrates/verbalizes understanding of HEP/Ed previously given.     Treatment Plan:  [x] Therapeutic Exercise   77793  [] Iontophoresis: 4 mg/mL Dexamethasone Sodium Phosphate  mAmin  95353   [x] Therapeutic Activity  00131 [] Vasopneumatic cold with compression  20295    [] Gait Training   54803 [x] Ultrasound   02115   [x] Neuromuscular Re-education  78216 [x] Electrical Stimulation Unattended  69928   [x] Manual Therapy  96404 [] Electrical Stimulation Attended  05775   [x] Instruction in HEP  [x] Lumbar/Cervical Traction  61846   [] Aquatic Therapy   85472 [x] Cold/hotpack    [] Massage   82612      [] Dry Needling, 1 or 2 muscles  11006   [] Biofeedback, first 15 minutes   88831  [] Biofeedback, additional 15 minutes   94214 [] Dry Needling, 3 or more muscles  36591     []  Medication allergies reviewed for use of    Dexamethasone Sodium Phosphate 4mg/ml with iontophoresis treatments. Pt is not allergic. Frequency:  2 x/week for 18 visits    Todays Treatment:  Modalities:   Precautions:  Exercises:  Exercise Reps/ Time Weight/ Level Comments   Prone      ANGELA 5 min     Glut sets  5x 5sec    HS curls-alternating 15x           Other: Has exercise sheet from pain clinic, trunk rotation \"is the worst.\"  Educated Pt in laying in prone or ANGELA for 10 min every 2-3 hours throughout day to decrease stress on back. Educated Pt in above ex for HEP, issued handout. Specific Instructions for next treatment: progress prone spinal stab ex, hip strengthening, postural ed, HP ES for symptom control, consider manual pelvic traction      Evaluation Complexity:  History (Personal factors, comorbidities) [] 0 [] 1-2 [x] 3+   Exam (limitations, restrictions) [] 1-2 [] 3 [x] 4+   Clinical presentation (progression) [] Stable [x] Evolving  [] Unstable   Decision Making [] Low [] Moderate [x] High    [] Low Complexity [] Moderate Complexity [x] High Complexity       Treatment Charges: Mins Units   [x] Evaluation       []  Low       []  Moderate       [x]  High 46 1   []  Modalities     [x]  Ther Exercise 9 1   []  Manual Therapy     []  Ther Activities     []  Aquatics     []  Vasocompression     []  Other       TOTAL TREATMENT TIME: 55 min    Time in: 0904      Time out: 0959    Electronically signed by: Wilhemenia Leventhal, PT          Physician Signature:________________________________Date:__________________  By signing above or cosigning this note, I have reviewed this plan of care and certify a need for medically necessary rehabilitation services.      *PLEASE SIGN ABOVE AND FAX BACK ALL PAGES*

## 2021-01-14 ENCOUNTER — HOSPITAL ENCOUNTER (OUTPATIENT)
Dept: PHYSICAL THERAPY | Age: 46
Setting detail: THERAPIES SERIES
Discharge: HOME | End: 2021-01-14
Payer: MEDICARE

## 2021-01-14 PROCEDURE — 97110 THERAPEUTIC EXERCISES: CPT

## 2021-01-14 PROCEDURE — G0283 ELEC STIM OTHER THAN WOUND: HCPCS

## 2021-01-14 NOTE — FLOWSHEET NOTE
[x] [facility]) - [facility] &  Therapy  [address]  [phone]  F: [phone] [] [address]  [facility]   [address]  P: [phone]  F: [phone] [] [address] &  Therapy  [address] [facility]  P: [phone]  F: [phone] [] [address]  P: [phone]  F: [phone] [] [address]  [facility]   [address]   [place]: [phone]  F: [phone]      Physical Therapy Daily Treatment Note    Date:  2021  Patient Name:  Santa Morrison    :  1975  MRN: [id number]  Physician: Erick Herndon                            Insurance: [employer] Dual  Medical Diagnosis: Lumbar Radiculopathy M54.16                          Rehab Codes: M54.5, M54.6, M25.551, M25.552, M79.605, R26.2, r26.89, R26.2, R29.3, R20.2  Onset Date: 2020                    Next 's appt.: 2021 Pebbles Faustin    Visit# / total visits: ; Progress note for Medicare patient due at visit 10     Cancels/No Shows: 0/0    Subjective:    Pain:  [x]? Yes  []? No  Location: LB, B hips    Pain Rating: (0-10 scale) 8/10  Pain altered Tx:  []? Yes  [x]? No  Action:  Comments: Patient arrives stating he is feeling \"sore\" states mostly in lisa hips. Notes pain continues at 8/10. States he has tried prone HEP exs, however \"they hurt\".      Objective:  Modalities: IFC/lumbar HP to low back x 15 minutes in supine  Precautions:  Exercises:  Exercise Reps/ Time Weight/ Level Comments   Scifit 5m           Standing      Gastroc stretch 3x20\"  Wedge         Prone         Prone lay 3 min       ANGELA 2min     Press ups 2x10     Hip ext 10x ea  2 pillows under hips   HS curls - alt 15x ea  2 pillows under hips   Glut sets  10x 5sec     Quad stretch 3x20\"   Gait belt         Sidelying      Hip ext 10x  L hip only, R hip painful    Clamshell 10x                     Other:        Specific Instructions for next treatment: progress prone spinal stab ex, hip strengthening, postural ed, HP ES for symptom control, consider manual pelvic traction        Treatment Charges: Mins Units   [x]  Modalities- IFC/ HP 15/15 1/0   [x]  Ther Exercise 35 2   []  Manual Therapy     []  Ther Activities     []  Aquatics     []  Vasocompression     []  Other     Total Treatment time 50 3       Assessment: [x] Progressing toward goals. Initiated therapeutic exercise program as noted above with overall fair tolerance. Patient verbalized increased pain in R hip with sidelying hip ext, thus held reps on this side only. Applied IFC/HP to low back post exercises with patient noting relief post. Educated to continue with prone lay at home as able with good verbal understanding noted. [] No change. [] Other:  [x] Patient would continue to benefit from skilled physical therapy services in order to: decrease pain, increase strength and ROM, and increase functional mobility, including increased tolerance to standing and walking. STG: (to be met in 10 treatments)  1. ? Pain: LB, B hips 5/10 average pain, 7/10 at worst   2. ? ROM: Lumbar flex 60°, ext 20°  3. ? Strength: R hip 4-/5, L hip ext 4-/5, R knee 4/5, L knee 4+/5  4. ? Function: Oswestry 66% loss of function  5. Patient to be independent with home exercise program as demonstrated by performance with correct form without cues.     LTG: (to be met in 18 treatments)   6. ? Pain: LB, B hips 2/10 average pain, 5/10 at worst   7. ? ROM: Lumbar AROM WFL without increased pain, B hip AROM, SKTC WFL without increased pain  8. ? Strength: R hip 4/5, L hip ext 4/5, B knees 4+/5  9. ? Function: Oswestry 40% loss of function        Patient goals: \"Hopefully make pain better\"       Pt.  Education:  [x] Yes  [] No  [x] Reviewed Prior HEP/Ed  Method of Education: [x] Verbal  [x] Demo  [] Written  Comprehension of Education:  [x] Verbalizes understanding. [x] Demonstrates understanding. [] Needs review. [x] Demonstrates/verbalizes HEP/Ed previously given. Plan: [x] Continue current frequency toward long and short term goals. [x] Specific Instructions for subsequent treatments: progress prone exs, progress to standing as tolerated.      Frequency:  2 x/week for 18 visits      Time In: 903 am            Time Out: 959 am    Electronically signed by:  Jennifer Brown PTA

## 2021-01-19 ENCOUNTER — HOSPITAL ENCOUNTER (OUTPATIENT)
Dept: PHYSICAL THERAPY | Age: 46
Setting detail: THERAPIES SERIES
Discharge: HOME | End: 2021-01-19
Payer: MEDICARE

## 2021-01-19 PROCEDURE — 97110 THERAPEUTIC EXERCISES: CPT

## 2021-01-19 PROCEDURE — G0283 ELEC STIM OTHER THAN WOUND: HCPCS

## 2021-01-19 NOTE — FLOWSHEET NOTE
[x] [facility]) - [facility] &  Therapy  [address]  [phone]  F: [phone] [] [address]  [facility]   [address]  P: [phone]  F: [phone] [] [address] &  Therapy  [facility]  P: [phone]  F: [phone] [] [address]  P: [phone]  F: [phone] [] [address]  [facility]   [address]   [facility]: [phone]  F: [phone]      Physical Therapy Daily Treatment Note    Date:  2021  Patient Name:  Lilian Calabrese    :  1975  MRN: [id number]  Physician: Debbi Amaya                            Insurance: Zandra Skiff Dual  Medical Diagnosis: Lumbar Radiculopathy M54.16                          Rehab Codes: M54.5, M54.6, M25.551, M25.552, M79.605, R26.2, r26.89, R26.2, R29.3, R20.2  Onset Date: 2020                    Next 's appt.: 2021 Natasha Alexander    Visit# / total visits: 3/18; Progress note for Medicare patient due at visit 10     Cancels/No Shows: 0/0    Subjective:    Pain:  [x]? Yes  []? No  Location: LB, B hips    Pain Rating: (0-10 scale) 7/10  Pain altered Tx:  []? Yes  [x]? No  Action:  Comments: Patient arrives stating he has low back pain. Spasms in both legs. Stated he had increased pain during last therapy session but relief with the heat and estim.       Objective:  Modalities: IFC/lumbar HP to low back x 15 minutes in supine  Precautions:  Exercises:  Exercise Reps/ Time Weight/ Level Comments   Scifit 5m           Standing      Gastroc stretch 3x20\"  Wedge  -  L only   Marches 10x     Hip abd 10x                 Prone         Prone lay 3 min       ANGELA 2min     Press ups 2x10  PA mobs   Hip ext 10x ea  2 pillows under hips   HS curls - alt 20x ea  2 pillows under hips   Alt arm raises 5x  Painful in shoulders   Glut sets  10x 5sec     Quad stretch 3x20\"   Gait belt   Iso ER (heel squeeze) 20x  Ball at heels in prone in knee extension   Hip ER/IR 5 min PROM          Sidelying      Hip ext 10x2 lime L hip only, R hip painful    Clamshell 10x                     Other:        Specific Instructions for next treatment: progress prone spinal stab ex, hip strengthening, postural ed, HP ES for symptom control, consider manual pelvic traction    Treatment Charges: Mins Units   [x]  Modalities- IFC/ HP 15/15 1/0   [x]  Ther Exercise 40 3   []  Manual Therapy     []  Ther Activities     []  Aquatics     []  Vasocompression     []  Other     Total Treatment time 58 4       Assessment: [x] Progressing toward goals. Trial of standing exercises increased pain in low back. Improved push ups in prone with sacral overpressure applied for stability. Iso ER hip rotation added to increase hip strength along with adding resistance with clams. Prone hip IR/ER PROM applied to increase range with pain and limitation to lisa ER. Ended with HP/IFC to decrease and spasms. [] No change. [] Other:  [x] Patient would continue to benefit from skilled physical therapy services in order to: decrease pain, increase strength and ROM, and increase functional mobility, including increased tolerance to standing and walking. STG: (to be met in 10 treatments)  1. ? Pain: LB, B hips 5/10 average pain, 7/10 at worst   2. ? ROM: Lumbar flex 60°, ext 20°  3. ? Strength: R hip 4-/5, L hip ext 4-/5, R knee 4/5, L knee 4+/5  4. ? Function: Oswestry 66% loss of function  5.  Patient to be independent with home exercise program as demonstrated by performance with correct form without cues.     LTG: (to be met in 18 treatments)   6. ? Pain: LB, B hips 2/10 average pain, 5/10 at worst   7. ? ROM: Lumbar AROM WFL without increased pain, B hip AROM, SKTC WFL without increased pain  8. ? Strength: R hip 4/5, L hip ext 4/5, B knees 4+/5  9. ? Function: Oswestry 40% loss of function        Patient goals: \"Hopefully make pain better\"       Pt. Education:  [x] Yes  [] No  [x] Reviewed Prior HEP/Ed  Method of Education: [x] Verbal  [x] Demo  [] Written  Comprehension of Education:  [x] Verbalizes understanding. [x] Demonstrates understanding. [] Needs review. [x] Demonstrates/verbalizes HEP/Ed previously given. Plan: [x] Continue current frequency toward long and short term goals. [x] Specific Instructions for subsequent treatments: progress prone exs, progress to standing as tolerated.      Frequency:  2 x/week for 18 visits      Time In: 1000 am            Time Out: 1103 am    Electronically signed by:  Jayson Bowman PTA

## 2021-01-21 ENCOUNTER — HOSPITAL ENCOUNTER (OUTPATIENT)
Dept: PHYSICAL THERAPY | Age: 46
Setting detail: THERAPIES SERIES
Discharge: HOME | End: 2021-01-21
Payer: MEDICARE

## 2021-01-21 PROCEDURE — 97110 THERAPEUTIC EXERCISES: CPT

## 2021-01-21 PROCEDURE — G0283 ELEC STIM OTHER THAN WOUND: HCPCS

## 2021-01-21 PROCEDURE — 97012 MECHANICAL TRACTION THERAPY: CPT

## 2021-01-21 NOTE — FLOWSHEET NOTE
[x] [facility]) [facility] TWELVESTEP [facility] &  Therapy  [address]  [phone]  F: [phone] [] [address]  [facility]   [address]  P: [phone]  F: [phone] [] [address] &  Therapy  [address] [facility]  P: [phone]  F: [phone] [] [address]  P: [phone]  F: [phone] [] [address]  [facility]   [address]   [place]: [phone]  F: [phone]      Physical Therapy Daily Treatment Note    Date:  2021  Patient Name:  Godfrey Cardenas    :  1975  MRN: [id number]  Physician: Reji Krishna                            Insurance: Blayne Maier Dual  Medical Diagnosis: Lumbar Radiculopathy M54.16                          Rehab Codes: M54.5, M54.6, M25.551, M25.552, M79.605, R26.2, r26.89, R26.2, R29.3, R20.2  Onset Date: 2020                    Next 's appt.: 2021 tSefany Tinajero    Visit# / total visits: ; Progress note for Medicare patient due at visit 10     Cancels/No Shows: 0/0    Subjective:    Pain:  [x]? Yes  []? No  Location: LB, B hips    Pain Rating: (0-10 scale) 4/10  Pain altered Tx:  [x]? Yes  []? No  Action:  Comments: Patient reported pain was 10/10 yesterday. Lied in hot bath for 1 hr and then took a muscle relaxer. Was able to bring pain level down but unsure of why it came on suddenly. Notes his pain is usually really bad one day but then tolerable the next day but pain never goes away. Pain is worse when sitting, standing or walking too long.       Objective:  Modalities: IFC/lumbar HP to low back x 15 minutes in supine  Precautions:  Exercises:  Exercise Reps/ Time Weight/ Level Comments   Scifit 5 min            Standing      Gastroc stretch 3x20\"  Wedge  -  L only   Marchsarah 10x Hip abd 10x                 Prone         Prone lay 3 min       ANGELA 2min     Press ups 2x10  PA mobs   Hip ext 10x ea  2 pillows under hips   HS curls - alt 20x ea  2 pillows under hips   Alt arm raises 5x  Painful in shoulders   Glut sets  10x 5sec     Quad stretch 3x20\"   Gait belt   Iso ER (heel squeeze) 20x  Ball at heels in prone in knee extension   Passive hip ER/IR 3 min PROM Inc pain - even with light stretching               Sidelying      Hip ext 10x2 lime L hip only, R hip painful    Clamshell 10x                     Other:        Specific Instructions for next treatment: progress prone spinal stab ex, hip strengthening, postural ed, HP ES for symptom control, consider manual pelvic traction    Treatment Charges: Mins Units   [x]  Modalities- IFC/ HP 15/15 1/0   [x]  Ther Exercise 31 2   []  Manual Therapy     []  Ther Activities     []  Aquatics     []  Vasocompression     [x]  Other  L traction 20 1   Total Treatment time 66 4       Assessment: [x] Progressing toward goals. Added trial of lumbar traction to centralize symptoms with relief noted by patient. Held standing exercise this date. Patient noted pain started in R low back, eased up following traction but migrated to the L LB during prone exercises. Inc pain with prone hip ext and manual IR rotation  Ended with IFC/HP to low back to decrease pain. [x] No change. Started with SciFit to increase blood flow and ROM with patient noting R low back spasms from hip down the leg. [] Other:  [x] Patient would continue to benefit from skilled physical therapy services in order to: decrease pain, increase strength and ROM, and increase functional mobility, including increased tolerance to standing and walking.      STG: (to be met in 10 treatments)  1. ? Pain: LB, B hips 5/10 average pain, 7/10 at worst   2. ? ROM: Lumbar flex 60°, ext 20°  3. ? Strength: R hip 4-/5, L hip ext 4-/5, R knee 4/5, L knee 4+/5  4. ? Function: Oswestry 66% loss of function  5. Patient to be independent with home exercise program as demonstrated by performance with correct form without cues.     LTG: (to be met in 18 treatments)   6. ? Pain: LB, B hips 2/10 average pain, 5/10 at worst   7. ? ROM: Lumbar AROM WFL without increased pain, B hip AROM, SKTC WFL without increased pain  8. ? Strength: R hip 4/5, L hip ext 4/5, B knees 4+/5  9. ? Function: Oswestry 40% loss of function        Patient goals: \"Hopefully make pain better\"       Pt. Education:  [x] Yes  [] No  [x] Reviewed Prior HEP/Ed  Method of Education: [x] Verbal  [x] Demo  [] Written  Comprehension of Education:  [x] Verbalizes understanding. [x] Demonstrates understanding. [] Needs review. [x] Demonstrates/verbalizes HEP/Ed previously given. Plan: [x] Continue current frequency toward long and short term goals. [x] Specific Instructions for subsequent treatments: progress prone exs, progress to standing as tolerated.      Frequency:  2 x/week for 18 visits      Time In: 1000 am            Time Out: 8728 am    Electronically signed by:  Carol Hogue PTA

## 2021-01-22 ENCOUNTER — VIRTUAL VISIT (OUTPATIENT)
Dept: INTERNAL MEDICINE | Age: 46
End: 2021-01-22
Payer: MEDICARE

## 2021-01-22 DIAGNOSIS — Z00.00 ROUTINE GENERAL MEDICAL EXAMINATION AT A HEALTH CARE FACILITY: ICD-10-CM

## 2021-01-22 DIAGNOSIS — Z71.89 ACP (ADVANCE CARE PLANNING): ICD-10-CM

## 2021-01-22 PROCEDURE — G0444 DEPRESSION SCREEN ANNUAL: HCPCS | Performed by: NURSE PRACTITIONER

## 2021-01-22 PROCEDURE — G0439 PPPS, SUBSEQ VISIT: HCPCS | Performed by: NURSE PRACTITIONER

## 2021-01-22 PROCEDURE — G8484 FLU IMMUNIZE NO ADMIN: HCPCS | Performed by: NURSE PRACTITIONER

## 2021-01-22 ASSESSMENT — PATIENT HEALTH QUESTIONNAIRE - PHQ9
SUM OF ALL RESPONSES TO PHQ QUESTIONS 1-9: 0
SUM OF ALL RESPONSES TO PHQ QUESTIONS 1-9: 0

## 2021-01-22 ASSESSMENT — LIFESTYLE VARIABLES: HOW OFTEN DO YOU HAVE A DRINK CONTAINING ALCOHOL: 0

## 2021-01-22 NOTE — PATIENT INSTRUCTIONS
· What are you most afraid of that might happen? (Maybe you're afraid of having pain, losing your independence, or being kept alive by machines.)  · Where would you prefer to die? (Your home? A hospital? A nursing home?)  · Do you want to donate your organs when you die? · Do you want certain Orthodox practices performed before you die? When should you call for help? Be sure to contact your doctor if you have any questions. Where can you learn more? Go to [email or web address] [email or web address].[email or web address] org and sign in to your Horizon Discovery account. Enter R264 in the Comixology box to learn more about \"Advance Directives: Care Instructions. \"     If you do not have an account, please click on the \"Sign Up Now\" link. Current as of: December 9, 2019               Content Version: 12.6  © 3830-9736 [employer]. Care instructions adapted under license by [facility] ([facility]). If you have questions about a medical condition or this instruction, always ask your healthcare professional. [place] [address] any warranty or liability for your use of this information. Learning About Medical Power of   What is a medical power of ? A medical power of , also called a durable power of  for health care, is one type of the legal forms called advance directives. It lets you name the person you want to make treatment decisions for you if you can't speak or decide for yourself. The person you choose is called your health care agent. This person is also called a health care proxy or health care surrogate. A medical power of  may be called something else in your state. How do you choose a health care agent? Choose your health care agent carefully. This person may or may not be a family member. Talk to the person before you make your final decision. Make sure he or she is comfortable with this responsibility.   It's a good idea to choose someone who: · Is at least 25years old. · Knows you well and understands what makes life meaningful for you. · Understands your Anabaptist and moral values. · Will do what you want, not what he or she wants. · Will be able to make difficult choices at a stressful time. · Will be able to refuse or stop treatment, if that is what you would want, even if you could die. · Will be firm and confident with health professionals if needed. · Will ask questions to get needed information. · Lives near you or agrees to travel to you if needed. Your family may help you make medical decisions while you can still be part of that process. But it's important to choose one person to be your health care agent in case you aren't able to make decisions for yourself. If you don't fill out the legal form and name a health care agent, the decisions your family can make may be limited. A health care agent may be called something else in your state. Who will make decisions for you if you don't have a health care agent? If you don't have a health care agent or a living will, you may not get the care you want. Decisions may be made by family members who disagree about your medical care. Or decisions may be made by a medical professional who doesn't know you well. In some cases, a  makes the decisions. When you name a health care agent, it is very clear who has the power to make health decisions for you. How do you name a health care agent? You name your health care agent on a legal form. This form is usually called a medical power of . Ask your hospital, state bar association, or office on aging where to find these forms. You must sign the form to make it legal. Some states require you to get the form notarized. This means that a person called a  watches you sign the form and then he or she signs the form. Some states also require that two or more witnesses sign the form. · If you get better and can speak for yourself again, you can accept or refuse any treatment. It doesn't matter what you said in your living will. · Some states may limit your right to refuse treatment in certain cases. For example, you may need to clearly state in your living will that you don't want artificial hydration and nutrition, such as being fed through a tube. Is a living will a legal document? A living will is a legal document. Each state has its own laws about living galicia. And a living will may be called something else in your state. Here are some things to know about living galicia. · You don't need an  to complete a living will. But legal advice can be helpful if your state's laws are unclear. It can also help if your health history is complicated or your family can't agree on what should be in your living will. · You can change your living will at any time. Some people find that their wishes about end-of-life care change as their health changes. If you make big changes to your living will, complete a new form. · If you move to another state, make sure that your living will is legal in the state where you now live. In most cases, doctors will respect your wishes even if you have a form from a different state. · You might use a universal form that has been approved by many states. This kind of form can sometimes be filled out and stored online. Your digital copy will then be available wherever you have a connection to the internet. The doctors and nurses who need to treat you can find it right away. · Your state may offer an online registry. This is another place where you can store your living will online. · It's a good idea to get your living will notarized. This means using a person called a  to watch two people sign, or witness, your living will. What should you know when you create a living will?   Here are some questions to ask yourself as you make your living will: · Do you know enough about life support methods that might be used? If not, talk to your doctor so you know what might be done if you can't breathe on your own, your heart stops, or you can't swallow. · What things would you still want to be able to do after you receive life-support methods? Would you want to be able to walk? To speak? To eat on your own? To live without the help of machines? · Do you want certain Christianity practices performed if you become very ill? · If you have a choice, where do you want to be cared for? In your home? At a hospital or nursing home? · If you have a choice at the end of your life, where would you prefer to die? At home? In a hospital or nursing home? Somewhere else? · Would you prefer to be buried or cremated? · Do you want your organs to be donated after you die? What should you do with your living will? · Make sure that your family members and your health care agent have copies of your living will (also called a declaration). · Give your doctor a copy of your living will. Ask him or her to keep it as part of your medical record. If you have more than one doctor, make sure that each one has a copy. · Put a copy of your living will where it can be easily found. For example, some people may put a copy on their refrigerator door. If you are using a digital copy, be sure your doctor, family members, and health care agent know how to find and access it. Where can you learn more? Go to [email or web address].[email or web address] [email or web address] and sign in to your [email or web address] account. Enter Y163 in the News Republic box to learn more about \"Learning About Living Melda Setting. \"     If you do not have an account, please click on the \"Sign Up Now\" link. Current as of: December 9, 2019               Content Version: 12.6  © 3259-7689 [employer]. Care instructions adapted under license by [facility] ([facility]). If you have questions about a medical condition or this instruction, always ask your healthcare professional. [place] [address] any warranty or liability for your use of this information. Personalized Preventive Plan for Jeyson Makeda - 1/22/2021  Medicare offers a range of preventive health benefits. Some of the tests and screenings are paid in full while other may be subject to a deductible, co-insurance, and/or copay. Some of these benefits include a comprehensive review of your medical history including lifestyle, illnesses that may run in your family, and various assessments and screenings as appropriate. After reviewing your medical record and screening and assessments performed today your provider may have ordered immunizations, labs, imaging, and/or referrals for you. A list of these orders (if applicable) as well as your Preventive Care list are included within your After Visit Summary for your review. Other Preventive Recommendations:    · A preventive eye exam performed by an eye specialist is recommended every 1-2 years to screen for glaucoma; cataracts, macular degeneration, and other eye disorders. · A preventive dental visit is recommended every 6 months. · Try to get at least 150 minutes of exercise per week or 10,000 steps per day on a pedometer . · Order or download the FREE \"Exercise & Physical Activity: Your Everyday Guide\" from [employer]. Call 0-417.459.6158 or search [employer] online. · You need 3820-2723 mg of calcium and 5090-5924 IU of vitamin D per day.  It is possible to meet your calcium requirement with diet alone, but a vitamin D supplement is usually necessary to meet this goal. · When exposed to the sun, use a sunscreen that protects against both UVA and UVB radiation with an SPF of 30 or greater. Reapply every 2 to 3 hours or after sweating, drying off with a towel, or swimming. · Always wear a seat belt when traveling in a car. Always wear a helmet when riding a bicycle or motorcycle.

## 2021-01-22 NOTE — PROGRESS NOTES
 ED (erectile dysfunction) of organic origin     GERD (gastroesophageal reflux disease)     Gynecomastia, male     History of hepatitis 07/18/2017    PT DENIES    Hypertension     Lumbar disc disease     [facility] [facility]    Neurogenic dysfunction of the urinary bladder     Osteoarthritis     Phantom limb pain [facility]     Pure hypercholesterolemia 7/18/2017    RSD (reflex sympathetic dystrophy)        Past Surgical History:   Procedure Laterality Date    ABDOMINAL ADHESION SURGERY      BLADDER REPAIR      LEG AMPUTATION THROUGH LOWER TIBIA AND FIBULA Right 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    PAIN MANAGEMENT PROCEDURE Bilateral 2/20/2020    EPIDURAL STEROID INJECTION MOIZ L5S1 [facility] [address] [facility] Bilateral 3/2/2020    EPIDURAL STEROID INJECTION MOIZ L5S1 performed by Thierry Matute MD at [address] [facility] Bilateral 8/6/2020    EPIDURAL STEROID INJECTION BILATERAL L5 performed by Thierry Matute MD at [address]      r/t bowel obstruction         Family History   Problem Relation Age of Onset    High Blood Pressure Mother     Diabetes Mother     High Blood Pressure Father     Cancer Father         prostate    Diabetes Father     Coronary Art Dis Father     Heart Attack Father     Heart Disease Father     No Known Problems Sister     No Known Problems Brother        CareTeam (Including outside providers/suppliers regularly involved in providing care):   Patient Care Team:  Batool Matamoros MD as PCP - General (Internal Medicine)  Becky Devi MD as PCP - [facility] Empaneled Provider  MD Sammy Braxton DO as Consulting Physician (General Surgery)    Wt Readings from Last 3 Encounters:   10/31/20 135 lb (61.2 kg)   09/12/20 135 lb (61.2 kg)   09/12/20 140 lb (63.5 kg)      Patient-Reported Vitals 1/22/2021   Patient-Reported Weight 140.0lb Patient-Reported Height 5 2   Patient-Reported Temperature -      There is no height or weight on file to calculate BMI. Based upon direct observation of the patient, evaluation of cognition reveals recent and remote memory intact. Wt Readings from Last 3 Encounters:   10/31/20 135 lb (61.2 kg)   09/12/20 135 lb (61.2 kg)   09/12/20 140 lb (63.5 kg)     Temp Readings from Last 3 Encounters:   10/31/20 98.6 °F (37 °C) (Oral)   09/12/20 98.9 °F (37.2 °C) (Oral)   09/12/20 98.5 °F (36.9 °C)     BP Readings from Last 3 Encounters:   10/31/20 130/83   09/12/20 (!) 141/96   09/12/20 (!) 158/111     Pulse Readings from Last 3 Encounters:   10/31/20 100   09/12/20 91   09/12/20 114       Patient's complete Health Risk Assessment and screening values have been reviewed and are found in Flowsheets. The following problems were reviewed today and where indicated follow up appointments were made and/or referrals ordered. Positive Risk Factor Screenings with Interventions:            General Health and ACP:  General  In general, how would you say your health is?: Fair  In the past 7 days, have you experienced any of the following?  New or Increased Pain, New or Increased Fatigue, Loneliness, Social Isolation, Stress or Anger?: None of These  Do you get the social and emotional support that you need?: Yes  Do you have a Living Will?: (!) No  Advance Directives     Power of [address] [place] [address] Will ACP-Advance Directive ACP-Power of     Not on File Not on File Not on File Not on File      General Health Risk Interventions:  · No Living Will:  info sent with [email or web address]     Hearing/Vision:  No exam data present  Hearing/Vision  Do you or your family notice any trouble with your hearing?: No  Do you have difficulty driving, watching TV, or doing any of your daily activities because of your eyesight?: No  Have you had an eye exam within the past year?: (!) No  Hearing/Vision Interventions:  ·  info sent with [email or web address]    Safety: Diagnoses and all orders for this visit:    ACP (advance care planning)    Routine general medical examination at a health care facility  -     Andi Lord is a 54 y.o. male being evaluated by a Virtual Visit (phone) encounter to address concerns as mentioned above. A caregiver was present when appropriate. Due to this being a TeleHealth encounter (During CJCDO-09 public health emergency), evaluation of the following organ systems was limited: Vitals/Constitutional/EENT/Resp/CV/GI//MS/Neuro/Skin/Heme-Lymph-Imm. Pursuant to the emergency declaration under the [address] [place] [address], [address] [place] [address] authority and the [employer] Act, this Virtual Visit was conducted with patient's (and/or legal guardian's) consent, to reduce the patient's risk of exposure to COVID-19 and provide necessary medical care. The patient (and/or legal guardian) has also been advised to contact this office for worsening conditions or problems, and seek emergency medical treatment and/or call 911 if deemed necessary. Patient identification was verified at the start of the visit: Yes    Services were provided through phone to substitute for in-person clinic visit. Patient and provider were located at their individual homes. --REY Martins CNP on 1/22/2021 at 3:45 PM    An electronic signature was used to authenticate this note.

## 2021-01-27 ENCOUNTER — HOSPITAL ENCOUNTER (OUTPATIENT)
Dept: PHYSICAL THERAPY | Age: 46
Setting detail: THERAPIES SERIES
Discharge: HOME | End: 2021-01-27
Payer: MEDICARE

## 2021-01-27 PROCEDURE — 97012 MECHANICAL TRACTION THERAPY: CPT

## 2021-01-27 PROCEDURE — 97110 THERAPEUTIC EXERCISES: CPT

## 2021-01-27 NOTE — FLOWSHEET NOTE
[x] [facility]) - [facility] &  Therapy  [address]  [phone]  F: [phone] [] [address]  [facility]   [address]  P: [phone]  F: [phone] [] [address] &  Therapy  [address] [facility]  P: [phone]  F: [phone] [] [address]  P: [phone]  F: [phone] [] [address]  [facility]   [address]   [place]: [phone]  F: [phone]      Physical Therapy Daily Treatment Note    Date:  2021  Patient Name:  Adrianne Kolb    :  1975  MRN: [id number]  Physician: Olayinka Shepherd                            Insurance: Elina Meyer Dual  Medical Diagnosis: Lumbar Radiculopathy M54.16                          Rehab Codes: M54.5, M54.6, M25.551, M25.552, M79.605, R26.2, r26.89, R26.2, R29.3, R20.2  Onset Date: 2020                    Next 's appt.: 2.2.21    Visit# / total visits: ; Progress note for Medicare patient due at visit 10     Cancels/No Shows: 0/1    Subjective:    Pain:  [x]? Yes  []? No  Location: LB, B hips    Pain Rating: (0-10 scale) 3/10  Pain altered Tx:  [x]? Yes  []? No  Action:  Comments:  Patient reports pain 3/10 when resting but gets up to 10/10 with walking. Even walking up to clinic from lobby increased pain 10/10. Objective:  Modalities:       Lumbar traction in supine (150 lbs) 70 max/35 min.   Patient given emergency shut off button      IFC/lumbar HP to low back x 15 minutes in supine Held 1..21  Precautions: R BKA  Exercises:  Exercise Reps/ Time Weight/ Level Comments   Scifit 5 min  L 3          Standing      Gastroc stretch 3x20\"  Wedge  -  L only   Mini Marches 20x     Hip abd 10x     HIp ext 10x     Mini squats 10x  Post. lean         Supine      SKTC 3x20\"     Piriformis stretch 3x20\"     Bridge 10x     Iso Abd 3x  Mod cueing          Prone         Prone lay 3 min       ANGELA 2min     Press ups 2x10  PA mobs   Hip ext   2 pillows under hips   HS curls - alt 20x ea  2 pillows under hips   Alt arm raises   Painful in shoulders   Glut sets  HEP 5sec     Quad stretch 3x20\"   Gait belt   Iso ER (heel squeeze)   Ball at heels in prone in knee extension   Passive hip ER/IR  PROM Inc pain - even with light stretching               Sidelying      Hip ext  lime L hip only, R hip painful    Clamshell                      Other:        Specific Instructions for next treatment: progress prone spinal stab ex, hip strengthening, postural ed, HP ES for symptom control, consider manual pelvic traction    Treatment Charges: Mins Units   []  Modalities- IFC/ HP     [x]  Ther Exercise 40 3   []  Manual Therapy     []  Ther Activities     []  Aquatics     []  Vasocompression     [x]  Other  L traction 20 1   Total Treatment time 60 4       Assessment: [x] Progressing toward goals. Started with NuStep to increase blood flow and ROM. Progressed with standing exercises in extension base with increased pain 6/10. Prone lying reduced radicular symptoms initially but became difficult to stay comfortable. Trial of positional change to supine to address hamstring and piriformis tonicity. Mod cueing with iso abd was difficult and increased patients pain in low back. Ended with lumbar traction. [] No change. [] Other:  [x] Patient would continue to benefit from skilled physical therapy services in order to: decrease pain, increase strength and ROM, and increase functional mobility, including increased tolerance to standing and walking.      STG: (to be met in 10 treatments)  1. ? Pain: LB, B hips 5/10 average pain, 7/10 at worst   2. ? ROM: Lumbar flex 60°, ext 20°  3. ? Strength: R hip 4-/5, L hip ext 4-/5, R knee 4/5, L knee 4+/5  4. ? Function: Oswestry 66% loss of

## 2021-01-28 ENCOUNTER — HOSPITAL ENCOUNTER (OUTPATIENT)
Dept: ULTRASOUND IMAGING | Age: 46
Discharge: HOME | End: 2021-01-30
Payer: MEDICARE

## 2021-01-28 ENCOUNTER — HOSPITAL ENCOUNTER (OUTPATIENT)
Age: 46
Setting detail: SPECIMEN
Discharge: HOME | End: 2021-01-28
Payer: MEDICARE

## 2021-01-28 DIAGNOSIS — Z13.1 DIABETES MELLITUS SCREENING: ICD-10-CM

## 2021-01-28 DIAGNOSIS — Z13.220 SCREENING FOR HYPERLIPIDEMIA: ICD-10-CM

## 2021-01-28 DIAGNOSIS — E04.1 THYROID NODULE: ICD-10-CM

## 2021-01-28 LAB
CHOLESTEROL, FASTING: 182 MG/DL
CHOLESTEROL/HDL RATIO: 3.3
ESTIMATED AVERAGE GLUCOSE: 111 MG/DL
HBA1C MFR BLD: 5.5 % (ref 4–6)
HDLC SERPL-MCNC: 55 MG/DL
LDL CHOLESTEROL: 112 MG/DL (ref 0–130)
TRIGLYCERIDE, FASTING: 76 MG/DL
TSH SERPL DL<=0.05 MIU/L-ACNC: 0.58 MIU/L (ref 0.3–5)
VLDLC SERPL CALC-MCNC: NORMAL MG/DL (ref 1–30)

## 2021-01-28 PROCEDURE — 76536 US EXAM OF HEAD AND NECK: CPT

## 2021-01-29 ENCOUNTER — HOSPITAL ENCOUNTER (OUTPATIENT)
Dept: PHYSICAL THERAPY | Age: 46
Setting detail: THERAPIES SERIES
Discharge: HOME | End: 2021-01-29
Payer: MEDICARE

## 2021-01-29 PROCEDURE — 97012 MECHANICAL TRACTION THERAPY: CPT

## 2021-01-29 PROCEDURE — 97110 THERAPEUTIC EXERCISES: CPT

## 2021-01-29 NOTE — FLOWSHEET NOTE
[x] [facility] &  [facility]  [address]  [phone]  F: [phone] [] [address]  [facility]   [address]  P: [phone]  F: [phone] [] [address] &  Therapy  [address] [facility]  P: [phone]  F: [phone] [] [address]  P: [phone]  F: [phone] [] [address]  [facility]   [address]   [place]: [phone]  F: [phone]      Physical Therapy Daily Treatment Note    Date:  2021  Patient Name:  Ally Jacobs    :  1975  MRN: [id number]  Physician: Marcello Murphy                            Insurance: Diane Lao Dual  Medical Diagnosis: Lumbar Radiculopathy M54.16                          Rehab Codes: M54.5, M54.6, M25.551, M25.552, M79.605, R26.2, r26.89, R26.2, R29.3, R20.2  Onset Date: 2020                    Next 's appt.: 2.2.21    Visit# / total visits: ; Progress note for Medicare patient due at visit 10     Cancels/No Shows: 0/1    Subjective:    Pain:  [x]? Yes  []? No  Location: LB, B hips    Pain Rating: (0-10 scale) 6/10  Pain altered Tx:  [x]? Yes  []? No  Action:  Comments:   Patient stated pain is better after traction but only temporary. Feels stronger but the pain has not improved much. Objective:  Modalities:       Lumbar traction in supine (150 lbs) 70 max/35 min.   Patient given emergency shut off button      IFC/lumbar HP to low back x 15 minutes in supine Held 1..21  Precautions: R BKA  Exercises:  Exercise Reps/ Time Weight/ Level Comments   Scifit 5 min  L 3          Standing      Gastroc stretch 3x20\"  Wedge  -  L only   Mini Marches 20x     Hip abd   Pain   HIp ext   Pain   Mini squats 10x  Post. lean         Supine      SKTC 3x20\"  Passive Piriformis stretch 3x20\"     Bridge 10x     Iso Abd 3x  Mod cueing          Prone         Prone lay 3 min       ANGELA 2min     Press ups 2x10  PA mobs   Hip ext   2 pillows under hips   HS curls - alt 20x ea  2 pillows under hips   Alt arm raises   Painful in shoulders   Glut sets  HEP 5sec     Quad stretch 3x20\"   Gait belt   Iso ER (heel squeeze)   Ball at heels in prone in knee extension   Passive hip ER/IR  [employer] Inc pain - even with light stretching               Sidelying      Hip ext  lime L hip only, R hip painful    Clamshell                      Other:        Specific Instructions for next treatment: progress prone spinal stab ex, hip strengthening, postural ed, HP ES for symptom control, consider manual pelvic traction    Treatment Charges: Mins Units   []  Modalities- IFC/ HP     [x]  Ther Exercise 38 3   []  Manual Therapy     []  Ther Activities     []  Aquatics     []  Vasocompression     [x]  Other  L traction 20 1   Total Treatment time 58 4       Assessment: [] Progressing toward goals. [x] No change. Advancement continues to be limited by pain. Regressed with standing exercises d/t pain down R leg. Prone push up with PA joint mobs decreased pain slightly. Supine SKTC, hamstring and piriformis stretch performed passively. Sharp pain noted with passive L hip IR. Lumbar traction immediately relieved pain. [] Other:  [x] Patient would continue to benefit from skilled physical therapy services in order to: decrease pain, increase strength and ROM, and increase functional mobility, including increased tolerance to standing and walking. STG: (to be met in 10 treatments)  1. ? Pain: LB, B hips 5/10 average pain, 7/10 at worst   2. ? ROM: Lumbar flex 60°, ext 20°  3. ? Strength: R hip 4-/5, L hip ext 4-/5, R knee 4/5, L knee 4+/5  4. ? Function: Oswestry 66% loss of function  5.  Patient to be independent with home exercise program as demonstrated by performance with correct form without cues.     LTG: (to be met in 18 treatments)   6. ? Pain: LB, B hips 2/10 average pain, 5/10 at worst   7. ? ROM: Lumbar AROM WFL without increased pain, B hip AROM, SKTC WFL without increased pain  8. ? Strength: R hip 4/5, L hip ext 4/5, B knees 4+/5  9. ? Function: Oswestry 40% loss of function        Patient goals: \"Hopefully make pain better\"       Pt. Education:  [x] Yes  [] No  [x] Reviewed Prior HEP/Ed  Method of Education: [x] Verbal  [x] Demo  [x] Written 1.27.21 [address] [place] [address] printed   Comprehension of Education:  [x] Verbalizes understanding. [x] Demonstrates understanding. [] Needs review. [x] Demonstrates/verbalizes HEP/Ed previously given. Access Code: Grzegorz Castrejon   URL: [email or web address]   Date: 01/27/2021   Prepared by: Alvaro Fox     Exercises   Supine Single Knee to Chest Stretch - 10 reps - 3 sets - 1x daily - 7x weekly   Supine Piriformis Stretch - 10 reps - 3 sets - 1x daily - 7x weekly   Supine Bridge - 10 reps - 3 sets - 1x daily - 7x weekly   Supine Transversus Abdominis Bracing - Hands on Stomach - 10 reps - 3 sets - 1x daily - 7x weekly   Standing Hip Abduction - 10 reps - 3 sets - 1x daily - 7x weekly   Standing Hip Extension - 10 reps - 3 sets - 1x daily - 7x weekly   Standing Knee Flexion AROM with Chair Support - 10 reps - 3 sets - 1x daily - 7x weekly   Mini Squat - 10 reps - 3 sets - 1x daily - 7x weekly   Standing Marching - 10 reps - 3 sets - 1x daily - 7x weekly       Plan: [x] Continue current frequency toward long and short term goals.     [x] Specific Instructions for subsequent treatments: progress prone exs, advance standing, supine stretches, core strengthening     Frequency:  2 x/week for 18 visits      Time In: 1100 am            Time Out: 1201 am    Electronically signed by:  Alvaro Fox PTA

## 2021-02-01 NOTE — PRE-CERTIFICATION NOTE
[x] [facility]) - [facility] &  Therapy  [address]  [phone]  F: [phone] [] [address] [facility] [address]   [address]  P: [phone]  F: [phone] [] [address]  [address] [facility]  P: [phone]  F: [phone] [] [address] [facility]   [address]   [place]: [phone]  F: [phone]  [x] Ximena Parra   Outpatient Occupational Therapy  [address] [facility] [address]: [phone]  F: [phone]          Therapy Pre-certification Note      2/1/2021    [facility]  1975   [id number]      Insurance approval was received for Physical Therapy from [address] on 2.27.21. Approval was received for 18 visits, from 1.12.21-3.31.21. Authorization number [id number]. Patient was contacted and began therapy on 1.14.21. Precert received later on 1.27.21 and approved back dates.         Electronically signed by Alvaro Fox PTA on 2/1/2021 at 2:55 PM

## 2021-02-02 ENCOUNTER — TELEPHONE (OUTPATIENT)
Dept: PAIN MANAGEMENT | Age: 46
End: 2021-02-02

## 2021-02-02 ENCOUNTER — OFFICE VISIT (OUTPATIENT)
Dept: PAIN MANAGEMENT | Age: 46
End: 2021-02-02
Payer: MEDICARE

## 2021-02-02 VITALS
DIASTOLIC BLOOD PRESSURE: 88 MMHG | WEIGHT: 150 LBS | HEART RATE: 96 BPM | BODY MASS INDEX: 27.6 KG/M2 | SYSTOLIC BLOOD PRESSURE: 131 MMHG | HEIGHT: 62 IN

## 2021-02-02 DIAGNOSIS — M51.26 LUMBAR DISC HERNIATION: Primary | ICD-10-CM

## 2021-02-02 DIAGNOSIS — G89.29 CHRONIC BILATERAL LOW BACK PAIN WITH BILATERAL SCIATICA: ICD-10-CM

## 2021-02-02 DIAGNOSIS — M54.42 CHRONIC BILATERAL LOW BACK PAIN WITH BILATERAL SCIATICA: ICD-10-CM

## 2021-02-02 DIAGNOSIS — M54.41 CHRONIC BILATERAL LOW BACK PAIN WITH BILATERAL SCIATICA: ICD-10-CM

## 2021-02-02 PROCEDURE — G8484 FLU IMMUNIZE NO ADMIN: HCPCS | Performed by: ANESTHESIOLOGY

## 2021-02-02 PROCEDURE — G8419 CALC BMI OUT NRM PARAM NOF/U: HCPCS | Performed by: ANESTHESIOLOGY

## 2021-02-02 PROCEDURE — G8427 DOCREV CUR MEDS BY ELIG CLIN: HCPCS | Performed by: ANESTHESIOLOGY

## 2021-02-02 PROCEDURE — 99214 OFFICE O/P EST MOD 30 MIN: CPT | Performed by: ANESTHESIOLOGY

## 2021-02-02 PROCEDURE — 1036F TOBACCO NON-USER: CPT | Performed by: ANESTHESIOLOGY

## 2021-02-02 ASSESSMENT — ENCOUNTER SYMPTOMS
CONSTIPATION: 1
SHORTNESS OF BREATH: 0

## 2021-02-02 NOTE — PROGRESS NOTES
The patient is a 54 y. o. Non-/non  male. Chief Complaint   Patient presents with    Follow-up    Back Pain        HPI     58-year-old very pleasant male with history of chronic lower back pain onset more than 1 year ago located predominantly in the lumbar area with radiation down both legs right-sided predominantly associated with right leg numbness  Radiation of pain over gluteal region posterior thigh all the way to the foot  Pain aggravated with lifting bending twisting turning walking    Past history significant for left below-knee amputation  Patient report no changes in bladder or bowel control but report erection issues sexual dysfunction  He developed right foot drop in last year which resolved itself  He has tried multiple courses of physical therapy  He was evaluated by the neurosurgery department at [facility] last year and was not advised for any surgery    Patient had epidural injection in past with intermittent outcome  Last injection was more than 6 months ago  He is interested for a repeat injection and a second surgical evaluation    Patient states still having pain that goes down both legs. Pain is the same as it was before the epidural 8/6/2020. Has been seen by orthopedics for right hip pain and had right hip corticosteroid injection. Patient has started P.T and reports that the only thing that gives relief is the traction method while being treated.          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    Neurogenic dysfunction of the urinary bladder     Osteoarthritis     Phantom limb pain ([address]     Pure hypercholesterolemia 7/18/2017  RSD (reflex sympathetic dystrophy)       Past Surgical History:   Procedure Laterality Date    ABDOMINAL ADHESION SURGERY      BLADDER REPAIR      LEG AMPUTATION THROUGH LOWER TIBIA AND FIBULA Right 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    PAIN MANAGEMENT PROCEDURE Bilateral 2/20/2020    EPIDURAL STEROID INJECTION MOIZ L5S1 performed by Syd Gonzalez MD at [address] [place] [address] Bilateral 3/2/2020    EPIDURAL STEROID INJECTION MOIZ L5S1 performed by Syd Gonzalez MD at [address] [place] [address] Bilateral 8/6/2020    EPIDURAL STEROID INJECTION BILATERAL L5 performed by Syd Gonzalez MD at [place] [address]      r/t bowel obstruction     Social History     Socioeconomic History    Marital status:      Spouse name: None    Number of children: None    Years of education: None    Highest education level: None   Occupational History    None   Social Needs    Financial resource strain: Not very hard    Food insecurity     Worry: Never true     Inability: Never true    Transportation needs     Medical: No     Non-medical: No   Tobacco Use    Smoking status: Never Smoker    Smokeless tobacco: Never Used   Substance and Sexual Activity    Alcohol use: No     Alcohol/week: 0.0 standard drinks    Drug use: No    Sexual activity: Yes     Partners: Female   Lifestyle    Physical activity     Days per week: None     Minutes per session: None    Stress: None   Relationships    Social connections     Talks on phone: None     Gets together: None     Attends Rastafari service: None     Active member of club or organization: None     Attends meetings of clubs or organizations: None     Relationship status: None    Intimate partner violence     Fear of current or ex partner: None     Emotionally abused: None     Physically abused: None     Forced sexual activity: None   Other Topics Concern    None   Social History Narrative ** Merged History Encounter **          Family History   Problem Relation Age of Onset    High Blood Pressure Mother     Diabetes Mother     High Blood Pressure Father     Cancer Father         prostate    Diabetes Father     Coronary Art Dis Father     Heart Attack Father     Heart Disease Father     No Known Problems Sister     No Known Problems Brother      Allergies   Allergen Reactions    Shellfish-Derived Products Anaphylaxis    Penicillins Other (See Comments)     UNKNOWN REACTION     Shellfish-derived products and Penicillins   Vitals:    02/02/21 1016   BP: 131/88   Pulse: 96     Current Outpatient Medications   Medication Sig Dispense Refill    amLODIPine (NORVASC) 10 MG tablet take 1 tablet by mouth once daily 30 tablet 3    cyclobenzaprine (FLEXERIL) 10 MG tablet Take 1 tablet by mouth 3 times daily as needed for Muscle spasms 30 tablet 0    gabapentin (NEURONTIN) 400 MG capsule Take 1 capsule by mouth 4 times daily for 30 days. 120 capsule 1    famotidine (PEPCID) 20 MG tablet Take 1 tablet by mouth 2 times daily 60 tablet 3    docusate sodium (COLACE) 100 MG capsule Take 1 capsule by mouth 2 times daily 30 capsule 0    atorvastatin (LIPITOR) 40 MG tablet take 1 tablet by mouth once daily 90 tablet 1    hydroCHLOROthiazide (HYDRODIURIL) 12.5 MG tablet take 2 tablet by mouth once daily 180 tablet 2     No current facility-administered medications for this visit. Review of Systems   Constitutional: Negative for fever. HENT: Negative for ear pain. Eyes: Negative for visual disturbance. Respiratory: Negative for shortness of breath. Gastrointestinal: Positive for constipation. Genitourinary: Negative for difficulty urinating. Musculoskeletal: Positive for gait problem. Neurological: Negative for dizziness. Objective:  General Appearance:  Uncomfortable, in pain, well-appearing and in no acute distress. Vital signs: (most recent): Blood pressure 131/88, pulse 96, height 5' 2\" (1.575 m), weight 150 lb (68 kg). Vital signs are normal.  No fever. Output: Producing urine and producing stool. HEENT: Normal HEENT exam.    Lungs:  Normal effort and normal respiratory rate. Breath sounds clear to auscultation. He is not in respiratory distress. Heart: Normal rate. Extremities: Normal range of motion. There is no deformity. Neurological: Patient is alert and oriented to person, place and time. Patient has normal coordination. Pupils:  Pupils are equal, round, and reactive to light. Pupils are equal.   Skin:  Warm and dry. No rash or cyanosis. Assessment & Plan   71-year-old very pleasant male with history of chronic lower back pain onset more than 1 year ago located predominantly in the lumbar area with radiation down both legs right-sided predominantly associated with right leg numbness  Radiation of pain over gluteal region posterior thigh all the way to the foot  Pain aggravated with lifting bending twisting turning walking    Past history significant for left below-knee amputation  Patient report no changes in bladder or bowel control but report erection issues sexual dysfunction  He developed right foot drop in last year which resolved itself  He has tried multiple courses of physical therapy  He was evaluated by the neurosurgery department at [facility] last year and was not advised for any surgery    Patient had epidural injection in past with intermittent outcome  Last injection was more than 6 months ago  He is interested for a repeat injection and a second surgical evaluation    EXAMINATION: MRI OF THE LUMBAR SPINE WITHOUT AND WITH CONTRAST  1/14/2020 6:49 am    L5-S1: There is a circumferential disc bulge with facet hypertrophy. There is no significant canal stenosis. There is moderate left and moderate to severe right foraminal narrowing.          1. Lumbar disc herniation 2. Chronic bilateral low back pain with bilateral sciatica        Orders Placed This Encounter   Procedures    AFL - Katya Youngblood MD, Orthopedic Surgery, [facility] [address] AA&/STRD [facility] EPI LUMBAR/SACRAL 1 LEVEL      No orders of the defined types were placed in this encounter.          Electronically signed by Penny Lockhart MD on 2/2/2021 at 10:48 AM

## 2021-02-02 NOTE — TELEPHONE ENCOUNTER
Received a call from Dr. Malathi King office stating that they do no accept pt secondary insurance of [employer]. Pt needs a new referral to a different Neurosurgeon.

## 2021-02-03 ENCOUNTER — HOSPITAL ENCOUNTER (OUTPATIENT)
Dept: PHYSICAL THERAPY | Age: 46
Setting detail: THERAPIES SERIES
Discharge: HOME | End: 2021-02-03
Payer: MEDICARE

## 2021-02-03 PROCEDURE — 97110 THERAPEUTIC EXERCISES: CPT

## 2021-02-03 PROCEDURE — 97012 MECHANICAL TRACTION THERAPY: CPT

## 2021-02-03 PROCEDURE — G0283 ELEC STIM OTHER THAN WOUND: HCPCS

## 2021-02-03 NOTE — FLOWSHEET NOTE
[x] [facility]) [facility]  [facility]  [address]  [phone]  F: [phone] [] [address]  [facility]   [address]  P: [phone]  F: [phone] [] [address] &  Therapy  [address] [facility] [address]  P: [phone]  F: [phone] [] [address]  P: [phone]  F: [phone] [] [address]  [facility]   [address]   [place]: [phone]  F: [phone]      Physical Therapy Daily Treatment Note    Date:  2/3/2021  Patient Name:  Adina Rosado    :  1975  MRN: [id number]  Physician: Nina Mai                            Insurance: José Grover Dual  Medical Diagnosis: Lumbar Radiculopathy M54.16                          Rehab Codes: M54.5, M54.6, M25.551, M25.552, M79.605, R26.2, r26.89, R26.2, R29.3, R20.2  Onset Date: 2020                    Next 's appt.: 21    Visit# / total visits: ; Progress note for Medicare patient due at visit 10     Cancels/No Shows: 0/1    Subjective:    Pain:  [x]? Yes  []? No  Location: LB, B hips    Pain Rating: (0-10 scale) 7/10  Pain altered Tx:  []? Yes  [x]? No  Action:  Comments: Patient arrives with no change in pain. Met with Physician yesterday who is sending him to a surgeon. Has disc herniation at L5-S1. Scheduled for pain injection 21.    Objective:  Modalities:       Lumbar traction in supine (160 lbs) 70 max/35 min.   Patient given emergency shut off button      IFC/lumbar HP to low back x 15 minutes in supine   Precautions: R BKA  Exercises:  Exercise Reps/ Time Weight/ Level Comments   Scifit 5 min  L 3          Standing      Gastroc stretch 3x20\"  Wedge  -  L only   Mini Marches 20x     Hip abd   Pain   HIp ext   Pain   Mini squats 10x  Post. lean 4+/5  4. ? Function: Oswestry 66% loss of function  5. Patient to be independent with home exercise program as demonstrated by performance with correct form without cues.     LTG: (to be met in 18 treatments)   6. ? Pain: LB, B hips 2/10 average pain, 5/10 at worst   7. ? ROM: Lumbar AROM WFL without increased pain, B hip AROM, SKTC WFL without increased pain  8. ? Strength: R hip 4/5, L hip ext 4/5, B knees 4+/5  9. ? Function: Oswestry 40% loss of function        Patient goals: \"Hopefully make pain better\"       Pt. Education:  [x] Yes  [] No  [x] Reviewed Prior HEP/Ed  Method of Education: [x] Verbal  [x] Demo  [] Written    Comprehension of Education:  [x] Verbalizes understanding. [x] Demonstrates understanding. [] Needs review. [x] Demonstrates/verbalizes HEP/Ed previously given. Access Code: Socorro Peyuval   URL: [email or web address]   Date: 01/27/2021   Prepared by: Greyson Watson     Exercises   Supine Single Knee to Chest Stretch - 10 reps - 3 sets - 1x daily - 7x weekly   Supine Piriformis Stretch - 10 reps - 3 sets - 1x daily - 7x weekly   Supine Bridge - 10 reps - 3 sets - 1x daily - 7x weekly   Supine Transversus Abdominis Bracing - Hands on Stomach - 10 reps - 3 sets - 1x daily - 7x weekly   Standing Hip Abduction - 10 reps - 3 sets - 1x daily - 7x weekly   Standing Hip Extension - 10 reps - 3 sets - 1x daily - 7x weekly   Standing Knee Flexion AROM with Chair Support - 10 reps - 3 sets - 1x daily - 7x weekly   Mini Squat - 10 reps - 3 sets - 1x daily - 7x weekly   Standing Marching - 10 reps - 3 sets - 1x daily - 7x weekly       Plan: [x] Continue current frequency toward long and short term goals.     [x] Specific Instructions for subsequent treatments: progress prone exs, advance standing, supine stretches, core strengthening, focus on pain control     Frequency:  2 x/week for 18 visits      Time In: 1100 am            Time Out: 1205 am    Electronically signed by:  Greyson Watson PTA

## 2021-02-05 ENCOUNTER — HOSPITAL ENCOUNTER (OUTPATIENT)
Dept: PHYSICAL THERAPY | Age: 46
Setting detail: THERAPIES SERIES
End: 2021-02-05
Payer: MEDICARE

## 2021-02-08 ENCOUNTER — APPOINTMENT (OUTPATIENT)
Dept: GENERAL RADIOLOGY | Age: 46
End: 2021-02-08
Attending: ANESTHESIOLOGY
Payer: MEDICARE

## 2021-02-08 ENCOUNTER — HOSPITAL ENCOUNTER (OUTPATIENT)
Age: 46
Setting detail: OUTPATIENT SURGERY
Discharge: HOME | End: 2021-02-08
Attending: ANESTHESIOLOGY | Admitting: ANESTHESIOLOGY
Payer: MEDICARE

## 2021-02-08 VITALS
DIASTOLIC BLOOD PRESSURE: 92 MMHG | TEMPERATURE: 98.2 F | OXYGEN SATURATION: 98 % | BODY MASS INDEX: 26.58 KG/M2 | HEIGHT: 63 IN | RESPIRATION RATE: 18 BRPM | WEIGHT: 150 LBS | HEART RATE: 86 BPM | SYSTOLIC BLOOD PRESSURE: 123 MMHG

## 2021-02-08 DIAGNOSIS — M51.26 LUMBAR DISC HERNIATION: Primary | ICD-10-CM

## 2021-02-08 PROCEDURE — 99152 MOD SED SAME PHYS/QHP 5/>YRS: CPT | Performed by: ANESTHESIOLOGY

## 2021-02-08 PROCEDURE — 6360000002 HC RX W HCPCS: Performed by: ANESTHESIOLOGY

## 2021-02-08 PROCEDURE — 2500000003 HC RX 250 WO HCPCS: Performed by: ANESTHESIOLOGY

## 2021-02-08 PROCEDURE — 7100000010 HC PHASE II RECOVERY - FIRST 15 MIN: Performed by: ANESTHESIOLOGY

## 2021-02-08 PROCEDURE — 3209999900 FLUORO FOR SURGICAL PROCEDURES

## 2021-02-08 PROCEDURE — 2580000003 HC RX 258: Performed by: ANESTHESIOLOGY

## 2021-02-08 PROCEDURE — 64484 NJX AA&/STRD TFRM EPI L/S EA: CPT | Performed by: ANESTHESIOLOGY

## 2021-02-08 PROCEDURE — 2709999900 HC NON-CHARGEABLE SUPPLY: Performed by: ANESTHESIOLOGY

## 2021-02-08 PROCEDURE — 6360000004 HC RX CONTRAST MEDICATION: Performed by: ANESTHESIOLOGY

## 2021-02-08 PROCEDURE — 64483 NJX AA&/STRD TFRM EPI L/S 1: CPT | Performed by: ANESTHESIOLOGY

## 2021-02-08 PROCEDURE — 3600000050 HC PAIN LEVEL 1 BASE: Performed by: ANESTHESIOLOGY

## 2021-02-08 PROCEDURE — 7100000011 HC PHASE II RECOVERY - ADDTL 15 MIN: Performed by: ANESTHESIOLOGY

## 2021-02-08 RX ORDER — DEXAMETHASONE SODIUM PHOSPHATE 10 MG/ML
INJECTION INTRAMUSCULAR; INTRAVENOUS PRN
Status: DISCONTINUED | OUTPATIENT
Start: 2021-02-08 | End: 2021-02-08 | Stop reason: ALTCHOICE

## 2021-02-08 RX ORDER — FENTANYL CITRATE 50 UG/ML
INJECTION, SOLUTION INTRAMUSCULAR; INTRAVENOUS PRN
Status: DISCONTINUED | OUTPATIENT
Start: 2021-02-08 | End: 2021-02-08 | Stop reason: ALTCHOICE

## 2021-02-08 RX ORDER — SODIUM CHLORIDE 0.9 % (FLUSH) 0.9 %
10 SYRINGE (ML) INJECTION EVERY 12 HOURS SCHEDULED
Status: DISCONTINUED | OUTPATIENT
Start: 2021-02-08 | End: 2021-02-08 | Stop reason: HOSPADM

## 2021-02-08 RX ORDER — MIDAZOLAM HYDROCHLORIDE 1 MG/ML
INJECTION INTRAMUSCULAR; INTRAVENOUS PRN
Status: DISCONTINUED | OUTPATIENT
Start: 2021-02-08 | End: 2021-02-08 | Stop reason: ALTCHOICE

## 2021-02-08 RX ORDER — SODIUM CHLORIDE 0.9 % (FLUSH) 0.9 %
10 SYRINGE (ML) INJECTION PRN
Status: DISCONTINUED | OUTPATIENT
Start: 2021-02-08 | End: 2021-02-08 | Stop reason: HOSPADM

## 2021-02-08 RX ORDER — LIDOCAINE HYDROCHLORIDE 10 MG/ML
INJECTION, SOLUTION EPIDURAL; INFILTRATION; INTRACAUDAL; PERINEURAL PRN
Status: DISCONTINUED | OUTPATIENT
Start: 2021-02-08 | End: 2021-02-08 | Stop reason: ALTCHOICE

## 2021-02-08 RX ADMIN — SODIUM CHLORIDE, PRESERVATIVE FREE 10 ML: 5 INJECTION INTRAVENOUS at 13:37

## 2021-02-08 ASSESSMENT — PAIN SCALES - GENERAL
PAINLEVEL_OUTOF10: 8
PAINLEVEL_OUTOF10: 8

## 2021-02-08 NOTE — H&P
History and Physical Update    Pt Name: Aditya Santos  MRN: [id number]  YOB: 1975  Date of evaluation: 2/8/2021      [x] I have reviewed the PAIN MANAGEMENT  PROGRESS NOTE OF 2/ 2/21 which meets the criteria for an Interval History and Physical note COPIED BELOW. .    [x] I have examined  Vincent Steiner  There are no changes to the patient who is scheduled for a RIGHT L/5 -S/1 EPIDURAL STEROID INJECTION   FOR TREATMENT OF CHRONIC BILATERAL LOW BACK PAIN WITH BILATERAL SCIATICA. The patient denies new health changes, fever, chills, wheezing, cough, increased SOB, chest pain, open sores or wounds. HE DOES NOT HAVE DIABETES, HE DOES NOT TAKE BLOOD THINNERS. Vital signs: BP (!) 145/83   Pulse 93   Temp 96 °F (35.6 °C) (Temporal)   Resp 16   Ht 5' 3\" (1.6 m)   Wt 150 lb (68 kg)   SpO2 96%   BMI 26.57 kg/m²     Allergies:  Shellfish-derived products and Penicillins  2Medications:    Prior to Admission medications    Medication Sig Start Date End Date Taking? Authorizing Provider   amLODIPine (NORVASC) 10 MG tablet take 1 tablet by mouth once daily 1/5/21  Yes Koby Aguila MD   cyclobenzaprine (FLEXERIL) 10 MG tablet Take 1 tablet by mouth 3 times daily as needed for Muscle spasms 1/5/21  Yes Koby Aguila MD   gabapentin (NEURONTIN) 400 MG capsule Take 1 capsule by mouth 4 times daily for 30 days. 1/5/21 2/8/21 Yes Koby Aguila MD   famotidine (PEPCID) 20 MG tablet Take 1 tablet by mouth 2 times daily 1/5/21  Yes Koby Aguila MD   docusate sodium (COLACE) 100 MG capsule Take 1 capsule by mouth 2 times daily 10/31/20  Yes Judit Jones MD   atorvastatin (LIPITOR) 40 MG tablet take 1 tablet by mouth once daily 10/11/20  Yes Miguel Angel Bennett MD   hydroCHLOROthiazide (HYDRODIURIL) 12.5 MG tablet take 2 tablet by mouth once daily 10/11/20  Yes Miguel Angel Bennett MD         This is a 54 y.o. male who is pleasant, cooperative, alert and oriented x3, in no acute distress.     Heart: Heart sounds are normal.  HR regular rate and rhythm without murmur, gallop or rub. Lungs: Normal respiratory effort with equal expansion, good air exchange, unlabored and clear to auscultation without wheezes or rales bilaterally   Abdomen: soft, nontender, nondistended with bowel sounds   LEFT PEDAL PULSES + 2, RIGHT LEG IS PROSTHETIC. Labs:  No results for input(s): HGB, HCT, WBC, MCV, PLT, NA, K, CL, CO2, BUN, CREATININE, GLUCOSE, INR, PROTIME, APTT, AST, ALT, LABALBU, HCG in the last 720 hours. No results for input(s): COVID19 in the last 720 hours. [address], ACNP-BC  Electronically signed 2/8/2021 at 1:38 PM                Physician   Specialty:  Pain Management   Progress Notes   Signed   Encounter Date:  2/2/2021         Related encounter: Office Visit from 2/2/2021 in [facility]         Signed        Expand AllCollapse All     Show:Clear all  [x]Manual[x]Template[x]Copied    Added by:  [x]Kathrine Seay MD    [][place] for details   The patient is a 54 y. o. Non-/non  male.      Chief Complaint   Patient presents with    Follow-up    Back Pain         HPI      54-year-old very pleasant male with history of chronic lower back pain onset more than 1 year ago located predominantly in the lumbar area with radiation down both legs right-sided predominantly associated with right leg numbness  Radiation of pain over gluteal region posterior thigh all the way to the foot  Pain aggravated with lifting bending twisting turning walking     Past history significant for left below-knee amputation  Patient report no changes in bladder or bowel control but report erection issues sexual dysfunction  He developed right foot drop in last year which resolved itself  He has tried multiple courses of physical therapy  He was evaluated by the neurosurgery department at [facility] last year and was not advised for any surgery     Patient had epidural injection Outpatient Medications   Medication Sig Dispense Refill    amLODIPine (NORVASC) 10 MG tablet take 1 tablet by mouth once daily 30 tablet 3    cyclobenzaprine (FLEXERIL) 10 MG tablet Take 1 tablet by mouth 3 times daily as needed for Muscle spasms 30 tablet 0    gabapentin (NEURONTIN) 400 MG capsule Take 1 capsule by mouth 4 times daily for 30 days. 120 capsule 1    famotidine (PEPCID) 20 MG tablet Take 1 tablet by mouth 2 times daily 60 tablet 3    docusate sodium (COLACE) 100 MG capsule Take 1 capsule by mouth 2 times daily 30 capsule 0    atorvastatin (LIPITOR) 40 MG tablet take 1 tablet by mouth once daily 90 tablet 1    hydroCHLOROthiazide (HYDRODIURIL) 12.5 MG tablet take 2 tablet by mouth once daily 180 tablet 2      No current facility-administered medications for this visit. Review of Systems   Constitutional: Negative for fever. HENT: Negative for ear pain. Eyes: Negative for visual disturbance. Respiratory: Negative for shortness of breath. Gastrointestinal: Positive for constipation. Genitourinary: Negative for difficulty urinating. Musculoskeletal: Positive for gait problem. Neurological: Negative for dizziness. Objective:  General Appearance:  Uncomfortable, in pain, well-appearing and in no acute distress. Vital signs: (most recent): Blood pressure 131/88, pulse 96, height 5' 2\" (1.575 m), weight 150 lb (68 kg). Vital signs are normal.  No fever. Output: Producing urine and producing stool. HEENT: Normal HEENT exam.    Lungs:  Normal effort and normal respiratory rate. Breath sounds clear to auscultation. He is not in respiratory distress. Heart: Normal rate. Extremities: Normal range of motion. There is no deformity. Neurological: Patient is alert and oriented to person, place and time. Patient has normal coordination. Pupils:  Pupils are equal, round, and reactive to light. Pupils are equal.   Skin:  Warm and dry. No rash or cyanosis. Assessment & Plan   58-year-old very pleasant male with history of chronic lower back pain onset more than 1 year ago located predominantly in the lumbar area with radiation down both legs right-sided predominantly associated with right leg numbness  Radiation of pain over gluteal region posterior thigh all the way to the foot  Pain aggravated with lifting bending twisting turning walking     Past history significant for left below-knee amputation  Patient report no changes in bladder or bowel control but report erection issues sexual dysfunction  He developed right foot drop in last year which resolved itself  He has tried multiple courses of physical therapy  He was evaluated by the neurosurgery department at [facility] last year and was not advised for any surgery     Patient had epidural injection in past with intermittent outcome  Last injection was more than 6 months ago  He is interested for a repeat injection and a second surgical evaluation     EXAMINATION: MRI OF THE LUMBAR SPINE WITHOUT AND WITH CONTRAST  1/14/2020 6:49 am    L5-S1: There is a circumferential disc bulge with facet hypertrophy. There is no significant canal stenosis. There is moderate left and moderate to severe right foraminal narrowing. 1. Lumbar disc herniation    2. Chronic bilateral low back pain with bilateral sciatica            Orders Placed This Encounter   Procedures   Keven Soto MD, Orthopedic Surgery, [facility] AA&/STRD TFRML EPI LUMBAR/SACRAL 1 LEVEL        Encounter Medications    No orders of the defined types were placed in this encounter.             Electronically signed by Syd Gonzalez MD on 2/2/2021 at 10:48 AM            Revision History

## 2021-02-08 NOTE — OP NOTE
Operative Note      Patient: Luz Maria Ram  YOB: 1975  MRN: [id number]    Date of Procedure: 2/8/2021    Pre-Op Diagnosis: LUMBAR DISC HERNIATION  CHRONIC MOIZ LOW BACK PAIN WITH MOIZ SCIATICA    Post-Op Diagnosis: Same       Procedure(s):  RIGHT L5 S1 EPIDURAL STEROID INJECTION    Surgeon(s):  Priya Hyde MD    Assistant:   * No surgical staff found *    Anesthesia: IV Sedation    Estimated Blood Loss (mL): Minimal    Complications: NoneE    Specimens:   * No specimens in log *    Implants:  * No implants in log *      Drains: * No LDAs found *    Findings: N/A    Detailed Description of Procedure:     Patient Name: Luz Maria Ram   YOB: 1975  Room/Bed: STAZ OR Pool/NONE  Medical Record Number: [id number]  Date: 2/8/2021       Preoperative Diagnosis:    chronic low back pain with right-sided sciatica  Chronic lumbar radiculopathy  Lumbar spinal stenosis    Postoperative diagnosis:   chronic low back pain with right-sided sciatica  Chronic lumbar radiculopathy  Lumbar spinal stenosis    Blood Loss: none    Procedure Performed:  Transforaminal lumbar epidural steroid injection at the levels of L4 and s1  on the RIGHT side under fluoroscopic guidance. Procedure: The Patient was seen in the preop area, chart was reviewed, informed consent was obtained. Patient was taken to procedure room and was placed in prone position. Vital signs were monitored through out the  Procedure. A time out was completed. The skin over the back was prepped and draped in sterile manner. The target point was marked in ipsilateral obliques just below the pedicle at the target levels. Skin and deep tissues were anesthetized with 1 % lidocaine. A 20-gauge, spinal needle was advanced  under fluoroscopy guidance in AP / Oblique and lateral views, such that the tip of the needle lies in the neuroforamina at about the 6 o'clock position under the pedicle of the target vertebra.   This was confirmed with AP and lateral views of the fluoroscopy. Then after negative aspiration contrast dye Omnipaque-180 was injected with live fluoroscopy in AP views that showed  spread of the contrast in the epidural space  and no vascular runoff or intrathecal spread. Finally 3 ml of treatment solution containing 5 ml of  1 % PF lidocaine and 1 ml of dexamethasone 10 mg / ml was injected. The needle was removed and a Band-Aid was placed over the needle  insertion site. The patient's vital signs remained stable and the patient tolerated the procedure well. The same procedure was then repeated at RIGHT at L 4 level with same technique. The remaining 3 ml of treatment solution was injected at that. The total dose of steroid injected today was dexamethasone 10 mg. Electronically signed by Neeru Arthur MD on 2/8/2021 at 3:02 PM    SEDATION NOTE:    ASA CLASSIFICATION  2  MP   CLASSIFICATION  2    · Moderate intravenous conscious sedation was supervised by Dr. Ainsley Freeman  . The patient was independently monitored by a Registered Nurse assigned to the Procedure Room  . Monitoring included automated blood pressure, continuous EKG, Capnography and continuous pulse oximetry. . The detailed Conscious Record is permanently stored in the [place] [address].      The following is the conscious sedation record;  Start Time:  1430  End times:  1444  Duration:  14 minutes  MEDS GIVEN 2 MG VERSED AND 50 [address]      Electronically signed by Neeru Arthur MD on 2/8/2021 at 3:01 PM

## 2021-02-10 ENCOUNTER — HOSPITAL ENCOUNTER (OUTPATIENT)
Dept: PHYSICAL THERAPY | Age: 46
Setting detail: THERAPIES SERIES
Discharge: HOME | End: 2021-02-10
Payer: MEDICARE

## 2021-02-10 PROCEDURE — G0283 ELEC STIM OTHER THAN WOUND: HCPCS

## 2021-02-10 PROCEDURE — 97110 THERAPEUTIC EXERCISES: CPT

## 2021-02-10 PROCEDURE — 97012 MECHANICAL TRACTION THERAPY: CPT

## 2021-02-10 NOTE — FLOWSHEET NOTE
[x] [facility]) [facility] TWELVESTEP [facility] &  Therapy  [address]  [phone]  F: [phone] [] [address]  [facility] [address]   [address]  P: [phone]  F: [phone] [] [address] &  Therapy  [address]  P: [phone]  F: [phone] [] [address]  P: [phone]  F: [phone] [] [address]  [facility]   [address]   [place]: [phone]  F: [phone]      Physical Therapy Daily Treatment Note    Date:  2/10/2021  Patient Name:  Emigdio Bearden    :  1975  MRN: [id number]  Physician: Iona Norris                            Insurance: Benay Dial Dual  Medical Diagnosis: Lumbar Radiculopathy M54.16                          Rehab Codes: M54.5, M54.6, M25.551, M25.552, M79.605, R26.2, r26.89, R26.2, R29.3, R20.2  Onset Date: 2020                    Next Dr.'s appt.: 21    Visit# / total visits: ; Progress note for Medicare patient due at visit 10     Cancels/No Shows: 0/1    Subjective:    Pain:  [x]? Yes  []? No  Location: LB, B hips    Pain Rating: (0-10 scale) 9/10  Pain altered Tx:  []? Yes  [x]? No  Action:  Comments: Patient states pain is 9/10 today after walking up from the parking lot. Received pain injection 2 days ago and has not received any relief yet. Scheduled to see spine Dr tomorrow and surgeon on 21.    Objective:  Modalities:       Lumbar traction in supine (160 lbs) 70 max/35 min.   Patient given emergency shut off button      IFC/lumbar HP to low back x 15 minutes in supine   Precautions: R BKA  Exercises:  Exercise Reps/ Time Weight/ Level Comments   Scifit 5 min  L 3          Standing      Gastroc stretch 3x20\"  Wedge  -  L only   Mini Marches 20x     Hip abd   Pain   HIp ext   Pain hip ext 4-/5, R knee 4/5, L knee 4+/5  4. ? Function: Oswestry 66% loss of function  5. Patient to be independent with home exercise program as demonstrated by performance with correct form without cues.     LTG: (to be met in 18 treatments)   6. ? Pain: LB, B hips 2/10 average pain, 5/10 at worst   7. ? ROM: Lumbar AROM WFL without increased pain, B hip AROM, SKTC WFL without increased pain  8. ? Strength: R hip 4/5, L hip ext 4/5, B knees 4+/5  9. ? Function: Oswestry 40% loss of function        Patient goals: \"Hopefully make pain better\"       Pt. Education:  [x] Yes  [] No  [x] Reviewed Prior HEP/Ed  Method of Education: [x] Verbal  [x] Demo  [] Written    Comprehension of Education:  [x] Verbalizes understanding. [x] Demonstrates understanding. [] Needs review. [x] Demonstrates/verbalizes HEP/Ed previously given. Access Code: Amanda Ruano   URL: [email or web address]   Date: 01/27/2021   Prepared by: Vidhi Slater     Exercises   Supine Single Knee to Chest Stretch - 10 reps - 3 sets - 1x daily - 7x weekly   Supine Piriformis Stretch - 10 reps - 3 sets - 1x daily - 7x weekly   Supine Bridge - 10 reps - 3 sets - 1x daily - 7x weekly   Supine Transversus Abdominis Bracing - Hands on Stomach - 10 reps - 3 sets - 1x daily - 7x weekly   Standing Hip Abduction - 10 reps - 3 sets - 1x daily - 7x weekly   Standing Hip Extension - 10 reps - 3 sets - 1x daily - 7x weekly   Standing Knee Flexion AROM with Chair Support - 10 reps - 3 sets - 1x daily - 7x weekly   Mini Squat - 10 reps - 3 sets - 1x daily - 7x weekly   Standing Marching - 10 reps - 3 sets - 1x daily - 7x weekly       Plan: [x] Continue current frequency toward long and short term goals.     [x] Specific Instructions for subsequent treatments: focus on pain control, core strength, look at leg length with possible heel lift (BKA on R side)    Frequency:  2 x/week for 18 visits      Time In: 100 pm            Time Out: 212 pm    Electronically signed by:  Greyson Watson, PTA

## 2021-02-16 ENCOUNTER — APPOINTMENT (OUTPATIENT)
Dept: PHYSICAL THERAPY | Age: 46
End: 2021-02-16
Payer: MEDICARE

## 2021-02-17 ENCOUNTER — OFFICE VISIT (OUTPATIENT)
Dept: PAIN MANAGEMENT | Age: 46
End: 2021-02-17
Payer: MEDICARE

## 2021-02-17 VITALS
HEART RATE: 106 BPM | WEIGHT: 150 LBS | SYSTOLIC BLOOD PRESSURE: 156 MMHG | TEMPERATURE: 97.6 F | HEIGHT: 63 IN | DIASTOLIC BLOOD PRESSURE: 93 MMHG | BODY MASS INDEX: 26.58 KG/M2

## 2021-02-17 DIAGNOSIS — G89.29 CHRONIC BILATERAL LOW BACK PAIN WITH RIGHT-SIDED SCIATICA: Primary | Chronic | ICD-10-CM

## 2021-02-17 DIAGNOSIS — Z92.241 S/P EPIDURAL STEROID INJECTION: ICD-10-CM

## 2021-02-17 DIAGNOSIS — M51.9 LUMBAR DISC DISEASE: ICD-10-CM

## 2021-02-17 DIAGNOSIS — Z89.511 HX OF RIGHT BKA (HCC): ICD-10-CM

## 2021-02-17 DIAGNOSIS — M54.41 CHRONIC BILATERAL LOW BACK PAIN WITH RIGHT-SIDED SCIATICA: Primary | Chronic | ICD-10-CM

## 2021-02-17 DIAGNOSIS — M54.16 CHRONIC LUMBAR RADICULOPATHY: ICD-10-CM

## 2021-02-17 PROCEDURE — 1036F TOBACCO NON-USER: CPT | Performed by: ANESTHESIOLOGY

## 2021-02-17 PROCEDURE — G8419 CALC BMI OUT NRM PARAM NOF/U: HCPCS | Performed by: ANESTHESIOLOGY

## 2021-02-17 PROCEDURE — G8427 DOCREV CUR MEDS BY ELIG CLIN: HCPCS | Performed by: ANESTHESIOLOGY

## 2021-02-17 PROCEDURE — 99214 OFFICE O/P EST MOD 30 MIN: CPT | Performed by: ANESTHESIOLOGY

## 2021-02-17 PROCEDURE — G8484 FLU IMMUNIZE NO ADMIN: HCPCS | Performed by: ANESTHESIOLOGY

## 2021-02-17 RX ORDER — HYDROCODONE BITARTRATE AND ACETAMINOPHEN 5; 325 MG/1; MG/1
1 TABLET ORAL EVERY 8 HOURS PRN
Qty: 45 TABLET | Refills: 0 | Status: SHIPPED | OUTPATIENT
Start: 2021-02-17 | End: 2021-03-04

## 2021-02-17 ASSESSMENT — ENCOUNTER SYMPTOMS
GASTROINTESTINAL NEGATIVE: 1
ALLERGIC/IMMUNOLOGIC NEGATIVE: 1
EYES NEGATIVE: 1
RESPIRATORY NEGATIVE: 1
BACK PAIN: 1

## 2021-02-17 NOTE — PROGRESS NOTES
The patient is a 54 y. o. Non-/non  male. Chief Complaint   Patient presents with    Follow Up After Procedure     02/08/21 Right L5-S1 Epidural Steroid Injection     Back Pain     Lumbar region         HPI    Severe lower back pain radiating down right leg onset more than a year ago  Aggravated with any movement associated with right leg numbness  Pain located over the lumbar area and right gluteal region over posterior thigh  Pain is constant interfering with quality of life  Have tried conservative measures  Recently had a repeat epidural with little to no improvement  Had seen neurosurgery at [facility] in [address] and was not advised for surgery  Recent MRI imaging had shown large disc herniation with impingement of the and displacement of the nerve root  Have a another surgical opinion scheduled for next week    Have tried NSAIDs muscle relaxant and gabapentin without any relief  Report no history of illicit substance use  Outcome   Any improvement of activity?   No   Any side effects (appetite,leg cramping,facial fleshing): No   Increase of pain:  No  Pain score Today:  9  % of pain relief: 0%  Pain diary (medial branch block): No    Lab Results   Component Value Date    LABA1C 5.5 01/28/2021     Lab Results   Component Value Date     01/28/2021       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    Neuroblastoma ([address]     Neurogenic dysfunction of the urinary bladder     Osteoarthritis     Phantom limb pain ([address]     Pure hypercholesterolemia 7/18/2017    RSD (reflex sympathetic dystrophy)       Past Surgical History:   Procedure Laterality Date  ABDOMINAL ADHESION SURGERY      BLADDER REPAIR      LEG AMPUTATION THROUGH LOWER TIBIA AND FIBULA Right 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    PAIN MANAGEMENT PROCEDURE Bilateral 2/20/2020    EPIDURAL STEROID INJECTION MOIZ L5S1 performed by Michelle Robb MD at [address] [place] [address] Bilateral 3/2/2020    EPIDURAL STEROID INJECTION MOIZ L5S1 performed by Michelle Robb MD at [address] [place] [address] Bilateral 8/6/2020    EPIDURAL STEROID INJECTION BILATERAL L5 performed by Michelle Robb MD at [address] [place] [address] Right 2/8/2021    RIGHT L5 S1 EPIDURAL STEROID INJECTION performed by Michelle Robb MD at [place] [address]      r/t bowel obstruction     Social History     Socioeconomic History    Marital status:      Spouse name: None    Number of children: None    Years of education: None    Highest education level: None   Occupational History    None   Social Needs    Financial resource strain: Not very hard    Food insecurity     Worry: Never true     Inability: Never true    Transportation needs     Medical: No     Non-medical: No   Tobacco Use    Smoking status: Never Smoker    Smokeless tobacco: Never Used   Substance and Sexual Activity    Alcohol use: No     Alcohol/week: 0.0 standard drinks    Drug use: No    Sexual activity: Yes     Partners: Female   Lifestyle    Physical activity     Days per week: None     Minutes per session: None    Stress: None   Relationships    Social connections     Talks on phone: None     Gets together: None     Attends [employer] service: None     Active member of club or organization: None     Attends meetings of clubs or organizations: None     Relationship status: None    Intimate partner violence     Fear of current or ex partner: None     Emotionally abused: None     Physically abused: None     Forced sexual activity: None   Other Topics Concern    None Social History Narrative    ** Merged History Encounter **          Family History   Problem Relation Age of Onset    High Blood Pressure Mother     Diabetes Mother     High Blood Pressure Father     Cancer Father         prostate    Diabetes Father     Coronary Art Dis Father     Heart Attack Father     Heart Disease Father     No Known Problems Sister     No Known Problems Brother      Allergies   Allergen Reactions    Shellfish-Derived Products Anaphylaxis    Penicillins Other (See Comments)     UNKNOWN REACTION     Shellfish-derived products and Penicillins   Vitals:    02/17/21 0958   BP: (!) 156/93   Pulse: 106   Temp:      Current Outpatient Medications   Medication Sig Dispense Refill    HYDROcodone-acetaminophen (NORCO) 5-325 MG per tablet Take 1 tablet by mouth every 8 hours as needed for Pain for up to 15 days. 45 tablet 0    amLODIPine (NORVASC) 10 MG tablet take 1 tablet by mouth once daily 30 tablet 3    cyclobenzaprine (FLEXERIL) 10 MG tablet Take 1 tablet by mouth 3 times daily as needed for Muscle spasms 30 tablet 0    famotidine (PEPCID) 20 MG tablet Take 1 tablet by mouth 2 times daily 60 tablet 3    docusate sodium (COLACE) 100 MG capsule Take 1 capsule by mouth 2 times daily 30 capsule 0    atorvastatin (LIPITOR) 40 MG tablet take 1 tablet by mouth once daily 90 tablet 1    hydroCHLOROthiazide (HYDRODIURIL) 12.5 MG tablet take 2 tablet by mouth once daily 180 tablet 2     No current facility-administered medications for this visit. Review of Systems   Constitutional: Negative. Negative for fever. HENT: Negative. Eyes: Negative. Respiratory: Negative. Cardiovascular: Negative. Gastrointestinal: Negative. Endocrine: Positive for cold intolerance. Genitourinary: Positive for frequency, penile pain, testicular pain and urgency. Negative for scrotal swelling. Musculoskeletal: Positive for arthralgias, back pain, joint swelling, myalgias, neck pain and neck stiffness. Skin: Negative. Allergic/Immunologic: Negative. Neurological: Negative. Hematological: Negative. Psychiatric/Behavioral: Negative. Objective:  General Appearance:  Uncomfortable and in pain. Vital signs: (most recent): Blood pressure (!) 156/93, pulse 106, temperature 97.6 °F (36.4 °C), temperature source Temporal, height 5' 3\" (1.6 m), weight 150 lb (68 kg). Vital signs are normal.  No fever. Output: Producing urine and producing stool. HEENT: Normal HEENT exam.    Lungs:  Normal effort and normal respiratory rate. Breath sounds clear to auscultation. He is not in respiratory distress. Heart: Normal rate. Extremities: Normal range of motion. There is no deformity. Neurological: Patient is alert and oriented to person, place and time. Patient has normal coordination. Pupils:  Pupils are equal, round, and reactive to light. Pupils are equal.   Skin:  Warm and dry. No rash or cyanosis. Assessment & Plan     Severe lower back pain radiating down right leg onset more than a year ago  Aggravated with any movement associated with right leg numbness  Pain located over the lumbar area and right gluteal region over posterior thigh  Pain is constant interfering with quality of life  Have tried conservative measures  Recently had a repeat epidural with little to no improvement  Had [facility]surgery [facility] in [facility] and was not advised for surgery  Recent MRI imaging had shown large disc herniation with impingement of the and displacement of the nerve root  Have a another surgical opinion scheduled for next week    Have tried NSAIDs muscle relaxant and gabapentin without any relief  Report no history of illicit substance use  Outcome    1. Chronic bilateral low back pain with right-sided sciatica    2.  Chronic lumbar radiculopathy 3. Hx of right BKA ([facility]    4. Lumbar disc disease    5. S/P epidural steroid injection        Considering severe pain affecting quality of life  Adequate trial of nonopioid modalities including therapy injection nonopioid medication  Diagnostic work-up documenting pathology  Following up instructions of treatment appropriately  I will collect urine for toxicology we will sign opioid contract and will start patient on low-dose opioid therapy with Norco 5 mg 1 tablet 2-3 times a day  Follow-up in 2 weeks    No surgical treatment is advised then neuromodulation trial will be the next option  Orders Placed This Encounter   Procedures    Urine Drug Screen, Comprehensive      Orders Placed This Encounter   Medications    HYDROcodone-acetaminophen (NORCO) 5-325 MG per tablet     Sig: Take 1 tablet by mouth every 8 hours as needed for Pain for up to 15 days.      Dispense:  45 tablet     Refill:  0     Reduce doses taken as pain becomes manageable          Electronically signed by Dangelo Olmedo MD on 2/17/2021 at 10:18 AM     Urine toxicology February 17, 2021  Validity testing satisfactory  No illicit substance

## 2021-02-19 DIAGNOSIS — M54.16 CHRONIC LUMBAR RADICULOPATHY: ICD-10-CM

## 2021-02-19 NOTE — TELEPHONE ENCOUNTER
Shant York is calling to request a refill on the following medication(s):    Last Visit Date (If Applicable):  8/0/0259    Next Visit Date:    3/2/2021    Medication Request:  Requested Prescriptions     Pending Prescriptions Disp Refills    cyclobenzaprine (FLEXERIL) 10 MG tablet 30 tablet 0     Sig: Take 1 tablet by mouth 3 times daily as needed for Muscle spasms

## 2021-02-20 RX ORDER — CYCLOBENZAPRINE HCL 10 MG
10 TABLET ORAL 3 TIMES DAILY PRN
Qty: 30 TABLET | Refills: 0 | Status: SHIPPED | OUTPATIENT
Start: 2021-02-20 | End: 2021-03-10 | Stop reason: SDUPTHER

## 2021-02-26 ENCOUNTER — TELEPHONE (OUTPATIENT)
Dept: PAIN MANAGEMENT | Age: 46
End: 2021-02-26

## 2021-03-04 ENCOUNTER — TELEPHONE (OUTPATIENT)
Dept: INTERNAL MEDICINE | Age: 46
End: 2021-03-04

## 2021-03-04 NOTE — TELEPHONE ENCOUNTER
Please schedule an appointment at [facility].  Thank you    Agnes Molina MD  PGY-2, Internal Medicine Resident  [facility]  3/4/2021 3:23 PM

## 2021-03-04 NOTE — TELEPHONE ENCOUNTER
[facility] need medical clearance, pt have a appt on 3/9/21, his procedure is on 3/12/21    Scanned medical clearance to pt chart    Put medical clearance in the NDB

## 2021-03-09 ENCOUNTER — CLINICAL DOCUMENTATION (OUTPATIENT)
Dept: INTERNAL MEDICINE | Age: 46
End: 2021-03-09

## 2021-03-10 ENCOUNTER — OFFICE VISIT (OUTPATIENT)
Dept: INTERNAL MEDICINE | Age: 46
End: 2021-03-10
Payer: MEDICARE

## 2021-03-10 VITALS
SYSTOLIC BLOOD PRESSURE: 153 MMHG | BODY MASS INDEX: 27.53 KG/M2 | HEART RATE: 102 BPM | HEIGHT: 62 IN | DIASTOLIC BLOOD PRESSURE: 94 MMHG | WEIGHT: 149.6 LBS

## 2021-03-10 DIAGNOSIS — Z23 NEEDS FLU SHOT: ICD-10-CM

## 2021-03-10 DIAGNOSIS — I10 ESSENTIAL HYPERTENSION: ICD-10-CM

## 2021-03-10 DIAGNOSIS — K21.9 GASTROESOPHAGEAL REFLUX DISEASE, UNSPECIFIED WHETHER ESOPHAGITIS PRESENT: ICD-10-CM

## 2021-03-10 DIAGNOSIS — Z90.49 S/P SMALL BOWEL RESECTION: ICD-10-CM

## 2021-03-10 DIAGNOSIS — E78.00 PURE HYPERCHOLESTEROLEMIA: ICD-10-CM

## 2021-03-10 DIAGNOSIS — M51.27 HERNIATION OF INTERVERTEBRAL DISC BETWEEN L5 AND S1: ICD-10-CM

## 2021-03-10 DIAGNOSIS — Z01.818 PRE-OPERATIVE CLEARANCE: Primary | ICD-10-CM

## 2021-03-10 DIAGNOSIS — M48.061 LUMBAR FORAMINAL STENOSIS: ICD-10-CM

## 2021-03-10 DIAGNOSIS — M54.16 CHRONIC LUMBAR RADICULOPATHY: ICD-10-CM

## 2021-03-10 PROCEDURE — 1036F TOBACCO NON-USER: CPT | Performed by: STUDENT IN AN ORGANIZED HEALTH CARE EDUCATION/TRAINING PROGRAM

## 2021-03-10 PROCEDURE — G8484 FLU IMMUNIZE NO ADMIN: HCPCS | Performed by: STUDENT IN AN ORGANIZED HEALTH CARE EDUCATION/TRAINING PROGRAM

## 2021-03-10 PROCEDURE — G8427 DOCREV CUR MEDS BY ELIG CLIN: HCPCS | Performed by: STUDENT IN AN ORGANIZED HEALTH CARE EDUCATION/TRAINING PROGRAM

## 2021-03-10 PROCEDURE — G8419 CALC BMI OUT NRM PARAM NOF/U: HCPCS | Performed by: STUDENT IN AN ORGANIZED HEALTH CARE EDUCATION/TRAINING PROGRAM

## 2021-03-10 PROCEDURE — 99213 OFFICE O/P EST LOW 20 MIN: CPT | Performed by: STUDENT IN AN ORGANIZED HEALTH CARE EDUCATION/TRAINING PROGRAM

## 2021-03-10 PROCEDURE — 99211 OFF/OP EST MAY X REQ PHY/QHP: CPT | Performed by: INTERNAL MEDICINE

## 2021-03-10 RX ORDER — DOCUSATE SODIUM 100 MG/1
100 CAPSULE, LIQUID FILLED ORAL 2 TIMES DAILY
Qty: 30 CAPSULE | Refills: 0 | Status: SHIPPED | OUTPATIENT
Start: 2021-03-10 | End: 2021-11-02

## 2021-03-10 RX ORDER — HYDROCHLOROTHIAZIDE 25 MG/1
TABLET ORAL
Qty: 30 TABLET | Refills: 2 | Status: SHIPPED | OUTPATIENT
Start: 2021-03-10 | End: 2021-04-20

## 2021-03-10 RX ORDER — HYDROCODONE BITARTRATE AND ACETAMINOPHEN 5; 325 MG/1; MG/1
325 TABLET ORAL PRN
COMMUNITY
Start: 2021-02-19 | End: 2021-07-27 | Stop reason: ALTCHOICE

## 2021-03-10 RX ORDER — CARVEDILOL 6.25 MG/1
6.25 TABLET ORAL 2 TIMES DAILY
Qty: 60 TABLET | Refills: 3 | Status: SHIPPED | OUTPATIENT
Start: 2021-03-10 | End: 2021-07-12

## 2021-03-10 RX ORDER — FAMOTIDINE 20 MG/1
40 TABLET, FILM COATED ORAL 2 TIMES DAILY
Qty: 60 TABLET | Refills: 3 | Status: SHIPPED | OUTPATIENT
Start: 2021-03-10 | End: 2021-11-02 | Stop reason: ALTCHOICE

## 2021-03-10 RX ORDER — ATORVASTATIN CALCIUM 40 MG/1
TABLET, FILM COATED ORAL
Qty: 90 TABLET | Refills: 1 | Status: SHIPPED | OUTPATIENT
Start: 2021-03-10 | End: 2021-10-05

## 2021-03-10 RX ORDER — CYCLOBENZAPRINE HCL 10 MG
10 TABLET ORAL 3 TIMES DAILY PRN
Qty: 30 TABLET | Refills: 0 | Status: SHIPPED | OUTPATIENT
Start: 2021-03-10 | End: 2021-07-27 | Stop reason: SDUPTHER

## 2021-03-10 RX ORDER — AMLODIPINE BESYLATE 10 MG/1
TABLET ORAL
Qty: 30 TABLET | Refills: 3 | Status: SHIPPED | OUTPATIENT
Start: 2021-03-10 | End: 2021-07-27 | Stop reason: SDUPTHER

## 2021-03-10 NOTE — PROGRESS NOTES
Attending Physician Statement  I have discussed the care of [address] [place] [address] Juan Manuel Philip, including pertinent history and exam findings with the resident. I have reviewed the key elements of all parts of the encounter with the resident. I agree with the assessment, and status of the problem list as documented. Has laminectomy scheduled on march 12. Diagnosis Orders   1. Pre-operative clearance     2. Herniation of intervertebral disc between L5 and S1     3. Essential hypertension  hydroCHLOROthiazide (HYDRODIURIL) 25 MG tablet    amLODIPine (NORVASC) 10 MG tablet    carvedilol (COREG) 6.25 MG tablet   4. Chronic lumbar radiculopathy  cyclobenzaprine (FLEXERIL) 10 MG tablet   5. Gastroesophageal reflux disease, unspecified whether esophagitis present  famotidine (PEPCID) 20 MG tablet   6. Pure hypercholesterolemia  atorvastatin (LIPITOR) 40 MG tablet   7. Lumbar foraminal stenosis     8. Needs flu shot     9. S/P small bowel resection  docusate sodium (COLACE) 100 MG capsule   patient did not follow up with stone clinic  BP is elevated today . Start coreg and rtc tomorrow. The plan and orders should include No orders of the defined types were placed in this encounter. and this was also documented by the resident. The medication list was reviewed with the resident and is up to date. The return visit should be in 1 day .     Dr Jody Price MD, [facility] , Department of Internal Medicine  Resident Ambulatory Site Medical Director  [facility] Internal Medicine  [address] [place] [address],[address]  Internal Medicine Clerkship - Lucia Ma    3/10/2021, 4:19 PM

## 2021-03-10 NOTE — PROGRESS NOTES
Vaccine Information Sheet, \"Influenza - Inactivated\"  given to Delmi Prince, or parent/legal guardian of  Delmi Prince and verbalized understanding. Patient responses:    Is the person being vaccinated sick today? No    Does the person to be vaccinated have an allergy to a component of the vaccine? No    Has the person to be vaccinated ever had a reaction to the flu vaccine in the past?  No    Have you ever had Guillian Smithfield Syndrome? No    Flu vaccine given per order. Please see immunization tab.

## 2021-03-10 NOTE — PROGRESS NOTES
[facility]  [facility] [address] [address]  Dept: [phone]  Dept Fax: [phone]    Office Progress/Follow Up Note  Date of patient's visit: 3/10/2021  Patient's Name:  Trace Acosta YOB: 1975            Patient Care Team:  Tucker Ortiz MD as PCP - General (Internal Medicine)  Sterling Hill MD as PCP - [facility]  MD Tyesha Joshua DO as Consulting Physician (General Surgery)    REASON FOR VISIT: For surgical clearance    HISTORY OF PRESENT ILLNESS:      Chief Complaint   Patient presents with    Procedure     surgery clearance    Discuss Labs     tsh    Health Maintenance     flu pend       History was obtained from the patient. Trace Acosta is a 54 y.o. is here for preoperative clearance. Patient has longstanding history of chronic bilateral low back pain with bilateral sciatica secondary to disc herniation L5-S1. Patient follows with pain management and neurosurgery at [facility]. Laminectomy is scheduled for 3/12/2021. Known history of essential hypertension, currently on Norvasc 10 mg daily, HCTZ 25 mg daily. BP today 153/94 mmHg, repeat was 147/96 mmHg. Patient had CKD stage II and medullary nephrocalcinosis and was referred to stone clinic at [facility] during the last visit. Patient did not follow-up with stone clinic. Ultrasound thyroid was ordered during last visit. Ultrasound thyroid showed multinodular goiter. Denies any complaints at this time. 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       Patient Active Problem List   Diagnosis    GERD (gastroesophageal reflux disease)    Chronic bilateral low back pain with right-sided sciatica    Lumbar disc disease    Hemorrhoids, external    Essential hypertension    Pure hypercholesterolemia    Spondylosis without myelopathy or radiculopathy, lumbar region    Hx of right BKA ([facility]    Chronic lumbar radiculopathy    Spinal stenosis of lumbar region with neurogenic claudication    Lumbar foraminal stenosis    Arthritis of right hip    Right hip pain         Health Maintenance Due   Topic Date Due    Flu vaccine (1) 09/01/2020         Allergies   Allergen Reactions    Shellfish-Derived Products Anaphylaxis    Penicillins Other (See Comments)     UNKNOWN REACTION         MEDICATIONS:      Current Outpatient Medications   Medication Sig Dispense Refill    HYDROcodone-acetaminophen (NORCO) 5-325 MG per tablet Take 325 tablets by mouth as needed.  cyclobenzaprine (FLEXERIL) 10 MG tablet Take 1 tablet by mouth 3 times daily as needed for Muscle spasms 30 tablet 0    amLODIPine (NORVASC) 10 MG tablet take 1 tablet by mouth once daily 30 tablet 3    famotidine (PEPCID) 20 MG tablet Take 1 tablet by mouth 2 times daily 60 tablet 3    docusate sodium (COLACE) 100 MG capsule Take 1 capsule by mouth 2 times daily 30 capsule 0    atorvastatin (LIPITOR) 40 MG tablet take 1 tablet by mouth once daily 90 tablet 1    hydroCHLOROthiazide (HYDRODIURIL) 12.5 MG tablet take 2 tablet by mouth once daily 180 tablet 2     No current facility-administered medications for this visit. SOCIAL HISTORY    Reviewed and no change from previous record. Vincent  reports that he has never smoked. He has never used smokeless tobacco.    FAMILY HISTORY:    Reviewed and No change from previous visit    REVIEW OF SYSTEMS:    12 point ROS Negative except as mentioned above.     Review of Systems    PHYSICAL EXAM:      Vitals:    03/10/21 1535   BP: (!) 153/94   Site: Right Upper Arm   Position: Sitting   Cuff Size: Medium Adult   Pulse: 102   Weight: 149 lb 9.6 oz (67.9 kg)   Height: 5' 2\" (1.575 m)     BP Readings from Last 3 Encounters:   03/10/21 (!) 153/94   02/17/21 (!) 156/93   02/08/21 (!) 123/92      General appearance - alert, well appearing, and in no distress Mental status - alert, oriented to person, place, and time  Mouth - mucous membranes moist, pharynx normal without lesions  Neck - supple, no significant adenopathy  Chest - clear to auscultation, no wheezes, rales or rhonchi, symmetric air entry  Heart - normal rate, regular rhythm, normal S1, S2, no murmurs, rubs, clicks or gallops  Abdomen - soft, nontender, nondistended, no masses or organomegaly  Neurological - alert, oriented, normal speech, no focal findings or movement disorder noted  Extremities - peripheral pulses normal, no pedal edema, no clubbing or cyanosis  Skin - normal coloration and turgor, no rashes, no suspicious skin lesions noted    LABORATORY FINDINGS:    CBC:  Lab Results   Component Value Date    WBC 10.4 10/31/2020    HGB 15.6 10/31/2020     10/31/2020       BMP:    Lab Results   Component Value Date     10/31/2020    K 3.4 10/31/2020    CL 99 10/31/2020    CO2 22 10/31/2020    BUN 24 10/31/2020    CREATININE 1.24 10/31/2020    GLUCOSE 133 10/31/2020       HEMOGLOBIN A1C:   Lab Results   Component Value Date    LABA1C 5.5 01/28/2021       FASTING LIPID PANEL:  Lab Results   Component Value Date    CHOL 218 (H) 10/21/2019    HDL 55 01/28/2021    TRIG 68 10/21/2019       ASSESSMENT AND PLAN:    Joshua Amador was seen today for procedure, discuss labs and health maintenance. Diagnoses and all orders for this visit:    1. Pre-operative clearance  RCRI score 0.  But with patient's high blood pressure cannot clear him for the surgery at this time. Patient was advised to start taking Coreg from today and return to clinic tomorrow p.m. for a blood pressure check. If the BP is controlled during tomorrow's visit, will clear him for the surgery with throat intermediate risk. 2. Herniation of intervertebral disc between L5 and S1  Continue pain management. Plan for laminectomy procedure on 3/12/2021.    3. Essential hypertension  BP is 153/94 mmHg, repeat was 147/96 mmHg.   Start

## 2021-03-11 ENCOUNTER — TELEMEDICINE (OUTPATIENT)
Dept: PAIN MANAGEMENT | Age: 46
End: 2021-03-11
Payer: MEDICARE

## 2021-03-11 ENCOUNTER — OFFICE VISIT (OUTPATIENT)
Dept: INTERNAL MEDICINE | Age: 46
End: 2021-03-11
Payer: MEDICARE

## 2021-03-11 VITALS
WEIGHT: 149 LBS | HEIGHT: 62 IN | SYSTOLIC BLOOD PRESSURE: 131 MMHG | DIASTOLIC BLOOD PRESSURE: 96 MMHG | HEART RATE: 88 BPM | BODY MASS INDEX: 27.42 KG/M2

## 2021-03-11 DIAGNOSIS — M54.41 CHRONIC BILATERAL LOW BACK PAIN WITH RIGHT-SIDED SCIATICA: Chronic | ICD-10-CM

## 2021-03-11 DIAGNOSIS — I10 ESSENTIAL HYPERTENSION: Primary | ICD-10-CM

## 2021-03-11 DIAGNOSIS — E04.2 MULTINODULAR GOITER: ICD-10-CM

## 2021-03-11 DIAGNOSIS — N18.31 STAGE 3A CHRONIC KIDNEY DISEASE (HCC): ICD-10-CM

## 2021-03-11 DIAGNOSIS — G89.29 CHRONIC BILATERAL LOW BACK PAIN WITH RIGHT-SIDED SCIATICA: Chronic | ICD-10-CM

## 2021-03-11 DIAGNOSIS — M51.9 LUMBAR DISC DISEASE: ICD-10-CM

## 2021-03-11 PROCEDURE — 99213 OFFICE O/P EST LOW 20 MIN: CPT | Performed by: STUDENT IN AN ORGANIZED HEALTH CARE EDUCATION/TRAINING PROGRAM

## 2021-03-11 PROCEDURE — 1036F TOBACCO NON-USER: CPT | Performed by: STUDENT IN AN ORGANIZED HEALTH CARE EDUCATION/TRAINING PROGRAM

## 2021-03-11 PROCEDURE — G8427 DOCREV CUR MEDS BY ELIG CLIN: HCPCS | Performed by: STUDENT IN AN ORGANIZED HEALTH CARE EDUCATION/TRAINING PROGRAM

## 2021-03-11 PROCEDURE — G8484 FLU IMMUNIZE NO ADMIN: HCPCS | Performed by: STUDENT IN AN ORGANIZED HEALTH CARE EDUCATION/TRAINING PROGRAM

## 2021-03-11 PROCEDURE — G8419 CALC BMI OUT NRM PARAM NOF/U: HCPCS | Performed by: STUDENT IN AN ORGANIZED HEALTH CARE EDUCATION/TRAINING PROGRAM

## 2021-03-11 PROCEDURE — 99211 OFF/OP EST MAY X REQ PHY/QHP: CPT | Performed by: INTERNAL MEDICINE

## 2021-03-11 PROCEDURE — G8427 DOCREV CUR MEDS BY ELIG CLIN: HCPCS | Performed by: ANESTHESIOLOGY

## 2021-03-11 PROCEDURE — 99213 OFFICE O/P EST LOW 20 MIN: CPT | Performed by: ANESTHESIOLOGY

## 2021-03-11 RX ORDER — HYDROCODONE BITARTRATE AND ACETAMINOPHEN 5; 325 MG/1; MG/1
1 TABLET ORAL EVERY 8 HOURS PRN
Qty: 45 TABLET | Refills: 0 | Status: CANCELLED | OUTPATIENT
Start: 2021-03-11 | End: 2021-03-26

## 2021-03-11 ASSESSMENT — ENCOUNTER SYMPTOMS: BACK PAIN: 1

## 2021-03-11 NOTE — PATIENT INSTRUCTIONS
Surgical clearance formed reviewed and given to patient. Patient was put on a wait list for 6 weeks and will be contacted to schedule their next follow up appointment once the schedule is available. If the patient is in need of an appointment before their next visit please call the office at [phone]. After Visit Summary  given and reviewed.

## 2021-03-11 NOTE — PROGRESS NOTES
Patient is here today for follow-up on his blood pressure. He was started on Coreg yesterday. He is going for surgery tomorrow for a large herniated disc in the lumbar area. He is asymptomatic as far as blood pressure is concerned. He is continuing in his other blood pressure medications as well. Patient is in pain because of his lumbar radiculopathy. He had some tachycardia yesterday which has improved with Coreg. His blood pressure and pulse elevation may be due to his pain and stress. At this point continue current medications. He is RCRI score otherwise is 0. He is stable for surgery. He does have other issues including nephrocalcinosis and CKD and for which she needs followed up. Multinodular goiter is present but not toxic. Repeat ultrasound in a year indicated. Attending Physician Statement  I have discussed the care of Hugo Rolle, including pertinent history and exam findings,  with the resident. I have reviewed the key elements of all parts of the encounter with the resident. I agree with the assessment, plan and orders as documented by the resident.   (GE Modifier)

## 2021-03-11 NOTE — PROGRESS NOTES
[facility] [address] [address]  Dept: [phone]  Dept Fax: [phone]    Office Progress/Follow Up Note  Date of patient's visit: 3/11/2021  Patient's Name:  Delmi Prince YOB: 1975            Patient Care Team:  Nisa Epstein MD as PCP - General (Internal Medicine)  Melanie Trevino MD as PCP - [facility] Provider  MD Mindy Grant DO as Consulting Physician (General Surgery)    REASON FOR VISIT: BP check    HISTORY OF PRESENT ILLNESS:      Chief Complaint   Patient presents with    Hypertension    Forms     surgery clearance       History was obtained from the patient. Delmi Prince is a 54 y.o. is here for a blood pressure check. This patient is known to me from his previous visit yesterday 3/10/2021. His previous visit was for surgical clearance for lumbar laminectomy which is scheduled for 3/12/2021. Blood pressure during his last visit was high with SBP 150s and DBP high 90s. He was on Norvasc 10 mg daily and HCTZ 25 daily. He was started on Coreg 6.25 mg twice daily and was asked to return to clinic in 1 day I.e. today for BP check. BP today is 133/98 mmHg, repeat is 131/96 mmHg. Denies any complaints at this time.       Patient Active Problem List   Diagnosis    GERD (gastroesophageal reflux disease)    Chronic bilateral low back pain with right-sided sciatica    Lumbar disc disease    Hemorrhoids, external    Essential hypertension    Pure hypercholesterolemia    Spondylosis without myelopathy or radiculopathy, lumbar region    Hx of right BKA ([address]    Chronic lumbar radiculopathy    Spinal stenosis of lumbar region with neurogenic claudication    Lumbar foraminal stenosis    Arthritis of right hip    Right hip pain         Health Maintenance Due   Topic Date Due    Flu vaccine (1) 09/01/2020         Allergies   Allergen Reactions    Shellfish-Derived Products rhonchi, symmetric air entry  Heart - normal rate, regular rhythm, normal S1, S2, no murmurs, rubs, clicks or gallops  Abdomen - soft, nontender, nondistended, no masses or organomegaly  Neurological - alert, oriented, normal speech, no focal findings or movement disorder noted  Extremities - peripheral pulses normal, no pedal edema, no clubbing or cyanosis  Skin - normal coloration and turgor, no rashes, no suspicious skin lesions noted    LABORATORY FINDINGS:    CBC:  Lab Results   Component Value Date    WBC 10.4 10/31/2020    HGB 15.6 10/31/2020     10/31/2020       BMP:    Lab Results   Component Value Date     10/31/2020    K 3.4 10/31/2020    CL 99 10/31/2020    CO2 22 10/31/2020    BUN 24 10/31/2020    CREATININE 1.24 10/31/2020    GLUCOSE 133 10/31/2020       HEMOGLOBIN A1C:   Lab Results   Component Value Date    LABA1C 5.5 01/28/2021       FASTING LIPID PANEL:  Lab Results   Component Value Date    CHOL 218 (H) 10/21/2019    HDL 55 01/28/2021    TRIG 68 10/21/2019       ASSESSMENT AND PLAN:    Curry Villa was seen today for hypertension and forms. Diagnoses and all orders for this visit:    1. Essential hypertension  BP today is 131/96 mmHg. DBP continues to be elevated. SBP is better compared to yesterday. Continue Coreg 6.25 mg twice daily, Norvasc 10 daily, HCTZ 25 mg daily. RTC in 1-2 months. 2.  Preoperative clearance  RCRI score 0.  We will clear the patient for lumbar laminectomy procedure on 3/12/2021 with low risk. FOLLOW UP AND INSTRUCTIONS:   No follow-ups on file. · Vinecnt received counseling on the following healthy behaviors: nutrition, exercise, medication adherence, tobacco cessation and decrease in alcohol consumption    · Discussed use, benefit, and side effects of prescribed medications. Barriers to medication compliance addressed. All patient questions answered. Pt voiced understanding.      · Patient given educational materials - see patient instructions        Gemma Libman, MD  Internal Medicine Resident, PGY-2  [facility];  [place], [place]  3/11/2021, 1:41 PM

## 2021-03-11 NOTE — PROGRESS NOTES
The patient is a 54 y. o. Non-/non  male.     Chief Complaint   Patient presents with    Back Pain    Medication Refill        HPI    -Chronic lower back pain onset more than 1 year ago located in the lumbar area  Radiation of pain down both legs predominantly right side associated with right leg numbness  Pain located over the gluteal region posterior thigh it all the way to the foot  Aggravated with any movement lifting bending twisting turning  Past history significant for left knee below amputation  No loss of bladder or bowel control    Imaging showed large lumbar disc herniation with effacement of the descending nerve root  Tried epidural injection did not provide much relief  Tried physical therapy  Tried nonnarcotic medications  Considering severe pain we will give him a short-term prescription for Norco which he did find helpful without any side effect    We gave him a second surgical evaluation referral  Patient is now scheduled for surgery tomorrow      Pain score Today:  8  Adverse effects (Constipation / Nausea / Sedation / sexual Dysfunction / others) :itching  Mood: fair  Sleep pattern and quality: poor  Activity level: poor    Pill count Today:Pt counted # 5 Norco  Last dose taken  3/11/21  OARRS report reviewed today: yes  ER/Hospitalizations/PCP visit related to pain since last visit:no   Any legal problems e.g. DUI etc.:No  Satisfied with current management: Yes    Opioid Contract:2/17/21  Last Urine Dug screen dated:2/17/21    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    Neuroblastoma ([address]     Neurogenic dysfunction of the urinary bladder     Osteoarthritis     Phantom limb pain ([facility]     Pure hypercholesterolemia 7/18/2017    RSD (reflex sympathetic dystrophy)       Past Surgical History:   Procedure Laterality Date    ABDOMINAL ADHESION SURGERY      BLADDER REPAIR      LEG AMPUTATION THROUGH LOWER TIBIA AND FIBULA Right 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    PAIN MANAGEMENT PROCEDURE Bilateral 2/20/2020    EPIDURAL STEROID INJECTION MOIZ L5S1 performed by Yahaira Rashid MD at [address] Bilateral 3/2/2020    EPIDURAL STEROID INJECTION MOIZ L5S1 performed by Yahaira Rashid MD at [address] [place] [address] Bilateral 8/6/2020    EPIDURAL STEROID INJECTION BILATERAL L5 performed by Yahaira Rashid MD at [address] [place] [address] Right 2/8/2021    RIGHT L5 S1 EPIDURAL STEROID INJECTION performed by Yahaira Rashid MD at [facility]      r/t bowel obstruction     Social History     Socioeconomic History    Marital status:      Spouse name: Not on file    Number of children: Not on file    Years of education: Not on file    Highest education level: Not on file   Occupational History    Not on file   Social Needs    Financial resource strain: Not very hard    Food insecurity     Worry: Never true     Inability: Never true    Transportation needs     Medical: No     Non-medical: No   Tobacco Use    Smoking status: Never Smoker    Smokeless tobacco: Never Used   Substance and Sexual Activity    Alcohol use: No     Alcohol/week: 0.0 standard drinks    Drug use: No    Sexual activity: Yes     Partners: Female   Lifestyle    Physical activity     Days per week: Not on file     Minutes per session: Not on file    Stress: Not on file   Relationships    Social connections     Talks on phone: Not on file     Gets together: Not on file     Attends Mosque service: Not on file     Active member of club or organization: Not on file     Attends meetings of clubs or organizations: Not on file     Relationship status: Not on file    Intimate partner violence     Fear of current or ex partner: Not on file     Emotionally abused: Not on file     Physically abused: Not on file     Forced sexual activity: Not on file   Other Topics Concern    Not on file   Social History Narrative    ** Merged History Encounter **          Family History   Problem Relation Age of Onset    High Blood Pressure Mother     Diabetes Mother     High Blood Pressure Father     Cancer Father         prostate    Diabetes Father     Coronary Art Dis Father     Heart Attack Father     Heart Disease Father     No Known Problems Sister     No Known Problems Brother      Allergies   Allergen Reactions    Shellfish-Derived Products Anaphylaxis    Penicillins Other (See Comments)     UNKNOWN REACTION     Shellfish-derived products and Penicillins   There were no vitals filed for this visit. Current Outpatient Medications   Medication Sig Dispense Refill    HYDROcodone-acetaminophen (NORCO) 5-325 MG per tablet Take 325 tablets by mouth as needed.  hydroCHLOROthiazide (HYDRODIURIL) 25 MG tablet take 2 tablet by mouth once daily 30 tablet 2    famotidine (PEPCID) 20 MG tablet Take 2 tablets by mouth 2 times daily 60 tablet 3    docusate sodium (COLACE) 100 MG capsule Take 1 capsule by mouth 2 times daily 30 capsule 0    cyclobenzaprine (FLEXERIL) 10 MG tablet Take 1 tablet by mouth 3 times daily as needed for Muscle spasms 30 tablet 0    atorvastatin (LIPITOR) 40 MG tablet take 1 tablet by mouth once daily 90 tablet 1    amLODIPine (NORVASC) 10 MG tablet take 1 tablet by mouth once daily 30 tablet 3    carvedilol (COREG) 6.25 MG tablet Take 1 tablet by mouth 2 times daily 60 tablet 3     No current facility-administered medications for this visit. Review of Systems   Constitutional: Negative. Negative for fever. Musculoskeletal: Positive for arthralgias and back pain. Neurological: Negative. Objective:  General Appearance:  Uncomfortable. Vital signs: (most recent): There were no vitals taken for this visit. Vital signs are normal.  No fever. Output: Producing urine and producing stool. HEENT: Normal HEENT exam.    Lungs:  Normal effort and normal respiratory rate. Breath sounds clear to auscultation. He is not in respiratory distress. Heart: Normal rate. Extremities: Normal range of motion. There is no deformity. Neurological: Patient is alert and oriented to person, place and time. Patient has normal coordination. Pupils:  Pupils are equal, round, and reactive to light. Pupils are equal.   Skin:  Warm and dry. No rash or cyanosis. Assessment & Plan  1. Chronic bilateral low back pain with right-sided sciatica    2. Lumbar disc disease        Urine toxicology report reviewed  No illicit substance    Patient reporting no side effect from [address] [place] [address] and find it helpful    He is scheduled for surgery tomorrow    Automated prescription report reviewed  No sign or symptom of any aberrancy  No refill needed today as patient is having surgery tomorrow    Follow-up after surgery if needed      Blanca Bennett was evaluated through a synchronous (real-time) audio-video encounter. The patient (or guardian if applicable) is aware that this is a billable service. Verbal consent to proceed has been obtained within the past 12 months. The visit was conducted pursuant to the emergency declaration under the [facility], [address] [place] [address] authority and the [employer] General Act. Patient identification was verified, and a caregiver was present when appropriate. The patient was located in a state where the provider was credentialed to provide care.     Total time spent for this encounter: Not billed by time    --Scout Solares MD on 3/11/2021 at 1:15 PM    An electronic signature was used to authenticate this note.           Electronically signed by Evangelina Eubanks MD on 3/11/2021 at 1:14 PM

## 2021-04-20 DIAGNOSIS — I10 ESSENTIAL HYPERTENSION: ICD-10-CM

## 2021-04-20 RX ORDER — HYDROCHLOROTHIAZIDE 25 MG/1
TABLET ORAL
Qty: 30 TABLET | Refills: 2 | Status: SHIPPED | OUTPATIENT
Start: 2021-04-20 | End: 2021-11-02 | Stop reason: SDUPTHER

## 2021-04-20 NOTE — TELEPHONE ENCOUNTER
Request for Hydrodiuril. Last Visit Date:   Next Visit Date:  No future appointments. Health Maintenance   Topic Date Due    COVID-19 Vaccine (1) Never done    Flu vaccine (Season Ended) 09/01/2021    Potassium monitoring  10/31/2021    Creatinine monitoring  10/31/2021    Annual Wellness Visit (AWV)  01/23/2022    Lipid screen  01/28/2022    Diabetes screen  01/28/2024    DTaP/Tdap/Td vaccine (2 - Td) 10/19/2025    Hepatitis C screen  Completed    HIV screen  Completed    Hepatitis A vaccine  Aged Out    Hepatitis B vaccine  Aged Out    Hib vaccine  Aged Out    Meningococcal (ACWY) vaccine  Aged Out    Pneumococcal 0-64 years Vaccine  Aged Out       Hemoglobin A1C (%)   Date Value   01/28/2021 5.5   01/13/2015 5.2             ( goal A1C is < 7)   Microalb/Crt.  Ratio (mcg/mg creat)   Date Value   10/20/2015 23     LDL Cholesterol (mg/dL)   Date Value   01/28/2021 112       (goal LDL is <100)   AST (U/L)   Date Value   10/31/2020 13     ALT (U/L)   Date Value   10/31/2020 8     BUN (mg/dL)   Date Value   10/31/2020 24 (H)     BP Readings from Last 3 Encounters:   03/11/21 (!) 131/96   03/10/21 (!) 153/94   02/17/21 (!) 156/93          (goal 120/80)    All Future Testing planned in [facility]  Lab Frequency Next Occurrence   OH NJX AA&/STRD TFRML EPI LUMBAR/SACRAL 1 LEVEL Once 02/03/2021         Patient Active Problem List:     GERD (gastroesophageal reflux disease)     Chronic bilateral low back pain with right-sided sciatica     Lumbar disc disease     Hemorrhoids, external     Essential hypertension     Pure hypercholesterolemia     Spondylosis without myelopathy or radiculopathy, lumbar region     Hx of right BKA (HCC)     Chronic lumbar radiculopathy     Spinal stenosis of lumbar region with neurogenic claudication     Lumbar foraminal stenosis     Arthritis of right hip     Right hip pain

## 2021-05-04 ENCOUNTER — HOSPITAL ENCOUNTER (OUTPATIENT)
Dept: PHYSICAL THERAPY | Age: 46
Setting detail: THERAPIES SERIES
Discharge: HOME | End: 2021-05-04
Payer: MEDICARE

## 2021-05-04 PROCEDURE — 97162 PT EVAL MOD COMPLEX 30 MIN: CPT

## 2021-05-04 PROCEDURE — 97110 THERAPEUTIC EXERCISES: CPT

## 2021-05-04 NOTE — PROGRESS NOTES
[facility] Outpatient Physical Therapy  [address] [place] [address]. [address]         Phone: [phone]       Fax: [phone]    Physical Therapy Spine Evaluation    Date:  2021  Patient: Yvon Arango  : 1975  MRN: [id number]  Physician: Cristo Quintero MD     Medical Diagnosis: Spondylolisthesis at L4-L5 level (M43.16)    Clinical Diagnosis: Low back pain with radicular symptoms/LE weakness and limited motion   Onset date: 2021    Next Dr's appt.: TBD  PT Visit Information  PT Insurance Information: [employer] - 2021 - 2021  Total # of Visits Approved: 18  Total # of Visits to Date: 1  No Show: 0  Canceled Appointment: 0    Comment: Leg and Ankle Strengthening Only/currently in back brace during waking hours. Subjective  CC: Low back pain   HPI: (2021)  History of low back pain/conservative treatment unsuccessful. Elected to have a lumbar \"fusion\" @ L4-L5 on 2021@ [facility]. Complications occurred and second surgery was performed the next day. Pt stated that he was hospitalized x 1 week. Discharged to with home with rigid brace that he is currently wearing per physician's orders. Outpt PT ordered for LE strengthening. PMHx: [] Unremarkable [] Diabetes [] HTN  [] Pacemaker   [] MI/Heart Problems [x] Cancer [x] Arthritis   [x] Other: Phantom limb pain ([facility] [address] Neurogenic dysfunction of the urinary bladder; Gynecomastia, male; RSD (reflex sympathetic dystrophy); Chronic abdominal pain; ED (erectile dysfunction) of organic origin; GERD (gastroesophageal reflux disease); Lumbar disc disease; Allergic rhinitis; JOELLEN (acute kidney injury) ([facility] [address]  History of hepatitis, hyperlipidemia                [x] Refer to full medical chart  In EPIC     Comorbidities  [] Obesity [] Dialysis  [] Other:   [] Asthma/COPD [] Dementia [] Other:   [] Stroke [] Sleep apnea [] Other:   [] Vascular disease [] Rheumatic disease [] Other:     Tests: [] X-Ray: [] MRI:  [] Other:     Medications: [x] Refer to full medical record [] None [] Other:  Allergies:      [x] Refer to full medical record [] None [] Other:     Normal Deficit Comments   Follows commands [x] []    Vision [x] []    Hearing [x] []    Orientation [x] []      Pain  Pain:  [x] Yes   [] No      Location: mid lumbar region, (B) buttocks and down the (L) LE into the toes.    Pain Rating: (0-10 scale) 8/10 with pain medication   Worst: 8/10 with pain medication   Best: 6/10 with pain medication   Symptoms:  [x] Improving [] Worsening       [] Same  Descriptors: Constant, shooting, burning aching and numbness/tingling   Aggravating factors: standing/walking   Relieving factors: Hot Bath   Sleep: Disturbed   Other:     Function  Hand Dominance  [x] Right  [] Left  Working:  [] Normal Duty  [] Light Duty  [] Off D/T Condition  [] Retired    [] Not Employed    [x]  Disability  [] Other:           Return to work:   Job/ADL Description:    ADL/IADL Previous level of function Current level of function Who currently assists the patient with task/Comments   Bathing  [x] Independent  [] Assist [x] Independent  [] Assist    Dress/grooming [x] Independent  [] Assist [x] Independent  [] Assist    Transfer/mobility [x] Independent  [] Assist [x] Independent  [] Assist    Feeding [x] Independent  [] Assist [x] Independent  [] Assist    Toileting [x] Independent  [] Assist [x] Independent  [] Assist    Driving [x] Independent  [] Assist [x] Independent  [] Assist    Housekeeping [x] Independent  [] Assist [] Independent  [x] Assist Family    Grocery shop/meal prep [x] Independent  [] Assist [] Independent  [x] Assist Family      Gait Prior level of function Current level of function    [x] Independent  [] Assist [x] Independent  [] Assist   Device: [x] Independent [] Independent    [] Straight Cane [] Quad cane [] Straight Cane [] Quad cane    [] Standard walker [] Rolling walker   [] 4 wheeled walker [] Standard walker [] Rolling walker   [x] 4 wheeled walker    [] Wheelchair [] Wheelchair     Objective  Observation/Palpation   Normal Deficit Comments   Observation [] [] N/A currently in a rigid brace/plan to assess once it is removed. Posture  [] []    Palpation [] []    Edema [] []    Scar [] []    Other [] []      Range of Motion  Spine - Range of Motion  Cervical  N/A  Thoracic   N/A per physician's order   Lumbar  N/A per physician's order     Right Lower Extremity - Active ROM  Hip flexion  WNL  Hip extension  WNL   Hip abduction  WNL   Hip adduction  WNL   Hip ER  WNL   Hip IR  WNL   Knee flexion  WNL   Knee extension  WNL     Left Lower Extremity - Active ROM  Hip flexion  WNL  Hip extension  WNL  Hip abduction  WNL  Hip adduction  WNL   Hip ER  WNL  Hip IR  WNL  Knee flexion  WNL  Knee extension  WNL  Dorsiflexion  (Plantarflexed @ 36 to 7 degrees) with knee extended/(Plantarflexed @ 48 to 24 degrees) with knee flexed. Plantarflexion  WNL   Inversion   10  Eversion   5    Left Lower Extremity - Passive ROM  Dorsiflexion  0 Straight leg; 6 knee flexed @ 90  Inversion  WNL  Eversion  WNL    Right Lower Extremity - Strength  Hip flexion  5/5 MMT   Hip extension  5/5 MMT   Hip abduction  5/5 MMT   Hip adduction  5/5 MMT   Hip ER  5/5 MMT   Hip IR  5/5 MMT     Left Lower Extremity - Strength  Hip flexion  5/5 MMT   Hip extension  5/5 MMT   Hip abduction  4/5 MMT   Hip adduction  4/5 MMT   Hip ER  5/5 MMT   Hip IR  5/5 MMT   Knee flexion  5/5 MMT   Knee extension  5/5 MMT   Dorsiflexion  2-/5 MMT   Plantarflexion  4/5 MMT   Inversion   2-/5 MMT   Eversion   2-/5 MMT     Additional Measures    Normal Deficit Comments   Sensation   [] []    Reflexes   [] []    Gross motor   [] []    Flexibility    [] [x] (L) quad tightness was apparent (supine position)    Special Tests   [] []     strength   [] []    Other   [] []      Gait Assessment  Assistive device: 4 wheeled walker   Comments:  Forward lean at the waist/rigid back brace     Balance  N/A currently using an assistive device for double limb support        Assessment  Patient demonstrated decreased (L) hip weakness in the frontal plane and decreased (L) ankle weakness in frontal and sagittal plane motions when compared to the (R) LE. The patient's (L) and (R) knee and hip AROM in all planes were within normal limits. Active and passive (L) ankle DF were limited as a result of (L) foot drop caused by complications from lumbar fusion surgery. Active (L) ankle frontal plane motions were limited due to weakness as evidenced by PROM being WNL. Patient would continue to benefit from skilled physical therapy services in order to regain strength and ROM in the (L) LE to help with (I) function. Problems:    [x] ? Pain: 6-8/10 pain in the mid lumbar region, (B) buttocks and down the (L) LE into the toes. Pain described as Constant, shooting, burning aching and numbness/tingling   [x] ? ROM: AROM - (L) frontal plane and DF (knee bent and extended); PROM - (L) DF (knee bent and extended)    [x] ? Strength:  (L) hip ABD/ADDductors (4/5 MMT); (L) ankle DF (2-/5 MMT); (L) ankle PF (4/5 MMT); (L) ankle IV and EV (2-/5 MMT)   [x] ? Function: Modified Oswestry = 54% disability   [] Other:      Goals   STG: (to be met in 9 treatments)  1. Pt will report \"intermittent\" low back pain to help regain (I) function. 2. Pt will demonstrate improved AROM in the (L) ankle in the frontal and sagittal planes to help improve mobility. 3. Pt will demonstrate improved (L) hip strength in the frontal plane and (L) ankle strength in the frontal and sagittal planes to assist with (I) function and mobility. 4. Modified Oswestry Disability Index improved by 5%. 5. Patient will demonstrate knowledge of fall prevention. LTG: (to be met in 18 treatments)  1. Pt will report decreased \"intermittent\" low back pain (1-2/10) to help improve (I) function.   2. Pt will demonstrate AROM WNL in the (L) ankle in the frontal and sagittal planes to improve (I) mobility. 3. Pt will demonstrate 5/5 (L) hip ABD/ADD MMT and (L) ankle PF, and 4/5 (L) ankle DF, IV, and EV MMT to improve (I) function and mobility. 4. Modified Oswestry Disability Index improved by 10%. (Sofiya Hubbard [address])  5.            Patient goals: \"Get some kind of relief\"      Functional Assessment Used: Modified Oswestry Low Back Pain Disability Questionnaire  Current Status: 27/50 = 54%  Goal Status: 44%    Evaluation Complexity: Moderate    History:  Keena STarT Back Screening Tool Total score = 8/9; Sub score (Q5-9) = 4/5 (High Risk), Fear-Avoidance Beliefs Questionnaire (FABQ) -  Physical Activity Sub Scale 2 - 21 points (Red flag - 14 points or more)  Exam: Pain level 8/10 lumbar region with radicular symptoms down the (L) LE, LE weakness and limited motion. Clinical Presentation: Patient's symptoms are evolving. Barriers to Learning: None    Rehab Potential:  [x] Good  [] Fair  [] Poor   Suggested Professional Referral:  [x] No  [] Yes:  Barriers to Goal Achievement[de-identified]  [x] No  [] Yes:  Domestic Concerns:  [x] No  [] Yes:    Pt. Education:  [x] Plans/Goals, Risks/Benefits discussed    [x] Home exercise program - Long Seated Gastroc stretch 30 sec hold x 3 reps followed by active dorsiflexion x 10 reps (Daily 2-3 sessions)     Method of Education: [x] Verbal  [x] Demo  [] Written  Comprehension of Education:  [] Verbalizes understanding. [] Demonstrates understanding. [x] Needs Review. [] Demonstrates/verbalizes understanding of HEP/Ed previously given.     Treatment Plan:  [x] Therapeutic Exercise   79631  [] Iontophoresis: 4 mg/mL Dexamethasone Sodium Phosphate  mAmin  31252   [] Therapeutic Activity  09543 [] Vasopneumatic cold with compression  53638    [] Gait Training   63198 [] Ultrasound   21331   [] Neuromuscular Re-education  59923 [] Electrical Stimulation Unattended  75975   [] Manual Therapy  46831 [] Electrical Stimulation Attended  59398   [x] Instruction in HEP  [] Lumbar/Cervical Traction  [id number]   [] Aquatic Therapy   [id number] [] Cold/hotpack    [] Massage   [id number]      [] Dry Needling, 1 or 2 muscles  28454   [] Biofeedback, first 15 minutes   33667  [] Biofeedback, additional 15 minutes   25961 [] Dry Needling, 3 or more muscles  51226     []  Medication allergies reviewed for use of    Dexamethasone Sodium Phosphate 4mg/ml     with iontophoresis treatments. Pt is not allergic. Frequency: 2- 3 x/week for 18  visits    Todays Treatment:  Modalities:   Precautions: 3/14/21 - Lumbar fusion @ L4-L5. Currently in rigid back brace. Leg and ankle strengthening only. Exercises:  Exercise Reps/ Time Weight/ Level Comments                                 Other:    Specific Instructions for next treatment: Assess irritability and initiate a therapeutic exercise program for LE ROM and strengthening. Treatment Charges: Mins Units   [x] Evaluation       []  Low       [x]  Moderate       []  High 45 1   []  Modalities     [x]  Ther Exercise 15 1   []  Manual Therapy     []  Ther Activities     []  Aquatics     []  Neuromuscular     []  Gait Training     []  Dry Needling           1-2 muscles     []  Dry Needling           3 or more          muscles     [] Vasocompression     []  Other        TOTAL TREATMENT TIME: 60     Time in: 0930   Time Out: 1030    Electronically signed by: Hiren Glass PT DPT    Physician Signature:________________________________Date:__________________  By signing above or cosigning this note, I have reviewed this plan of care and certify a need for medically necessary rehabilitation services.      *PLEASE SIGN ABOVE AND FAX BACK ALL PAGES*

## 2021-05-05 NOTE — PROGRESS NOTES
[facility] Fall Risk Assessment    Name: Janina YarbroughEleanor Slater [facility]   Risk Factor Scale  Score   History of Falls [x] Yes  [] No 25  0 25   Secondary Diagnosis [] Yes  [x] No 15  0 0   Ambulatory Aid [] Furniture  [x] Crutches/cane/walker  [] None/bedrest/wheelchair/nurse 30  15  0 15   IV/Heparin Lock [] Yes  [x] No 20  0 0   Gait/Transferring [x] Impaired  [] Weak  [] Normal/bedrest/immobile 20  10  0 20   Mental Status [] Forgets limitations  [x] Oriented to own ability 15  0 0      Total: 60     Based on the Assessment score: check the appropriate box.     []  No intervention needed   Low =   Score of 0-24    []  Use standard prevention interventions Moderate =  Score of 24-44   [] Give patient handout and discuss fall prevention strategies   [] Establish goal of education for patient/family RE: fall prevention strategies    [x]  Use high risk prevention interventions High = Score of 45 and higher   [x] Give patient handout and discuss fall prevention strategies   [x] Establish goal of education for patient/family Re: fall prevention strategies   [x] Discuss lifeline / other resources    Electronically signed by:   Thea Wilson PT DPT  Date: 5/5/2021

## 2021-05-28 ENCOUNTER — HOSPITAL ENCOUNTER (OUTPATIENT)
Dept: PHYSICAL THERAPY | Age: 46
Setting detail: THERAPIES SERIES
Discharge: HOME | End: 2021-05-28
Payer: MEDICARE

## 2021-05-28 PROCEDURE — 97110 THERAPEUTIC EXERCISES: CPT

## 2021-05-28 NOTE — PROGRESS NOTES
[facility]   [address]   Phone: [phone]   Fax: [phone]    Physical Therapy Daily Treatment Note    Date:  2021  Patient: Ashtyn Martin  : 1975  MRN: [id number]  Physician: Christie Del Rio MD                              Medical Diagnosis: Spondylolisthesis at L4-L5 level (M43.16)                      Clinical Diagnosis: Low back pain with radicular symptoms/LE weakness and limited motion   Onset date: 2021                  Next Dr's appt.: TBD  PT Visit Information  PT Insurance Information: [employer]  Total # of Visits Approved: 18  Total # of Visits to Date: 2  No Show: 1  Canceled Appointment: 0    Comment: Leg and Ankle Strengthening Only/currently in back brace during waking hours.       Insurance authorization has been received for 18 visits since his initial evaluation. (2021 -2021)    Subjective  Symptoms relatively unchanged since his initial evaluation. Did not wear his back brace/stated that he has had difficulty breathing with it and his surgeon stated that he could wear it as needed. Pain  Pain:  [x]? Yes   []? No               Location: mid lumbar region, (B) buttocks and down the (L) LE into the toes. Pain Rating: (0-10 scale) 8/10 with pain medication   Worst: 8/10 with pain medication   Best: 6/10 with pain medication   Symptoms:  []? Improving   []? Worsening                 [x]?  Same  Descriptors: Constant, shooting, burning aching and numbness/tingling   Aggravating factors: standing/walking   Relieving factors: Hot Bath   Sleep: Disturbed   Other:     Objective  Modalities:   Precautions:   Exercises:  Exercise Reps/ Time Weight/ Level Comments   Supine (L) ankle dorsiflexion  20 reps      Supine (L) SLR  10 reps      Side lying (L) hip abduction  5 reps x 2 sets    AAROM    Side lying (L) ankle eversion  10 reps x 2 sets      Prone (L) hip extension  10 reps x 2 sets    AAROM Side lying (L) hip inversion 10 reps x 2 sets      Sit to Stand Transfers 5 reps   Without UE support    Squats 10 reps              Passive Stretching   Supine Plantar flexor/gastroc stretch 30 sec hold x 3 reps   Supine (L) hamstring stretch 30 sec hold x 3 reps   Supine (L) quad stretch 30 sec hold x 3 reps     Neuro \"Sliders\"  Supine (90/90) with ankle dorsiflexion  Supine (90/90) with knee flexion/extension    Pt. Education:  [] Yes  [] No  [] Reviewed Prior HEP/Ed  Method of Education: [] Verbal  [] Demo  [] Written  HEP:   Comprehension of Education:  [] Verbalizes understanding. [] Demonstrates understanding. [] Needs review. [] Demonstrates/verbalizes HEP/Ed previously given. Activity Tolerance  Patient tolerated treatment well     Assessment  Initiated a therapeutic exercise program for (L) LE strengthening/demonstrated much greater weakness within the hip then exhibited during the evaluation several weeks ago. Goals   STG: (to be met in 9 treatments)  1. Pt will report \"intermittent\" low back pain to help regain (I) function. 2. Pt will demonstrate improved AROM in the (L) ankle in the frontal and sagittal planes to help improve mobility. 3. Pt will demonstrate improved (L) hip strength in the frontal plane and (L) ankle strength in the frontal and sagittal planes to assist with (I) function and mobility. 4. Modified Oswestry Disability Index improved by 5%. 5. Patient will demonstrate knowledge of fall prevention.      LTG: (to be met in 18 treatments)  1. Pt will report decreased \"intermittent\" low back pain (1-2/10) to help improve (I) function. 2. Pt will demonstrate AROM WNL in the (L) ankle in the frontal and sagittal planes to improve (I) mobility. 3. Pt will demonstrate 5/5 (L) hip ABD/ADD MMT and (L) ankle PF, and 4/5 (L) ankle DF, IV, and EV MMT to improve (I) function and mobility. 4. Modified Oswestry Disability Index improved by 10%.  (Sofiya Haydee [address])  5.            Patient goals: \"Get some kind of relief\"       Plan: [x] Continue per plan of care.    [] Other:      Treatment Charges: Mins Units   []  Modalities     [x]  Ther Exercise 45 3   []  Manual Therapy     []  Ther Activities     []  Aquatics     []  Neuromuscular     [] Vasocompression     [] Gait Training     [] Dry needling        [] 1 or 2 muscles        [] 3 or more muscles     []  Other     Total Treatment time 45 3     Time In: 1430           Time Out: 1376    Electronically signed by:  ESTELLE NovoaT

## 2021-07-09 DIAGNOSIS — I10 ESSENTIAL HYPERTENSION: ICD-10-CM

## 2021-07-09 NOTE — TELEPHONE ENCOUNTER
Request for Carvedilol. Last Visit Date: 3/11/2021  Next Visit Date:  Future Appointments   Date Time Provider Ximena Bustosi   7/27/2021  9:10 AM Erick Jang MD [address] [facility] Maintenance   Topic Date Due    COVID-19 Vaccine (1) Never done    Colon cancer screen colonoscopy  Never done    Flu vaccine (1) 09/01/2021    Potassium monitoring  10/31/2021    Creatinine monitoring  10/31/2021    Annual Wellness Visit (AWV)  01/23/2022    Lipid screen  01/28/2022    Diabetes screen  01/28/2024    DTaP/Tdap/Td vaccine (2 - Td or Tdap) 10/19/2025    Hepatitis C screen  Completed    HIV screen  Completed    Hepatitis A vaccine  Aged Out    Hepatitis B vaccine  Aged Out    Hib vaccine  Aged Out    Meningococcal (ACWY) vaccine  Aged Out    Pneumococcal 0-64 years Vaccine  Aged Out       Hemoglobin A1C (%)   Date Value   01/28/2021 5.5   01/13/2015 5.2             ( goal A1C is < 7)   Microalb/Crt.  Ratio (mcg/mg creat)   Date Value   10/20/2015 23     LDL Cholesterol (mg/dL)   Date Value   01/28/2021 112       (goal LDL is <100)   AST (U/L)   Date Value   10/31/2020 13     ALT (U/L)   Date Value   10/31/2020 8     BUN (mg/dL)   Date Value   10/31/2020 24 (H)     BP Readings from Last 3 Encounters:   03/11/21 (!) 131/96   03/10/21 (!) 153/94   02/17/21 (!) 156/93          (goal 120/80)    All Future Testing planned in [facility]  Lab Frequency Next Occurrence         Patient Active Problem List:     GERD (gastroesophageal reflux disease)     Chronic bilateral low back pain with right-sided sciatica     Lumbar disc disease     Hemorrhoids, external     Essential hypertension     Pure hypercholesterolemia     Spondylosis without myelopathy or radiculopathy, lumbar region     Hx of right BKA (HCC)     Chronic lumbar radiculopathy     Spinal stenosis of lumbar region with neurogenic claudication     Lumbar foraminal stenosis     Arthritis of right hip     Right hip pain

## 2021-07-12 RX ORDER — CARVEDILOL 6.25 MG/1
TABLET ORAL
Qty: 60 TABLET | Refills: 3 | Status: SHIPPED | OUTPATIENT
Start: 2021-07-12 | End: 2021-11-02 | Stop reason: SDUPTHER

## 2021-07-26 RX ORDER — GABAPENTIN 300 MG/1
CAPSULE ORAL
COMMUNITY
Start: 2021-04-28 | End: 2021-07-27 | Stop reason: SDUPTHER

## 2021-07-26 RX ORDER — PHENYLEPHRINE HCL 1 %
SPRAY, NON-AEROSOL (ML) NASAL
COMMUNITY
Start: 2021-05-06 | End: 2021-11-02

## 2021-07-26 RX ORDER — OXYCODONE HYDROCHLORIDE AND ACETAMINOPHEN 5; 325 MG/1; MG/1
TABLET ORAL
COMMUNITY
Start: 2021-04-22 | End: 2021-11-02

## 2021-07-27 ENCOUNTER — HOSPITAL ENCOUNTER (OUTPATIENT)
Age: 46
Setting detail: SPECIMEN
Discharge: HOME | End: 2021-07-27
Payer: MEDICARE

## 2021-07-27 ENCOUNTER — OFFICE VISIT (OUTPATIENT)
Dept: INTERNAL MEDICINE | Age: 46
End: 2021-07-27
Payer: MEDICARE

## 2021-07-27 VITALS
DIASTOLIC BLOOD PRESSURE: 93 MMHG | BODY MASS INDEX: 25.4 KG/M2 | HEIGHT: 62 IN | WEIGHT: 138 LBS | HEART RATE: 85 BPM | SYSTOLIC BLOOD PRESSURE: 134 MMHG

## 2021-07-27 DIAGNOSIS — I10 ESSENTIAL HYPERTENSION: ICD-10-CM

## 2021-07-27 DIAGNOSIS — N29 NEPHROCALCINOSIS: ICD-10-CM

## 2021-07-27 DIAGNOSIS — E83.59 NEPHROCALCINOSIS: ICD-10-CM

## 2021-07-27 DIAGNOSIS — Z12.11 COLON CANCER SCREENING: ICD-10-CM

## 2021-07-27 DIAGNOSIS — M54.16 CHRONIC LUMBAR RADICULOPATHY: ICD-10-CM

## 2021-07-27 DIAGNOSIS — G89.29 CHRONIC BILATERAL LOW BACK PAIN WITH BILATERAL SCIATICA: Primary | ICD-10-CM

## 2021-07-27 DIAGNOSIS — M54.42 CHRONIC BILATERAL LOW BACK PAIN WITH BILATERAL SCIATICA: Primary | ICD-10-CM

## 2021-07-27 DIAGNOSIS — M54.41 CHRONIC BILATERAL LOW BACK PAIN WITH BILATERAL SCIATICA: Primary | ICD-10-CM

## 2021-07-27 LAB
ANION GAP SERPL CALCULATED.3IONS-SCNC: 16 MMOL/L (ref 9–17)
BUN BLDV-MCNC: 21 MG/DL (ref 6–20)
BUN/CREAT BLD: ABNORMAL (ref 9–20)
CALCIUM SERPL-MCNC: 9.3 MG/DL (ref 8.6–10.4)
CHLORIDE BLD-SCNC: 104 MMOL/L (ref 98–107)
CO2: 19 MMOL/L (ref 20–31)
CREAT SERPL-MCNC: 0.99 MG/DL (ref 0.7–1.2)
GFR AFRICAN AMERICAN: >60 ML/MIN
GFR NON-AFRICAN AMERICAN: >60 ML/MIN
GFR SERPL CREATININE-BSD FRML MDRD: ABNORMAL ML/MIN/{1.73_M2}
GFR SERPL CREATININE-BSD FRML MDRD: ABNORMAL ML/MIN/{1.73_M2}
GLUCOSE BLD-MCNC: 91 MG/DL (ref 70–99)
POTASSIUM SERPL-SCNC: 4 MMOL/L (ref 3.7–5.3)
SODIUM BLD-SCNC: 139 MMOL/L (ref 135–144)

## 2021-07-27 PROCEDURE — 36415 COLL VENOUS BLD VENIPUNCTURE: CPT

## 2021-07-27 PROCEDURE — 99213 OFFICE O/P EST LOW 20 MIN: CPT | Performed by: STUDENT IN AN ORGANIZED HEALTH CARE EDUCATION/TRAINING PROGRAM

## 2021-07-27 PROCEDURE — 80048 BASIC METABOLIC PNL TOTAL CA: CPT

## 2021-07-27 PROCEDURE — 1036F TOBACCO NON-USER: CPT | Performed by: STUDENT IN AN ORGANIZED HEALTH CARE EDUCATION/TRAINING PROGRAM

## 2021-07-27 PROCEDURE — 99211 OFF/OP EST MAY X REQ PHY/QHP: CPT | Performed by: INTERNAL MEDICINE

## 2021-07-27 PROCEDURE — G8419 CALC BMI OUT NRM PARAM NOF/U: HCPCS | Performed by: STUDENT IN AN ORGANIZED HEALTH CARE EDUCATION/TRAINING PROGRAM

## 2021-07-27 PROCEDURE — G8427 DOCREV CUR MEDS BY ELIG CLIN: HCPCS | Performed by: STUDENT IN AN ORGANIZED HEALTH CARE EDUCATION/TRAINING PROGRAM

## 2021-07-27 RX ORDER — OXYCODONE HYDROCHLORIDE AND ACETAMINOPHEN 5; 325 MG/1; MG/1
1 TABLET ORAL EVERY 4 HOURS PRN
Qty: 15 TABLET | Refills: 0 | Status: CANCELLED | OUTPATIENT
Start: 2021-07-27 | End: 2021-07-30

## 2021-07-27 RX ORDER — AMLODIPINE BESYLATE 10 MG/1
TABLET ORAL
Qty: 30 TABLET | Refills: 3 | Status: SHIPPED | OUTPATIENT
Start: 2021-07-27 | End: 2021-11-02 | Stop reason: SDUPTHER

## 2021-07-27 RX ORDER — CYCLOBENZAPRINE HCL 10 MG
10 TABLET ORAL 3 TIMES DAILY PRN
Qty: 30 TABLET | Refills: 0 | Status: SHIPPED | OUTPATIENT
Start: 2021-07-27 | End: 2021-11-02 | Stop reason: SDUPTHER

## 2021-07-27 RX ORDER — GABAPENTIN 300 MG/1
CAPSULE ORAL
Qty: 90 CAPSULE | Refills: 3 | Status: SHIPPED | OUTPATIENT
Start: 2021-07-27 | End: 2021-12-23

## 2021-07-27 RX ORDER — CYCLOBENZAPRINE HCL 10 MG
10 TABLET ORAL 3 TIMES DAILY PRN
Qty: 30 TABLET | Refills: 0 | Status: CANCELLED | OUTPATIENT
Start: 2021-07-27

## 2021-07-27 NOTE — PROGRESS NOTES
washing your hands well. When you're fully vaccinated, you don't need to wear a mask when you are around other fully vaccinated people. And you don't need one around unvaccinated people who are all from one other household, as long as no one in that household is at high risk of severe illness from COVID-19. Why not just get COVID-19 and skip the vaccine? The risk of serious problems from the virus is much higher than the risk of serious problems from the vaccine. COVID-19 is unpredictable. Even if you're young and healthy, you could get very sick, have long-term health problems, or die. So it's much safer to get the vaccine than it is to catch COVID-19. The COVID-19 vaccine is one of the best ways to help stop the pandemic. So get vaccinated as soon as you can. Current as of: March 26, 2021               Content Version: 12.9  © 2006-2021 Healthwise, [facility]. Care instructions adapted under license by [facility] ([facility]).  If you have questions about a medical condition or this instruction, always ask your healthcare professional. [address] any warranty or liability for your use of this information.

## 2021-07-27 NOTE — PROGRESS NOTES
Attending Physician Statement  I have discussed the care of [address] [place] [address] Juan Manuel Philip, including pertinent history and exam findings with the resident. I have reviewed the key elements of all parts of the encounter with the resident. I agree with the assessment, and status of the problem list as documented. Diagnosis Orders   1. Chronic bilateral low back pain with bilateral sciatica  gabapentin (NEURONTIN) 300 MG capsule   2. Essential hypertension  amLODIPine (NORVASC) 10 MG tablet   3. Colon cancer screening  Cologuard (For External Results Only)   4. Nephrocalcinosis  Basic Metabolic Panel    AFL([facility]) - Anthony Lewis MD, Nephrology, [facility]   5. Chronic lumbar radiculopathy  cyclobenzaprine (FLEXERIL) 10 MG tablet      The plan and orders should include   Orders Placed This Encounter   Procedures    Cologuard (For External Results Only)    Basic Metabolic Panel    AFL([facility]) - Anthony Lewis MD, Nephrology, [facility]    and this was also documented by the resident. I agree with the referral to Nephro. The medication list was reviewed with the resident and is up to date. The return visit should be in 6 months .     Dr Sol Callahan MD, [address]  Associate , Department of Internal Medicine  Resident Ambulatory Site Medical Director  [address] [facility] Internal Medicine  [address] [facility],[address]  Internal Medicine Clerkship - Phan Khan    7/27/2021, 9:53 AM

## 2021-07-27 NOTE — PROGRESS NOTES
@[facility]@    [facility] MEDICINE [facility]    Progress Note    Date of patient's visit: 7/27/2021    Patient's Name:  Judy Hurtado    YOB: 1975            Patient Care Team:  Josh Kiser MD as PCP - General (Internal Medicine)  REY Cain - CNP as PCP - [facility] Empaneled Provider  MD Pilar Ribeiro DO as Consulting Physician (General Surgery)    REASON FOR VISIT: Routine outpatient follow     Chief Complaint   Patient presents with    6 Month Follow-Up     HTN    Health Maintenance     patient would like discuss covid vaccine, cologuard pended         HISTORY OF PRESENT ILLNESS:    History was obtained from the patient. Judy Hurtado is a 51 y.o. is here for 6-month follow-up. Patient complains of lower back pain, radiating to bilateral lower extremity. Aggravated on walking and relieved with resting. Associated with tingling numbness of bilateral lower extremity. Patient is requesting for refill of his Percocet. Patient had his laminectomy done in March 2021 by orthopedist Dr. Brendan Travis. Patient does not follow-up with neurosurgery    He is compliant with his blood pressure pills. Blood pressure 134/93 today. A1c 5.5 January 2021    Due for colon cancer screening. Denies alcohol consumption, smoking cigarettes or substance use. 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     Patient declines Covid vaccination    Past Medical History:   Diagnosis Date    JOELLEN (acute kidney injury) ([facility] [address] 2/12/2015    Allergic rhinitis     Chronic abdominal pain     ED (erectile dysfunction) of organic origin     GERD (gastroesophageal reflux disease)     Gynecomastia, male     History of hepatitis 07/18/2017    PT DENIES    Hypertension     Lumbar disc disease     Neuroblastoma ([facility] [address] 1975    Neuroblastoma ([facility] [address]     Neurogenic dysfunction of the urinary bladder     Osteoarthritis     Phantom limb pain ([facility] [address]     Pure hypercholesterolemia 7/18/2017    RSD (reflex sympathetic dystrophy)        Past Surgical History:   Procedure Laterality Date    ABDOMINAL ADHESION SURGERY      BLADDER REPAIR      LEG AMPUTATION THROUGH LOWER TIBIA AND FIBULA Right 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    PAIN MANAGEMENT PROCEDURE Bilateral 2/20/2020    EPIDURAL STEROID INJECTION MIOZ L5S1 performed by Alfredo Rose MD at [address] [place] [address] Bilateral 3/2/2020    EPIDURAL STEROID INJECTION MOIZ L5S1 performed by Alfredo Rose MD at [address] [place] [address] Bilateral 8/6/2020    EPIDURAL STEROID INJECTION BILATERAL L5 performed by Alfredo Rose MD at [address] [place] [address] Right 2/8/2021    RIGHT L5 S1 EPIDURAL STEROID INJECTION performed by Alfredo Rose MD at [place] [address]      r/t bowel obstruction         ALLERGIES      Allergies   Allergen Reactions    Shellfish-Derived Products Anaphylaxis    Penicillins Other (See Comments)     UNKNOWN REACTION       MEDICATIONS:      Current Outpatient Medications on File Prior to Visit   Medication Sig Dispense Refill    carvedilol (COREG) 6.25 MG tablet take 1 tablet by mouth twice a day 60 tablet 3    hydroCHLOROthiazide (HYDRODIURIL) 25 MG tablet take 2 tablets by mouth once daily 30 tablet 2    famotidine (PEPCID) 20 MG tablet Take 2 tablets by mouth 2 times daily 60 tablet 3    cyclobenzaprine (FLEXERIL) 10 MG PHYSICAL EXAM:      Vitals:    07/27/21 0909 07/27/21 0912   BP: (!) 143/94 (!) 134/93   Site: Left Upper Arm Left Upper Arm   Position: Sitting Sitting   Cuff Size: Medium Adult Medium Adult   Pulse: 86 85   Weight: 138 lb (62.6 kg)    Height: 5' 2\" (1.575 m)      Body mass index is 25.24 kg/m². BP Readings from Last 3 Encounters:   07/27/21 (!) 134/93   03/11/21 (!) 131/96   03/10/21 (!) 153/94        Wt Readings from Last 3 Encounters:   07/27/21 138 lb (62.6 kg)   03/11/21 149 lb (67.6 kg)   03/10/21 149 lb 9.6 oz (67.9 kg)           · General appearance: awake, alert, cooperative  · HEENT: Head: Normocephalic, no lesions, without obvious abnormality. · Lungs: clear to auscultation bilaterally  · Heart: regular rate and rhythm, S1, S2 normal, no murmur  · Abdomen: soft, non-tender; bowel sounds normal; no masses,  no organomegaly  · Extremities: extremities normal, atraumatic, no cyanosis or edema  · Neurological:  Awake, alert, oriented to name, place and time. Cranial nerves II-XII are grossly intact. Reflexes normal and symmetric.  Sensation grossly normal  · Eye no icterus no redness    LABORATORY FINDINGS:    CBC:  Lab Results   Component Value Date    WBC 10.4 10/31/2020    HGB 15.6 10/31/2020     10/31/2020     BMP:    Lab Results   Component Value Date     10/31/2020    K 3.4 10/31/2020    CL 99 10/31/2020    CO2 22 10/31/2020    BUN 24 10/31/2020    CREATININE 1.24 10/31/2020    GLUCOSE 133 10/31/2020     HEMOGLOBIN A1C:   Lab Results   Component Value Date    LABA1C 5.5 01/28/2021     MICROALBUMIN URINE:   Lab Results   Component Value Date    MICROALBUR 55 10/20/2015     FASTING LIPID PANEL:  Lab Results   Component Value Date    CHOL 218 (H) 10/21/2019    HDL 55 01/28/2021    TRIG 68 10/21/2019     Lab Results   Component Value Date    LDLCHOLESTEROL 112 01/28/2021       LIVER PROFILE:  Lab Results   Component Value Date    ALT 8 10/31/2020    AST 13 10/31/2020    PROT 8.3 10/31/2020    BILITOT 0.49 10/31/2020    LABALBU 4.3 10/31/2020      THYROID FUNCTION:   Lab Results   Component Value Date    TSH 0.58 01/28/2021      URINE ANALYSIS: No results found for: LABURIN  ASSESSMENT AND PLAN:    1. Chronic bilateral low back pain with bilateral sciatica    - oxyCODONE-acetaminophen (PERCOCET) 5-325 MG per tablet; Take 1 tablet by mouth every 4 hours as needed for Pain for up to 3 days. Dispense: 15 tablet; Refill: 0  - gabapentin (NEURONTIN) 300 MG capsule; take 1 capsule by mouth three times a day  Dispense: 90 capsule; Refill: 3  -Patient requested to get further refills of opioid analgesics from Orthopedic surgeon and pain physician    2. Essential hypertension  Well-controlled  -Continue amLODIPine (NORVASC) 10 MG tablet; take 1 tablet by mouth once daily  Dispense: 30 tablet; Refill: 3  -Continue Coreg 6.25 mg twice daily and hydrochlorthiazide 25 mg daily    3. Colon cancer screening    - Cologuard (For External Results Only); Future    4. Bilateral nephrocalcinosis     Follow BMP and nephrology recommendation  Patient will need stone clinic referral at [address] [place] [address]:   No follow-ups on file. 1. Vincent received counseling on the following healthy behaviors: nutrition, exercise and medication adherence    2. Reviewed prior labs and health maintenance. 3. Discussed use, benefit, and side effects of prescribed medications. Barriers to medication compliance addressed. All patient questions answered. Pt voiced understanding.      4. Patient given educational materials - see patient instructions         Brandon Ge MD  PGY-3, Internal Medicine Resident  [facility]  7/27/2021 9:43 AM

## 2021-07-27 NOTE — PATIENT INSTRUCTIONS
Medications e-scribe to pharmacy of pt's choice. Labs given to patient, they will have them done before their next visit. Referral to Nephrology was placed, summary of care printed and faxed to office, phone numbers given to the patient, they will contact office for an appt    Patient was put on a wait list for 6 months and will be contacted to schedule their next follow up appointment once the schedule is available. If the patient is in need of an appointment before their next visit please call the office at [phone]. After Visit Summary  given and reviewed.

## 2021-10-03 DIAGNOSIS — E78.00 PURE HYPERCHOLESTEROLEMIA: ICD-10-CM

## 2021-10-04 ENCOUNTER — TELEPHONE (OUTPATIENT)
Dept: INTERNAL MEDICINE | Age: 46
End: 2021-10-04

## 2021-10-04 NOTE — TELEPHONE ENCOUNTER
Fax received that pt has not returned cologuard test. PC to pt to see if pt received test and if pt had any questions or concerns about the test. PC to pt; Pt states he did not recieve the test, writer gave number for pt to call to get new test. . If pt needs a new test Please call [phone] to get a new one sent to them.

## 2021-10-04 NOTE — LETTER
[address] [facility] [address] [address]  Phone: [phone]  Fax: [phone]    Josefina Syed MD        January 24, 2022    [address]      Dear Joseph Ordoñez: We were unable to reach you by phone. It is important that you contact us by calling [phone], at your earliest convenience. This message is regarding your Cologuard Test.  If you did not receive test your Cologuard Test in the mail Please call [phone] to get a new one. If you have any questions or concerns, please don't hesitate to call.     Sincerely,        Josefina Syed MD

## 2021-10-05 RX ORDER — ATORVASTATIN CALCIUM 40 MG/1
TABLET, FILM COATED ORAL
Qty: 90 TABLET | Refills: 1 | Status: SHIPPED | OUTPATIENT
Start: 2021-10-05 | End: 2021-11-02

## 2021-10-19 ENCOUNTER — HOSPITAL ENCOUNTER (OUTPATIENT)
Dept: PREADMISSION TESTING | Age: 46
Discharge: HOME | End: 2021-10-23
Payer: MEDICARE

## 2021-10-19 VITALS
TEMPERATURE: 97.3 F | HEART RATE: 85 BPM | SYSTOLIC BLOOD PRESSURE: 143 MMHG | RESPIRATION RATE: 18 BRPM | OXYGEN SATURATION: 99 % | HEIGHT: 62 IN | WEIGHT: 143.6 LBS | DIASTOLIC BLOOD PRESSURE: 96 MMHG | BODY MASS INDEX: 26.43 KG/M2

## 2021-10-19 LAB
ABSOLUTE EOS #: 0.08 K/UL (ref 0–0.44)
ABSOLUTE IMMATURE GRANULOCYTE: 0.02 K/UL (ref 0–0.3)
ABSOLUTE LYMPH #: 2.12 K/UL (ref 1.1–3.7)
ABSOLUTE MONO #: 0.54 K/UL (ref 0.1–1.2)
ANION GAP SERPL CALCULATED.3IONS-SCNC: 12 MMOL/L (ref 9–17)
BASOPHILS # BLD: 1 % (ref 0–2)
BASOPHILS ABSOLUTE: 0.03 K/UL (ref 0–0.2)
BILIRUBIN URINE: NEGATIVE
BUN BLDV-MCNC: 22 MG/DL (ref 6–20)
BUN/CREAT BLD: 19 (ref 9–20)
CALCIUM SERPL-MCNC: 9.3 MG/DL (ref 8.6–10.4)
CHLORIDE BLD-SCNC: 106 MMOL/L (ref 98–107)
CO2: 20 MMOL/L (ref 20–31)
COLOR: YELLOW
COMMENT UA: NORMAL
CREAT SERPL-MCNC: 1.14 MG/DL (ref 0.7–1.2)
DIFFERENTIAL TYPE: ABNORMAL
EKG ATRIAL RATE: 83 BPM
EKG P AXIS: 34 DEGREES
EKG P-R INTERVAL: 150 MS
EKG Q-T INTERVAL: 344 MS
EKG QRS DURATION: 92 MS
EKG QTC CALCULATION (BAZETT): 404 MS
EKG R AXIS: 3 DEGREES
EKG T AXIS: 9 DEGREES
EKG VENTRICULAR RATE: 83 BPM
EOSINOPHILS RELATIVE PERCENT: 1 % (ref 1–4)
GFR AFRICAN AMERICAN: >60 ML/MIN
GFR NON-AFRICAN AMERICAN: >60 ML/MIN
GFR SERPL CREATININE-BSD FRML MDRD: ABNORMAL ML/MIN/{1.73_M2}
GFR SERPL CREATININE-BSD FRML MDRD: ABNORMAL ML/MIN/{1.73_M2}
GLUCOSE BLD-MCNC: 92 MG/DL (ref 70–99)
GLUCOSE URINE: NEGATIVE
HCT VFR BLD CALC: 38.5 % (ref 40.7–50.3)
HEMOGLOBIN: 12.2 G/DL (ref 13–17)
IMMATURE GRANULOCYTES: 0 %
INR BLD: 1
KETONES, URINE: NEGATIVE
LEUKOCYTE ESTERASE, URINE: NEGATIVE
LYMPHOCYTES # BLD: 36 % (ref 24–43)
MCH RBC QN AUTO: 27.4 PG (ref 25.2–33.5)
MCHC RBC AUTO-ENTMCNC: 31.7 G/DL (ref 28.4–34.8)
MCV RBC AUTO: 86.5 FL (ref 82.6–102.9)
MONOCYTES # BLD: 9 % (ref 3–12)
NITRITE, URINE: NEGATIVE
NRBC AUTOMATED: 0 PER 100 WBC
PARTIAL THROMBOPLASTIN TIME: 28.6 SEC (ref 23.9–33.8)
PDW BLD-RTO: 14 % (ref 11.8–14.4)
PH UA: 6 (ref 5–8)
PLATELET # BLD: 323 K/UL (ref 138–453)
PLATELET ESTIMATE: ABNORMAL
PMV BLD AUTO: 9.1 FL (ref 8.1–13.5)
POTASSIUM SERPL-SCNC: 3.9 MMOL/L (ref 3.7–5.3)
PROTEIN UA: NEGATIVE
PROTHROMBIN TIME: 12.6 SEC (ref 11.5–14.2)
RBC # BLD: 4.45 M/UL (ref 4.21–5.77)
RBC # BLD: ABNORMAL 10*6/UL
SEG NEUTROPHILS: 53 % (ref 36–65)
SEGMENTED NEUTROPHILS ABSOLUTE COUNT: 3.1 K/UL (ref 1.5–8.1)
SODIUM BLD-SCNC: 138 MMOL/L (ref 135–144)
SPECIFIC GRAVITY UA: 1.02 (ref 1–1.03)
TURBIDITY: CLEAR
URINE HGB: NEGATIVE
UROBILINOGEN, URINE: NORMAL
WBC # BLD: 5.9 K/UL (ref 3.5–11.3)
WBC # BLD: ABNORMAL 10*3/UL

## 2021-10-19 PROCEDURE — 87086 URINE CULTURE/COLONY COUNT: CPT

## 2021-10-19 PROCEDURE — 93010 ELECTROCARDIOGRAM REPORT: CPT | Performed by: INTERNAL MEDICINE

## 2021-10-19 PROCEDURE — 85730 THROMBOPLASTIN TIME PARTIAL: CPT

## 2021-10-19 PROCEDURE — 36415 COLL VENOUS BLD VENIPUNCTURE: CPT

## 2021-10-19 PROCEDURE — 85610 PROTHROMBIN TIME: CPT

## 2021-10-19 PROCEDURE — 87641 MR-STAPH DNA AMP PROBE: CPT

## 2021-10-19 PROCEDURE — 80048 BASIC METABOLIC PNL TOTAL CA: CPT

## 2021-10-19 PROCEDURE — 81003 URINALYSIS AUTO W/O SCOPE: CPT

## 2021-10-19 PROCEDURE — 85025 COMPLETE CBC W/AUTO DIFF WBC: CPT

## 2021-10-19 PROCEDURE — 93005 ELECTROCARDIOGRAM TRACING: CPT | Performed by: ANESTHESIOLOGY

## 2021-10-19 RX ORDER — TRAMADOL HYDROCHLORIDE 50 MG/1
TABLET ORAL
COMMUNITY
Start: 2021-09-23 | End: 2021-11-02

## 2021-10-19 RX ORDER — CLINDAMYCIN PHOSPHATE 900 MG/50ML
900 INJECTION INTRAVENOUS ONCE
Status: CANCELLED | OUTPATIENT
Start: 2021-11-08 | End: 2021-10-19

## 2021-10-19 ASSESSMENT — PAIN SCALES - GENERAL: PAINLEVEL_OUTOF10: 7

## 2021-10-19 ASSESSMENT — PAIN DESCRIPTION - FREQUENCY: FREQUENCY: CONTINUOUS

## 2021-10-19 ASSESSMENT — PAIN DESCRIPTION - LOCATION: LOCATION: HIP

## 2021-10-19 ASSESSMENT — PAIN DESCRIPTION - DIRECTION: RADIATING_TOWARDS: GROIN

## 2021-10-19 ASSESSMENT — PAIN DESCRIPTION - ORIENTATION: ORIENTATION: RIGHT

## 2021-10-19 ASSESSMENT — PAIN DESCRIPTION - PAIN TYPE: TYPE: CHRONIC PAIN

## 2021-10-19 NOTE — H&P (VIEW-ONLY)
History and Physical Service   [facility] - INPATIENT    HISTORY AND PHYSICAL EXAMINATION            Date of Evaluation: 10/19/2021  Patient name:  Maxine Quintero  MRN:   [id number]  YOB: 1975  PCP:    Mike Hale MD    History Obtained From:     Patient, medical records    History of Present Illness: This is Maxine Quintero a 51 y.o. male who presents for a pre-admission testing appointment for an upcoming right SI joint fusion by Dr. Gm Varner scheduled on 11/8/2021 at 0730 due to right SI dysfunction. The patient's chief complaint is 7/10 low back painpain that has progressively worsened over the past several years. Low back pain is aggravated by walking, standing and is minimally relieved with rest, gabapentin and flexeril. Prior treatment includes injections and physical therapy. Patient is scheduled for left SI joint fusion on 12/20/2021. Denies recent falls and injuries. History hypertension and hyperlipidemia. Patient previously had EKG with abnormality and chest pain with referral to Dr. Beryl Roldan for surgical clearance in March 2020. Patient had cardiac echo and stress test completed on 3/18/2020. Patient denies any further cardiac issues or chest pain. Nuclear stress test 3/18/2020:   1. Normal perfusion study with no ischemia or infarction. 2. Preserved LV systolic function with EF 72%. 3. Normal wall motion. Echocardiogram 3/18/2020:  1. Overall preserved LV systolic function with EF 55-60%. 2. Normal LV diastolic compliance. 3. No significant valvular abnormalities. Functional Capacity per pt:   1) Pt is able to walk 2 city blocks on level ground without SOB. 2) Pt is able to climb 2 flights of stairs without SOB. 3) Pt is able to walk up a hill for 1-2 city blocks without SOB.     Past Medical History:     Past Medical History:   Diagnosis Date    JOELLEN (acute kidney injury) ([facility] [address] 2/12/2015    Allergic rhinitis     Chronic abdominal pain     ED daily as needed for Muscle spasms 7/27/21   Darrion Presley MD   RA ALLERGY 25 MG tablet take 1 tablet by mouth every 6 hours if needed  Patient not taking: Reported on 7/27/2021 5/6/21   Historical Provider, MD   oxyCODONE-acetaminophen (PERCOCET) 5-325 MG per tablet take 1-2 tablets by mouth every 4 hours if needed for 7 days  Patient not taking: Reported on 7/27/2021 4/22/21   Historical Provider, MD   carvedilol (COREG) 6.25 MG tablet take 1 tablet by mouth twice a day 7/12/21   Iona Candelaria MD   hydroCHLOROthiazide (HYDRODIURIL) 25 MG tablet take 2 tablets by mouth once daily 4/20/21   Iona Candelaria MD   famotidine (PEPCID) 20 MG tablet Take 2 tablets by mouth 2 times daily 3/10/21   Darrion Presley MD   docusate sodium (COLACE) 100 MG capsule Take 1 capsule by mouth 2 times daily  Patient not taking: Reported on 7/27/2021 3/10/21   Darrion Presley MD        Allergies:     Shellfish-derived products and Penicillins    Social History:     Tobacco:    reports that he has never smoked. He has never used smokeless tobacco.  Alcohol:      reports no history of alcohol use. Drug Use:  reports no history of drug use. Family History:     Family History   Problem Relation Age of Onset    High Blood Pressure Mother     Diabetes Mother     High Blood Pressure Father     Cancer Father         prostate    Diabetes Father     Coronary Art Dis Father     Heart Attack Father     Heart Disease Father     No Known Problems Sister     No Known Problems Brother        Review of Systems:     Positive and Negative as described in HPI. CONSTITUTIONAL: Fatigue Negative for fevers, chills, sweats, and weight loss. HEENT: Negative for glasses, hearing changes, rhinorrhea, and throat pain. RESPIRATORY: Negative for shortness of breath, cough, congestion, and wheezing. CARDIOVASCULAR: HTN. Negative for chest pain, blood clot, irregular heartbeat, and palpitations. GASTROINTESTINAL: GERD.  Occasional constipation Negative for nausea, vomiting, diarrhea, change in bowel habits, and abdominal pain. GENITOURINARY: JOELLEN. Neurogenic dysfunction of bladder. Urinary frequency. Negative for difficulty of urination, burning with urination. INTEGUMENT: Negative for rash, skin lesions, and easy bruising. HEMATOLOGIC/LYMPHATIC: Negative for swelling/edema. ALLERGIC/IMMUNOLOGIC: Negative for urticaria and itching. ENDOCRINE: Increase in thirst. Hx thyroid nodules. Negative for increase in urination, and heat or cold intolerance. MUSCULOSKELETAL: See HPI. Right below knee amputation. Muscle spasms right upper leg. Negative muscle aches, and swelling of joints. NEUROLOGICAL: Hx neuroblastoma. Numbness and tingling left foot and right knee. Negative for headaches, dizziness, lightheadedness. BEHAVIOR/PSYCH: Negative for depression and anxiety. Physical Exam:   BP (!) 143/96 Comment: DID NOT TAKE B/P MED TODAY  Pulse 85   Temp 97.3 °F (36.3 °C) (Temporal)   Resp 18   Ht 5' 2\" (1.575 m)   Wt 143 lb 9.6 oz (65.1 kg)   SpO2 99%   BMI 26.26 kg/m²    No results for input(s): POCGLU in the last 72 hours. General Appearance:  Alert, well appearing, and in no acute distress. Mental status:  Oriented to person, place, and time. Head:  Normocephalic and atraumatic. Eye:  No icterus, redness, pupils equal and reactive, extraocular eye movements intact, and conjunctiva clear. Ear:  Hearing grossly intact. Nose:  No drainage noted. Mouth:  Mucous membranes moist.  Neck:  Supple and no carotid bruits noted. Lungs:  Bilateral equal air entry, clear to auscultation, no wheezing, rales or rhonchi, and normal effort. Cardiovascular:  Normal rate, regular rhythm, no murmur, gallop, or rub. Abdomen:  Soft, nontender, nondistended, and active bowel sounds. Neurologic:  Normal speech and cranial nerves II through XII grossly intact. Strength 5/5 bilaterally.   Skin:  No gross lesions, rashes, bruising, or bleeding on exposed skin area. Extremities: Right BKA with prosthesis. Posterior tibial pulses 2+ bilaterally. No pedal edema. No calf tenderness with palpation. Psych: Normal affect.      Investigations:      Laboratory Testing:  Recent Results (from the past 24 hour(s))   CBC Auto Differential    Collection Time: 10/19/21  8:30 AM   Result Value Ref Range    WBC 5.9 3.5 - 11.3 k/uL    RBC 4.45 4.21 - 5.77 m/uL    Hemoglobin 12.2 (L) 13.0 - 17.0 g/dL    Hematocrit 38.5 (L) 40.7 - 50.3 %    MCV 86.5 82.6 - 102.9 fL    MCH 27.4 25.2 - 33.5 pg    MCHC 31.7 28.4 - 34.8 g/dL    RDW 14.0 11.8 - 14.4 %    Platelets 059 816 - 450 k/uL    MPV 9.1 8.1 - 13.5 fL    NRBC Automated 0.0 0.0 per 100 WBC    Differential Type NOT REPORTED     Seg Neutrophils 53 36 - 65 %    Lymphocytes 36 24 - 43 %    Monocytes 9 3 - 12 %    Eosinophils % 1 1 - 4 %    Basophils 1 0 - 2 %    Immature Granulocytes 0 0 %    Segs Absolute 3.10 1.50 - 8.10 k/uL    Absolute Lymph # 2.12 1.10 - 3.70 k/uL    Absolute Mono # 0.54 0.10 - 1.20 k/uL    Absolute Eos # 0.08 0.00 - 0.44 k/uL    Basophils Absolute 0.03 0.00 - 0.20 k/uL    Absolute Immature Granulocyte 0.02 0.00 - 0.30 k/uL    WBC Morphology NOT REPORTED     RBC Morphology NOT REPORTED     Platelet Estimate NOT REPORTED    APTT    Collection Time: 10/19/21  8:30 AM   Result Value Ref Range    PTT 28.6 23.9 - 33.8 sec   Protime-INR    Collection Time: 10/19/21  8:30 AM   Result Value Ref Range    Protime 12.6 11.5 - 14.2 sec    INR 1.0    Basic Metabolic Panel    Collection Time: 10/19/21  8:30 AM   Result Value Ref Range    Glucose 92 70 - 99 mg/dL    BUN 22 (H) 6 - 20 mg/dL    CREATININE 1.14 0.70 - 1.20 mg/dL    Bun/Cre Ratio 19 9 - 20    Calcium 9.3 8.6 - 10.4 mg/dL    Sodium 138 135 - 144 mmol/L    Potassium 3.9 3.7 - 5.3 mmol/L    Chloride 106 98 - 107 mmol/L    CO2 20 20 - 31 mmol/L    Anion Gap 12 9 - 17 mmol/L    GFR Non-African American >60 >60 mL/min    GFR African American >60 >60 mL/min    GFR Comment GFR Staging NOT REPORTED    EKG 12 Lead    Collection Time: 10/19/21  8:34 AM   Result Value Ref Range    Ventricular Rate 83 BPM    Atrial Rate 83 BPM    P-R Interval 150 ms    QRS Duration 92 ms    Q-T Interval 344 ms    QTc Calculation (Bazett) 404 ms    P Axis 34 degrees    R Axis 3 degrees    T Axis 9 degrees   Urinalysis    Collection Time: 10/19/21  8:50 AM   Result Value Ref Range    Color, UA Yellow Yellow    Turbidity UA Clear Clear    Glucose, Ur NEGATIVE NEGATIVE    Bilirubin Urine NEGATIVE NEGATIVE    Ketones, Urine NEGATIVE NEGATIVE    Specific Gravity, UA 1.020 1.005 - 1.030    Urine Hgb NEGATIVE NEGATIVE    pH, UA 6.0 5.0 - 8.0    Protein, UA NEGATIVE NEGATIVE    Urobilinogen, Urine Normal Normal    Nitrite, Urine NEGATIVE NEGATIVE    Leukocyte Esterase, Urine NEGATIVE NEGATIVE    Urinalysis Comments       Microscopic exam not performed based on chemical results unless requested in original order. Recent Labs     10/19/21  0830   HGB 12.2*   HCT 38.5*   WBC 5.9   MCV 86.5      K 3.9      CO2 20   BUN 22*   CREATININE 1.14   GLUCOSE 92   INR 1.0   PROTIME 12.6   APTT 28.6       No results for input(s): COVID19 in the last 720 hours. Imaging/Diagnostics:    No results found. EKG: 10/19/2021: See Epic. Diagnosis:      1. Right SI joint dysfunction    Plans:     1.  Right SI joint fusion      Corinne Gilbert, REY - CHARLENE  10/19/2021  12:07 PM

## 2021-10-19 NOTE — H&P
History and Physical Service   [facility] - INPATIENT    HISTORY AND PHYSICAL EXAMINATION            Date of Evaluation: 10/19/2021  Patient name:  Niki Gunter  MRN:   [id number]  YOB: 1975  PCP:    Vickey Ye MD    History Obtained From:     Patient, medical records    History of Present Illness: This is Niki Gunter a 51 y.o. male who presents for a pre-admission testing appointment for an upcoming right SI joint fusion by Dr. Chris Jones scheduled on 11/8/2021 at 0730 due to right SI dysfunction. The patient's chief complaint is 7/10 low back painpain that has progressively worsened over the past several years. Low back pain is aggravated by walking, standing and is minimally relieved with rest, gabapentin and flexeril. Prior treatment includes injections and physical therapy. Patient is scheduled for left SI joint fusion on 12/20/2021. Denies recent falls and injuries. History hypertension and hyperlipidemia. Patient previously had EKG with abnormality and chest pain with referral to Dr. Gabriela Hankins for surgical clearance in March 2020. Patient had cardiac echo and stress test completed on 3/18/2020. Patient denies any further cardiac issues or chest pain. Nuclear stress test 3/18/2020:   1. Normal perfusion study with no ischemia or infarction. 2. Preserved LV systolic function with EF 72%. 3. Normal wall motion. Echocardiogram 3/18/2020:  1. Overall preserved LV systolic function with EF 55-60%. 2. Normal LV diastolic compliance. 3. No significant valvular abnormalities. Functional Capacity per pt:   1) Pt is able to walk 2 city blocks on level ground without SOB. 2) Pt is able to climb 2 flights of stairs without SOB. 3) Pt is able to walk up a hill for 1-2 city blocks without SOB.     Past Medical History:     Past Medical History:   Diagnosis Date    JOELLEN (acute kidney injury) ([facility] [address] 2/12/2015    Allergic rhinitis     Chronic abdominal pain     ED (erectile dysfunction) of organic origin     GERD (gastroesophageal reflux disease)     Gynecomastia, male     History of hepatitis 07/18/2017    PT DENIES    Hypertension     Lumbar disc disease     Neuroblastoma ([facility] [address] 1975    Neuroblastoma ([facility] [address]     Neurogenic dysfunction of the urinary bladder     Osteoarthritis     Phantom limb pain ([facility] [address]     Pure hypercholesterolemia 7/18/2017    RSD (reflex sympathetic dystrophy)         Past Surgical History:     Past Surgical History:   Procedure Laterality Date    ABDOMINAL ADHESION SURGERY      BLADDER REPAIR      LEG AMPUTATION THROUGH LOWER TIBIA AND FIBULA Right 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    PAIN MANAGEMENT PROCEDURE Bilateral 2/20/2020    EPIDURAL STEROID INJECTION MOIZ L5S1 performed by Danelle Welch MD at [address] [place] [address] Bilateral 3/2/2020    EPIDURAL STEROID INJECTION MOIZ L5S1 performed by Danelle Welch MD at [address] [place] [address] Bilateral 8/6/2020    EPIDURAL STEROID INJECTION BILATERAL L5 performed by Danelle Welch MD at [address] [place] [address] Right 2/8/2021    RIGHT L5 S1 EPIDURAL STEROID INJECTION performed by Danelle Welch MD at [place] [address]      r/t bowel obstruction        Medications Prior to Admission:     Prior to Admission medications    Medication Sig Start Date End Date Taking?  Authorizing Provider   traMADol (ULTRAM) 50 MG tablet take 1 TO 2 tablets by mouth every 4 to 6 hours AS NEEDED FOR PAIN **MUST LAST 20 DAYS** 9/23/21   Historical Provider, MD   atorvastatin (LIPITOR) 40 MG tablet take 1 tablet by mouth once daily 10/5/21   Georgette Asencio MD   amLODIPine (NORVASC) 10 MG tablet take 1 tablet by mouth once daily 7/27/21   Katherine Allred MD   gabapentin (NEURONTIN) 300 MG capsule take 1 capsule by mouth three times a day 7/27/21 8/27/21  Katherine Allred MD   cyclobenzaprine (FLEXERIL) 10 MG tablet Take 1 tablet by mouth 3 times daily as needed for Muscle spasms 7/27/21   Amol Jorgensen MD   RA ALLERGY 25 MG tablet take 1 tablet by mouth every 6 hours if needed  Patient not taking: Reported on 7/27/2021 5/6/21   Historical Provider, MD   oxyCODONE-acetaminophen (PERCOCET) 5-325 MG per tablet take 1-2 tablets by mouth every 4 hours if needed for 7 days  Patient not taking: Reported on 7/27/2021 4/22/21   Historical Provider, MD   carvedilol (COREG) 6.25 MG tablet take 1 tablet by mouth twice a day 7/12/21   Bryson Oneal MD   hydroCHLOROthiazide (HYDRODIURIL) 25 MG tablet take 2 tablets by mouth once daily 4/20/21   Bryson Oneal MD   famotidine (PEPCID) 20 MG tablet Take 2 tablets by mouth 2 times daily 3/10/21   Amol Jorgensen MD   docusate sodium (COLACE) 100 MG capsule Take 1 capsule by mouth 2 times daily  Patient not taking: Reported on 7/27/2021 3/10/21   Amol Jorgensen MD        Allergies:     Shellfish-derived products and Penicillins    Social History:     Tobacco:    reports that he has never smoked. He has never used smokeless tobacco.  Alcohol:      reports no history of alcohol use. Drug Use:  reports no history of drug use. Family History:     Family History   Problem Relation Age of Onset    High Blood Pressure Mother     Diabetes Mother     High Blood Pressure Father     Cancer Father         prostate    Diabetes Father     Coronary Art Dis Father     Heart Attack Father     Heart Disease Father     No Known Problems Sister     No Known Problems Brother        Review of Systems:     Positive and Negative as described in HPI. CONSTITUTIONAL: Fatigue Negative for fevers, chills, sweats, and weight loss. HEENT: Negative for glasses, hearing changes, rhinorrhea, and throat pain. RESPIRATORY: Negative for shortness of breath, cough, congestion, and wheezing. CARDIOVASCULAR: HTN. Negative for chest pain, blood clot, irregular heartbeat, and palpitations. GASTROINTESTINAL: GERD.  Occasional constipation Negative for nausea, vomiting, diarrhea, change in bowel habits, and abdominal pain. GENITOURINARY: JOELLEN. Neurogenic dysfunction of bladder. Urinary frequency. Negative for difficulty of urination, burning with urination. INTEGUMENT: Negative for rash, skin lesions, and easy bruising. HEMATOLOGIC/LYMPHATIC: Negative for swelling/edema. ALLERGIC/IMMUNOLOGIC: Negative for urticaria and itching. ENDOCRINE: Increase in thirst. Hx thyroid nodules. Negative for increase in urination, and heat or cold intolerance. MUSCULOSKELETAL: See HPI. Right below knee amputation. Muscle spasms right upper leg. Negative muscle aches, and swelling of joints. NEUROLOGICAL: Hx neuroblastoma. Numbness and tingling left foot and right knee. Negative for headaches, dizziness, lightheadedness. BEHAVIOR/PSYCH: Negative for depression and anxiety. Physical Exam:   BP (!) 143/96 Comment: DID NOT TAKE B/P MED TODAY  Pulse 85   Temp 97.3 °F (36.3 °C) (Temporal)   Resp 18   Ht 5' 2\" (1.575 m)   Wt 143 lb 9.6 oz (65.1 kg)   SpO2 99%   BMI 26.26 kg/m²    No results for input(s): POCGLU in the last 72 hours. General Appearance:  Alert, well appearing, and in no acute distress. Mental status:  Oriented to person, place, and time. Head:  Normocephalic and atraumatic. Eye:  No icterus, redness, pupils equal and reactive, extraocular eye movements intact, and conjunctiva clear. Ear:  Hearing grossly intact. Nose:  No drainage noted. Mouth:  Mucous membranes moist.  Neck:  Supple and no carotid bruits noted. Lungs:  Bilateral equal air entry, clear to auscultation, no wheezing, rales or rhonchi, and normal effort. Cardiovascular:  Normal rate, regular rhythm, no murmur, gallop, or rub. Abdomen:  Soft, nontender, nondistended, and active bowel sounds. Neurologic:  Normal speech and cranial nerves II through XII grossly intact. Strength 5/5 bilaterally.   Skin:  No gross lesions, rashes, bruising, or bleeding on exposed skin area. Extremities: Right BKA with prosthesis. Posterior tibial pulses 2+ bilaterally. No pedal edema. No calf tenderness with palpation. Psych: Normal affect.      Investigations:      Laboratory Testing:  Recent Results (from the past 24 hour(s))   CBC Auto Differential    Collection Time: 10/19/21  8:30 AM   Result Value Ref Range    WBC 5.9 3.5 - 11.3 k/uL    RBC 4.45 4.21 - 5.77 m/uL    Hemoglobin 12.2 (L) 13.0 - 17.0 g/dL    Hematocrit 38.5 (L) 40.7 - 50.3 %    MCV 86.5 82.6 - 102.9 fL    MCH 27.4 25.2 - 33.5 pg    MCHC 31.7 28.4 - 34.8 g/dL    RDW 14.0 11.8 - 14.4 %    Platelets 623 671 - 917 k/uL    MPV 9.1 8.1 - 13.5 fL    NRBC Automated 0.0 0.0 per 100 WBC    Differential Type NOT REPORTED     Seg Neutrophils 53 36 - 65 %    Lymphocytes 36 24 - 43 %    Monocytes 9 3 - 12 %    Eosinophils % 1 1 - 4 %    Basophils 1 0 - 2 %    Immature Granulocytes 0 0 %    Segs Absolute 3.10 1.50 - 8.10 k/uL    Absolute Lymph # 2.12 1.10 - 3.70 k/uL    Absolute Mono # 0.54 0.10 - 1.20 k/uL    Absolute Eos # 0.08 0.00 - 0.44 k/uL    Basophils Absolute 0.03 0.00 - 0.20 k/uL    Absolute Immature Granulocyte 0.02 0.00 - 0.30 k/uL    WBC Morphology NOT REPORTED     RBC Morphology NOT REPORTED     Platelet Estimate NOT REPORTED    APTT    Collection Time: 10/19/21  8:30 AM   Result Value Ref Range    PTT 28.6 23.9 - 33.8 sec   Protime-INR    Collection Time: 10/19/21  8:30 AM   Result Value Ref Range    Protime 12.6 11.5 - 14.2 sec    INR 1.0    Basic Metabolic Panel    Collection Time: 10/19/21  8:30 AM   Result Value Ref Range    Glucose 92 70 - 99 mg/dL    BUN 22 (H) 6 - 20 mg/dL    CREATININE 1.14 0.70 - 1.20 mg/dL    Bun/Cre Ratio 19 9 - 20    Calcium 9.3 8.6 - 10.4 mg/dL    Sodium 138 135 - 144 mmol/L    Potassium 3.9 3.7 - 5.3 mmol/L    Chloride 106 98 - 107 mmol/L    CO2 20 20 - 31 mmol/L    Anion Gap 12 9 - 17 mmol/L    GFR Non-African American >60 >60 mL/min    GFR African American >60 >60 mL/min    GFR Comment GFR Staging NOT REPORTED    EKG 12 Lead    Collection Time: 10/19/21  8:34 AM   Result Value Ref Range    Ventricular Rate 83 BPM    Atrial Rate 83 BPM    P-R Interval 150 ms    QRS Duration 92 ms    Q-T Interval 344 ms    QTc Calculation (Bazett) 404 ms    P Axis 34 degrees    R Axis 3 degrees    T Axis 9 degrees   Urinalysis    Collection Time: 10/19/21  8:50 AM   Result Value Ref Range    Color, UA Yellow Yellow    Turbidity UA Clear Clear    Glucose, Ur NEGATIVE NEGATIVE    Bilirubin Urine NEGATIVE NEGATIVE    Ketones, Urine NEGATIVE NEGATIVE    Specific Gravity, UA 1.020 1.005 - 1.030    Urine Hgb NEGATIVE NEGATIVE    pH, UA 6.0 5.0 - 8.0    Protein, UA NEGATIVE NEGATIVE    Urobilinogen, Urine Normal Normal    Nitrite, Urine NEGATIVE NEGATIVE    Leukocyte Esterase, Urine NEGATIVE NEGATIVE    Urinalysis Comments       Microscopic exam not performed based on chemical results unless requested in original order. Recent Labs     10/19/21  0830   HGB 12.2*   HCT 38.5*   WBC 5.9   MCV 86.5      K 3.9      CO2 20   BUN 22*   CREATININE 1.14   GLUCOSE 92   INR 1.0   PROTIME 12.6   APTT 28.6       No results for input(s): COVID19 in the last 720 hours. Imaging/Diagnostics:    No results found. EKG: 10/19/2021: See Epic. Diagnosis:      1. Right SI joint dysfunction    Plans:     1.  Right SI joint fusion      Martha Gilbert, REY - CHARLENE  10/19/2021  12:07 PM

## 2021-10-19 NOTE — PRE-PROCEDURE INSTRUCTIONS
[address], NOVEMBER 8, 2021 at 0530 AM.    Once you enter the hospital lobby, after your temperature is taken, take the elevators to the second floor. Check-In is at the surgery registration desk    One person age 25 or older may remain in the waiting room while you are in surgery. Continue to take your home medications as you normally do up to and including the night before surgery with the exception of any blood thinning medications. Please stop any blood thinning medications as directed by your surgeon or prescribing physician. Failure to stop certain medications may interfere with your scheduled surgery. These may include:  Aspirin, Warfarin (Coumadin), Clopidogrel (Plavix), Ibuprofen (Motrin, Advil), Naproxen (Aleve), Meloxicam (Mobic), Celecoxib (Celebrex), Eliquis, Pradaxa, Xarelto, Effient, Fish Oil, Herbal supplements. NONE         Please take the following medication(s) the day of surgery with a small sip of water:  CARVEDILOL, AMLODIPINE, FAMOTIDINE    . PREPARING FOR YOUR SURGERY:     Before surgery, you can play an important role in your own health. Because skin is not sterile, we need to be sure that your skin is as free of germs as possible before surgery by carefully washing before surgery. Preparing or prepping skin before surgery can reduce the risk of a surgical site infection.   Do not shave the area of your body where your surgery will be performed unless you received specific permission from your physician. You will need to shower at home the night before surgery and the morning of surgery with a special soap called chlorhexidine gluconate (CHG*). *Not to be used by people allergic to Chlorhexidine Gluconate (CHG). Following these instructions will help you be sure that your skin is clean before surgery. Instructions on cleaning your skin before surgery:      The night before your surgery:      You will need to shower with warm water (not hot) and the CHG soap.  Use a clean wash cloth and a clean towel. Have clean clothes available to put on after the shower.   First wash your hair with regular shampoo. Rinse your hair and body thoroughly to remove the shampoo.  Wash your face and genital area (private parts) with your regular soap or water only. Thoroughly rinse your body with warm water from the neck down.  Turn water off to prevent rinsing the soap off too soon.  With a clean wet washcloth and half of the CHG soap in the bottle, lather your entire body from the neck down. Do not use CHG soap near your eyes or ears to avoid injury to those areas.  Wash thoroughly, paying special attention to the area where your surgery will be performed.  Wash your body gently for five (5) minutes. Avoid scrubbing your skin too hard.  Turn the water back on and rinse your body thoroughly.  Pat yourself dry with a clean, soft towel. Do not apply lotion, cream or powder.  Dress with clean freshly washed clothes. The morning of surgery:     Repeat shower following steps above - using remaining half of CHG soap in bottle. Patient Instructions:    Mi Reeves If you are having any type of anesthesia you are to have nothing to eat or drink after midnight the night before your surgery. This includes gum, hard candy, mints, water or smoking or chewing tobacco.  The only exception to this is a small sip of water to take with any morning dose of heart, blood pressure, or seizure medications. No alcoholic beverages for 24 hours prior to surgery.  Brush your teeth but do not swallow water.  Bring your eye glasses and case with you. No contacts are to be worn the day of surgery. You also may bring your hearing aids. Most surgical procedures involving anesthesia will require that you remove your dentures prior to surgery. · Do not wear any jewelry or body piercings day of surgery.   Also, NO lotion, perfume or deodorant to be used the day of surgery. No nail polish on the operative extremity (arm/leg surgeries)    · If you are staying overnight with us, please bring a small bag of necessary personal items.  Please wear loose, comfortable clothing. If you are potentially going to have a cast or brace bring clothing that will fit over them.  In case of illness - If you have cold or flu like symptoms (high fever, runny nose, sore throat, cough, etc.) rash, nausea, vomiting, loose stools, and/or recent contact with someone who has a contagious disease (chicken pox, measles, etc.) Please call your doctor before coming to the hospital.         Day of Surgery/Procedure:    As a patient at [facility] you can expect quality medical and nursing care that is centered on your individual needs. Our goal is to make your surgical experience as comfortable as possible    . Transportation After Your Surgery/Procedure: You will need a friend or family member to drive you home after your procedure. Your  must be 25years of age or older. Discharge instructions will be reviewed with family/friend by phone. A taxi cab or any other form of public transportation is not acceptable. Someone must remain with you for the first 24 hours after your surgery if you receive anesthesia or medication. If you do not have someone to stay with you, your procedure may be cancelled.       If you have any other questions regarding your procedure or the day of surgery, please call [phone]      _________________________  ____________________________  Signature (Patient)    Signature (Provider)            Date

## 2021-10-20 ENCOUNTER — ANESTHESIA EVENT (OUTPATIENT)
Dept: OPERATING ROOM | Age: 46
End: 2021-10-20
Payer: MEDICARE

## 2021-10-20 LAB
CULTURE: NORMAL
Lab: NORMAL
MRSA, DNA, NASAL: NORMAL
SPECIMEN DESCRIPTION: NORMAL
SPECIMEN DESCRIPTION: NORMAL

## 2021-10-21 ENCOUNTER — HOSPITAL ENCOUNTER (EMERGENCY)
Age: 46
Discharge: HOME | End: 2021-10-22
Attending: STUDENT IN AN ORGANIZED HEALTH CARE EDUCATION/TRAINING PROGRAM
Payer: MEDICARE

## 2021-10-21 VITALS
BODY MASS INDEX: 25.76 KG/M2 | RESPIRATION RATE: 16 BRPM | SYSTOLIC BLOOD PRESSURE: 145 MMHG | HEART RATE: 102 BPM | HEIGHT: 62 IN | OXYGEN SATURATION: 99 % | DIASTOLIC BLOOD PRESSURE: 92 MMHG | TEMPERATURE: 97.9 F | WEIGHT: 140 LBS

## 2021-10-21 DIAGNOSIS — M62.838 SPASM OF MUSCLE: Primary | ICD-10-CM

## 2021-10-21 LAB
ABSOLUTE EOS #: 0.1 K/UL (ref 0–0.4)
ABSOLUTE IMMATURE GRANULOCYTE: ABNORMAL K/UL (ref 0–0.3)
ABSOLUTE LYMPH #: 2.1 K/UL (ref 1–4.8)
ABSOLUTE MONO #: 0.7 K/UL (ref 0.1–1.3)
ALBUMIN SERPL-MCNC: 3.4 G/DL (ref 3.5–5.2)
ALBUMIN/GLOBULIN RATIO: ABNORMAL (ref 1–2.5)
ALP BLD-CCNC: 76 U/L (ref 40–129)
ALT SERPL-CCNC: 9 U/L (ref 5–41)
ANION GAP SERPL CALCULATED.3IONS-SCNC: 10 MMOL/L (ref 9–17)
AST SERPL-CCNC: 10 U/L
BASOPHILS # BLD: 1 % (ref 0–2)
BASOPHILS ABSOLUTE: 0 K/UL (ref 0–0.2)
BILIRUB SERPL-MCNC: 0.32 MG/DL (ref 0.3–1.2)
BILIRUBIN URINE: NEGATIVE
BUN BLDV-MCNC: 25 MG/DL (ref 6–20)
BUN/CREAT BLD: ABNORMAL (ref 9–20)
CALCIUM SERPL-MCNC: 8.8 MG/DL (ref 8.6–10.4)
CHLORIDE BLD-SCNC: 109 MMOL/L (ref 98–107)
CO2: 21 MMOL/L (ref 20–31)
COLOR: YELLOW
COMMENT UA: NORMAL
CREAT SERPL-MCNC: 1.52 MG/DL (ref 0.7–1.2)
DIFFERENTIAL TYPE: ABNORMAL
EOSINOPHILS RELATIVE PERCENT: 2 % (ref 0–4)
GFR AFRICAN AMERICAN: 60 ML/MIN
GFR NON-AFRICAN AMERICAN: 49 ML/MIN
GFR SERPL CREATININE-BSD FRML MDRD: ABNORMAL ML/MIN/{1.73_M2}
GFR SERPL CREATININE-BSD FRML MDRD: ABNORMAL ML/MIN/{1.73_M2}
GLUCOSE BLD-MCNC: 107 MG/DL (ref 70–99)
GLUCOSE URINE: NEGATIVE
HCT VFR BLD CALC: 33.6 % (ref 41–53)
HEMOGLOBIN: 11.3 G/DL (ref 13.5–17.5)
IMMATURE GRANULOCYTES: ABNORMAL %
KETONES, URINE: NEGATIVE
LEUKOCYTE ESTERASE, URINE: NEGATIVE
LYMPHOCYTES # BLD: 32 % (ref 24–44)
MAGNESIUM: 1.6 MG/DL (ref 1.6–2.6)
MCH RBC QN AUTO: 28.1 PG (ref 26–34)
MCHC RBC AUTO-ENTMCNC: 33.7 G/DL (ref 31–37)
MCV RBC AUTO: 83.5 FL (ref 80–100)
MONOCYTES # BLD: 10 % (ref 1–7)
NITRITE, URINE: NEGATIVE
NRBC AUTOMATED: ABNORMAL PER 100 WBC
PDW BLD-RTO: 15.3 % (ref 11.5–14.9)
PH UA: 6 (ref 5–8)
PLATELET # BLD: 363 K/UL (ref 150–450)
PLATELET ESTIMATE: ABNORMAL
PMV BLD AUTO: 6.5 FL (ref 6–12)
POTASSIUM SERPL-SCNC: 4.1 MMOL/L (ref 3.7–5.3)
PROTEIN UA: NEGATIVE
RBC # BLD: 4.03 M/UL (ref 4.5–5.9)
RBC # BLD: ABNORMAL 10*6/UL
SEG NEUTROPHILS: 55 % (ref 36–66)
SEGMENTED NEUTROPHILS ABSOLUTE COUNT: 3.6 K/UL (ref 1.3–9.1)
SODIUM BLD-SCNC: 140 MMOL/L (ref 135–144)
SPECIFIC GRAVITY UA: 1.02 (ref 1–1.03)
TOTAL CK: 173 U/L (ref 39–308)
TOTAL PROTEIN: 6.7 G/DL (ref 6.4–8.3)
TURBIDITY: CLEAR
URINE HGB: NEGATIVE
UROBILINOGEN, URINE: NORMAL
WBC # BLD: 6.5 K/UL (ref 3.5–11)
WBC # BLD: ABNORMAL 10*3/UL

## 2021-10-21 PROCEDURE — 85025 COMPLETE CBC W/AUTO DIFF WBC: CPT

## 2021-10-21 PROCEDURE — 96374 THER/PROPH/DIAG INJ IV PUSH: CPT

## 2021-10-21 PROCEDURE — 82550 ASSAY OF CK (CPK): CPT

## 2021-10-21 PROCEDURE — 81003 URINALYSIS AUTO W/O SCOPE: CPT

## 2021-10-21 PROCEDURE — 36415 COLL VENOUS BLD VENIPUNCTURE: CPT

## 2021-10-21 PROCEDURE — 99284 EMERGENCY DEPT VISIT MOD MDM: CPT

## 2021-10-21 PROCEDURE — 6370000000 HC RX 637 (ALT 250 FOR IP): Performed by: STUDENT IN AN ORGANIZED HEALTH CARE EDUCATION/TRAINING PROGRAM

## 2021-10-21 PROCEDURE — 6360000002 HC RX W HCPCS: Performed by: STUDENT IN AN ORGANIZED HEALTH CARE EDUCATION/TRAINING PROGRAM

## 2021-10-21 PROCEDURE — 80053 COMPREHEN METABOLIC PANEL: CPT

## 2021-10-21 PROCEDURE — 83735 ASSAY OF MAGNESIUM: CPT

## 2021-10-21 PROCEDURE — 2580000003 HC RX 258: Performed by: STUDENT IN AN ORGANIZED HEALTH CARE EDUCATION/TRAINING PROGRAM

## 2021-10-21 RX ORDER — 0.9 % SODIUM CHLORIDE 0.9 %
1000 INTRAVENOUS SOLUTION INTRAVENOUS ONCE
Status: COMPLETED | OUTPATIENT
Start: 2021-10-21 | End: 2021-10-22

## 2021-10-21 RX ORDER — MORPHINE SULFATE 2 MG/ML
4 INJECTION, SOLUTION INTRAMUSCULAR; INTRAVENOUS ONCE
Status: COMPLETED | OUTPATIENT
Start: 2021-10-21 | End: 2021-10-21

## 2021-10-21 RX ORDER — DIAZEPAM 2 MG/1
2 TABLET ORAL ONCE
Status: COMPLETED | OUTPATIENT
Start: 2021-10-21 | End: 2021-10-21

## 2021-10-21 RX ADMIN — DIAZEPAM 2 MG: 2 TABLET ORAL at 23:44

## 2021-10-21 RX ADMIN — MORPHINE SULFATE 4 MG: 2 INJECTION, SOLUTION INTRAMUSCULAR; INTRAVENOUS at 22:27

## 2021-10-21 RX ADMIN — SODIUM CHLORIDE 1000 ML: 9 INJECTION, SOLUTION INTRAVENOUS at 22:27

## 2021-10-21 ASSESSMENT — ENCOUNTER SYMPTOMS
FACIAL SWELLING: 0
RHINORRHEA: 0
COUGH: 0
SORE THROAT: 0
SHORTNESS OF BREATH: 0
DIARRHEA: 0
NAUSEA: 0
EYE PAIN: 0
BACK PAIN: 0
ABDOMINAL PAIN: 0
COLOR CHANGE: 0
VOMITING: 0
EYE REDNESS: 0

## 2021-10-21 ASSESSMENT — PAIN SCALES - GENERAL
PAINLEVEL_OUTOF10: 10
PAINLEVEL_OUTOF10: 10

## 2021-10-22 RX ORDER — DIAZEPAM 2 MG/1
2 TABLET ORAL EVERY 6 HOURS PRN
Qty: 12 TABLET | Refills: 0 | Status: SHIPPED | OUTPATIENT
Start: 2021-10-22 | End: 2021-10-25

## 2021-10-22 NOTE — ED PROVIDER NOTES
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 Symptoms present for the past 1 to 2 days. There is a history of similar symptoms in the past.  Takes gabapentin and Flexeril for spasms which are not been working. Does have a history of electrolyte abnormalities issues with potassium and magnesium. He has tried drinking pickle juice at home today with no relief. No generalized shaking or tongue biting or incontinence. No chest pain or abdominal pain. No nausea vomiting or diarrhea. No fever chills. Symptoms are moderate and intermittent. Spasms are in all of his extremities. REVIEW OF SYSTEMS     Review of Systems   Constitutional: Negative for chills and fatigue. HENT: Negative for facial swelling, nosebleeds, rhinorrhea and sore throat. Eyes: Negative for pain and redness. Respiratory: Negative for cough and shortness of breath. Cardiovascular: Negative for chest pain and leg swelling. Gastrointestinal: Negative for abdominal pain, diarrhea, nausea and vomiting. Genitourinary: Negative for flank pain and hematuria. Musculoskeletal: Negative for arthralgias and back pain. Skin: Negative for color change and rash. Neurological: Negative for dizziness, tremors, facial asymmetry, speech difficulty, weakness and numbness.      PASTMEDICAL HISTORY     Past Medical History:   Diagnosis Date    JOELLEN [facility]acute kidney injury) ([facility] [address] 2/12/2015    Allergic rhinitis     Chronic abdominal pain     ED (erectile dysfunction) of organic origin     GERD (gastroesophageal reflux disease)     Gynecomastia, male     History of hepatitis 07/18/2017    PT DENIES    Hypertension     Lumbar disc disease     Neuroblastoma ([facility] [address] [facility]    Neuroblastoma ([facility] [address]     Neurogenic dysfunction of the urinary bladder     Osteoarthritis     Phantom limb pain ([facility] [address]     Pure hypercholesterolemia 7/18/2017    RSD (reflex sympathetic dystrophy)      Past Problem List  Patient Active Problem List   Diagnosis Code    GERD (gastroesophageal reflux disease) K21.9    Chronic bilateral low back pain with right-sided sciatica M54.41, G89.29    Lumbar disc disease M51.9    Hemorrhoids, external K64.4    Essential hypertension I10    Pure hypercholesterolemia E78.00    Spondylosis without myelopathy or radiculopathy, lumbar region M47.816    Hx of right BKA ([facility] [address] Z89.511    Chronic lumbar radiculopathy M54.16    Spinal stenosis of lumbar region with neurogenic claudication M48.062    Lumbar foraminal stenosis M48.061    Arthritis of right hip M16.11    Right hip pain M25.551     SURGICAL HISTORY       Past Surgical History:   Procedure Laterality Date    ABDOMINAL ADHESION SURGERY      BLADDER REPAIR      LEG AMPUTATION THROUGH LOWER TIBIA AND FIBULA Right 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    PAIN MANAGEMENT PROCEDURE Bilateral 2/20/2020    EPIDURAL STEROID INJECTION MOIZ L5S1 performed by Lora Kang MD at [facility] [address] Bilateral 3/2/2020    EPIDURAL STEROID INJECTION MOIZ L5S1 performed by Lora Kang MD at [facility] Bilateral 8/6/2020    EPIDURAL STEROID INJECTION BILATERAL L5 performed by Lora Kang MD at [facility] Right 2/8/2021    RIGHT L5 S1 EPIDURAL STEROID INJECTION performed by Lora Kang MD at [place] [address]      r/t bowel obstruction     CURRENT MEDICATIONS       Discharge Medication List as of 10/22/2021 12:35 AM      CONTINUE these medications which have NOT CHANGED    Details   traMADol (ULTRAM) 50 MG tablet take 1 TO 2 tablets by mouth every 4 to 6 hours AS NEEDED FOR PAIN **MUST LAST 20 DAYS**Historical Med      atorvastatin (LIPITOR) 40 MG tablet take 1 tablet by mouth once daily, Disp-90 tablet, R-1Normal      amLODIPine (NORVASC) 10 MG tablet take 1 tablet by mouth once daily, Disp-30 tablet, R-3Normal      gabapentin (NEURONTIN) 300 MG capsule take 1 capsule by mouth three times a day, Disp-90 capsule, R-3Normal      cyclobenzaprine (FLEXERIL) 10 MG tablet Take 1 tablet by mouth 3 times daily as needed for Muscle spasms, Disp-30 tablet, R-0Normal      RA ALLERGY 25 MG tablet take 1 tablet by mouth every 6 hours if needed, DAWHistorical Med      oxyCODONE-acetaminophen (PERCOCET) 5-325 MG per tablet take 1-2 tablets by mouth every 4 hours if needed for 7 daysHistorical Med      carvedilol (COREG) 6.25 MG tablet take 1 tablet by mouth twice a day, Disp-60 tablet, R-3Normal      hydroCHLOROthiazide (HYDRODIURIL) 25 MG tablet take 2 tablets by mouth once daily, Disp-30 tablet, R-2Normal      famotidine (PEPCID) 20 MG tablet Take 2 tablets by mouth 2 times daily, Disp-60 tablet, R-3Normal      docusate sodium (COLACE) 100 MG capsule Take 1 capsule by mouth 2 times daily, Disp-30 capsule, R-0Normal           ALLERGIES     is allergic to shellfish-derived products and penicillins. FAMILY HISTORY     He indicated that his mother is alive. He indicated that his father is alive. He indicated that his sister is alive. He indicated that his brother is alive. SOCIAL HISTORY       Social History     Tobacco Use    Smoking status: Never Smoker    Smokeless tobacco: Never Used   Vaping Use    Vaping Use: Former   Substance Use Topics    Alcohol use: No     Alcohol/week: 0.0 standard drinks    Drug use: No     PHYSICAL EXAM     INITIAL VITALS: BP (!) 145/92   Pulse 102   Temp 97.9 °F (36.6 °C) (Temporal)   Resp 16   Ht 5' 2\" (1.575 m)   Wt 140 lb (63.5 kg)   SpO2 99%   BMI 25.61 kg/m²    Physical Exam  Vitals and nursing note reviewed. Constitutional:       Appearance: Normal appearance. HENT:      Head: Normocephalic and atraumatic. Nose: Nose normal.   Eyes:      Extraocular Movements: Extraocular movements intact. Pupils: Pupils are equal, round, and reactive to light. Cardiovascular:      Rate and Rhythm: Normal rate and regular rhythm. Pulmonary:      Effort: Pulmonary effort is normal. No respiratory distress. Breath sounds: Normal breath sounds. Abdominal:      General: Abdomen is flat. There is no distension. Palpations: Abdomen is soft. There is no mass. Musculoskeletal:         General: No swelling. Normal range of motion. Cervical back: Normal range of motion. No rigidity. Comments: Right aka. Soft compartments no extremity tenderness   Skin:     General: Skin is warm and dry. Neurological:      General: No focal deficit present. Mental Status: He is alert. Mental status is at baseline. Cranial Nerves: No cranial nerve deficit. MEDICAL DECISION MAKINyear old male presents for evaluation of generalized spasms. Will check electrolytes, kidney function, cbc, ck. Will treat with IV fluids and reassess. Electrolytes did show some increase in creatinine consistent with some dehydration. No other major electrolyte abnormalities. Feeling little bit better after IV fluids and a dose of p.o. Valium. Will discharge with several days of Valium for symptomatic treatment. Discussed with patient he will check in with neurology who he has seen in the past.         CRITICAL CARE:       PROCEDURES:    Procedures    DIAGNOSTIC RESULTS   EKG:All EKG's are interpreted by the Emergency Department Physician who either signs or Co-signs this chart in the absence of a cardiologist.        RADIOLOGY:All plain film, CT, MRI, and formal ultrasound images (except ED bedside ultrasound) are read by the radiologist, see reports below, unless otherwisenoted in MDM or here.   No orders to display     LABS: All lab results were reviewed by myself, and all abnormals are listed below. Labs Reviewed   CBC WITH AUTO DIFFERENTIAL - Abnormal; Notable for the following components:       Result Value    RBC 4.03 (*)     Hemoglobin 11.3 (*)     Hematocrit 33.6 (*)     RDW 15.3 (*)     Monocytes 10 (*)     All other components within normal limits   COMPREHENSIVE METABOLIC PANEL W/ REFLEX TO MG FOR LOW K - Abnormal; Notable for the following components:    Glucose 107 (*)     BUN 25 (*)     CREATININE 1.52 (*)     Chloride 109 (*)     Albumin 3.4 (*)     GFR Non- 49 (*)     GFR  60 (*)     All other components within normal limits   MAGNESIUM   URINALYSIS   CK       EMERGENCY DEPARTMENTCOURSE:         Vitals:    Vitals:    10/21/21 2039   BP: (!) 145/92   Pulse: 102   Resp: 16   Temp: 97.9 °F (36.6 °C)   TempSrc: Temporal   SpO2: 99%   Weight: 140 lb (63.5 kg)   Height: 5' 2\" (1.575 m)       The patient was given the following medications while in the emergency department:  Orders Placed This Encounter   Medications    0.9 % sodium chloride IV bolus 1,000 mL    morphine (PF) injection 4 mg    diazePAM (VALIUM) tablet 2 mg    diazePAM (VALIUM) 2 MG tablet     Sig: Take 1 tablet by mouth every 6 hours as needed for Anxiety for up to 3 days. Dispense:  12 tablet     Refill:  0     CONSULTS:  None    FINAL IMPRESSION      1. Spasm of muscle          DISPOSITION/PLAN   DISPOSITION Decision To Discharge 10/22/2021 12:34:53 AM      PATIENT REFERRED TO:  Maura Meek MD  [address] [place] [address] [phone]    Call   As needed    Karoline Dakin, MD  [address] [place] [address], [facility] [address]  [address] [place] [address] [address] [place] [address]  [phone]    Call in 1 day      DISCHARGE MEDICATIONS:  Discharge Medication List as of 10/22/2021 12:35 AM      START taking these medications    Details   diazePAM (VALIUM) 2 MG tablet Take 1 tablet by mouth every 6 hours as needed for Anxiety for up to 3 days. , Disp-12 tablet, R-0Print           The care is provided during an unprecedented national emergency due to the novel coronavirus, COVID 19.   Jodie Yanez MD  Attending Emergency Physician                    Jodie Yanez MD  10/22/21 0778

## 2021-10-27 ENCOUNTER — TELEPHONE (OUTPATIENT)
Dept: INTERNAL MEDICINE | Age: 46
End: 2021-10-27

## 2021-10-27 NOTE — TELEPHONE ENCOUNTER
----- Message from Katarina Osegueras sent at 10/27/2021  8:23 AM EDT -----  Subject: Appointment Request    Reason for Call: Routine Pre-Op    QUESTIONS  Type of Appointment? Established Patient  Reason for appointment request? Available appointments did not meet   patient need  Additional Information for Provider? PT missed his pre op appt on   10/26/21. He would like to schedule another pre op appt before his surgery   on 11/8/21. Screened green. Please advise with patient.  ---------------------------------------------------------------------------  --------------  CALL BACK INFO  What is the best way for the office to contact you? OK to leave message on   voicemail  Preferred Call Back Phone Number? [phone]  ---------------------------------------------------------------------------  --------------  SCRIPT ANSWERS  Relationship to Patient? Self  Do you have questions for your provider that need to be answered prior to   scheduling your pre-op appointment? No  Have you been diagnosed with, awaiting test results for, or told that you   are suspected of having COVID-19 (Coronavirus)? (If patient has tested   negative or was tested as a requirement for work, school, or travel and   not based on symptoms, answer no)? No  Within the past two weeks have you developed any of the following symptoms   (answer no if symptoms have been present longer than 2 weeks or began   more than 2 weeks ago)? Fever or Chills, Cough, Shortness of breath or   difficulty breathing, Loss of taste or smell, Sore throat, Nasal   congestion, Sneezing or runny nose, Fatigue or generalized body aches   (answer no if pain is specific to a body part e.g. back pain), Diarrhea,   Headache? No  Have you had close contact with someone with COVID-19 in the last 14 days? No  (Service Expert  click yes below to proceed with [employer] As Usual   Scheduling)?  Yes

## 2021-11-02 ENCOUNTER — HOSPITAL ENCOUNTER (OUTPATIENT)
Age: 46
Setting detail: SPECIMEN
Discharge: HOME | End: 2021-11-02
Payer: MEDICARE

## 2021-11-02 ENCOUNTER — OFFICE VISIT (OUTPATIENT)
Dept: INTERNAL MEDICINE | Age: 46
End: 2021-11-02
Payer: MEDICARE

## 2021-11-02 VITALS
BODY MASS INDEX: 26.68 KG/M2 | SYSTOLIC BLOOD PRESSURE: 137 MMHG | DIASTOLIC BLOOD PRESSURE: 97 MMHG | WEIGHT: 145 LBS | HEIGHT: 62 IN | HEART RATE: 88 BPM

## 2021-11-02 DIAGNOSIS — Z01.818 PRE-OPERATIVE CLEARANCE: Primary | ICD-10-CM

## 2021-11-02 DIAGNOSIS — K21.9 GASTROESOPHAGEAL REFLUX DISEASE, UNSPECIFIED WHETHER ESOPHAGITIS PRESENT: ICD-10-CM

## 2021-11-02 DIAGNOSIS — Z23 NEED FOR INFLUENZA VACCINATION: ICD-10-CM

## 2021-11-02 DIAGNOSIS — I10 ESSENTIAL HYPERTENSION: ICD-10-CM

## 2021-11-02 DIAGNOSIS — Z12.11 COLON CANCER SCREENING: ICD-10-CM

## 2021-11-02 DIAGNOSIS — Z01.818 PRE-OPERATIVE CLEARANCE: ICD-10-CM

## 2021-11-02 DIAGNOSIS — M54.16 CHRONIC LUMBAR RADICULOPATHY: ICD-10-CM

## 2021-11-02 DIAGNOSIS — E78.00 PURE HYPERCHOLESTEROLEMIA: ICD-10-CM

## 2021-11-02 LAB
ABSOLUTE EOS #: 0.13 K/UL (ref 0–0.44)
ABSOLUTE IMMATURE GRANULOCYTE: 0.03 K/UL (ref 0–0.3)
ABSOLUTE LYMPH #: 2.12 K/UL (ref 1.1–3.7)
ABSOLUTE MONO #: 0.63 K/UL (ref 0.1–1.2)
ANION GAP SERPL CALCULATED.3IONS-SCNC: 14 MMOL/L (ref 9–17)
BASOPHILS # BLD: 1 % (ref 0–2)
BASOPHILS ABSOLUTE: 0.05 K/UL (ref 0–0.2)
BUN BLDV-MCNC: 18 MG/DL (ref 6–20)
BUN/CREAT BLD: ABNORMAL (ref 9–20)
CALCIUM SERPL-MCNC: 9.5 MG/DL (ref 8.6–10.4)
CHLORIDE BLD-SCNC: 100 MMOL/L (ref 98–107)
CO2: 21 MMOL/L (ref 20–31)
CREAT SERPL-MCNC: 1.27 MG/DL (ref 0.7–1.2)
DIFFERENTIAL TYPE: ABNORMAL
EOSINOPHILS RELATIVE PERCENT: 2 % (ref 1–4)
GFR AFRICAN AMERICAN: >60 ML/MIN
GFR NON-AFRICAN AMERICAN: >60 ML/MIN
GFR SERPL CREATININE-BSD FRML MDRD: ABNORMAL ML/MIN/{1.73_M2}
GFR SERPL CREATININE-BSD FRML MDRD: ABNORMAL ML/MIN/{1.73_M2}
GLUCOSE BLD-MCNC: 92 MG/DL (ref 70–99)
HCT VFR BLD CALC: 42.8 % (ref 40.7–50.3)
HEMOGLOBIN: 13.5 G/DL (ref 13–17)
IMMATURE GRANULOCYTES: 1 %
LYMPHOCYTES # BLD: 34 % (ref 24–43)
MCH RBC QN AUTO: 27.2 PG (ref 25.2–33.5)
MCHC RBC AUTO-ENTMCNC: 31.5 G/DL (ref 28.4–34.8)
MCV RBC AUTO: 86.3 FL (ref 82.6–102.9)
MONOCYTES # BLD: 10 % (ref 3–12)
NRBC AUTOMATED: 0 PER 100 WBC
PDW BLD-RTO: 14.4 % (ref 11.8–14.4)
PLATELET # BLD: 340 K/UL (ref 138–453)
PLATELET ESTIMATE: ABNORMAL
PMV BLD AUTO: 10.2 FL (ref 8.1–13.5)
POTASSIUM SERPL-SCNC: 4.9 MMOL/L (ref 3.7–5.3)
RBC # BLD: 4.96 M/UL (ref 4.21–5.77)
RBC # BLD: ABNORMAL 10*6/UL
SEG NEUTROPHILS: 52 % (ref 36–65)
SEGMENTED NEUTROPHILS ABSOLUTE COUNT: 3.25 K/UL (ref 1.5–8.1)
SODIUM BLD-SCNC: 135 MMOL/L (ref 135–144)
WBC # BLD: 6.2 K/UL (ref 3.5–11.3)
WBC # BLD: ABNORMAL 10*3/UL

## 2021-11-02 PROCEDURE — G8482 FLU IMMUNIZE ORDER/ADMIN: HCPCS | Performed by: INTERNAL MEDICINE

## 2021-11-02 PROCEDURE — 99213 OFFICE O/P EST LOW 20 MIN: CPT | Performed by: INTERNAL MEDICINE

## 2021-11-02 PROCEDURE — G8427 DOCREV CUR MEDS BY ELIG CLIN: HCPCS | Performed by: INTERNAL MEDICINE

## 2021-11-02 PROCEDURE — 90686 IIV4 VACC NO PRSV 0.5 ML IM: CPT | Performed by: INTERNAL MEDICINE

## 2021-11-02 PROCEDURE — 99211 OFF/OP EST MAY X REQ PHY/QHP: CPT | Performed by: INTERNAL MEDICINE

## 2021-11-02 PROCEDURE — 1036F TOBACCO NON-USER: CPT | Performed by: INTERNAL MEDICINE

## 2021-11-02 PROCEDURE — G8419 CALC BMI OUT NRM PARAM NOF/U: HCPCS | Performed by: INTERNAL MEDICINE

## 2021-11-02 RX ORDER — CARVEDILOL 6.25 MG/1
TABLET ORAL
Qty: 60 TABLET | Refills: 3 | Status: SHIPPED | OUTPATIENT
Start: 2021-11-02 | End: 2022-03-09

## 2021-11-02 RX ORDER — AMLODIPINE BESYLATE 10 MG/1
TABLET ORAL
Qty: 30 TABLET | Refills: 3 | Status: SHIPPED | OUTPATIENT
Start: 2021-11-02 | End: 2022-03-21

## 2021-11-02 RX ORDER — HYDROCHLOROTHIAZIDE 25 MG/1
TABLET ORAL
Qty: 60 TABLET | Refills: 3 | Status: SHIPPED | OUTPATIENT
Start: 2021-11-02 | End: 2022-03-09

## 2021-11-02 RX ORDER — HYDROCHLOROTHIAZIDE 25 MG/1
TABLET ORAL
Qty: 30 TABLET | Refills: 3 | Status: CANCELLED | OUTPATIENT
Start: 2021-11-02

## 2021-11-02 RX ORDER — CYCLOBENZAPRINE HCL 10 MG
10 TABLET ORAL 3 TIMES DAILY PRN
Qty: 30 TABLET | Refills: 0 | Status: SHIPPED | OUTPATIENT
Start: 2021-11-02 | End: 2022-04-06 | Stop reason: SDUPTHER

## 2021-11-02 RX ORDER — ATORVASTATIN CALCIUM 40 MG/1
TABLET, FILM COATED ORAL
Qty: 90 TABLET | Refills: 3 | Status: SHIPPED | OUTPATIENT
Start: 2021-11-02 | End: 2022-04-06 | Stop reason: SDUPTHER

## 2021-11-02 RX ORDER — FAMOTIDINE 20 MG/1
40 TABLET, FILM COATED ORAL 2 TIMES DAILY
Qty: 60 TABLET | Refills: 3 | Status: CANCELLED | OUTPATIENT
Start: 2021-11-02

## 2021-11-02 RX ORDER — PANTOPRAZOLE SODIUM 20 MG/1
20 TABLET, DELAYED RELEASE ORAL DAILY
Qty: 30 TABLET | Refills: 3 | Status: SHIPPED | OUTPATIENT
Start: 2021-11-02 | End: 2022-03-09

## 2021-11-02 NOTE — PROGRESS NOTES
@[email or web address]@    [facility]    Progress Note    Date of patient's visit: 11/2/2021    Patient's Name:  Yakelin Carter    YOB: 1975            Patient Care Team:  Ariel Kim MD as PCP - General (Internal Medicine)  Jenny Ferris MD as PCP - [facility] Empaneled Provider  MD Rich Moore DO as Consulting Physician (General Surgery)    REASON FOR VISIT: Routine outpatient follow     Chief Complaint   Patient presents with    Pre-op Exam     11/8 RIGHT SI JOINT FUSION PERCUTANEOUS -SI BONE WILLIE   [facility] [place] Maintenance     pt has cologuard kit ordered 7/2021 will r/p and follow up with Ani Hernandez due to patient not recieving kit yet, agrees to flu vaccine         HISTORY OF PRESENT ILLNESS:    History was obtained from the patient. Yakelin Carter is a 51 y.o. is here for preop surgical clearance. Patient is scheduled for left sacroiliac joint fusion surgery on 11/8/2021 by Dr. Royce Justin at [facility]. Patient does not have any specific complaints at this visit. He is compliant with his medications. He does not smoke cigarettes, consume alcohol or use drugs. 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     Past Medical History:   Diagnosis Date    [facility]acute [facility]) ([facility] [address] 2/12/2015    Allergic rhinitis     Chronic abdominal pain     ED (erectile dysfunction) of organic origin     GERD (gastroesophageal reflux disease)     Gynecomastia, male     History of hepatitis 07/18/2017    PT DENIES    Hypertension     Lumbar disc disease     Neuroblastoma ([facility] [address] 1975    Neuroblastoma ([facility] [address]     Neurogenic dysfunction of the urinary bladder     Osteoarthritis     Phantom limb pain ([facility] [address]     Pure hypercholesterolemia 7/18/2017    RSD (reflex sympathetic dystrophy)        Past Surgical History:   Procedure Laterality Date    ABDOMINAL ADHESION SURGERY      BLADDER REPAIR      LEG AMPUTATION THROUGH LOWER TIBIA AND FIBULA Right 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    PAIN MANAGEMENT PROCEDURE Bilateral 2/20/2020    EPIDURAL STEROID INJECTION MOIZ L5S1 performed by Arabella Veronica MD at [address] [place] [address] Bilateral 3/2/2020    EPIDURAL STEROID INJECTION MOIZ L5S1 performed by Arabella Veronica MD at [address] [place] [address] Bilateral 8/6/2020    EPIDURAL STEROID INJECTION BILATERAL L5 performed by Arabella Veronica MD at [address] [place] [address] Right 2/8/2021    RIGHT L5 S1 EPIDURAL STEROID INJECTION performed by Arabella Veronica MD at [place] [address]      r/t bowel obstruction         ALLERGIES      Allergies   Allergen Reactions    Shellfish-Derived Products Anaphylaxis    Penicillins Other (See Comments)     UNKNOWN REACTION       MEDICATIONS:      Current Outpatient Medications on File Prior to Visit   Medication Sig Dispense Refill    atorvastatin (LIPITOR) 40 MG tablet take 1 tablet by mouth once daily 90 tablet 1    amLODIPine (NORVASC) 10 MG tablet take 1 tablet by mouth once daily 30 tablet 3    cyclobenzaprine (FLEXERIL) 10 MG tablet Take 1 tablet by mouth 3 times daily as needed for Muscle spasms 30 tablet 0    carvedilol (COREG) 6.25 MG tablet take 1 tablet by mouth twice a day 60 tablet 3    hydroCHLOROthiazide (HYDRODIURIL) 25 MG tablet take 2 tablets by mouth once daily 30 tablet 2    traMADol (ULTRAM) 50 MG tablet take 1 TO 2 tablets by mouth every 4 to 6 hours AS NEEDED FOR PAIN **MUST LAST 20 DAYS** (Patient not taking: Reported on 11/2/2021)      gabapentin (NEURONTIN) 300 MG capsule take 1 capsule by mouth three times a day 90 capsule 3    RA ALLERGY 25 MG tablet take 1 tablet by mouth every 6 hours if needed (Patient not taking: Reported on 7/27/2021)      oxyCODONE-acetaminophen (PERCOCET) 5-325 MG per tablet take 1-2 tablets by mouth every 4 hours if needed for 7 days (Patient not taking: Reported on 7/27/2021)      docusate sodium (COLACE) 100 MG capsule Take 1 capsule by mouth 2 times daily (Patient not taking: Reported on 7/27/2021) 30 capsule 0     No current facility-administered medications on file prior to visit. SOCIAL HISTORY    Reviewed and no change from previous record. Vincent  reports that he has never smoked. He has never used smokeless tobacco.    FAMILY HISTORY:    Reviewed and No change from previous visit    HEALTH MAINTENANCE DUE:      Health Maintenance Due   Topic Date Due    Colon cancer screen colonoscopy  Never done       REVIEW OF SYSTEMS:    12 point review of symptoms completed and found to be normal except noted in the HPI    · Constitutional: Negative for Fever, chills  · Eyes: Negative for visual changes, diplopia  · ENT: Negative for mouth sores, sore throat. · Cardiovascular: Negative for lightheadedness ,chest pain, palpitations   · Respiratory:Negative for Shortness of breath,cough or wheezing. · Gastrointestinal: Negative for nausea/vomiting, change in bowel habits, abdominal pain  · Genitourinary:Negative for change in bladder habits, dysuria, hematuria.   · Musculoskeletal: Negative for joint pain   · Neurological: Negative for headache, change in muscle strength numbness/tingling       PHYSICAL EXAM:      Vitals:    11/02/21 0848 11/02/21 0910   BP: (!) 137/97 (!) 137/97   Site: Right Upper Arm Right Upper Arm   Position: Sitting Sitting   Cuff Size: Medium Adult Medium Adult   Pulse: 90 88   Weight: 145 lb (65.8 kg)    Height: 5' 2\" (1.575 m)      Body mass index is 26.52 kg/m². BP Readings from Last 3 Encounters:   11/02/21 (!) 137/97   10/19/21 (!) 143/96   10/21/21 (!) 145/92        Wt Readings from Last 3 Encounters:   11/02/21 145 lb (65.8 kg)   10/19/21 143 lb 9.6 oz (65.1 kg)   10/21/21 140 lb (63.5 kg)           · General appearance: awake, alert, cooperative  · HEENT: Head: Normocephalic, no lesions, without obvious abnormality. · Lungs: clear to auscultation bilaterally  · Heart: regular rate and rhythm, S1, S2 normal, no murmur  · Abdomen: soft, non-tender; bowel sounds normal; no masses,  no organomegaly  · Extremities: Patient has history of right BKA and uses prosthesis right leg   · neurological:  Awake, alert, oriented to name, place and time. Cranial nerves II-XII are grossly intact. Reflexes normal and symmetric.  Sensation grossly normal  · Eye no icterus no redness    LABORATORY FINDINGS:    CBC:  Lab Results   Component Value Date    WBC 6.5 10/21/2021    HGB 11.3 10/21/2021     10/21/2021     BMP:    Lab Results   Component Value Date     10/21/2021    K 4.1 10/21/2021     10/21/2021    CO2 21 10/21/2021    BUN 25 10/21/2021    CREATININE 1.52 10/21/2021    GLUCOSE 107 10/21/2021     HEMOGLOBIN A1C:   Lab Results   Component Value Date    LABA1C 5.5 01/28/2021     MICROALBUMIN URINE:   Lab Results   Component Value Date    MICROALBUR 55 10/20/2015     FASTING LIPID PANEL:  Lab Results   Component Value Date    CHOL 218 (H) 10/21/2019    HDL 55 01/28/2021    TRIG 68 10/21/2019     Lab Results   Component Value Date    LDLCHOLESTEROL 112 01/28/2021       LIVER PROFILE:  Lab Results   Component Value Date    ALT 9 10/21/2021    AST 10 10/21/2021    PROT 6.7 10/21/2021    BILITOT 0.32 10/21/2021    LABALBU 3.4 10/21/2021      THYROID FUNCTION:   Lab Results   Component Value Date    TSH 0.58 01/28/2021      URINE ANALYSIS: No results found for: LABURIN  ASSESSMENT AND PLAN: 1. Pre-operative clearance:   -Revised cardiac risk index for preoperative risk score: 0. Low risk for cardiac complications postoperatively. Class I risk. 3.9%. - CBC With Auto Differential; Future  - Basic Metabolic Panel; Future    Patient is medically clear for surgery on the eighth for sacroiliac joint fusion. We will obtain renal function and hemoglobin levels prior to that. 2. Pure hypercholesterolemia    - atorvastatin (LIPITOR) 40 MG tablet; take 1 tablet by mouth once daily  Dispense: 90 tablet; Refill: 3    3. Essential hypertension    - amLODIPine (NORVASC) 10 MG tablet; take 1 tablet by mouth once daily  Dispense: 30 tablet; Refill: 3  - carvedilol (COREG) 6.25 MG tablet; take 1 tablet by mouth twice a day  Dispense: 60 tablet; Refill: 3  - hydroCHLOROthiazide (HYDRODIURIL) 25 MG tablet; take 2 tablets by mouth once daily  Dispense: 30 tablet; Refill: 3    4. Chronic lumbar radiculopathy    - cyclobenzaprine (FLEXERIL) 10 MG tablet; Take 1 tablet by mouth 3 times daily as needed for Muscle spasms  Dispense: 30 tablet; Refill: 0    5. Gastroesophageal reflux disease, unspecified whether esophagitis present    - pantoprazole (PROTONIX) 20 MG tablet; Take 1 tablet by mouth daily  Dispense: 30 tablet; Refill: 3    6. Need for influenza vaccination    - INFLUENZA, QUADV, 3 YRS AND OLDER, IM PF, PREFILL SYR OR SDV, 0.5ML (AFLURIA QUADV, PF)  - [place] IMMUNIZ ADMIN,1 SINGLE/COMB VAC/TOXOID    7. Colon cancer screening    - Cologuard (For External Results Only); Future          Health Maintenance      FOLLOW UP AND INSTRUCTIONS:   No follow-ups on file. 1. Vincent received counseling on the following healthy behaviors: nutrition, exercise and medication adherence    2. Reviewed prior labs and health maintenance. 3. Discussed use, benefit, and side effects of prescribed medications. Barriers to medication compliance addressed. All patient questions answered. Pt voiced understanding. 4. Patient given educational materials - see patient instructions      Williemae Cheadle MD  PGY-3, Internal Medicine Resident  [facility], [place]  11/2/2021 9:20 AM

## 2021-11-02 NOTE — PROGRESS NOTES
Attending Physician Statement  I have discussed the care of [address] [place] [address] Juan Manuel Philip 101, including pertinent history and exam findings with the resident. I have reviewed the key elements of all parts of the encounter with the resident. I agree with the assessment, and status of the problem list as documented. Diagnosis Orders   1. Pre-operative clearance  CBC With Auto Differential    Basic Metabolic Panel   2. Pure hypercholesterolemia  atorvastatin (LIPITOR) 40 MG tablet   3. Essential hypertension  amLODIPine (NORVASC) 10 MG tablet    carvedilol (COREG) 6.25 MG tablet   4. Chronic lumbar radiculopathy  cyclobenzaprine (FLEXERIL) 10 MG tablet   5. Gastroesophageal reflux disease, unspecified whether esophagitis present  pantoprazole (PROTONIX) 20 MG tablet   6. Need for influenza vaccination  INFLUENZA, QUADV, 3 YRS AND OLDER, IM PF, PREFILL SYR OR SDV, 0.5ML (AFLURIA QUADV, PF)    [place] IMMUNIZ ADMIN,1 SINGLE/COMB VAC/TOXOID   7. Colon cancer screening  Cologuard (For External Results Only)     The plan and orders should include   Orders Placed This Encounter   Procedures    Cologuard (For External Results Only)    INFLUENZA, QUADV, 3 YRS AND OLDER, IM PF, PREFILL SYR OR SDV, 0.5ML (AFLURIA QUADV, PF)    CBC With Auto Differential    Basic Metabolic Panel    [place] IMMUNIZ ADMIN,1 SINGLE/COMB VAC/TOXOID    and this was also documented by the resident. I agree with the referral to Nephro that he has not followed up but is highly encouraged to do so. The medication list was reviewed with the resident and is up to date.    Dr Luz Mcgowan MD, [address]  Associate , Department of Internal Medicine  Resident Ambulatory Site Medical Director  [address] [facility] Internal Medicine  [address] [facility],4Th Floor  Internal Medicine Clerkship - Jody Carter    11/2/2021, 9:35 AM

## 2021-11-02 NOTE — PATIENT INSTRUCTIONS
Medications e-scribe to pharmacy of pt's choice. Laboratory Instructions: Your doctor has ordered blood or urine testing. You can get this testing done at the Lab located on the first floor of the [facility], or at any other [facility]. Please stop at Main Registration, before going to the lab, as you must be registered first.   Please get this lab done before your next visit. You may eat or drink before this test.  Labs given to patient    Doctor has order a cologuard kit, the company will be in contact with you and mail you the kit with all of the instructions. Please call [phone] if you need a new kit. Return To Clinic 2/1/2022. After Visit Summary  given and reviewed. --    It is very important for your care that you keep your appointment. If for some reason you are unable to keep your appointment it is equally important that you call our office at [phone] to cancel your appointment and reschedule. Failure to do so may result in your termination from our practice.     Surgery Clearance form given to patient.

## 2021-11-08 ENCOUNTER — APPOINTMENT (OUTPATIENT)
Dept: GENERAL RADIOLOGY | Age: 46
End: 2021-11-08
Attending: ORTHOPAEDIC SURGERY
Payer: MEDICARE

## 2021-11-08 ENCOUNTER — HOSPITAL ENCOUNTER (OUTPATIENT)
Age: 46
Setting detail: OUTPATIENT SURGERY
Discharge: HOME | End: 2021-11-08
Attending: ORTHOPAEDIC SURGERY | Admitting: ORTHOPAEDIC SURGERY
Payer: MEDICARE

## 2021-11-08 ENCOUNTER — ANESTHESIA (OUTPATIENT)
Dept: OPERATING ROOM | Age: 46
End: 2021-11-08
Payer: MEDICARE

## 2021-11-08 VITALS — DIASTOLIC BLOOD PRESSURE: 57 MMHG | TEMPERATURE: 94.5 F | OXYGEN SATURATION: 97 % | SYSTOLIC BLOOD PRESSURE: 83 MMHG

## 2021-11-08 VITALS
HEART RATE: 74 BPM | DIASTOLIC BLOOD PRESSURE: 94 MMHG | RESPIRATION RATE: 20 BRPM | HEIGHT: 62 IN | TEMPERATURE: 97.3 F | WEIGHT: 143 LBS | BODY MASS INDEX: 26.31 KG/M2 | OXYGEN SATURATION: 100 % | SYSTOLIC BLOOD PRESSURE: 122 MMHG

## 2021-11-08 DIAGNOSIS — M53.3 SACROILIAC JOINT DYSFUNCTION OF RIGHT SIDE: Primary | Chronic | ICD-10-CM

## 2021-11-08 PROCEDURE — 7100000001 HC PACU RECOVERY - ADDTL 15 MIN: Performed by: ORTHOPAEDIC SURGERY

## 2021-11-08 PROCEDURE — C1713 ANCHOR/SCREW BN/BN,TIS/BN: HCPCS | Performed by: ORTHOPAEDIC SURGERY

## 2021-11-08 PROCEDURE — 7100000011 HC PHASE II RECOVERY - ADDTL 15 MIN: Performed by: ORTHOPAEDIC SURGERY

## 2021-11-08 PROCEDURE — 2580000003 HC RX 258: Performed by: ANESTHESIOLOGY

## 2021-11-08 PROCEDURE — 2500000003 HC RX 250 WO HCPCS: Performed by: ORTHOPAEDIC SURGERY

## 2021-11-08 PROCEDURE — 3600000002 HC SURGERY LEVEL 2 BASE: Performed by: ORTHOPAEDIC SURGERY

## 2021-11-08 PROCEDURE — 2500000003 HC RX 250 WO HCPCS: Performed by: NURSE ANESTHETIST, CERTIFIED REGISTERED

## 2021-11-08 PROCEDURE — 2709999900 HC NON-CHARGEABLE SUPPLY: Performed by: ORTHOPAEDIC SURGERY

## 2021-11-08 PROCEDURE — 3600000012 HC SURGERY LEVEL 2 ADDTL 15MIN: Performed by: ORTHOPAEDIC SURGERY

## 2021-11-08 PROCEDURE — 3209999900 FLUORO FOR SURGICAL PROCEDURES

## 2021-11-08 PROCEDURE — 6360000002 HC RX W HCPCS: Performed by: NURSE ANESTHETIST, CERTIFIED REGISTERED

## 2021-11-08 PROCEDURE — 7100000000 HC PACU RECOVERY - FIRST 15 MIN: Performed by: ORTHOPAEDIC SURGERY

## 2021-11-08 PROCEDURE — 2580000003 HC RX 258: Performed by: ORTHOPAEDIC SURGERY

## 2021-11-08 PROCEDURE — 7100000010 HC PHASE II RECOVERY - FIRST 15 MIN: Performed by: ORTHOPAEDIC SURGERY

## 2021-11-08 PROCEDURE — 6370000000 HC RX 637 (ALT 250 FOR IP): Performed by: ORTHOPAEDIC SURGERY

## 2021-11-08 PROCEDURE — 6370000000 HC RX 637 (ALT 250 FOR IP): Performed by: ANESTHESIOLOGY

## 2021-11-08 PROCEDURE — 3700000001 HC ADD 15 MINUTES (ANESTHESIA): Performed by: ORTHOPAEDIC SURGERY

## 2021-11-08 PROCEDURE — 3700000000 HC ANESTHESIA ATTENDED CARE: Performed by: ORTHOPAEDIC SURGERY

## 2021-11-08 DEVICE — IMPLANTABLE DEVICE
Type: IMPLANTABLE DEVICE | Status: FUNCTIONAL
Brand: IFUSE IMPLANT SYSTEM

## 2021-11-08 RX ORDER — FENTANYL CITRATE 50 UG/ML
25 INJECTION, SOLUTION INTRAMUSCULAR; INTRAVENOUS EVERY 5 MIN PRN
Status: DISCONTINUED | OUTPATIENT
Start: 2021-11-08 | End: 2021-11-08 | Stop reason: HOSPADM

## 2021-11-08 RX ORDER — HYDROCODONE BITARTRATE AND ACETAMINOPHEN 5; 325 MG/1; MG/1
1 TABLET ORAL PRN
Status: COMPLETED | OUTPATIENT
Start: 2021-11-08 | End: 2021-11-08

## 2021-11-08 RX ORDER — MAGNESIUM CARB/ALUMINUM HYDROX 105-160MG
TABLET,CHEWABLE ORAL PRN
Status: DISCONTINUED | OUTPATIENT
Start: 2021-11-08 | End: 2021-11-08 | Stop reason: ALTCHOICE

## 2021-11-08 RX ORDER — PROPOFOL 10 MG/ML
INJECTION, EMULSION INTRAVENOUS PRN
Status: DISCONTINUED | OUTPATIENT
Start: 2021-11-08 | End: 2021-11-08 | Stop reason: SDUPTHER

## 2021-11-08 RX ORDER — ONDANSETRON 2 MG/ML
4 INJECTION INTRAMUSCULAR; INTRAVENOUS
Status: DISCONTINUED | OUTPATIENT
Start: 2021-11-08 | End: 2021-11-08 | Stop reason: HOSPADM

## 2021-11-08 RX ORDER — ONDANSETRON 2 MG/ML
INJECTION INTRAMUSCULAR; INTRAVENOUS PRN
Status: DISCONTINUED | OUTPATIENT
Start: 2021-11-08 | End: 2021-11-08 | Stop reason: SDUPTHER

## 2021-11-08 RX ORDER — ROCURONIUM BROMIDE 10 MG/ML
INJECTION, SOLUTION INTRAVENOUS PRN
Status: DISCONTINUED | OUTPATIENT
Start: 2021-11-08 | End: 2021-11-08 | Stop reason: SDUPTHER

## 2021-11-08 RX ORDER — DEXAMETHASONE SODIUM PHOSPHATE 10 MG/ML
INJECTION INTRAMUSCULAR; INTRAVENOUS PRN
Status: DISCONTINUED | OUTPATIENT
Start: 2021-11-08 | End: 2021-11-08 | Stop reason: SDUPTHER

## 2021-11-08 RX ORDER — OXYCODONE HYDROCHLORIDE AND ACETAMINOPHEN 5; 325 MG/1; MG/1
1-2 TABLET ORAL EVERY 4 HOURS PRN
Qty: 60 TABLET | Refills: 0 | Status: SHIPPED | OUTPATIENT
Start: 2021-11-08 | End: 2021-11-15

## 2021-11-08 RX ORDER — MIDAZOLAM HYDROCHLORIDE 1 MG/ML
INJECTION INTRAMUSCULAR; INTRAVENOUS PRN
Status: DISCONTINUED | OUTPATIENT
Start: 2021-11-08 | End: 2021-11-08 | Stop reason: SDUPTHER

## 2021-11-08 RX ORDER — PROMETHAZINE HYDROCHLORIDE 25 MG/ML
6.25 INJECTION, SOLUTION INTRAMUSCULAR; INTRAVENOUS
Status: DISCONTINUED | OUTPATIENT
Start: 2021-11-08 | End: 2021-11-08 | Stop reason: HOSPADM

## 2021-11-08 RX ORDER — HYDROMORPHONE HCL 110MG/55ML
PATIENT CONTROLLED ANALGESIA SYRINGE INTRAVENOUS PRN
Status: DISCONTINUED | OUTPATIENT
Start: 2021-11-08 | End: 2021-11-08 | Stop reason: SDUPTHER

## 2021-11-08 RX ORDER — LIDOCAINE HYDROCHLORIDE 20 MG/ML
INJECTION, SOLUTION EPIDURAL; INFILTRATION; INTRACAUDAL; PERINEURAL PRN
Status: DISCONTINUED | OUTPATIENT
Start: 2021-11-08 | End: 2021-11-08 | Stop reason: SDUPTHER

## 2021-11-08 RX ORDER — BUPIVACAINE HYDROCHLORIDE AND EPINEPHRINE 5; 5 MG/ML; UG/ML
INJECTION, SOLUTION EPIDURAL; INTRACAUDAL; PERINEURAL PRN
Status: DISCONTINUED | OUTPATIENT
Start: 2021-11-08 | End: 2021-11-08 | Stop reason: ALTCHOICE

## 2021-11-08 RX ORDER — HYDROCODONE BITARTRATE AND ACETAMINOPHEN 5; 325 MG/1; MG/1
2 TABLET ORAL PRN
Status: COMPLETED | OUTPATIENT
Start: 2021-11-08 | End: 2021-11-08

## 2021-11-08 RX ORDER — FENTANYL CITRATE 50 UG/ML
50 INJECTION, SOLUTION INTRAMUSCULAR; INTRAVENOUS EVERY 5 MIN PRN
Status: DISCONTINUED | OUTPATIENT
Start: 2021-11-08 | End: 2021-11-08 | Stop reason: HOSPADM

## 2021-11-08 RX ORDER — FENTANYL CITRATE 50 UG/ML
INJECTION, SOLUTION INTRAMUSCULAR; INTRAVENOUS PRN
Status: DISCONTINUED | OUTPATIENT
Start: 2021-11-08 | End: 2021-11-08 | Stop reason: SDUPTHER

## 2021-11-08 RX ORDER — CLINDAMYCIN PHOSPHATE 900 MG/50ML
900 INJECTION INTRAVENOUS ONCE
Status: COMPLETED | OUTPATIENT
Start: 2021-11-08 | End: 2021-11-08

## 2021-11-08 RX ORDER — SODIUM CHLORIDE, SODIUM LACTATE, POTASSIUM CHLORIDE, CALCIUM CHLORIDE 600; 310; 30; 20 MG/100ML; MG/100ML; MG/100ML; MG/100ML
INJECTION, SOLUTION INTRAVENOUS CONTINUOUS
Status: DISCONTINUED | OUTPATIENT
Start: 2021-11-08 | End: 2021-11-08 | Stop reason: HOSPADM

## 2021-11-08 RX ADMIN — PROPOFOL 170 MG: 10 INJECTION, EMULSION INTRAVENOUS at 07:32

## 2021-11-08 RX ADMIN — DEXAMETHASONE SODIUM PHOSPHATE 10 MG: 10 INJECTION INTRAMUSCULAR; INTRAVENOUS at 07:46

## 2021-11-08 RX ADMIN — Medication 50 MCG: at 07:53

## 2021-11-08 RX ADMIN — SODIUM CHLORIDE, POTASSIUM CHLORIDE, SODIUM LACTATE AND CALCIUM CHLORIDE: 600; 310; 30; 20 INJECTION, SOLUTION INTRAVENOUS at 06:33

## 2021-11-08 RX ADMIN — MIDAZOLAM 2 MG: 1 INJECTION INTRAMUSCULAR; INTRAVENOUS at 07:28

## 2021-11-08 RX ADMIN — ONDANSETRON 4 MG: 2 INJECTION, SOLUTION INTRAMUSCULAR; INTRAVENOUS at 08:16

## 2021-11-08 RX ADMIN — HYDROCODONE BITARTRATE AND ACETAMINOPHEN 1 TABLET: 5; 325 TABLET ORAL at 09:47

## 2021-11-08 RX ADMIN — CLINDAMYCIN PHOSPHATE 900 MG: 900 INJECTION, SOLUTION INTRAVENOUS at 07:44

## 2021-11-08 RX ADMIN — SUGAMMADEX 140 MG: 100 INJECTION, SOLUTION INTRAVENOUS at 08:36

## 2021-11-08 RX ADMIN — LIDOCAINE HYDROCHLORIDE 60 MG: 20 INJECTION, SOLUTION EPIDURAL; INFILTRATION; INTRACAUDAL; PERINEURAL at 07:32

## 2021-11-08 RX ADMIN — HYDROMORPHONE HYDROCHLORIDE 0.5 MG: 2 INJECTION INTRAMUSCULAR; INTRAVENOUS; SUBCUTANEOUS at 08:00

## 2021-11-08 RX ADMIN — SUGAMMADEX 260 MG: 100 INJECTION, SOLUTION INTRAVENOUS at 08:16

## 2021-11-08 RX ADMIN — Medication 50 MCG: at 07:32

## 2021-11-08 RX ADMIN — ROCURONIUM BROMIDE 40 MG: 10 INJECTION, SOLUTION INTRAVENOUS at 07:32

## 2021-11-08 ASSESSMENT — PULMONARY FUNCTION TESTS
PIF_VALUE: 5
PIF_VALUE: 0
PIF_VALUE: 28
PIF_VALUE: 30
PIF_VALUE: 27
PIF_VALUE: 2
PIF_VALUE: 1
PIF_VALUE: 30
PIF_VALUE: 28
PIF_VALUE: 23
PIF_VALUE: 8
PIF_VALUE: 30
PIF_VALUE: 3
PIF_VALUE: 2
PIF_VALUE: 2
PIF_VALUE: 28
PIF_VALUE: 28
PIF_VALUE: 1
PIF_VALUE: 1
PIF_VALUE: 27
PIF_VALUE: 29
PIF_VALUE: 28
PIF_VALUE: 29
PIF_VALUE: 28
PIF_VALUE: 29
PIF_VALUE: 29
PIF_VALUE: 28
PIF_VALUE: 3
PIF_VALUE: 28
PIF_VALUE: 21
PIF_VALUE: 1
PIF_VALUE: 25
PIF_VALUE: 2
PIF_VALUE: 5
PIF_VALUE: 29
PIF_VALUE: 22
PIF_VALUE: 30
PIF_VALUE: 28
PIF_VALUE: 28
PIF_VALUE: 7
PIF_VALUE: 1
PIF_VALUE: 23
PIF_VALUE: 29
PIF_VALUE: 29
PIF_VALUE: 24
PIF_VALUE: 28
PIF_VALUE: 29
PIF_VALUE: 20
PIF_VALUE: 29
PIF_VALUE: 29
PIF_VALUE: 1
PIF_VALUE: 16
PIF_VALUE: 1
PIF_VALUE: 30
PIF_VALUE: 30
PIF_VALUE: 5
PIF_VALUE: 1
PIF_VALUE: 30
PIF_VALUE: 1
PIF_VALUE: 5
PIF_VALUE: 29
PIF_VALUE: 30
PIF_VALUE: 23
PIF_VALUE: 1
PIF_VALUE: 29
PIF_VALUE: 4
PIF_VALUE: 29

## 2021-11-08 ASSESSMENT — PAIN DESCRIPTION - DESCRIPTORS: DESCRIPTORS: CONSTANT

## 2021-11-08 ASSESSMENT — PAIN - FUNCTIONAL ASSESSMENT: PAIN_FUNCTIONAL_ASSESSMENT: 0-10

## 2021-11-08 ASSESSMENT — PAIN SCALES - GENERAL
PAINLEVEL_OUTOF10: 0
PAINLEVEL_OUTOF10: 0
PAINLEVEL_OUTOF10: 5
PAINLEVEL_OUTOF10: 6

## 2021-11-08 ASSESSMENT — PAIN DESCRIPTION - LOCATION: LOCATION: BACK

## 2021-11-08 ASSESSMENT — PAIN DESCRIPTION - ORIENTATION: ORIENTATION: RIGHT

## 2021-11-08 ASSESSMENT — ENCOUNTER SYMPTOMS: SHORTNESS OF BREATH: 0

## 2021-11-08 NOTE — ANESTHESIA PRE PROCEDURE
Department of Anesthesiology  Preprocedure Note       Name:  Rojas Rivas   Age:  51 y.o.  :  1975                                          MRN:  [id number]         Date:  2021      Surgeon: Roman Meza):  Jane Dimas MD    Procedure: Procedure(s):  RIGHT SI JOINT FUSION PERCUTANEOUS -SI BONE WILLIE    Medications prior to admission:   Prior to Admission medications    Medication Sig Start Date End Date Taking? Authorizing Provider   amLODIPine (NORVASC) 10 MG tablet take 1 tablet by mouth once daily 21  Yes Clay Woo MD   cyclobenzaprine (FLEXERIL) 10 MG tablet Take 1 tablet by mouth 3 times daily as needed for Muscle spasms 21  Yes Clay Woo MD   carvedilol (COREG) 6.25 MG tablet take 1 tablet by mouth twice a day 21  Yes Clay Woo MD   atorvastatin (LIPITOR) 40 MG tablet take 1 tablet by mouth once daily 21  Yes Clay Woo MD   pantoprazole (PROTONIX) 20 MG tablet Take 1 tablet by mouth daily 21  Yes Clay Woo MD   hydroCHLOROthiazide (HYDRODIURIL) 25 MG tablet take 2 tablets by mouth once daily 21  Yes Polo Argueta MD   gabapentin (NEURONTIN) 300 MG capsule take 1 capsule by mouth three times a day 21 Yes Clay Woo MD       Current medications:    Current Facility-Administered Medications   Medication Dose Route Frequency Provider Last Rate Last Admin    clindamycin (CLEOCIN) 900 mg in dextrose 5 % 50 mL IVPB  900 mg IntraVENous Once Jane Dimas MD        lactated ringers infusion   IntraVENous Continuous Deyanira Griffiths  mL/hr at 21 0633 New Bag at 21 5925       Allergies:     Allergies   Allergen Reactions    Shellfish-Derived Products Anaphylaxis    Penicillins Other (See Comments)     UNKNOWN REACTION       Problem List:    Patient Active Problem List   Diagnosis Code    GERD (gastroesophageal reflux disease) K21.9    Chronic bilateral low back pain with right-sided sciatica M54.41, G89.29    Lumbar disc disease M51.9    Hemorrhoids, external K64.4    Essential hypertension I10    Pure hypercholesterolemia E78.00    Spondylosis without myelopathy or radiculopathy, lumbar region M47.816    Hx of right BKA ([address] Z89.511    Chronic lumbar radiculopathy M54.16    Spinal stenosis of lumbar region with neurogenic claudication M48.062    Lumbar foraminal stenosis M48.061    Arthritis of right hip M16.11    Right hip pain M25.551       Past Medical History:        Diagnosis Date    JOELLEN (acute kidney injury) ([address] 2/12/2015    Allergic rhinitis     Chronic abdominal pain     ED (erectile dysfunction) of organic origin     GERD (gastroesophageal reflux disease)     Gynecomastia, male     History of hepatitis 07/18/2017    PT DENIES    Hypertension     Lumbar disc disease     Neuroblastoma ([facility] [address] [facility]    Neuroblastoma ([address]     Neurogenic dysfunction of the urinary bladder     Osteoarthritis     Phantom limb pain ([address]     Pure hypercholesterolemia 7/18/2017    RSD (reflex sympathetic dystrophy)        Past Surgical History:        Procedure Laterality Date    ABDOMINAL ADHESION SURGERY      BLADDER REPAIR      LEG AMPUTATION THROUGH LOWER TIBIA AND FIBULA Right 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    PAIN MANAGEMENT PROCEDURE Bilateral 2/20/2020    EPIDURAL STEROID INJECTION MOIZ L5S1 performed by Donya Rich MD at [address] [place] [address] Bilateral 3/2/2020    EPIDURAL STEROID INJECTION MOIZ L5S1 performed by Donya Rich MD at [address] [place] [address] Bilateral 8/6/2020    EPIDURAL STEROID INJECTION BILATERAL L5 performed by Donya Rich MD at [address] [place] [address] Right 2/8/2021    RIGHT L5 S1 EPIDURAL STEROID INJECTION performed by Donya Rich MD at [address] [place] [address]      r/t bowel obstruction       Social History:    Social History     Tobacco Use    Smoking status: Never Smoker    Smokeless tobacco: Never Used   Substance Use Topics    Alcohol use: No     Alcohol/week: 0.0 standard drinks                                Counseling given: Not Answered      Vital Signs (Current):   Vitals:    11/08/21 0604 11/08/21 0615   BP: (!) 130/92    Pulse: 86    Resp: 16    Temp: 97.5 °F (36.4 °C)    TempSrc: Temporal    SpO2: 97%    Weight:  143 lb (64.9 kg)   Height:  5' 2\" (1.575 m)                                              BP Readings from Last 3 Encounters:   11/08/21 (!) 130/92   11/02/21 (!) 137/97   10/19/21 (!) 143/96       NPO Status: Time of last liquid consumption: 2130                        Time of last solid consumption: 2130                        Date of last liquid consumption: 11/07/21                        Date of last solid food consumption: 11/07/21    BMI:   Wt Readings from Last 3 Encounters:   11/08/21 143 lb (64.9 kg)   11/02/21 145 lb (65.8 kg)   10/19/21 143 lb 9.6 oz (65.1 kg)     Body mass index is 26.16 kg/m². CBC:   Lab Results   Component Value Date    WBC 6.2 11/02/2021    RBC 4.96 11/02/2021    HGB 13.5 11/02/2021    HCT 42.8 11/02/2021    MCV 86.3 11/02/2021    RDW 14.4 11/02/2021     11/02/2021       CMP:   Lab Results   Component Value Date     11/02/2021    K 4.9 11/02/2021     11/02/2021    CO2 21 11/02/2021    BUN 18 11/02/2021    CREATININE 1.27 11/02/2021    GFRAA >60 11/02/2021    LABGLOM >60 11/02/2021    GLUCOSE 92 11/02/2021    PROT 6.7 10/21/2021    CALCIUM 9.5 11/02/2021    BILITOT 0.32 10/21/2021    ALKPHOS 76 10/21/2021    AST 10 10/21/2021    ALT 9 10/21/2021       POC Tests: No results for input(s): POCGLU, POCNA, POCK, POCCL, POCBUN, POCHEMO, POCHCT in the last 72 hours.     Coags:   Lab Results   Component Value Date    PROTIME 12.6 10/19/2021    INR 1.0 10/19/2021    APTT 28.6 10/19/2021       HCG (If Applicable): No results found for: PREGTESTUR, PREGSERUM, HCG, HCGQUANT     ABGs: No results found for: PHART, PO2ART, NKS1RPH, ZTM9EQB, BEART, R6TUQIUY     Type & Screen (If Applicable):  No results found for: LABABO, LABRH    Drug/Infectious Status (If Applicable):  Lab Results   Component Value Date    HEPCAB NONREACTIVE 01/04/2018       COVID-19 Screening (If Applicable): No results found for: COVID19        Anesthesia Evaluation    Airway: Mallampati: I  TM distance: >3 FB   Neck ROM: full  Mouth opening: > = 3 FB Dental:          Pulmonary:       (-) shortness of breath                           Cardiovascular:    (+) hypertension:,     (-)  angina                Neuro/Psych:               GI/Hepatic/Renal:             Endo/Other:                     Abdominal:             Vascular:           Other Findings:             Anesthesia Plan      general     ASA 3                       RSD        Anna Jacobs MD   11/8/2021

## 2021-11-08 NOTE — INTERVAL H&P NOTE
Interval H&P Note    Pt Name: Jama Parry  MRN: [id number]  YOB: 1975  Date of evaluation: 11/8/2021      [x] I have reviewed the H&P by WAYNE Gilbert CNP for an Interval History and Physical note. [x] I have examined  Vincent Steiner  There are no changes to the patient who is scheduled for a right SI joint fusion percutaneous - SI bone by Dr Danyell Kaufman for right SI dysfunction. The patient denies new health changes, fever, chills, wheezing, cough, increased SOB, chest pain, open sores or wounds. No DM or use of blood thinning medication    Vital signs: BP (!) 130/92   Pulse 86   Temp 97.5 °F (36.4 °C) (Temporal)   Resp 16   Ht 5' 2\" (1.575 m)   Wt 143 lb (64.9 kg)   SpO2 97%   BMI 26.16 kg/m²     Allergies:  Shellfish-derived products and Penicillins    Medications:    Prior to Admission medications    Medication Sig Start Date End Date Taking?  Authorizing Provider   amLODIPine (NORVASC) 10 MG tablet take 1 tablet by mouth once daily 11/2/21  Yes Tarah Rodriguez MD   cyclobenzaprine (FLEXERIL) 10 MG tablet Take 1 tablet by mouth 3 times daily as needed for Muscle spasms 11/2/21  Yes Tarah Rodriguez MD   carvedilol (COREG) 6.25 MG tablet take 1 tablet by mouth twice a day 11/2/21  Yes Tarah Rodriguez MD   atorvastatin (LIPITOR) 40 MG tablet take 1 tablet by mouth once daily 11/2/21  Yes Tarah Rodriguez MD   pantoprazole (PROTONIX) 20 MG tablet Take 1 tablet by mouth daily 11/2/21  Yes Tarah Rodriguez MD   hydroCHLOROthiazide (HYDRODIURIL) 25 MG tablet take 2 tablets by mouth once daily 11/2/21  Yes Johan Kelly MD   gabapentin (NEURONTIN) 300 MG capsule take 1 capsule by mouth three times a day 7/27/21 11/8/21 Yes Tarah Rodriguez MD         This is a 51 y.o. male who is pleasant, cooperative, alert and oriented x3, in no acute distress    Physical Exam:  Lungs: Bilateral equal air entry, unlabored, clear to ausculation, no wheezing, rales or rhonchi, normal effort  Cardiovascular: HR 86 normal rate, regular rhythm, no murmur, gallop, rub. Abdomen: Soft, nontender, nondistended, normal bowel sounds. Labs:  Recent Labs     11/02/21  1010 10/21/21  2208 10/21/21  2208 10/19/21  0830 10/19/21  0830   HGB 13.5   < > 11.3*   < > 12.2*   HCT 42.8   < > 33.6*   < > 38.5*   WBC 6.2   < > 6.5   < > 5.9   MCV 86.3   < > 83.5   < > 86.5      < > 363   < > 323      < > 140   < > 138   K 4.9   < > 4.1   < > 3.9      < > 109*   < > 106   CO2 21   < > 21   < > 20   BUN 18   < > 25*   < > 22*   CREATININE 1.27*   < > 1.52*   < > 1.14   GLUCOSE 92   < > 107*   < > 92   INR  --   --   --   --  1.0   PROTIME  --   --   --   --  12.6   APTT  --   --   --   --  28.6   AST  --   --  10  --   --    ALT  --   --  9  --   --    LABALBU  --   --  3.4*  --   --     < > = values in this interval not displayed. No results for input(s): COVID19 in the last 720 hours.     REY Harris CNP   Electronically signed 11/8/2021 at 6:34 AM

## 2021-11-09 NOTE — OP NOTE
Operative Note      Patient: Tabitha Venegas  YOB: 1975  MRN: [id number]    Date of Procedure: 11/8/2021    SURGEON:  Zhao Restrepo MD.     ANESTHESIA:  General endotracheal anesthesia. PREOPERATIVE DIAGNOSIS:  right sacroiliac dysfunction. POSTOPERATIVE DIAGNOSIS:  right sacroiliac dysfunction. PROCEDURE:  1. right minimally-invasive sacroiliac joint fusion utilizing      the iFuse implant system. 2. Intraoperative use of C-arm fluoroscopy. ESTIMATED BLOOD LOSS:  10 mL. FLUIDS:  Per anesthesia record. COMPLICATIONS:  None. INDICATIONS:  This is a pleasant 51year-old gentleman with  history of multiple lower back surgeries as well as chronic pain  situation. He had done extensive conservative management in terms  of workup for his low back as well as his sacroiliac joints. He  had multiple injections in the sacroiliac joints which did  confirm relived pain. He was having the  continued pain on the right side and had failed conservative  management. It was discussed with him the option of performing  sacroiliac joint fusion on that right side as well. Risk and  benefits were discussed including bleeding, infection, injury to  nerves, vessels, anesthetic risk, the need for possible further  future surgery, as well as the possibility for continued pain,  continued symptoms, possible nonunion. He did understand all  these risks and did wish to proceed and informed consent was  obtained. DESCRIPTION OF PROCEDURE:  The patient was taken to the operating  room, kept supine on his bed. He was intubated, placed under  general anesthesia by the anesthesiologist. He was given  preoperative antibiotic prophylaxis. He was then placed prone on  the [facility] table with towel beneath his chest and pelvis. All  bony prominences were padded. Eyes were kept free of any  pressure. Brachial plexus and elbows were padded as well.  He was  then taped in this position on to the table and the right buttock  and lower back were then prepped and draped in the sterile  fashion. At this point, C-arm fluoroscopy was then brought in in  the sacral ala as well as in line with the sacrum itself was drawn  on the lateral aspect of the skin. Once the appropriate area was  marked, this incision was marked out with a marking pen and this  area was infiltrated with 0.5% Marcaine with epinephrine. Approximately, a 3-cm incision was made along the right lateral  buttock region. Dissection was carried down to the subcutaneous  tissues. Blunt finger dissection was used to carry down through  the subcu as well. At this point, a guidewire was placed through  this opening and placed in a reasonable starting position just  below the sacral ala and in line with the sacrum itself. Lateral  picture confirmed it to be in appropriate position and the mallet  was used to tap the guidewire into the ilium. At this point, the  C-arm was brought into an inlet view and alignment was seen which  showed appropriate trajectory across the SI joint. An outlet view  was then obtained as well, and again showed appropriate  trajectory across the SI joint and above the first sacral  foramen. At this point, the pin  was used to drive the pin  across the SI joint and above the first sacral foramen. The  dilator was placed followed by the guide. Guide was placed over  the guidewire. The pin was then measured and shortened in  appropriate length implant. The inner cannula was then removed. Drill was then used to drill over the guidewire across the SI  joint followed by the broach. Once these were removed, the  implant was then placed over the guidewire and malleted it across  the SI joint under C-arm guidance. Once this was in good  position, the cannula was then removed. Pictures did confirm the  implant to be in excellent position.  At this point, the jig was  placed over the previous guidewire and a new guidewire was placed  distal and

## 2021-12-20 ENCOUNTER — APPOINTMENT (OUTPATIENT)
Dept: GENERAL RADIOLOGY | Age: 46
End: 2021-12-20
Attending: ORTHOPAEDIC SURGERY
Payer: MEDICARE

## 2021-12-20 ENCOUNTER — ANESTHESIA (OUTPATIENT)
Dept: OPERATING ROOM | Age: 46
End: 2021-12-20
Payer: MEDICARE

## 2021-12-20 ENCOUNTER — ANESTHESIA EVENT (OUTPATIENT)
Dept: OPERATING ROOM | Age: 46
End: 2021-12-20
Payer: MEDICARE

## 2021-12-20 ENCOUNTER — HOSPITAL ENCOUNTER (OUTPATIENT)
Age: 46
Setting detail: OUTPATIENT SURGERY
Discharge: HOME | End: 2021-12-20
Attending: ORTHOPAEDIC SURGERY | Admitting: ORTHOPAEDIC SURGERY
Payer: MEDICARE

## 2021-12-20 VITALS — DIASTOLIC BLOOD PRESSURE: 64 MMHG | SYSTOLIC BLOOD PRESSURE: 91 MMHG | OXYGEN SATURATION: 99 % | TEMPERATURE: 93 F

## 2021-12-20 VITALS
HEART RATE: 82 BPM | WEIGHT: 148.9 LBS | HEIGHT: 62 IN | OXYGEN SATURATION: 90 % | RESPIRATION RATE: 24 BRPM | BODY MASS INDEX: 27.4 KG/M2 | TEMPERATURE: 96.8 F | DIASTOLIC BLOOD PRESSURE: 78 MMHG | SYSTOLIC BLOOD PRESSURE: 111 MMHG

## 2021-12-20 DIAGNOSIS — M53.3 SACROILIAC JOINT DYSFUNCTION OF LEFT SIDE: Primary | Chronic | ICD-10-CM

## 2021-12-20 PROCEDURE — 6360000002 HC RX W HCPCS: Performed by: NURSE ANESTHETIST, CERTIFIED REGISTERED

## 2021-12-20 PROCEDURE — 2500000003 HC RX 250 WO HCPCS: Performed by: ORTHOPAEDIC SURGERY

## 2021-12-20 PROCEDURE — 3209999900 FLUORO FOR SURGICAL PROCEDURES

## 2021-12-20 PROCEDURE — 6360000002 HC RX W HCPCS: Performed by: ANESTHESIOLOGY

## 2021-12-20 PROCEDURE — 6370000000 HC RX 637 (ALT 250 FOR IP): Performed by: ORTHOPAEDIC SURGERY

## 2021-12-20 PROCEDURE — 6370000000 HC RX 637 (ALT 250 FOR IP): Performed by: ANESTHESIOLOGY

## 2021-12-20 PROCEDURE — 7100000001 HC PACU RECOVERY - ADDTL 15 MIN: Performed by: ORTHOPAEDIC SURGERY

## 2021-12-20 PROCEDURE — 2500000003 HC RX 250 WO HCPCS: Performed by: ANESTHESIOLOGY

## 2021-12-20 PROCEDURE — 2580000003 HC RX 258: Performed by: ANESTHESIOLOGY

## 2021-12-20 PROCEDURE — C1713 ANCHOR/SCREW BN/BN,TIS/BN: HCPCS | Performed by: ORTHOPAEDIC SURGERY

## 2021-12-20 PROCEDURE — 7100000010 HC PHASE II RECOVERY - FIRST 15 MIN: Performed by: ORTHOPAEDIC SURGERY

## 2021-12-20 PROCEDURE — 3700000000 HC ANESTHESIA ATTENDED CARE: Performed by: ORTHOPAEDIC SURGERY

## 2021-12-20 PROCEDURE — 3700000001 HC ADD 15 MINUTES (ANESTHESIA): Performed by: ORTHOPAEDIC SURGERY

## 2021-12-20 PROCEDURE — 2709999900 HC NON-CHARGEABLE SUPPLY: Performed by: ORTHOPAEDIC SURGERY

## 2021-12-20 PROCEDURE — 2580000003 HC RX 258: Performed by: ORTHOPAEDIC SURGERY

## 2021-12-20 PROCEDURE — 2500000003 HC RX 250 WO HCPCS: Performed by: NURSE ANESTHETIST, CERTIFIED REGISTERED

## 2021-12-20 PROCEDURE — 3600000012 HC SURGERY LEVEL 2 ADDTL 15MIN: Performed by: ORTHOPAEDIC SURGERY

## 2021-12-20 PROCEDURE — 3600000002 HC SURGERY LEVEL 2 BASE: Performed by: ORTHOPAEDIC SURGERY

## 2021-12-20 PROCEDURE — 7100000011 HC PHASE II RECOVERY - ADDTL 15 MIN: Performed by: ORTHOPAEDIC SURGERY

## 2021-12-20 PROCEDURE — 7100000000 HC PACU RECOVERY - FIRST 15 MIN: Performed by: ORTHOPAEDIC SURGERY

## 2021-12-20 DEVICE — IMPLANTABLE DEVICE
Type: IMPLANTABLE DEVICE | Site: BACK | Status: FUNCTIONAL
Brand: IFUSE IMPLANT SYSTEM

## 2021-12-20 RX ORDER — OXYCODONE HYDROCHLORIDE AND ACETAMINOPHEN 5; 325 MG/1; MG/1
1-2 TABLET ORAL EVERY 4 HOURS PRN
Qty: 28 TABLET | Refills: 0 | Status: SHIPPED | OUTPATIENT
Start: 2021-12-20 | End: 2021-12-27

## 2021-12-20 RX ORDER — BUPIVACAINE HYDROCHLORIDE AND EPINEPHRINE 5; 5 MG/ML; UG/ML
INJECTION, SOLUTION EPIDURAL; INTRACAUDAL; PERINEURAL PRN
Status: DISCONTINUED | OUTPATIENT
Start: 2021-12-20 | End: 2021-12-20 | Stop reason: ALTCHOICE

## 2021-12-20 RX ORDER — SODIUM CHLORIDE, SODIUM LACTATE, POTASSIUM CHLORIDE, CALCIUM CHLORIDE 600; 310; 30; 20 MG/100ML; MG/100ML; MG/100ML; MG/100ML
INJECTION, SOLUTION INTRAVENOUS CONTINUOUS
Status: DISCONTINUED | OUTPATIENT
Start: 2021-12-20 | End: 2021-12-20 | Stop reason: HOSPADM

## 2021-12-20 RX ORDER — FENTANYL CITRATE 50 UG/ML
25 INJECTION, SOLUTION INTRAMUSCULAR; INTRAVENOUS EVERY 5 MIN PRN
Status: DISCONTINUED | OUTPATIENT
Start: 2021-12-20 | End: 2021-12-20 | Stop reason: HOSPADM

## 2021-12-20 RX ORDER — ONDANSETRON 2 MG/ML
4 INJECTION INTRAMUSCULAR; INTRAVENOUS
Status: DISCONTINUED | OUTPATIENT
Start: 2021-12-20 | End: 2021-12-20 | Stop reason: HOSPADM

## 2021-12-20 RX ORDER — MIDAZOLAM HYDROCHLORIDE 1 MG/ML
INJECTION INTRAMUSCULAR; INTRAVENOUS PRN
Status: DISCONTINUED | OUTPATIENT
Start: 2021-12-20 | End: 2021-12-20 | Stop reason: SDUPTHER

## 2021-12-20 RX ORDER — HYDROMORPHONE HYDROCHLORIDE 1 MG/ML
0.25 INJECTION, SOLUTION INTRAMUSCULAR; INTRAVENOUS; SUBCUTANEOUS EVERY 5 MIN PRN
Status: COMPLETED | OUTPATIENT
Start: 2021-12-20 | End: 2021-12-20

## 2021-12-20 RX ORDER — PHENYLEPHRINE HCL IN 0.9% NACL 1 MG/10 ML
SYRINGE (ML) INTRAVENOUS PRN
Status: DISCONTINUED | OUTPATIENT
Start: 2021-12-20 | End: 2021-12-20 | Stop reason: SDUPTHER

## 2021-12-20 RX ORDER — FENTANYL CITRATE 50 UG/ML
50 INJECTION, SOLUTION INTRAMUSCULAR; INTRAVENOUS EVERY 5 MIN PRN
Status: DISCONTINUED | OUTPATIENT
Start: 2021-12-20 | End: 2021-12-20 | Stop reason: HOSPADM

## 2021-12-20 RX ORDER — FENTANYL CITRATE 50 UG/ML
INJECTION, SOLUTION INTRAMUSCULAR; INTRAVENOUS PRN
Status: DISCONTINUED | OUTPATIENT
Start: 2021-12-20 | End: 2021-12-20 | Stop reason: SDUPTHER

## 2021-12-20 RX ORDER — HYDROCODONE BITARTRATE AND ACETAMINOPHEN 5; 325 MG/1; MG/1
1 TABLET ORAL PRN
Status: DISCONTINUED | OUTPATIENT
Start: 2021-12-20 | End: 2021-12-20 | Stop reason: HOSPADM

## 2021-12-20 RX ORDER — ONDANSETRON 2 MG/ML
INJECTION INTRAMUSCULAR; INTRAVENOUS PRN
Status: DISCONTINUED | OUTPATIENT
Start: 2021-12-20 | End: 2021-12-20 | Stop reason: SDUPTHER

## 2021-12-20 RX ORDER — HYDROMORPHONE HYDROCHLORIDE 1 MG/ML
0.25 INJECTION, SOLUTION INTRAMUSCULAR; INTRAVENOUS; SUBCUTANEOUS EVERY 5 MIN PRN
Status: DISCONTINUED | OUTPATIENT
Start: 2021-12-20 | End: 2021-12-20 | Stop reason: HOSPADM

## 2021-12-20 RX ORDER — PROMETHAZINE HYDROCHLORIDE 25 MG/ML
6.25 INJECTION, SOLUTION INTRAMUSCULAR; INTRAVENOUS
Status: DISCONTINUED | OUTPATIENT
Start: 2021-12-20 | End: 2021-12-20 | Stop reason: HOSPADM

## 2021-12-20 RX ORDER — DIPHENHYDRAMINE HYDROCHLORIDE 50 MG/ML
12.5 INJECTION INTRAMUSCULAR; INTRAVENOUS ONCE
Status: COMPLETED | OUTPATIENT
Start: 2021-12-20 | End: 2021-12-20

## 2021-12-20 RX ORDER — PROPOFOL 10 MG/ML
INJECTION, EMULSION INTRAVENOUS PRN
Status: DISCONTINUED | OUTPATIENT
Start: 2021-12-20 | End: 2021-12-20 | Stop reason: SDUPTHER

## 2021-12-20 RX ORDER — MAGNESIUM CARB/ALUMINUM HYDROX 105-160MG
TABLET,CHEWABLE ORAL PRN
Status: DISCONTINUED | OUTPATIENT
Start: 2021-12-20 | End: 2021-12-20 | Stop reason: ALTCHOICE

## 2021-12-20 RX ORDER — OXYCODONE HYDROCHLORIDE AND ACETAMINOPHEN 5; 325 MG/1; MG/1
2 TABLET ORAL ONCE
Status: COMPLETED | OUTPATIENT
Start: 2021-12-20 | End: 2021-12-20

## 2021-12-20 RX ORDER — KETAMINE HCL IN NACL, ISO-OSM 100MG/10ML
SYRINGE (ML) INJECTION PRN
Status: DISCONTINUED | OUTPATIENT
Start: 2021-12-20 | End: 2021-12-20 | Stop reason: SDUPTHER

## 2021-12-20 RX ORDER — LIDOCAINE HYDROCHLORIDE 20 MG/ML
INJECTION, SOLUTION INFILTRATION; PERINEURAL PRN
Status: DISCONTINUED | OUTPATIENT
Start: 2021-12-20 | End: 2021-12-20 | Stop reason: SDUPTHER

## 2021-12-20 RX ORDER — ROCURONIUM BROMIDE 10 MG/ML
INJECTION, SOLUTION INTRAVENOUS PRN
Status: DISCONTINUED | OUTPATIENT
Start: 2021-12-20 | End: 2021-12-20 | Stop reason: SDUPTHER

## 2021-12-20 RX ORDER — HYDROCODONE BITARTRATE AND ACETAMINOPHEN 5; 325 MG/1; MG/1
2 TABLET ORAL PRN
Status: DISCONTINUED | OUTPATIENT
Start: 2021-12-20 | End: 2021-12-20 | Stop reason: HOSPADM

## 2021-12-20 RX ORDER — DEXAMETHASONE SODIUM PHOSPHATE 10 MG/ML
INJECTION INTRAMUSCULAR; INTRAVENOUS PRN
Status: DISCONTINUED | OUTPATIENT
Start: 2021-12-20 | End: 2021-12-20 | Stop reason: SDUPTHER

## 2021-12-20 RX ORDER — CLINDAMYCIN PHOSPHATE 900 MG/50ML
900 INJECTION INTRAVENOUS ONCE
Status: COMPLETED | OUTPATIENT
Start: 2021-12-20 | End: 2021-12-20

## 2021-12-20 RX ADMIN — ONDANSETRON 4 MG: 2 INJECTION, SOLUTION INTRAMUSCULAR; INTRAVENOUS at 10:40

## 2021-12-20 RX ADMIN — SODIUM CHLORIDE, POTASSIUM CHLORIDE, SODIUM LACTATE AND CALCIUM CHLORIDE: 600; 310; 30; 20 INJECTION, SOLUTION INTRAVENOUS at 07:20

## 2021-12-20 RX ADMIN — HYDROMORPHONE HYDROCHLORIDE 0.25 MG: 1 INJECTION, SOLUTION INTRAMUSCULAR; INTRAVENOUS; SUBCUTANEOUS at 11:33

## 2021-12-20 RX ADMIN — SUGAMMADEX 300 MG: 100 INJECTION, SOLUTION INTRAVENOUS at 10:49

## 2021-12-20 RX ADMIN — Medication 200 MCG: at 10:43

## 2021-12-20 RX ADMIN — MIDAZOLAM 2 MG: 1 INJECTION INTRAMUSCULAR; INTRAVENOUS at 09:43

## 2021-12-20 RX ADMIN — HYDROMORPHONE HYDROCHLORIDE 0.25 MG: 1 INJECTION, SOLUTION INTRAMUSCULAR; INTRAVENOUS; SUBCUTANEOUS at 11:44

## 2021-12-20 RX ADMIN — FENTANYL CITRATE 50 MCG: 50 INJECTION INTRAMUSCULAR; INTRAVENOUS at 11:59

## 2021-12-20 RX ADMIN — ROCURONIUM BROMIDE 35 MG: 10 INJECTION, SOLUTION INTRAVENOUS at 09:47

## 2021-12-20 RX ADMIN — Medication 100 MCG: at 09:48

## 2021-12-20 RX ADMIN — CLINDAMYCIN PHOSPHATE 900 MG: 900 INJECTION, SOLUTION INTRAVENOUS at 09:59

## 2021-12-20 RX ADMIN — Medication 200 MCG: at 10:37

## 2021-12-20 RX ADMIN — HYDROMORPHONE HYDROCHLORIDE 0.25 MG: 1 INJECTION, SOLUTION INTRAMUSCULAR; INTRAVENOUS; SUBCUTANEOUS at 11:38

## 2021-12-20 RX ADMIN — DEXAMETHASONE SODIUM PHOSPHATE 10 MG: 10 INJECTION INTRAMUSCULAR; INTRAVENOUS at 10:20

## 2021-12-20 RX ADMIN — OXYCODONE AND ACETAMINOPHEN 2 TABLET: 5; 325 TABLET ORAL at 11:48

## 2021-12-20 RX ADMIN — FENTANYL CITRATE 50 MCG: 50 INJECTION INTRAMUSCULAR; INTRAVENOUS at 11:54

## 2021-12-20 RX ADMIN — DIPHENHYDRAMINE HYDROCHLORIDE 12.5 MG: 50 INJECTION, SOLUTION INTRAMUSCULAR; INTRAVENOUS at 12:23

## 2021-12-20 RX ADMIN — Medication 200 MCG: at 10:16

## 2021-12-20 RX ADMIN — Medication 200 MCG: at 10:25

## 2021-12-20 RX ADMIN — HYDROMORPHONE HYDROCHLORIDE 0.25 MG: 1 INJECTION, SOLUTION INTRAMUSCULAR; INTRAVENOUS; SUBCUTANEOUS at 11:50

## 2021-12-20 RX ADMIN — PROPOFOL 160 MG: 10 INJECTION, EMULSION INTRAVENOUS at 09:47

## 2021-12-20 RX ADMIN — Medication 15 MG: at 09:57

## 2021-12-20 RX ADMIN — LIDOCAINE HYDROCHLORIDE 80 MG: 20 INJECTION, SOLUTION INFILTRATION; PERINEURAL at 09:47

## 2021-12-20 RX ADMIN — SUGAMMADEX 100 MG: 100 INJECTION, SOLUTION INTRAVENOUS at 11:07

## 2021-12-20 RX ADMIN — Medication 10 MG: at 09:47

## 2021-12-20 ASSESSMENT — PAIN DESCRIPTION - LOCATION: LOCATION: HIP

## 2021-12-20 ASSESSMENT — PULMONARY FUNCTION TESTS
PIF_VALUE: 28
PIF_VALUE: 25
PIF_VALUE: 27
PIF_VALUE: 28
PIF_VALUE: 27
PIF_VALUE: 25
PIF_VALUE: 28
PIF_VALUE: 28
PIF_VALUE: 27
PIF_VALUE: 7
PIF_VALUE: 13
PIF_VALUE: 28
PIF_VALUE: 7
PIF_VALUE: 19
PIF_VALUE: 27
PIF_VALUE: 28
PIF_VALUE: 28
PIF_VALUE: 17
PIF_VALUE: 7
PIF_VALUE: 28
PIF_VALUE: 23
PIF_VALUE: 27
PIF_VALUE: 28
PIF_VALUE: 1
PIF_VALUE: 19
PIF_VALUE: 27
PIF_VALUE: 5
PIF_VALUE: 1
PIF_VALUE: 27
PIF_VALUE: 28
PIF_VALUE: 27
PIF_VALUE: 19
PIF_VALUE: 28
PIF_VALUE: 26
PIF_VALUE: 27
PIF_VALUE: 27
PIF_VALUE: 1
PIF_VALUE: 27
PIF_VALUE: 30
PIF_VALUE: 27
PIF_VALUE: 26
PIF_VALUE: 9
PIF_VALUE: 4
PIF_VALUE: 27
PIF_VALUE: 28
PIF_VALUE: 27
PIF_VALUE: 15
PIF_VALUE: 27
PIF_VALUE: 7
PIF_VALUE: 1
PIF_VALUE: 1
PIF_VALUE: 28
PIF_VALUE: 23
PIF_VALUE: 20
PIF_VALUE: 19
PIF_VALUE: 27
PIF_VALUE: 19
PIF_VALUE: 27
PIF_VALUE: 1
PIF_VALUE: 27
PIF_VALUE: 26
PIF_VALUE: 11
PIF_VALUE: 5
PIF_VALUE: 24
PIF_VALUE: 27
PIF_VALUE: 27
PIF_VALUE: 28
PIF_VALUE: 0
PIF_VALUE: 1
PIF_VALUE: 13
PIF_VALUE: 27
PIF_VALUE: 19
PIF_VALUE: 27
PIF_VALUE: 1
PIF_VALUE: 1
PIF_VALUE: 30
PIF_VALUE: 1
PIF_VALUE: 27
PIF_VALUE: 41
PIF_VALUE: 8
PIF_VALUE: 27
PIF_VALUE: 27

## 2021-12-20 ASSESSMENT — PAIN SCALES - GENERAL
PAINLEVEL_OUTOF10: 10
PAINLEVEL_OUTOF10: 8
PAINLEVEL_OUTOF10: 10

## 2021-12-20 ASSESSMENT — PAIN DESCRIPTION - PAIN TYPE: TYPE: SURGICAL PAIN

## 2021-12-20 ASSESSMENT — PAIN DESCRIPTION - DESCRIPTORS: DESCRIPTORS: ACHING

## 2021-12-20 ASSESSMENT — ENCOUNTER SYMPTOMS: SHORTNESS OF BREATH: 0

## 2021-12-20 ASSESSMENT — PAIN DESCRIPTION - ORIENTATION: ORIENTATION: LEFT

## 2021-12-20 ASSESSMENT — PAIN - FUNCTIONAL ASSESSMENT: PAIN_FUNCTIONAL_ASSESSMENT: 0-10

## 2021-12-20 NOTE — ANESTHESIA PRE PROCEDURE
Department of Anesthesiology  Preprocedure Note       Name:  Song Kauffman   Age:  51 y.o.  :  1975                                          MRN:  [id number]         Date:  2021      Surgeon: Jasper Flores):  Pedro Pham MD    Procedure: Procedure(s):  LEFT SI JOINT FUSION PERCUTANEOU- SI BONE WILLIE    Medications prior to admission:   Prior to Admission medications    Medication Sig Start Date End Date Taking? Authorizing Provider   amLODIPine (NORVASC) 10 MG tablet take 1 tablet by mouth once daily 21  Yes Alonso Gallo MD   cyclobenzaprine (FLEXERIL) 10 MG tablet Take 1 tablet by mouth 3 times daily as needed for Muscle spasms 21  Yes Alonso Gallo MD   carvedilol (COREG) 6.25 MG tablet take 1 tablet by mouth twice a day 21  Yes Alonso Gallo MD   atorvastatin (LIPITOR) 40 MG tablet take 1 tablet by mouth once daily 21  Yes Alonso Gallo MD   pantoprazole (PROTONIX) 20 MG tablet Take 1 tablet by mouth daily 21  Yes Alonso Gallo MD   hydroCHLOROthiazide (HYDRODIURIL) 25 MG tablet take 2 tablets by mouth once daily 21  Yes Anitra Hill MD   gabapentin (NEURONTIN) 300 MG capsule take 1 capsule by mouth three times a day 21 Yes Alonso Gallo MD       Current medications:    Current Facility-Administered Medications   Medication Dose Route Frequency Provider Last Rate Last Admin    clindamycin (CLEOCIN) 900 mg in dextrose 5 % 50 mL IVPB  900 mg IntraVENous Once Pedro Pham MD        lactated ringers infusion   IntraVENous Continuous Charly Kern MD           Allergies:     Allergies   Allergen Reactions    Shellfish-Derived Products Anaphylaxis    Penicillins Other (See Comments)     UNKNOWN REACTION       Problem List:    Patient Active Problem List   Diagnosis Code    GERD (gastroesophageal reflux disease) K21.9    Chronic bilateral low back pain with right-sided sciatica M54.41, G89.29    Lumbar disc disease M51.9    Hemorrhoids, external K64.4    Essential hypertension I10    Pure hypercholesterolemia E78.00    Spondylosis without myelopathy or radiculopathy, lumbar region M47.816    Hx of right BKA ([address] Z89.511    Chronic lumbar radiculopathy M54.16    Spinal stenosis of lumbar region with neurogenic claudication M48.062    Lumbar foraminal stenosis M48.061    Arthritis of right hip M16.11    Right hip pain M25.551    Sacroiliac joint dysfunction of right side M53.3       Past Medical History:        Diagnosis Date    JOELLEN (acute kidney injury) ([address] 2/12/2015    Allergic rhinitis     Chronic abdominal pain     ED (erectile dysfunction) of organic origin     GERD (gastroesophageal reflux disease)     Gynecomastia, male     History of hepatitis 07/18/2017    PT DENIES    Hypertension     Lumbar disc disease     Neuroblastoma ([address] 1975    Neuroblastoma ([address]     Neurogenic dysfunction of the urinary bladder     Osteoarthritis     Phantom limb pain ([address]     Pure hypercholesterolemia 7/18/2017    RSD (reflex sympathetic dystrophy)        Past Surgical History:        Procedure Laterality Date    ABDOMINAL ADHESION SURGERY      BACK SURGERY Right 11/8/2021    RIGHT SI JOINT FUSION PERCUTANEOUS -SI BONE WILLIE performed by Lyndon Lennox, MD at [address] AND FIBULA Right 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    PAIN MANAGEMENT PROCEDURE Bilateral 2/20/2020    EPIDURAL STEROID INJECTION MOIZ L5S1 performed by Fernanda Hsieh MD at [address] [place] [address] Bilateral 3/2/2020    EPIDURAL STEROID INJECTION MOIZ L5S1 performed by Fernanda Hsieh MD at [address] [place] [address] Bilateral 8/6/2020    EPIDURAL STEROID INJECTION BILATERAL L5 performed by Fernanda Hsieh MD at [address] [place] [address] Right 2/8/2021    RIGHT L5 S1 EPIDURAL STEROID INJECTION performed by Fernanda Hsieh MD at [place] [address]      r/t bowel obstruction       Social History:    Social History     Tobacco Use    Smoking status: Never Smoker    Smokeless tobacco: Never Used   Substance Use Topics    Alcohol use: No     Alcohol/week: 0.0 standard drinks                                Counseling given: Not Answered      Vital Signs (Current):   Vitals:    12/20/21 0657 12/20/21 0711   BP: (!) 126/90    Pulse: 81    Resp: 16    Temp: 97.5 °F (36.4 °C)    SpO2: 99%    Weight:  148 lb 14.4 oz (67.5 kg)   Height:  5' 2\" (1.575 m)                                              BP Readings from Last 3 Encounters:   12/20/21 (!) 126/90   11/08/21 (!) 83/57   11/08/21 (!) 122/94       NPO Status:                                                                                 BMI:   Wt Readings from Last 3 Encounters:   12/20/21 148 lb 14.4 oz (67.5 kg)   11/08/21 143 lb (64.9 kg)   11/02/21 145 lb (65.8 kg)     Body mass index is 27.23 kg/m². CBC:   Lab Results   Component Value Date    WBC 6.2 11/02/2021    RBC 4.96 11/02/2021    HGB 13.5 11/02/2021    HCT 42.8 11/02/2021    MCV 86.3 11/02/2021    RDW 14.4 11/02/2021     11/02/2021       CMP:   Lab Results   Component Value Date     11/02/2021    K 4.9 11/02/2021     11/02/2021    CO2 21 11/02/2021    BUN 18 11/02/2021    CREATININE 1.27 11/02/2021    GFRAA >60 11/02/2021    LABGLOM >60 11/02/2021    GLUCOSE 92 11/02/2021    PROT 6.7 10/21/2021    CALCIUM 9.5 11/02/2021    BILITOT 0.32 10/21/2021    ALKPHOS 76 10/21/2021    AST 10 10/21/2021    ALT 9 10/21/2021       POC Tests: No results for input(s): POCGLU, POCNA, POCK, POCCL, POCBUN, POCHEMO, POCHCT in the last 72 hours.     Coags:   Lab Results   Component Value Date    PROTIME 12.6 10/19/2021    INR 1.0 10/19/2021    APTT 28.6 10/19/2021       HCG (If Applicable): No results found for: PREGTESTUR, PREGSERUM, HCG, HCGQUANT     ABGs: No results found for: PHART, PO2ART, AWR8PWU, MWQ1LPP, BEART, O1VRCYTM     Type & Screen (If Applicable):  No results found for: LABABO, LABRH    Drug/Infectious Status (If Applicable):  Lab Results   Component Value Date    HEPCAB NONREACTIVE 01/04/2018       COVID-19 Screening (If Applicable): No results found for: COVID19        Anesthesia Evaluation    Airway: Mallampati: I  TM distance: >3 FB   Neck ROM: full  Mouth opening: > = 3 FB Dental:          Pulmonary:       (-) shortness of breath                           Cardiovascular:    (+) hypertension:,     (-)  angina                Neuro/Psych:               GI/Hepatic/Renal:             Endo/Other:                     Abdominal:             Vascular:           Other Findings:             Anesthesia Plan      general     ASA 2                                 Valencia Calle MD   12/20/2021

## 2021-12-20 NOTE — ANESTHESIA PRE PROCEDURE
Department of Anesthesiology  Preprocedure Note       Name:  Bernadine Valenzuela   Age:  51 y.o.  :  1975                                          MRN:  [id number]         Date:  2021      Surgeon: Rony Muñoz):  Mari Valente MD    Procedure: Procedure(s):  LEFT SI JOINT FUSION PERCUTANEOU- SI BONE WILLIE    Medications prior to admission:   Prior to Admission medications    Medication Sig Start Date End Date Taking? Authorizing Provider   amLODIPine (NORVASC) 10 MG tablet take 1 tablet by mouth once daily 21   Ganga Rodgers MD   cyclobenzaprine (FLEXERIL) 10 MG tablet Take 1 tablet by mouth 3 times daily as needed for Muscle spasms 21   Ganga Rodgers MD   carvedilol (COREG) 6.25 MG tablet take 1 tablet by mouth twice a day 21   Ganga Rodgers MD   atorvastatin (LIPITOR) 40 MG tablet take 1 tablet by mouth once daily 21   Ganga Rodgers MD   pantoprazole (PROTONIX) 20 MG tablet Take 1 tablet by mouth daily 21   Ganga Rodgers MD   hydroCHLOROthiazide (HYDRODIURIL) 25 MG tablet take 2 tablets by mouth once daily 21   Marbin Villaseñor MD   gabapentin (NEURONTIN) 300 MG capsule take 1 capsule by mouth three times a day 21  Ganga Rodgers MD       Current medications:    No current facility-administered medications for this visit. No current outpatient medications on file.      Facility-Administered Medications Ordered in Other Visits   Medication Dose Route Frequency Provider Last Rate Last Admin    clindamycin (CLEOCIN) 900 mg in dextrose 5 % 50 mL IVPB  900 mg IntraVENous Once Mari Valente MD        lactated ringers infusion   IntraVENous Continuous Valencia Calle  mL/hr at 21 0720 New Bag at 21 0720    fentaNYL (SUBLIMAZE) injection 25 mcg  25 mcg IntraVENous Q5 Min PRN Valencia Calle MD        fentaNYL (SUBLIMAZE) injection 50 mcg  50 mcg IntraVENous Q5 Min PRN Valencia Calle MD        HYDROcodone-acetaminophen (NORCO) 5-325 MG per tablet 1 tablet  1 tablet Oral PRN Issa Kuhn MD        Or   [facility] HYDROcodone-acetaminophen [facility]) 5-325 MG per tablet 2 tablet  2 tablet Oral PRN Issa Kuhn MD        ondansetron [facility]) injection 4 mg  4 mg IntraVENous Once PRN Issa Kuhn MD        promethazine (PHENERGAN) injection 6.25 mg  6.25 mg IntraVENous Once PRN Issa Kuhn MD           Allergies:     Allergies   Allergen Reactions    Shellfish-Derived Products Anaphylaxis    Penicillins Other (See Comments)     UNKNOWN REACTION       Problem List:    Patient Active Problem List   Diagnosis Code    GERD (gastroesophageal reflux disease) K21.9    Chronic bilateral low back pain with right-sided sciatica M54.41, G89.29    Lumbar disc disease M51.9    Hemorrhoids, external K64.4    Essential hypertension I10    Pure hypercholesterolemia E78.00    Spondylosis without myelopathy or radiculopathy, lumbar region M47.816    Hx of right BKA ([address] Z89.511    Chronic lumbar radiculopathy M54.16    Spinal stenosis of lumbar region with neurogenic claudication M48.062    Lumbar foraminal stenosis M48.061    Arthritis of right hip M16.11    Right hip pain M25.551    Sacroiliac joint dysfunction of right side M53.3       Past Medical History:        Diagnosis Date    JOELLEN (acute kidney injury) ([address] 2/12/2015    Allergic rhinitis     Chronic abdominal pain     ED (erectile dysfunction) of organic origin     GERD (gastroesophageal reflux disease)     Gynecomastia, male     History of hepatitis 07/18/2017    PT DENIES    Hypertension     Lumbar disc disease     Neuroblastoma ([address] 1975    Neuroblastoma ([address]     Neurogenic dysfunction of the urinary bladder     Osteoarthritis     Phantom limb pain ([address]     Pure hypercholesterolemia 7/18/2017    RSD (reflex sympathetic dystrophy)        Past Surgical History:        Procedure Laterality Date    ABDOMINAL ADHESION SURGERY      BACK SURGERY Right 11/8/2021    RIGHT SI JOINT FUSION PERCUTANEOUS -SI BONE WILLIE performed by Mari Valente MD at [facility] Right 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    PAIN MANAGEMENT PROCEDURE Bilateral 2/20/2020    EPIDURAL STEROID INJECTION MOIZ L5S1 performed by Ty Villalobos MD at [address] [facility] Bilateral 3/2/2020    EPIDURAL STEROID INJECTION MOIZ L5S1 performed by Ty Villalobos MD at [address] [facility] Bilateral 8/6/2020    EPIDURAL STEROID INJECTION BILATERAL L5 performed by Ty Villalobos MD at [address] [facility] Right 2/8/2021    RIGHT L5 S1 EPIDURAL STEROID INJECTION performed by Ty Villalobos MD at [address] [facility]      r/t bowel obstruction       Social History:    Social History     Tobacco Use    Smoking status: Never Smoker    Smokeless tobacco: Never Used   Substance Use Topics    Alcohol use: No     Alcohol/week: 0.0 standard drinks                                Counseling given: Not Answered      Vital Signs (Current): There were no vitals filed for this visit.                                            BP Readings from Last 3 Encounters:   12/20/21 (!) 126/90   11/08/21 (!) 83/57   11/08/21 (!) 122/94       NPO Status:                                                                                 BMI:   Wt Readings from Last 3 Encounters:   12/20/21 148 lb 14.4 oz (67.5 kg)   11/08/21 143 lb (64.9 kg)   11/02/21 145 lb (65.8 kg)     There is no height or weight on file to calculate BMI.    CBC:   Lab Results   Component Value Date    WBC 6.2 11/02/2021    RBC 4.96 11/02/2021    HGB 13.5 11/02/2021    HCT 42.8 11/02/2021    MCV 86.3 11/02/2021    RDW 14.4 11/02/2021     11/02/2021       CMP:   Lab Results   Component Value Date     11/02/2021    K 4.9 11/02/2021     11/02/2021    CO2 21 11/02/2021    BUN 18 11/02/2021    CREATININE 1.27 11/02/2021    GFRAA >60 11/02/2021    LABGLOM >60 11/02/2021    GLUCOSE 92 11/02/2021    PROT 6.7 10/21/2021    CALCIUM 9.5 11/02/2021    BILITOT 0.32 10/21/2021    ALKPHOS 76 10/21/2021    AST 10 10/21/2021    ALT 9 10/21/2021       POC Tests: No results for input(s): POCGLU, POCNA, POCK, POCCL, POCBUN, POCHEMO, POCHCT in the last 72 hours. Coags:   Lab Results   Component Value Date    PROTIME 12.6 10/19/2021    INR 1.0 10/19/2021    APTT 28.6 10/19/2021       HCG (If Applicable): No results found for: PREGTESTUR, PREGSERUM, HCG, HCGQUANT     ABGs: No results found for: PHART, PO2ART, VOW4OWR, RWM9UPS, BEART, Y3CQSEIL     Type & Screen (If Applicable):  No results found for: LABABO, LABRH    Drug/Infectious Status (If Applicable):  Lab Results   Component Value Date    HEPCAB NONREACTIVE 01/04/2018       COVID-19 Screening (If Applicable): No results found for: COVID19        Anesthesia Evaluation   no history of anesthetic complications:   Airway: Mallampati: I  TM distance: >3 FB   Neck ROM: full  Mouth opening: > = 3 FB Dental:          Pulmonary:normal exam        (-) shortness of breath                           Cardiovascular:    (+) hypertension:,     (-)  angina                Neuro/Psych:   (+) neuromuscular disease:,             GI/Hepatic/Renal:   (+) renal disease: CRI,           Endo/Other:    (+) : arthritis: OA., malignancy/cancer (hx neuroblastoma with bka r). Abdominal:             Vascular: Other Findings:               Anesthesia Plan      general     ASA 2     (Right bka hx  )  Induction: intravenous. MIPS: Postoperative opioids intended and Prophylactic antiemetics administered. Anesthetic plan and risks discussed with patient. Plan discussed with CRNA.     Attending anesthesiologist reviewed and agrees with Preprocedure content              Kiana Hayden MD   12/20/2021

## 2021-12-20 NOTE — ANESTHESIA POSTPROCEDURE EVALUATION
Department of Anesthesiology  Postprocedure Note    Patient: Michelle Smith  MRN: [id number]  YOB: 1975  Date of evaluation: 12/20/2021  Time:  12:41 PM     Procedure Summary     Date: 12/20/21 Room / Location: [place] [address] / [facility] - INPATIENT    Anesthesia Start: 8934 Anesthesia Stop: 1598    Procedure: LEFT SI JOINT FUSION [address] (Left ) Diagnosis: (DX LEFT SI DYSFUNCTION)    Surgeons: Sarah Benjamin MD Responsible Provider: Tammy Stack MD    Anesthesia Type: general ASA Status: 2          Anesthesia Type: general    Shahrzad Phase I: Shahzrad Score: 10    Shahrzad Phase II: Shahrzad Score: 10    Last vitals: Reviewed and per EMR flowsheets.        Anesthesia Post Evaluation    Complications: no

## 2021-12-20 NOTE — H&P
History and Physical Update    Pt Name: Gloria Heath  MRN: [id number]  YOB: 1975  Date of evaluation: 12/20/2021      [x] I have reviewed the office note found in the pt's paper chart by Dr. Priscila George dated 11/23/2021 used for an Interval History and Physical note. [x] I have examined Gloria Heath a 51 y.o., male who is scheduled for a percutaneous left SI joint fusion by Dr. Priscila George due to left SI dysfunction. The patient denies health changes since his appointment with Dr. Priscila George on 11/23/2021. Pt denies fever, chills, productive cough, SOB, chest pain, open sores, rashes, wounds, and history of diabetes. Pt denies taking blood thinning medications in the past 10 days.      Vital signs: BP (!) 126/90   Pulse 81   Temp 97.5 °F (36.4 °C)   Resp 16   Ht 5' 2\" (1.575 m)   Wt 148 lb 14.4 oz (67.5 kg)   SpO2 99%   BMI 27.23 kg/m²      Allergies:  Shellfish-derived products and Penicillins      Past Medical History:     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    Neuroblastoma ([address]     Neurogenic dysfunction of the urinary bladder     Osteoarthritis     Phantom limb pain ([address]     Pure hypercholesterolemia 7/18/2017    RSD (reflex sympathetic dystrophy)         Past Surgical History:     Past Surgical History:   Procedure Laterality Date    ABDOMINAL ADHESION SURGERY      BACK SURGERY Right 11/8/2021    RIGHT SI JOINT FUSION PERCUTANEOUS -SI BONE WILLIE performed by Alea Torre MD at [address] AND [facility] Right 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    PAIN MANAGEMENT PROCEDURE Bilateral 2/20/2020    EPIDURAL STEROID INJECTION MOIZ L5S1 performed by Priscila Lu MD at [address] [facility] Bilateral 3/2/2020    EPIDURAL STEROID INJECTION MOIZ L5S1 performed by Priscila Lu MD at [address] [facility] Bilateral 8/6/2020    EPIDURAL STEROID INJECTION BILATERAL L5 performed by Priscila Lu MD at [address] [facility] Right 2/8/2021    RIGHT L5 S1 EPIDURAL STEROID INJECTION performed by Priscila Lu MD at [place] [address]      r/t bowel obstruction      Allergies:     Shellfish-derived products and Penicillins    Social History:     Tobacco:    reports that he has never smoked. He has never used smokeless tobacco.  Alcohol:      reports no history of alcohol use. Drug Use:  reports no history of drug use. Family History:     Family History   Problem Relation Age of Onset    High Blood Pressure Mother     Diabetes Mother     High Blood Pressure Father     Cancer Father         prostate    Diabetes Father     Coronary Art Dis Father     Heart Attack Father     Heart Disease Father     No Known Problems Sister     No Known Problems Brother        Medications:    Prior to Admission medications    Medication Sig Start Date End Date Taking?  Authorizing Provider   amLODIPine (NORVASC) 10 MG tablet take 1 tablet by mouth once daily 11/2/21  Yes Diamond Campo MD   cyclobenzaprine (FLEXERIL) 10 MG tablet Take 1 tablet by mouth 3 times daily as needed for Muscle spasms 11/2/21  Yes Diamond Campo MD   carvedilol (COREG) 6.25 MG tablet take 1 tablet by mouth twice a day 11/2/21  Yes Diamond Campo MD   atorvastatin (LIPITOR) 40 MG tablet take 1 tablet by mouth once daily 11/2/21  Yes Diamond Campo MD   pantoprazole (PROTONIX) 20 MG tablet Take 1 tablet by mouth daily 11/2/21  Yes Diamond Campo MD   hydroCHLOROthiazide (HYDRODIURIL) 25 MG tablet take 2 tablets by mouth once daily 11/2/21  Yes Jatinder Simon MD   gabapentin

## 2021-12-21 NOTE — OP NOTE
buttock  and lower back were then prepped and draped in the sterile  fashion. At this point, C-arm fluoroscopy was then brought in in  the sacral ala as well as in line with the sacrum itself was drawn  on the lateral aspect of the skin. Once the appropriate area was  marked, this incision was marked out with a marking pen and this  area was infiltrated with 0.5% Marcaine with epinephrine. Approximately, a 3-cm incision was made along the left lateral  buttock region. Dissection was carried down to the subcutaneous  tissues. Blunt finger dissection was used to carry down through  the subcu as well. At this point, a guidewire was placed through  this opening and placed in a reasonable starting position just  below the sacral ala and in line with the sacrum itself. Lateral  picture confirmed it to be in appropriate position and the mallet  was used to tap the guidewire into the ilium. At this point, the  C-arm was brought into an inlet view and alignment was seen which  showed appropriate trajectory across the SI joint. An outlet view  was then obtained as well, and again showed appropriate  trajectory across the SI joint and above the first sacral  foramen. At this point, the pin  was used to drive the pin  across the SI joint and above the first sacral foramen. The  dilator was placed followed by the guide. Guide was placed over  the guidewire. The pin was then measured and shortened in  appropriate length implant. The inner cannula was then removed. Drill was then used to drill over the guidewire across the SI  joint followed by the broach. Once these were removed, the  implant was then placed over the guidewire and malleted it across  the SI joint under C-arm guidance. Once this was in good  position, the cannula was then removed. Pictures did confirm the  implant to be in excellent position.  At this point, the jig was  placed over the previous guidewire and a new guidewire was placed  distal and slightly more anterior to the first one in line with  the sacroiliac joint in exact same fashion. Drill was placed  across the joint utilizing fluoroscopy followed by placement of  the drill and broach, and finally placement of the implant. Total of 2 implants were placed across the  sacroiliac joint on the left side and guidewires were  subsequently removed. Final pictures were taken which showed all  implants to be in excellent position in outlet views as well as  lateral views. Once this was done, the wound was irrigated with  sterile normal saline with bacitracin followed by placement of  vancomycin powder and closure was then performed with 0-Vicryl  and 2-0 Vicryl, and running 4-0 Monocryl suture with Dermabond  glue. Standard dressing was then applied. He was then placed  supine on the bed, extubated, and taken to recovery room in  stable condition.     Electronically signed by Oswald Mcmillan MD on 12/20/2021 at 7:22 PM

## 2021-12-22 DIAGNOSIS — G89.29 CHRONIC BILATERAL LOW BACK PAIN WITH BILATERAL SCIATICA: ICD-10-CM

## 2021-12-22 DIAGNOSIS — M54.42 CHRONIC BILATERAL LOW BACK PAIN WITH BILATERAL SCIATICA: ICD-10-CM

## 2021-12-22 DIAGNOSIS — M54.41 CHRONIC BILATERAL LOW BACK PAIN WITH BILATERAL SCIATICA: ICD-10-CM

## 2021-12-22 NOTE — TELEPHONE ENCOUNTER
Refill request for gabapentin (NEURONTIN) 300 MG capsule. If appropriate please send medication(s) to patients pharmacy. Next appt: 2/1/2022      Health Maintenance   Topic Date Due    Colon cancer screen colonoscopy  Never done    Lipid screen  01/28/2022    COVID-19 Vaccine (3 - Booster for Pfizer series) 03/22/2022    Potassium monitoring  11/02/2022    Creatinine monitoring  11/02/2022    DTaP/Tdap/Td vaccine (2 - Td or Tdap) 10/19/2025    Flu vaccine  Completed    Hepatitis C screen  Completed    HIV screen  Completed    Hepatitis A vaccine  Aged Out    Hepatitis B vaccine  Aged Out    Hib vaccine  Aged Out    Meningococcal (ACWY) vaccine  Aged Out    Pneumococcal 0-64 years Vaccine  Aged Out       Hemoglobin A1C (%)   Date Value   01/28/2021 5.5   01/13/2015 5.2             ( goal A1C is < 7)   Microalb/Crt.  Ratio (mcg/mg creat)   Date Value   10/20/2015 23     LDL Cholesterol (mg/dL)   Date Value   01/28/2021 112       (goal LDL is <100)   AST (U/L)   Date Value   10/21/2021 10     ALT (U/L)   Date Value   10/21/2021 9     BUN (mg/dL)   Date Value   11/02/2021 18     BP Readings from Last 3 Encounters:   12/20/21 111/78   12/20/21 91/64   11/08/21 (!) 83/57          (goal 120/80)          Patient Active Problem List:     GERD (gastroesophageal reflux disease)     Chronic bilateral low back pain with right-sided sciatica     Lumbar disc disease     Hemorrhoids, external     Essential hypertension     Pure hypercholesterolemia     Spondylosis without myelopathy or radiculopathy, lumbar region     Hx of right BKA (HCC)     Chronic lumbar radiculopathy     Spinal stenosis of lumbar region with neurogenic claudication     Lumbar foraminal stenosis     Arthritis of right hip     Right hip pain     Sacroiliac joint dysfunction of right side     Sacroiliac joint dysfunction of left side

## 2021-12-23 RX ORDER — GABAPENTIN 300 MG/1
CAPSULE ORAL
Qty: 90 CAPSULE | Refills: 3 | Status: SHIPPED | OUTPATIENT
Start: 2021-12-23 | End: 2022-04-06 | Stop reason: SDUPTHER

## 2022-01-24 NOTE — TELEPHONE ENCOUNTER
Fax received that pt has not returned cologuard test. PC to pt to see if pt received test and if pt had any questions or concerns about the test. PC to pt; Unable to LM, Letter Sent to Pt to contact office. If pt needs a new test Please call [phone] to get a new one sent to them.

## 2022-02-01 ENCOUNTER — TELEPHONE (OUTPATIENT)
Dept: INTERNAL MEDICINE | Age: 47
End: 2022-02-01

## 2022-02-01 NOTE — LETTER
[address]  [address] [address]  Phone: [phone]  Fax: [phone]    Jillian Aldrich MD        February 1, 2022    [address]      Dear Sussy Barker: We are sorry that you missed your appointment with Jillian Aldrich MD  on 2/1/2022. Your health and follow-up medical care are important to us. Please call our office at [phone] as soon as possible so that we may reschedule your appointment. If you have already rescheduled your appointment, please be sure to keep your next appointment. We do recognize that everyones time is valuable and that appointment time is limited, therefore we do ask that you provide 24 hour notice if you are unable to keep your appointment. If you have any questions or concerns, please don't hesitate to call.     Sincerely,        Jillian Aldrich MD

## 2022-02-14 ENCOUNTER — CARE COORDINATION (OUTPATIENT)
Dept: CARE COORDINATION | Age: 47
End: 2022-02-14

## 2022-02-14 NOTE — CARE COORDINATION
Ambulatory Care Coordination Note  2/14/2022  CM Risk Score: 4  Charlson 10 Year Mortality Risk Score: 23%     ACC: Cher Marcos RN    Summary Note: Called, message left on voicemail with my contact information and reason for call. Will attempt 2nd call in about 1 week if no return call today      Ambulatory Care Coordination Assessment    Care Coordination Protocol  Week 1 - Initial Assessment     Do you have all of your prescriptions and are they filled?: No (Comment: no new prescriptions - has all meds)                          Suggested Interventions and Community Resources                  Prior to Admission medications    Medication Sig Start Date End Date Taking? Authorizing Provider   gabapentin (NEURONTIN) 300 MG capsule take 1 capsule by mouth three times a day 12/23/21 3/23/22  Nellie Burleson MD   amLODIPine (NORVASC) 10 MG tablet take 1 tablet by mouth once daily 11/2/21   Lukas Huffman MD   cyclobenzaprine (FLEXERIL) 10 MG tablet Take 1 tablet by mouth 3 times daily as needed for Muscle spasms 11/2/21   Lukas Huffman MD   carvedilol (COREG) 6.25 MG tablet take 1 tablet by mouth twice a day 11/2/21   Lukas Huffman MD   atorvastatin (LIPITOR) 40 MG tablet take 1 tablet by mouth once daily 11/2/21   Lukas Huffman MD   pantoprazole (PROTONIX) 20 MG tablet Take 1 tablet by mouth daily 11/2/21   Lukas Huffman MD   hydroCHLOROthiazide (HYDRODIURIL) 25 MG tablet take 2 tablets by mouth once daily 11/2/21   Nellie Burleson MD       No future appointments.

## 2022-02-23 ENCOUNTER — CARE COORDINATION (OUTPATIENT)
Dept: CARE COORDINATION | Age: 47
End: 2022-02-23

## 2022-02-23 NOTE — CARE COORDINATION
Ambulatory Care Coordination Note  2/23/2022  CM Risk Score: 4  Charlson 10 Year Mortality Risk Score: 23%     ACC: Sixto Evangelista, RN    Summary Note: Called, message left on voicemail with my contact information and reason for call. Will attempt 3rd outreach in about 1 week              Goals Addressed    None         Prior to Admission medications    Medication Sig Start Date End Date Taking? Authorizing Provider   gabapentin (NEURONTIN) 300 MG capsule take 1 capsule by mouth three times a day 12/23/21 3/23/22  Eh Camacho MD   amLODIPine (NORVASC) 10 MG tablet take 1 tablet by mouth once daily 11/2/21   Jhonny Aaron MD   cyclobenzaprine (FLEXERIL) 10 MG tablet Take 1 tablet by mouth 3 times daily as needed for Muscle spasms 11/2/21   Jhonny Aaron MD   carvedilol (COREG) 6.25 MG tablet take 1 tablet by mouth twice a day 11/2/21   Jhonny Aaron MD   atorvastatin (LIPITOR) 40 MG tablet take 1 tablet by mouth once daily 11/2/21   Jhonny Aaron MD   pantoprazole (PROTONIX) 20 MG tablet Take 1 tablet by mouth daily 11/2/21   Jhonny Aaron MD   hydroCHLOROthiazide (HYDRODIURIL) 25 MG tablet take 2 tablets by mouth once daily 11/2/21   Eh Camacho MD       No future appointments.

## 2022-03-02 ENCOUNTER — CARE COORDINATION (OUTPATIENT)
Dept: CARE COORDINATION | Age: 47
End: 2022-03-02

## 2022-03-02 NOTE — CARE COORDINATION
Called, message left on voicemail with my contact information and reason for call. This is the 3rd attempt to contact patient for ACM enrollment. Will attepmt to contact in about 1 week for 4th and final attempt.

## 2022-03-09 DIAGNOSIS — I10 ESSENTIAL HYPERTENSION: ICD-10-CM

## 2022-03-09 DIAGNOSIS — K21.9 GASTROESOPHAGEAL REFLUX DISEASE, UNSPECIFIED WHETHER ESOPHAGITIS PRESENT: ICD-10-CM

## 2022-03-09 RX ORDER — HYDROCHLOROTHIAZIDE 25 MG/1
TABLET ORAL
Qty: 60 TABLET | Refills: 3 | Status: SHIPPED | OUTPATIENT
Start: 2022-03-09 | End: 2022-07-14

## 2022-03-09 RX ORDER — CARVEDILOL 6.25 MG/1
TABLET ORAL
Qty: 60 TABLET | Refills: 3 | Status: SHIPPED | OUTPATIENT
Start: 2022-03-09 | End: 2022-04-06 | Stop reason: SDUPTHER

## 2022-03-09 RX ORDER — PANTOPRAZOLE SODIUM 20 MG/1
TABLET, DELAYED RELEASE ORAL
Qty: 30 TABLET | Refills: 3 | Status: SHIPPED | OUTPATIENT
Start: 2022-03-09 | End: 2022-04-06 | Stop reason: SDUPTHER

## 2022-03-09 NOTE — TELEPHONE ENCOUNTER
Request for Coreg and Pantoprazole. Next Visit Date:  Future Appointments   Date Time Provider Ximena Bustosi   3/15/2022  9:10 AM Zoya Alonzo MD [address] [facility]   Topic Date Due    Depression Screen  Never done    Colorectal Cancer Screen  Never done    Lipid screen  01/28/2022    COVID-19 Vaccine (3 - Booster for Pfizer series) 02/22/2022    Potassium monitoring  11/02/2022    Creatinine monitoring  11/02/2022    DTaP/Tdap/Td vaccine (2 - Td or Tdap) 10/19/2025    Flu vaccine  Completed    Hepatitis C screen  Completed    HIV screen  Completed    Hepatitis A vaccine  Aged Out    Hepatitis B vaccine  Aged Out    Hib vaccine  Aged Out    Meningococcal (ACWY) vaccine  Aged Out    Pneumococcal 0-64 years Vaccine  Aged Out       Hemoglobin A1C (%)   Date Value   01/28/2021 5.5   01/13/2015 5.2             ( goal A1C is < 7)   Microalb/Crt.  Ratio (mcg/mg creat)   Date Value   10/20/2015 23     LDL Cholesterol (mg/dL)   Date Value   01/28/2021 112       (goal LDL is <100)   AST (U/L)   Date Value   10/21/2021 10     ALT (U/L)   Date Value   10/21/2021 9     BUN (mg/dL)   Date Value   11/02/2021 18     BP Readings from Last 3 Encounters:   12/20/21 111/78   12/20/21 91/64   11/08/21 (!) 83/57          (goal 120/80)    All Future Testing planned in [facility]  Lab Frequency Next Occurrence   Cologuard (For External Results Only) Once 11/02/2022         Patient Active Problem List:     GERD (gastroesophageal reflux disease)     Chronic bilateral low back pain with right-sided sciatica     Lumbar disc disease     Hemorrhoids, external     Essential hypertension     Pure hypercholesterolemia     Spondylosis without myelopathy or radiculopathy, lumbar region     Hx of right BKA (HCC)     Chronic lumbar radiculopathy     Spinal stenosis of lumbar region with neurogenic claudication     Lumbar foraminal stenosis     Arthritis of right hip     Right hip pain     Sacroiliac joint dysfunction of right side     Sacroiliac joint dysfunction of left side

## 2022-03-09 NOTE — TELEPHONE ENCOUNTER
Request for HCTZ. Next Visit Date:  Future Appointments   Date Time Provider Ximena Bustosi   3/15/2022  9:10 AM Harpreet Barajas MD [facility]   Topic Date Due    Depression Screen  Never done    Colorectal Cancer Screen  Never done    Lipid screen  01/28/2022    COVID-19 Vaccine (3 - Booster for Pfizer series) 02/22/2022    Potassium monitoring  11/02/2022    Creatinine monitoring  11/02/2022    DTaP/Tdap/Td vaccine (2 - Td or Tdap) 10/19/2025    Flu vaccine  Completed    Hepatitis C screen  Completed    HIV screen  Completed    Hepatitis A vaccine  Aged Out    Hepatitis B vaccine  Aged Out    Hib vaccine  Aged Out    Meningococcal (ACWY) vaccine  Aged Out    Pneumococcal 0-64 years Vaccine  Aged Out       Hemoglobin A1C (%)   Date Value   01/28/2021 5.5   01/13/2015 5.2             ( goal A1C is < 7)   Microalb/Crt.  Ratio (mcg/mg creat)   Date Value   10/20/2015 23     LDL Cholesterol (mg/dL)   Date Value   01/28/2021 112       (goal LDL is <100)   AST (U/L)   Date Value   10/21/2021 10     ALT (U/L)   Date Value   10/21/2021 9     BUN (mg/dL)   Date Value   11/02/2021 18     BP Readings from Last 3 Encounters:   12/20/21 111/78   12/20/21 91/64   11/08/21 (!) 83/57          (goal 120/80)    All Future Testing planned in [facility]  Lab Frequency Next Occurrence   Cologuard (For External Results Only) Once 11/02/2022         Patient Active Problem List:     GERD (gastroesophageal reflux disease)     Chronic bilateral low back pain with right-sided sciatica     Lumbar disc disease     Hemorrhoids, external     Essential hypertension     Pure hypercholesterolemia     Spondylosis without myelopathy or radiculopathy, lumbar region     Hx of right BKA (HCC)     Chronic lumbar radiculopathy     Spinal stenosis of lumbar region with neurogenic claudication     Lumbar foraminal stenosis     Arthritis of right hip     Right hip pain     Sacroiliac joint dysfunction of right side     Sacroiliac joint dysfunction of left side

## 2022-03-10 ENCOUNTER — CARE COORDINATION (OUTPATIENT)
Dept: CARE COORDINATION | Age: 47
End: 2022-03-10

## 2022-03-10 NOTE — CARE COORDINATION
Called, message left on voicemail with my contact information and reason for call. This is the 4th attempt to contact for ACM enrolment. If no return call, will remove from panel.   1st attempt was 2/14/22

## 2022-03-15 ENCOUNTER — TELEPHONE (OUTPATIENT)
Dept: INTERNAL MEDICINE | Age: 47
End: 2022-03-15

## 2022-03-15 NOTE — LETTER
[address] [address]  Phone: [phone]  Fax: [phone]    Eduar Winn MD        March 15, 2022    [address]      Dear Saul Figueroa: You recently missed a schedule clinic appointment to see Eduar Winn MD on 3/15/2022. This is the second scheduled clinic appointment that you have missed in 1-2 months. In the future, we insist that you call us at least 24 hours in advance at [phone], when you know you will be unable to keep the appointment. This will allow us to use the time for others who need to be seen. If you fail to keep your next appointment, you may be discharged from the office. If you have any questions or concerns, please don't hesitate to call.     Sincerely,        Eduar Winn MD

## 2022-03-19 DIAGNOSIS — I10 ESSENTIAL HYPERTENSION: ICD-10-CM

## 2022-03-21 RX ORDER — AMLODIPINE BESYLATE 10 MG/1
TABLET ORAL
Qty: 30 TABLET | Refills: 3 | Status: SHIPPED | OUTPATIENT
Start: 2022-03-21 | End: 2022-04-06 | Stop reason: SDUPTHER

## 2022-03-21 NOTE — TELEPHONE ENCOUNTER
Request for amlodipine. Next Visit Date:LM for the patient to call the office back to schedule an appointment. Last OV was on 11/2/21  No future appointments. Health Maintenance   Topic Date Due    Depression Screen  Never done    Colorectal Cancer Screen  Never done    Lipid screen  01/28/2022    COVID-19 Vaccine (3 - Booster for Pfizer series) 02/22/2022    Potassium monitoring  11/02/2022    Creatinine monitoring  11/02/2022    DTaP/Tdap/Td vaccine (2 - Td or Tdap) 10/19/2025    Flu vaccine  Completed    Hepatitis C screen  Completed    HIV screen  Completed    Hepatitis A vaccine  Aged Out    Hepatitis B vaccine  Aged Out    Hib vaccine  Aged Out    Meningococcal (ACWY) vaccine  Aged Out    Pneumococcal 0-64 years Vaccine  Aged Out       Hemoglobin A1C (%)   Date Value   01/28/2021 5.5   01/13/2015 5.2             ( goal A1C is < 7)   Microalb/Crt.  Ratio (mcg/mg creat)   Date Value   10/20/2015 23     LDL Cholesterol (mg/dL)   Date Value   01/28/2021 112       (goal LDL is <100)   AST (U/L)   Date Value   10/21/2021 10     ALT (U/L)   Date Value   10/21/2021 9     BUN (mg/dL)   Date Value   11/02/2021 18     BP Readings from Last 3 Encounters:   12/20/21 111/78   12/20/21 91/64   11/08/21 (!) 83/57          (goal 120/80)    All Future Testing planned in [facility]  Lab Frequency Next Occurrence   Cologuard (For External Results Only) Once 11/02/2022         Patient Active Problem List:     GERD (gastroesophageal reflux disease)     Chronic bilateral low back pain with right-sided sciatica     Lumbar disc disease     Hemorrhoids, external     Essential hypertension     Pure hypercholesterolemia     Spondylosis without myelopathy or radiculopathy, lumbar region     Hx of right BKA (HCC)     Chronic lumbar radiculopathy     Spinal stenosis of lumbar region with neurogenic claudication     Lumbar foraminal stenosis     Arthritis of right hip     Right hip pain     Sacroiliac joint dysfunction of right side     Sacroiliac joint dysfunction of left side

## 2022-04-06 ENCOUNTER — HOSPITAL ENCOUNTER (OUTPATIENT)
Age: 47
Setting detail: SPECIMEN
Discharge: HOME | End: 2022-04-06

## 2022-04-06 ENCOUNTER — OFFICE VISIT (OUTPATIENT)
Dept: INTERNAL MEDICINE | Age: 47
End: 2022-04-06
Payer: MEDICARE

## 2022-04-06 VITALS
WEIGHT: 145.2 LBS | HEART RATE: 85 BPM | BODY MASS INDEX: 26.72 KG/M2 | SYSTOLIC BLOOD PRESSURE: 129 MMHG | DIASTOLIC BLOOD PRESSURE: 83 MMHG | HEIGHT: 62 IN

## 2022-04-06 DIAGNOSIS — E78.5 DYSLIPIDEMIA: ICD-10-CM

## 2022-04-06 DIAGNOSIS — I10 ESSENTIAL HYPERTENSION: Primary | ICD-10-CM

## 2022-04-06 DIAGNOSIS — M46.1 BILATERAL SACROILIITIS (HCC): ICD-10-CM

## 2022-04-06 DIAGNOSIS — M54.16 CHRONIC LUMBAR RADICULOPATHY: ICD-10-CM

## 2022-04-06 DIAGNOSIS — I10 ESSENTIAL HYPERTENSION: ICD-10-CM

## 2022-04-06 DIAGNOSIS — K21.9 GASTROESOPHAGEAL REFLUX DISEASE, UNSPECIFIED WHETHER ESOPHAGITIS PRESENT: ICD-10-CM

## 2022-04-06 LAB
ALBUMIN SERPL-MCNC: 4.2 G/DL (ref 3.5–5.2)
ALBUMIN/GLOBULIN RATIO: 1.3 (ref 1–2.5)
ALP BLD-CCNC: 88 U/L (ref 40–129)
ALT SERPL-CCNC: 9 U/L (ref 5–41)
ANION GAP SERPL CALCULATED.3IONS-SCNC: 16 MMOL/L (ref 9–17)
AST SERPL-CCNC: 17 U/L
BILIRUB SERPL-MCNC: 0.35 MG/DL (ref 0.3–1.2)
BUN BLDV-MCNC: 11 MG/DL (ref 6–20)
CALCIUM SERPL-MCNC: 9.8 MG/DL (ref 8.6–10.4)
CHLORIDE BLD-SCNC: 107 MMOL/L (ref 98–107)
CHOLESTEROL, FASTING: 155 MG/DL
CHOLESTEROL/HDL RATIO: 3.2
CO2: 21 MMOL/L (ref 20–31)
CREAT SERPL-MCNC: 1.23 MG/DL (ref 0.7–1.2)
GFR AFRICAN AMERICAN: >60 ML/MIN
GFR NON-AFRICAN AMERICAN: >60 ML/MIN
GFR SERPL CREATININE-BSD FRML MDRD: ABNORMAL ML/MIN/{1.73_M2}
GLUCOSE BLD-MCNC: 96 MG/DL (ref 70–99)
HDLC SERPL-MCNC: 48 MG/DL
LDL CHOLESTEROL: 96 MG/DL (ref 0–130)
POTASSIUM SERPL-SCNC: 3.7 MMOL/L (ref 3.7–5.3)
SODIUM BLD-SCNC: 144 MMOL/L (ref 135–144)
TOTAL PROTEIN: 7.5 G/DL (ref 6.4–8.3)
TRIGLYCERIDE, FASTING: 55 MG/DL

## 2022-04-06 PROCEDURE — G8427 DOCREV CUR MEDS BY ELIG CLIN: HCPCS | Performed by: STUDENT IN AN ORGANIZED HEALTH CARE EDUCATION/TRAINING PROGRAM

## 2022-04-06 PROCEDURE — 1036F TOBACCO NON-USER: CPT | Performed by: STUDENT IN AN ORGANIZED HEALTH CARE EDUCATION/TRAINING PROGRAM

## 2022-04-06 PROCEDURE — 99213 OFFICE O/P EST LOW 20 MIN: CPT | Performed by: STUDENT IN AN ORGANIZED HEALTH CARE EDUCATION/TRAINING PROGRAM

## 2022-04-06 PROCEDURE — G8419 CALC BMI OUT NRM PARAM NOF/U: HCPCS | Performed by: STUDENT IN AN ORGANIZED HEALTH CARE EDUCATION/TRAINING PROGRAM

## 2022-04-06 PROCEDURE — 99211 OFF/OP EST MAY X REQ PHY/QHP: CPT | Performed by: INTERNAL MEDICINE

## 2022-04-06 RX ORDER — AMLODIPINE BESYLATE 10 MG/1
TABLET ORAL
Qty: 30 TABLET | Refills: 3 | Status: SHIPPED | OUTPATIENT
Start: 2022-04-06 | End: 2022-08-10

## 2022-04-06 RX ORDER — PANTOPRAZOLE SODIUM 20 MG/1
TABLET, DELAYED RELEASE ORAL
Qty: 30 TABLET | Refills: 3 | Status: SHIPPED | OUTPATIENT
Start: 2022-04-06 | End: 2022-08-10

## 2022-04-06 RX ORDER — HYDROCHLOROTHIAZIDE 25 MG/1
TABLET ORAL
Qty: 60 TABLET | Refills: 3 | Status: CANCELLED | OUTPATIENT
Start: 2022-04-06

## 2022-04-06 RX ORDER — CARVEDILOL 6.25 MG/1
TABLET ORAL
Qty: 60 TABLET | Refills: 3 | Status: SHIPPED | OUTPATIENT
Start: 2022-04-06 | End: 2022-08-10

## 2022-04-06 RX ORDER — ATORVASTATIN CALCIUM 40 MG/1
TABLET, FILM COATED ORAL
Qty: 90 TABLET | Refills: 3 | Status: SHIPPED | OUTPATIENT
Start: 2022-04-06

## 2022-04-06 RX ORDER — CYCLOBENZAPRINE HCL 10 MG
10 TABLET ORAL 3 TIMES DAILY PRN
Qty: 30 TABLET | Refills: 0 | Status: SHIPPED | OUTPATIENT
Start: 2022-04-06 | End: 2022-08-24 | Stop reason: SDUPTHER

## 2022-04-06 RX ORDER — GABAPENTIN 300 MG/1
CAPSULE ORAL
Qty: 90 CAPSULE | Refills: 3 | Status: SHIPPED | OUTPATIENT
Start: 2022-04-06 | End: 2022-08-12

## 2022-04-06 SDOH — ECONOMIC STABILITY: FOOD INSECURITY: WITHIN THE PAST 12 MONTHS, YOU WORRIED THAT YOUR FOOD WOULD RUN OUT BEFORE YOU GOT MONEY TO BUY MORE.: NEVER TRUE

## 2022-04-06 SDOH — ECONOMIC STABILITY: FOOD INSECURITY: WITHIN THE PAST 12 MONTHS, THE FOOD YOU BOUGHT JUST DIDN'T LAST AND YOU DIDN'T HAVE MONEY TO GET MORE.: NEVER TRUE

## 2022-04-06 ASSESSMENT — PATIENT HEALTH QUESTIONNAIRE - PHQ9
SUM OF ALL RESPONSES TO PHQ QUESTIONS 1-9: 0
2. FEELING DOWN, DEPRESSED OR HOPELESS: 0
SUM OF ALL RESPONSES TO PHQ9 QUESTIONS 1 & 2: 0
1. LITTLE INTEREST OR PLEASURE IN DOING THINGS: 0
SUM OF ALL RESPONSES TO PHQ QUESTIONS 1-9: 0

## 2022-04-06 ASSESSMENT — SOCIAL DETERMINANTS OF HEALTH (SDOH): HOW HARD IS IT FOR YOU TO PAY FOR THE VERY BASICS LIKE FOOD, HOUSING, MEDICAL CARE, AND HEATING?: NOT HARD AT ALL

## 2022-04-06 NOTE — PATIENT INSTRUCTIONS
Follow-up appointment scheduled for  Pt on wait list call office in June for July appointment , AVS given to patient. Medications e-scribe to pharmacy of pt's choice. Labs given to patient, they will have them done before their next visit.      jw

## 2022-04-06 NOTE — PROGRESS NOTES
@[facility]@    [facility]    Progress Note    Date of patient's visit: 4/6/2022    Patient's Name:  Marisel Cunha    YOB: 1975            Patient Care Team:  Cecilia Dillon MD as PCP - General (Internal Medicine)  Bart Cooper MD as PCP - [facility] Empaneled Provider  MD Padmini Juan DO as Consulting Physician (General Surgery)    REASON FOR VISIT: Routine outpatient follow     Chief Complaint   Patient presents with    Hypertension     pt did not take medication today         HISTORY OF PRESENT ILLNESS:    History was obtained from the patient. Marisel Cunha is a 51 y.o. is here for routine follow-up. I last saw him in November for surgical clearance office  sacroiliac joint dysfunction. Since then patient has undergone  hip surgery. (S/p right sacroiliac joint fusion 11/8/2021, left sacroiliac joint fusion 12/20/2021) patient mentioned that his pain since surgery has only worsened and that his orthopedic surgeon mentioned that it is expected for couple of months. He is following his physical therapy exercise regimen at home. He is due to visit his pain physician. Currently on Flexeril and gabapentin controlling pain to some extent. Patient is at home and has family. Is on disability. Has been compliant with his medications. He had colonoscopy done in 2018. Will obtain records from the office.   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      Denies alcohol consumption/smoking cigarettes/substance use  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    Neuroblastoma ([address]     Neurogenic dysfunction of the urinary bladder     Osteoarthritis     Phantom limb pain ([address]     Pure hypercholesterolemia 7/18/2017    RSD (reflex sympathetic dystrophy)        Past Surgical History:   Procedure Laterality Date    ABDOMINAL ADHESION SURGERY      BACK SURGERY Right 11/8/2021    RIGHT SI JOINT FUSION PERCUTANEOUS -SI BONE WILLIE performed by Belinda Narvaez MD at [facility] Left 12/20/2021    LEFT SI JOINT FUSION [address] performed by Belinda Narvaez MD at [facility] Right 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    PAIN MANAGEMENT PROCEDURE Bilateral 2/20/2020    EPIDURAL STEROID INJECTION MOIZ L5S1 performed by Capo Paris MD at [address] [place] [id number] Bilateral 3/2/2020    EPIDURAL STEROID INJECTION MOIZ L5S1 performed by Capo Paris MD at [address] [place] [id number] Bilateral 8/6/2020    EPIDURAL STEROID INJECTION BILATERAL L5 performed by Capo Paris MD at [address] [place] [id number] Right 2/8/2021    RIGHT L5 S1 EPIDURAL STEROID INJECTION performed by Capo Paris MD at [facility]      r/t bowel obstruction         ALLERGIES      Allergies   Allergen Reactions    Shellfish-Derived Products Anaphylaxis    Penicillins Other (See Comments)     UNKNOWN REACTION       MEDICATIONS:      Current Outpatient Medications on File Prior to Visit   Medication Sig Dispense Refill    amLODIPine (NORVASC) 10 MG tablet take 1 tablet by mouth once daily 30 tablet 3    carvedilol (COREG) 6.25 MG tablet take 1 tablet by mouth twice a day 60 tablet 3    pantoprazole (PROTONIX) 20 MG tablet take 1 tablet by mouth once daily 30 tablet 3    hydroCHLOROthiazide (HYDRODIURIL) 25 MG tablet take 2 tablets by mouth once daily 60 tablet 3    cyclobenzaprine (FLEXERIL) 10 MG tablet Take 1 tablet by mouth 3 times daily as needed for Muscle spasms 30 tablet 0    atorvastatin (LIPITOR) 40 MG tablet take 1 tablet by mouth once daily 90 tablet 3    gabapentin (NEURONTIN) 300 MG capsule take 1 capsule by mouth three times a day 90 capsule 3     No current facility-administered medications on file prior to visit. SOCIAL HISTORY    Reviewed and no change from previous record. Vincent  reports that he has never smoked. He has never used smokeless tobacco.    FAMILY HISTORY:    Reviewed and No change from previous visit    HEALTH MAINTENANCE DUE:      Health Maintenance Due   Topic Date Due    Colorectal Cancer Screen  Never done    Depression Screen  01/22/2022    Lipid screen  01/28/2022    COVID-19 Vaccine (3 - Booster for Pfizer series) 02/22/2022       REVIEW OF SYSTEMS:    12 point review of symptoms completed and found to be normal except noted in the HPI    · Constitutional: Negative for Fever, chills  · Eyes: Negative for visual changes, diplopia  · ENT: Negative for mouth sores, sore throat. · Cardiovascular: Negative for lightheadedness ,chest pain, palpitations   · Respiratory:Negative for Shortness of breath,cough or wheezing. · Gastrointestinal: Negative for nausea/vomiting, change in bowel habits, abdominal pain  · Genitourinary:Negative for change in bladder habits, dysuria, hematuria.   · Musculoskeletal: Positive for hip pain and back pain  · Neurological: Negative for headache, change in muscle strength numbness/tingling       PHYSICAL EXAM:      Vitals:    04/06/22 0923   BP: 129/83   Site: Left Upper Arm   Position: Sitting   Cuff Size: Medium Adult   Pulse: 85   Weight: 145 lb 3.2 oz (65.9 kg)   Height: 5' 2\" (1.575 m)     Body mass index is 26.56 kg/m². BP Readings from Last 3 Encounters:   04/06/22 129/83   12/20/21 111/78   12/20/21 91/64        Wt Readings from Last 3 Encounters:   04/06/22 145 lb 3.2 oz (65.9 kg)   12/20/21 148 lb 14.4 oz (67.5 kg)   11/08/21 143 lb (64.9 kg)           · General appearance: awake, alert, cooperative  · HEENT: Head: Normocephalic, no lesions, without obvious abnormality. · Lungs: clear to auscultation bilaterally  · Heart: regular rate and rhythm, S1, S2 normal, no murmur  · Abdomen: soft, non-tender; bowel sounds normal; no masses,  no organomegaly  · Extremities: History of s/p right BKA  ·  neurological:  Awake, alert, oriented to name, place and time. Cranial nerves II-XII are grossly intact. Reflexes normal and symmetric.  Sensation grossly normal  · Has some tenderness in his lower back in the middle  · Eye no icterus no redness    LABORATORY FINDINGS:    CBC:  Lab Results   Component Value Date    WBC 6.2 11/02/2021    HGB 13.5 11/02/2021     11/02/2021     BMP:    Lab Results   Component Value Date     11/02/2021    K 4.9 11/02/2021     11/02/2021    CO2 21 11/02/2021    BUN 18 11/02/2021    CREATININE 1.27 11/02/2021    GLUCOSE 92 11/02/2021     HEMOGLOBIN A1C:   Lab Results   Component Value Date    LABA1C 5.5 01/28/2021     MICROALBUMIN URINE:   Lab Results   Component Value Date    MICROALBUR 55 10/20/2015     FASTING LIPID PANEL:  Lab Results   Component Value Date    CHOL 218 (H) 10/21/2019    HDL 55 01/28/2021    TRIG 68 10/21/2019     Lab Results   Component Value Date    LDLCHOLESTEROL 112 01/28/2021       LIVER PROFILE:  Lab Results   Component Value Date ALT 9 10/21/2021    AST 10 10/21/2021    PROT 6.7 10/21/2021    BILITOT 0.32 10/21/2021    LABALBU 3.4 10/21/2021      THYROID FUNCTION:   Lab Results   Component Value Date    TSH 0.58 01/28/2021      URINE ANALYSIS: No results found for: LABURIN  ASSESSMENT AND PLAN:    1. Gastroesophageal reflux disease, unspecified whether esophagitis present  Continue Protonix 20 mg daily    2. Essential hypertension  Continue Coreg 6.25 mg twice daily, Norvasc 10 mg daily and hydrochlorothiazide 25 mg daily  Well-controlled    3. Chronic bilateral low back pain with bilateral sciatica s/p L4-L5 fusion surgery s/p laminectomy March 2021  Follows up with neurosurgery and pain management clinic      6. Screening for hyperlipidemia  Follow-up lipid profile  Continue atorvastatin 40 mg     8. Bilateral sacroiliitis [facility])  S/p sacroiliac fusion surgery in November and December 2021  Continue physical therapy and pain management with Flexeril and gabapentin  Patient has an appointment with pain physician  Follows up with Ortho physician    90 JOELLEN:  Follow-up BMP and electrolytes        Health Maintenance  Obtain colonoscopy records from GI office  Optimal pain managed    FOLLOW UP AND INSTRUCTIONS:   1. No follow-ups on file. 2. Vincent received counseling on the following healthy behaviors: nutrition, exercise and medication adherence    3. Reviewed prior labs and health maintenance. 4. Discussed use, benefit, and side effects of prescribed medications. Barriers to medication compliance addressed. All patient questions answered. Pt voiced understanding.      5. Patient given educational materials - see patient instructions         Gurwinder Razo MD  PGY-3, Internal Medicine Resident  [facility]  4/6/2022 10:31 AM

## 2022-04-06 NOTE — PROGRESS NOTES
Attending Physician Statement  I have discussed the care of Favian Blum, including pertinent history and exam findings,  with the resident. I have reviewed the key elements of all parts of the encounter with the resident. I agree with the assessment, plan and orders as documented by the resident.   (GE Modifier)

## 2022-06-10 ENCOUNTER — HOSPITAL ENCOUNTER (OUTPATIENT)
Dept: PHYSICAL THERAPY | Facility: CLINIC | Age: 47
Setting detail: THERAPIES SERIES
Discharge: HOME | End: 2022-06-10
Payer: MEDICARE

## 2022-06-10 PROCEDURE — 97750 PHYSICAL PERFORMANCE TEST: CPT

## 2022-06-15 ENCOUNTER — HOSPITAL ENCOUNTER (EMERGENCY)
Age: 47
Discharge: HOME | End: 2022-06-16
Attending: STUDENT IN AN ORGANIZED HEALTH CARE EDUCATION/TRAINING PROGRAM
Payer: MEDICARE

## 2022-06-15 DIAGNOSIS — M62.838 SPASM OF MUSCLE: Primary | ICD-10-CM

## 2022-06-15 LAB
ABSOLUTE EOS #: 0.09 K/UL (ref 0–0.4)
ABSOLUTE LYMPH #: 1.02 K/UL (ref 1–4.8)
ABSOLUTE MONO #: 1.11 K/UL (ref 0.1–1.3)
ANION GAP SERPL CALCULATED.3IONS-SCNC: 13 MMOL/L (ref 9–17)
BASOPHILS # BLD: 0 % (ref 0–2)
BASOPHILS ABSOLUTE: 0 K/UL (ref 0–0.2)
BUN BLDV-MCNC: 21 MG/DL (ref 6–20)
CALCIUM SERPL-MCNC: 9.1 MG/DL (ref 8.6–10.4)
CHLORIDE BLD-SCNC: 106 MMOL/L (ref 98–107)
CO2: 21 MMOL/L (ref 20–31)
CREAT SERPL-MCNC: 1.58 MG/DL (ref 0.7–1.2)
EOSINOPHILS RELATIVE PERCENT: 1 % (ref 0–4)
GFR AFRICAN AMERICAN: 57 ML/MIN
GFR NON-AFRICAN AMERICAN: 47 ML/MIN
GFR SERPL CREATININE-BSD FRML MDRD: ABNORMAL ML/MIN/{1.73_M2}
GLUCOSE BLD-MCNC: 103 MG/DL (ref 70–99)
HCT VFR BLD CALC: 34.1 % (ref 41–53)
HEMOGLOBIN: 10.9 G/DL (ref 13.5–17.5)
LYMPHOCYTES # BLD: 12 % (ref 24–44)
MCH RBC QN AUTO: 24.4 PG (ref 26–34)
MCHC RBC AUTO-ENTMCNC: 32 G/DL (ref 31–37)
MCV RBC AUTO: 76.2 FL (ref 80–100)
MONOCYTES # BLD: 13 % (ref 1–7)
MORPHOLOGY: ABNORMAL
MYOGLOBIN: 90 NG/ML (ref 28–72)
PDW BLD-RTO: 17 % (ref 11.5–14.9)
PLATELET # BLD: 312 K/UL (ref 150–450)
PMV BLD AUTO: 7.5 FL (ref 6–12)
POTASSIUM SERPL-SCNC: 3.2 MMOL/L (ref 3.7–5.3)
RBC # BLD: 4.48 M/UL (ref 4.5–5.9)
SEG NEUTROPHILS: 74 % (ref 36–66)
SEGMENTED NEUTROPHILS ABSOLUTE COUNT: 6.28 K/UL (ref 1.3–9.1)
SODIUM BLD-SCNC: 140 MMOL/L (ref 135–144)
TOTAL CK: 222 U/L (ref 39–308)
WBC # BLD: 8.5 K/UL (ref 3.5–11)

## 2022-06-15 PROCEDURE — 6360000002 HC RX W HCPCS: Performed by: STUDENT IN AN ORGANIZED HEALTH CARE EDUCATION/TRAINING PROGRAM

## 2022-06-15 PROCEDURE — 6370000000 HC RX 637 (ALT 250 FOR IP): Performed by: STUDENT IN AN ORGANIZED HEALTH CARE EDUCATION/TRAINING PROGRAM

## 2022-06-15 PROCEDURE — 36415 COLL VENOUS BLD VENIPUNCTURE: CPT

## 2022-06-15 PROCEDURE — 80048 BASIC METABOLIC PNL TOTAL CA: CPT

## 2022-06-15 PROCEDURE — 96372 THER/PROPH/DIAG INJ SC/IM: CPT

## 2022-06-15 PROCEDURE — 85025 COMPLETE CBC W/AUTO DIFF WBC: CPT

## 2022-06-15 PROCEDURE — 83874 ASSAY OF MYOGLOBIN: CPT

## 2022-06-15 PROCEDURE — 82550 ASSAY OF CK (CPK): CPT

## 2022-06-15 PROCEDURE — 99284 EMERGENCY DEPT VISIT MOD MDM: CPT

## 2022-06-15 RX ORDER — MORPHINE SULFATE 4 MG/ML
4 INJECTION, SOLUTION INTRAMUSCULAR; INTRAVENOUS ONCE
Status: DISCONTINUED | OUTPATIENT
Start: 2022-06-16 | End: 2022-06-15

## 2022-06-15 RX ORDER — 0.9 % SODIUM CHLORIDE 0.9 %
1000 INTRAVENOUS SOLUTION INTRAVENOUS ONCE
Status: DISCONTINUED | OUTPATIENT
Start: 2022-06-15 | End: 2022-06-16

## 2022-06-15 RX ORDER — DIAZEPAM 2 MG/1
2 TABLET ORAL ONCE
Status: COMPLETED | OUTPATIENT
Start: 2022-06-15 | End: 2022-06-15

## 2022-06-15 RX ORDER — MORPHINE SULFATE 4 MG/ML
4 INJECTION, SOLUTION INTRAMUSCULAR; INTRAVENOUS ONCE
Status: DISCONTINUED | OUTPATIENT
Start: 2022-06-15 | End: 2022-06-15

## 2022-06-15 RX ORDER — DIAZEPAM 2 MG/1
2 TABLET ORAL ONCE
Status: COMPLETED | OUTPATIENT
Start: 2022-06-16 | End: 2022-06-16

## 2022-06-15 RX ORDER — MORPHINE SULFATE 4 MG/ML
4 INJECTION, SOLUTION INTRAMUSCULAR; INTRAVENOUS ONCE
Status: DISCONTINUED | OUTPATIENT
Start: 2022-06-16 | End: 2022-06-16

## 2022-06-15 RX ORDER — MORPHINE SULFATE 4 MG/ML
4 INJECTION, SOLUTION INTRAMUSCULAR; INTRAVENOUS ONCE
Status: COMPLETED | OUTPATIENT
Start: 2022-06-15 | End: 2022-06-15

## 2022-06-15 RX ADMIN — MORPHINE SULFATE 4 MG: 4 INJECTION, SOLUTION INTRAMUSCULAR; INTRAVENOUS at 23:22

## 2022-06-15 RX ADMIN — DIAZEPAM 2 MG: 2 TABLET ORAL at 22:48

## 2022-06-15 ASSESSMENT — ENCOUNTER SYMPTOMS
SHORTNESS OF BREATH: 0
EYE ITCHING: 0
SINUS PAIN: 0
NAUSEA: 0
CONSTIPATION: 0
DIARRHEA: 0
SINUS PRESSURE: 0
SORE THROAT: 0
ABDOMINAL DISTENTION: 0
EYE PAIN: 0
COUGH: 0
ABDOMINAL PAIN: 0

## 2022-06-15 ASSESSMENT — PAIN DESCRIPTION - LOCATION
LOCATION: GENERALIZED
LOCATION: GENERALIZED

## 2022-06-15 ASSESSMENT — PAIN SCALES - GENERAL
PAINLEVEL_OUTOF10: 10
PAINLEVEL_OUTOF10: 10

## 2022-06-15 ASSESSMENT — PAIN DESCRIPTION - PAIN TYPE: TYPE: ACUTE PAIN

## 2022-06-15 ASSESSMENT — PAIN - FUNCTIONAL ASSESSMENT: PAIN_FUNCTIONAL_ASSESSMENT: 0-10

## 2022-06-15 ASSESSMENT — PAIN DESCRIPTION - DESCRIPTORS
DESCRIPTORS: SPASM
DESCRIPTORS: SPASM

## 2022-06-15 ASSESSMENT — LIFESTYLE VARIABLES: HOW OFTEN DO YOU HAVE A DRINK CONTAINING ALCOHOL: NEVER

## 2022-06-15 ASSESSMENT — PAIN DESCRIPTION - FREQUENCY: FREQUENCY: CONTINUOUS

## 2022-06-16 VITALS
OXYGEN SATURATION: 96 % | DIASTOLIC BLOOD PRESSURE: 83 MMHG | BODY MASS INDEX: 24.8 KG/M2 | SYSTOLIC BLOOD PRESSURE: 143 MMHG | HEART RATE: 109 BPM | TEMPERATURE: 98 F | HEIGHT: 63 IN | WEIGHT: 140 LBS | RESPIRATION RATE: 18 BRPM

## 2022-06-16 PROCEDURE — 6360000002 HC RX W HCPCS: Performed by: STUDENT IN AN ORGANIZED HEALTH CARE EDUCATION/TRAINING PROGRAM

## 2022-06-16 PROCEDURE — 6370000000 HC RX 637 (ALT 250 FOR IP): Performed by: STUDENT IN AN ORGANIZED HEALTH CARE EDUCATION/TRAINING PROGRAM

## 2022-06-16 RX ORDER — MORPHINE SULFATE 4 MG/ML
4 INJECTION, SOLUTION INTRAMUSCULAR; INTRAVENOUS ONCE
Status: COMPLETED | OUTPATIENT
Start: 2022-06-16 | End: 2022-06-16

## 2022-06-16 RX ORDER — DIAZEPAM 2 MG/1
2 TABLET ORAL EVERY 8 HOURS PRN
Qty: 3 TABLET | Refills: 0 | Status: SHIPPED | OUTPATIENT
Start: 2022-06-16 | End: 2022-06-26

## 2022-06-16 RX ADMIN — DIAZEPAM 2 MG: 2 TABLET ORAL at 00:54

## 2022-06-16 RX ADMIN — POTASSIUM BICARBONATE 40 MEQ: 782 TABLET, EFFERVESCENT ORAL at 00:54

## 2022-06-16 RX ADMIN — MORPHINE SULFATE 4 MG: 4 INJECTION, SOLUTION INTRAMUSCULAR; INTRAVENOUS at 00:54

## 2022-06-16 ASSESSMENT — PAIN DESCRIPTION - LOCATION: LOCATION: GENERALIZED

## 2022-06-16 ASSESSMENT — PAIN SCALES - GENERAL
PAINLEVEL_OUTOF10: 10
PAINLEVEL_OUTOF10: 10
PAINLEVEL_OUTOF10: 8

## 2022-06-16 ASSESSMENT — PAIN - FUNCTIONAL ASSESSMENT
PAIN_FUNCTIONAL_ASSESSMENT: 0-10
PAIN_FUNCTIONAL_ASSESSMENT: ACTIVITIES ARE NOT PREVENTED

## 2022-06-16 ASSESSMENT — PAIN DESCRIPTION - DESCRIPTORS: DESCRIPTORS: SPASM

## 2022-06-16 NOTE — ED TRIAGE NOTES
Pt presents to ER with what he states is \"full body muscle spasms\". Pt noted in triage spasming and jerking. Pt describes spasms as painful and symptoms have been progressive over 2 days.  Pt states he had a problem with his electrolytes being low in the past.

## 2022-06-16 NOTE — ED PROVIDER NOTES
[address]  Emergency Department Encounter  EmergencyMedicine Resident     Pt Name:Vincent Rush  MRN: [id number]  [place] 1975  Date of evaluation: 6/15/22  PCP:  Kera Sales MD    This patient was evaluated in the Emergency Department for symptoms described in the history of present illness. The patient was evaluated in the context of the global COVID-19 pandemic, which necessitated consideration that the patient might be at risk for infection with the SARS-CoV-2 virus that causes COVID-19. Institutional protocols and algorithms that pertain to the evaluation of patients at risk for COVID-19 are in a state of rapid change based on information released by regulatory bodies including the CDC and federal and state organizations. These policies and algorithms were followed during the patient's care in the ED. CHIEF COMPLAINT       Chief Complaint   Patient presents with    Tremors     muscle spasms        HISTORY OF PRESENT ILLNESS  (Location/Symptom, Timing/Onset, Context/Setting, Quality, Duration, Modifying Factors, Severity.)      Cynthia Joy is a 49 y.o. male who presents with history of neurofibromatosis, and hypertension presenting for muscle spasms for the last 2 days. No generalized shaking or seizure sounding activity. Patient states he has been seen here for this before and was treated with pain medications and a muscle relaxer with good effect. No new reported environmental exposures or new medications. He denies any chest pain, shortness of breath, fevers or bowel or bladder changes. Reports he is on gabapentin at home for neuropathic pain. He denies drug allergies but chart documents penicillin allergy.     PAST MEDICAL / SURGICAL / SOCIAL / FAMILY HISTORY      has a past medical history of JOELLEN (acute kidney injury) ([facility] [address] Allergic rhinitis, Chronic abdominal pain, ED (erectile dysfunction) of organic origin, GERD (gastroesophageal reflux disease), Gynecomastia, male, History of hepatitis, Hypertension, Lumbar disc disease, Neuroblastoma ([facility] [address] Neuroblastoma ([facility] [address] Neurogenic dysfunction of the urinary bladder, Osteoarthritis, Phantom limb pain ([facility] [address] Pure hypercholesterolemia, and RSD (reflex sympathetic dystrophy).     has a past surgical history that includes Leg amputation, lower tibia/fibula (Right, 1986); bladder repair; Abdominal adhesion surgery; Small intestine surgery; Pain management procedure (Bilateral, 2/20/2020); Pain management procedure (Bilateral, 3/2/2020); Pain management procedure (Bilateral, 8/6/2020); Pain management procedure (Right, 2/8/2021); back surgery (Right, 11/8/2021); and back surgery (Left, 12/20/2021). Social History     Socioeconomic History    Marital status:      Spouse name: Not on file    Number of children: Not on file    Years of education: Not on file    Highest education level: Not on file   Occupational History    Not on file   Tobacco Use    Smoking status: Never Smoker    Smokeless tobacco: Never Used   Vaping Use    Vaping Use: Former   Substance and Sexual Activity    Alcohol use: No     Alcohol/week: 0.0 standard drinks    Drug use: No    Sexual activity: Yes     Partners: Female   Other Topics Concern    Not on file   Social History Narrative    ** Merged History Encounter **          Social Determinants of Health     Financial Resource Strain: Low Risk     Difficulty of Paying Living Expenses: Not hard at all   Food Insecurity: No Food Insecurity    Worried About [address] in the Last Year: Never true    [address] in the Last Year: Never true   Transportation Needs:     Lack of Transportation (Medical): Not on file    Lack of Transportation (Non-Medical):  Not on file   Physical Activity:     Days of Exercise per Week: Not on file    Minutes of Exercise per Session: Not on file   Stress:     Feeling of Stress : Not on file   Social Connections:     Frequency of Communication with Friends and Family: Not on file    Frequency of Social Gatherings with Friends and Family: Not on file    Attends Druze Services: Not on file    Active Member of Clubs or Organizations: Not on file    Attends Club or Organization Meetings: Not on file    Marital Status: Not on file   Intimate Partner Violence:     Fear of Current or Ex-Partner: Not on file    Emotionally Abused: Not on file    Physically Abused: Not on file    Sexually Abused: Not on file   Housing Stability:     Unable to Pay for Housing in the Last Year: Not on file    Number of [place] in the Last Year: Not on file    Unstable Housing in the Last Year: Not on file       Family History   Problem Relation Age of Onset    High Blood Pressure Mother     Diabetes Mother     High Blood Pressure Father     Cancer Father         prostate    Diabetes Father     Coronary Art Dis Father     Heart Attack Father     Heart Disease Father     No Known Problems Sister     No Known Problems Brother        Allergies:  Shellfish-derived products and Penicillins    Home Medications:  Prior to Admission medications    Medication Sig Start Date End Date Taking? Authorizing Provider   diazePAM (VALIUM) 2 MG tablet Take 1 tablet by mouth every 8 hours as needed for Anxiety (Pain/Spasm) for up to 10 doses.  6/16/22 6/26/22 Yes Dot Meneses,    pantoprazole (PROTONIX) 20 MG tablet take 1 tablet by mouth once daily 4/6/22   Carissa Sanchez MD   gabapentin (NEURONTIN) 300 MG capsule take 1 capsule by mouth three times a day 4/6/22 7/5/22  Carissa Sanchez MD   cyclobenzaprine (FLEXERIL) 10 MG tablet Take 1 tablet by mouth 3 times daily as needed for Muscle spasms 4/6/22   Carissa Sanchez MD   carvedilol (COREG) 6.25 MG tablet take 1 tablet by mouth twice a day 4/6/22   Carissa Sanchez MD   atorvastatin (LIPITOR) 40 MG tablet take 1 tablet by mouth once daily 4/6/22   Carissa Sanchez MD   amLODIPine (NORVASC) 10 MG tablet take 1 tablet by mouth once daily 4/6/22   Mukul Michael MD   hydroCHLOROthiazide (HYDRODIURIL) 25 MG tablet take 2 tablets by mouth once daily 3/9/22   Erick Jang MD       REVIEW OF SYSTEMS    (2-9 systems for level 4, 10 or more for level 5)      Review of Systems   Constitutional: Negative for activity change, chills and fever. HENT: Negative for congestion, sinus pressure, sinus pain and sore throat. Eyes: Negative for pain and itching. Respiratory: Negative for cough and shortness of breath. Cardiovascular: Negative for chest pain. Gastrointestinal: Negative for abdominal distention, abdominal pain, constipation, diarrhea and nausea. Endocrine: Negative for polyuria. Genitourinary: Negative for dysuria and frequency. Musculoskeletal: Positive for myalgias. Negative for arthralgias. Skin: Negative for rash. Neurological: Negative for light-headedness and headaches. PHYSICAL EXAM   (up to 7 for level 4, 8 or more for level 5)      INITIAL VITALS:   BP (!) 143/83   Pulse (!) 109   Temp 98 °F (36.7 °C) (Tympanic)   Resp 18   Ht 5' 3\" (1.6 m)   Wt 140 lb (63.5 kg)   SpO2 96%   BMI 24.80 kg/m²     Physical Exam  Vitals reviewed. Constitutional:       General: He is not in acute distress. HENT:      Head: Normocephalic and atraumatic. Ears:      Comments: Hearing grossly normal     Nose: Nose normal.      Mouth/Throat:      Mouth: Mucous membranes are moist.      Pharynx: Oropharynx is clear. Eyes:      General: No scleral icterus. Conjunctiva/sclera: Conjunctivae normal.      Pupils: Pupils are equal, round, and reactive to light. Cardiovascular:      Rate and Rhythm: Normal rate and regular rhythm. Pulses: Normal pulses. Pulmonary:      Effort: Pulmonary effort is normal. No respiratory distress. Breath sounds: Normal breath sounds. Abdominal:      General: There is no distension. Tenderness: There is no abdominal tenderness. There is no guarding. Musculoskeletal:      Cervical back: No muscular tenderness. Right lower leg: No edema. Left lower leg: No edema. Skin:     General: Skin is warm and dry. Capillary Refill: Capillary refill takes less than 2 seconds. Neurological:      General: No focal deficit present. Mental Status: He is alert and oriented to person, place, and time. Mental status is at baseline. Comments: Observed w/intermittent whole body wide muscle spasms         DIFFERENTIAL  DIAGNOSIS     PLAN (LABS / IMAGING / EKG):  Orders Placed This Encounter   Procedures    CBC with Auto Differential    Basic Metabolic Panel    CK    Myoglobin       MEDICATIONS ORDERED:  Orders Placed This Encounter   Medications    DISCONTD: morphine sulfate (PF) injection 4 mg    diazePAM (VALIUM) tablet 2 mg    morphine sulfate (PF) injection 4 mg    potassium bicarb-citric acid (EFFER-K) effervescent tablet 40 mEq    DISCONTD: 0.9 % sodium chloride bolus    diazePAM (VALIUM) tablet 2 mg    DISCONTD: morphine sulfate (PF) injection 4 mg    DISCONTD: morphine sulfate (PF) injection 4 mg    DISCONTD: morphine sulfate (PF) injection 4 mg    morphine sulfate (PF) injection 4 mg    diazePAM (VALIUM) 2 MG tablet     Sig: Take 1 tablet by mouth every 8 hours as needed for Anxiety (Pain/Spasm) for up to 10 doses.      Dispense:  3 tablet     Refill:  0       DIAGNOSTIC RESULTS / EMERGENCY DEPARTMENT COURSE / MDM   LAB RESULTS:  Results for orders placed or performed during the hospital encounter of 06/15/22   CBC with Auto Differential   Result Value Ref Range    WBC 8.5 3.5 - 11.0 k/uL    RBC 4.48 (L) 4.5 - 5.9 m/uL    Hemoglobin 10.9 (L) 13.5 - 17.5 g/dL    Hematocrit 34.1 (L) 41 - 53 %    MCV 76.2 (L) 80 - 100 fL    MCH 24.4 (L) 26 - 34 pg    MCHC 32.0 31 - 37 g/dL    RDW 17.0 (H) 11.5 - 14.9 %    Platelets 282 835 - 051 k/uL    MPV 7.5 6.0 - 12.0 fL    Seg Neutrophils 74 (H) 36 - 66 %    Lymphocytes 12 (L) 24 - 44 % Monocytes 13 (H) 1 - 7 %    Eosinophils % 1 0 - 4 %    Basophils 0 0 - 2 %    Segs Absolute 6.28 1.3 - 9.1 k/uL    Absolute Lymph # 1.02 1.0 - 4.8 k/uL    Absolute Mono # 1.11 0.1 - 1.3 k/uL    Absolute Eos # 0.09 0.0 - 0.4 k/uL    Basophils Absolute 0.00 0.0 - 0.2 k/uL    Morphology ANISOCYTOSIS PRESENT    Basic Metabolic Panel   Result Value Ref Range    Glucose 103 (H) 70 - 99 mg/dL    BUN 21 (H) 6 - 20 mg/dL    CREATININE 1.58 (H) 0.70 - 1.20 mg/dL    Calcium 9.1 8.6 - 10.4 mg/dL    Sodium 140 135 - 144 mmol/L    Potassium 3.2 (L) 3.7 - 5.3 mmol/L    Chloride 106 98 - 107 mmol/L    CO2 21 20 - 31 mmol/L    Anion Gap 13 9 - 17 mmol/L    GFR Non-African American 47 (L) >60 mL/min    GFR  57 (L) >60 mL/min    GFR Comment         CK   Result Value Ref Range    Total  39 - 308 U/L   Myoglobin   Result Value Ref Range    Myoglobin 90 (H) 28 - 72 ng/mL       IMPRESSION: Gabby Mattson is a 49 y.o. man presenting for muscle spasms. He denies any new medications including any psychiatric medications, low concern for neuroleptic malignant syndrome. Also not typical of tetanus presentation and he denies any environmental exposures. He has been previously seen for the same in the past and was treated with morphine and Valium. We will repeat drug regimen and follow-up CK, myoglobin, BMP and CBC to rule out secondary rhabdomyolysis. RADIOLOGY:  No results found. EKG  none    All EKG's are interpreted by the Emergency Department Physician who either signs or Co-signs this chart in the absence of a cardiologist.    EMERGENCY DEPARTMENT COURSE:  Patient seen and evaluated, VSS and nontoxic in appearance. ED w/u demosntrates stable blood counts and metabolic panel. Pt was informed of mild hypokalemia. No rhabdo. Valium and morphine were given for pain and spasms w/good effect. Patient was given a few tabs of valium on discharge and agrees to f/u w/PCP.  Patient understands to return to the emergency department for any new or worsening symptoms and to see their PCP regarding hospital follow up. No notes of EC Admission Criteria type on file. PROCEDURES:  none    CONSULTS:  None    CRITICAL CARE:  See attending note    FINAL IMPRESSION      1. Spasm of muscle          DISPOSITION / PLAN     DISPOSITION Decision To Discharge 06/16/2022 01:24:39 AM      PATIENT REFERRED TO:  [facility] ED  [address]  [address] [facility]  [phone]    As needed, If symptoms worsen    Byron Muse MD  [facility] [place] [address]             DISCHARGE MEDICATIONS:  Discharge Medication List as of 6/16/2022 12:41 AM      START taking these medications    Details   diazePAM (VALIUM) 2 MG tablet Take 1 tablet by mouth every 8 hours as needed for Anxiety (Pain/Spasm) for up to 10 doses. , Disp-3 tablet, R-0Print             Mily Padilla DO  Emergency Medicine Resident    (Please note that portions of thisnote were completed with a voice recognition program.  Efforts were made to edit the dictations but occasionally words are mis-transcribed.)       Mily Padilla DO  Resident  06/20/22 1517

## 2022-07-08 DIAGNOSIS — I10 ESSENTIAL HYPERTENSION: ICD-10-CM

## 2022-07-13 NOTE — TELEPHONE ENCOUNTER
Refill request for hydroCHLOROthiazide (HYDRODIURIL) 25 MG tablet. If appropriate please send medication(s) to patients pharmacy. Next appt: 8/24/2022 Gill Coronado  Last appt: 4/6/2022    Health Maintenance   Topic Date Due    Colorectal Cancer Screen  Never done    COVID-19 Vaccine (3 - Booster for [employer] series) 02/22/2022    Flu vaccine (1) 09/01/2022    Lipids  04/06/2023    Depression Screen  04/06/2023    DTaP/Tdap/Td vaccine (2 - Td or Tdap) 10/19/2025    Hepatitis C screen  Completed    HIV screen  Completed    Hepatitis A vaccine  Aged Out    Hepatitis B vaccine  Aged Out    Hib vaccine  Aged Out    Meningococcal (ACWY) vaccine  Aged Out    Pneumococcal 0-64 years Vaccine  Aged Out       Hemoglobin A1C (%)   Date Value   01/28/2021 5.5   01/13/2015 5.2             ( goal A1C is < 7)   Microalb/Crt.  Ratio (mcg/mg creat)   Date Value   10/20/2015 23     LDL Cholesterol (mg/dL)   Date Value   04/06/2022 96       (goal LDL is <100)   AST (U/L)   Date Value   04/06/2022 17     ALT (U/L)   Date Value   04/06/2022 9     BUN (mg/dL)   Date Value   06/15/2022 21 (H)     BP Readings from Last 3 Encounters:   06/16/22 (!) 143/83   04/06/22 129/83   12/20/21 111/78          (goal 120/80)          Patient Active Problem List:     GERD (gastroesophageal reflux disease)     Chronic bilateral low back pain with right-sided sciatica     Lumbar disc disease     Hemorrhoids, external     Essential hypertension     Pure hypercholesterolemia     Spondylosis without myelopathy or radiculopathy, lumbar region     Hx of right BKA (HCC)     Chronic lumbar radiculopathy     Spinal stenosis of lumbar region with neurogenic claudication     Lumbar foraminal stenosis     Arthritis of right hip     Right hip pain     Sacroiliac joint dysfunction of right side     Sacroiliac joint dysfunction of left side

## 2022-07-14 RX ORDER — HYDROCHLOROTHIAZIDE 25 MG/1
TABLET ORAL
Qty: 60 TABLET | Refills: 3 | Status: SHIPPED | OUTPATIENT
Start: 2022-07-14

## 2022-08-09 DIAGNOSIS — K21.9 GASTROESOPHAGEAL REFLUX DISEASE, UNSPECIFIED WHETHER ESOPHAGITIS PRESENT: ICD-10-CM

## 2022-08-09 DIAGNOSIS — I10 ESSENTIAL HYPERTENSION: ICD-10-CM

## 2022-08-09 NOTE — TELEPHONE ENCOUNTER
Refill request for pended medications. If appropriate please send medication(s) to patients pharmacy. Next appt: 8/24/2022 Donna Plunkett  Last appt: 4/6/2022    Health Maintenance   Topic Date Due    Colorectal Cancer Screen  Never done    COVID-19 Vaccine (3 - Booster for [employer] series) 02/22/2022    Flu vaccine (1) 09/01/2022    Lipids  04/06/2023    Depression Screen  04/06/2023    DTaP/Tdap/Td vaccine (2 - Td or Tdap) 10/19/2025    Hepatitis C screen  Completed    HIV screen  Completed    Hepatitis A vaccine  Aged Out    Hepatitis B vaccine  Aged Out    Hib vaccine  Aged Out    Meningococcal (ACWY) vaccine  Aged Out    Pneumococcal 0-64 years Vaccine  Aged Out       Hemoglobin A1C (%)   Date Value   01/28/2021 5.5   01/13/2015 5.2             ( goal A1C is < 7)   Microalb/Crt.  Ratio (mcg/mg creat)   Date Value   10/20/2015 23     LDL Cholesterol (mg/dL)   Date Value   04/06/2022 96       (goal LDL is <100)   AST (U/L)   Date Value   04/06/2022 17     ALT (U/L)   Date Value   04/06/2022 9     BUN (mg/dL)   Date Value   06/15/2022 21 (H)     BP Readings from Last 3 Encounters:   06/16/22 (!) 143/83   04/06/22 129/83   12/20/21 111/78          (goal 120/80)          Patient Active Problem List:     GERD (gastroesophageal reflux disease)     Chronic bilateral low back pain with right-sided sciatica     Lumbar disc disease     Hemorrhoids, external     Essential hypertension     Pure hypercholesterolemia     Spondylosis without myelopathy or radiculopathy, lumbar region     Hx of right BKA (HCC)     Chronic lumbar radiculopathy     Spinal stenosis of lumbar region with neurogenic claudication     Lumbar foraminal stenosis     Arthritis of right hip     Right hip pain     Sacroiliac joint dysfunction of right side     Sacroiliac joint dysfunction of left side

## 2022-08-10 RX ORDER — AMLODIPINE BESYLATE 10 MG/1
TABLET ORAL
Qty: 30 TABLET | Refills: 3 | Status: SHIPPED | OUTPATIENT
Start: 2022-08-10

## 2022-08-10 RX ORDER — PANTOPRAZOLE SODIUM 20 MG/1
TABLET, DELAYED RELEASE ORAL
Qty: 30 TABLET | Refills: 3 | Status: SHIPPED | OUTPATIENT
Start: 2022-08-10

## 2022-08-10 RX ORDER — CARVEDILOL 6.25 MG/1
TABLET ORAL
Qty: 60 TABLET | Refills: 3 | Status: SHIPPED | OUTPATIENT
Start: 2022-08-10

## 2022-08-12 DIAGNOSIS — G89.29 CHRONIC BILATERAL LOW BACK PAIN WITH BILATERAL SCIATICA: ICD-10-CM

## 2022-08-12 DIAGNOSIS — M54.16 CHRONIC LUMBAR RADICULOPATHY: Primary | ICD-10-CM

## 2022-08-12 DIAGNOSIS — M54.42 CHRONIC BILATERAL LOW BACK PAIN WITH BILATERAL SCIATICA: ICD-10-CM

## 2022-08-12 DIAGNOSIS — M54.41 CHRONIC BILATERAL LOW BACK PAIN WITH BILATERAL SCIATICA: ICD-10-CM

## 2022-08-12 RX ORDER — GABAPENTIN 300 MG/1
CAPSULE ORAL
Qty: 90 CAPSULE | Refills: 2 | Status: SHIPPED | OUTPATIENT
Start: 2022-08-12 | End: 2023-01-12

## 2022-08-12 NOTE — TELEPHONE ENCOUNTER
Request for Gabapentin. Next Visit Date:  Future Appointments   Date Time Provider Ximena Bustosi   8/24/2022  2:30 PM Deena Calzada MD [address] [facility]   Topic Date Due    Colorectal Cancer Screen  Never done    COVID-19 Vaccine (3 - Booster for [employer] series) 02/22/2022    Flu vaccine (1) 09/01/2022    Lipids  04/06/2023    Depression Screen  04/06/2023    DTaP/Tdap/Td vaccine (2 - Td or Tdap) 10/19/2025    Hepatitis C screen  Completed    HIV screen  Completed    Hepatitis A vaccine  Aged Out    Hepatitis B vaccine  Aged Out    Hib vaccine  Aged Out    Meningococcal (ACWY) vaccine  Aged Out    Pneumococcal 0-64 years Vaccine  Aged Out       Hemoglobin A1C (%)   Date Value   01/28/2021 5.5   01/13/2015 5.2             ( goal A1C is < 7)   Microalb/Crt.  Ratio (mcg/mg creat)   Date Value   10/20/2015 23     LDL Cholesterol (mg/dL)   Date Value   04/06/2022 96       (goal LDL is <100)   AST (U/L)   Date Value   04/06/2022 17     ALT (U/L)   Date Value   04/06/2022 9     BUN (mg/dL)   Date Value   06/15/2022 21 (H)     BP Readings from Last 3 Encounters:   06/16/22 (!) 143/83   04/06/22 129/83   12/20/21 111/78          (goal 120/80)    All Future Testing planned in [facility]  Lab Frequency Next Occurrence         Patient Active Problem List:     GERD (gastroesophageal reflux disease)     Chronic bilateral low back pain with right-sided sciatica     Lumbar disc disease     Hemorrhoids, external     Essential hypertension     Pure hypercholesterolemia     Spondylosis without myelopathy or radiculopathy, lumbar region     Hx of right BKA (HCC)     Chronic lumbar radiculopathy     Spinal stenosis of lumbar region with neurogenic claudication     Lumbar foraminal stenosis     Arthritis of right hip     Right hip pain     Sacroiliac joint dysfunction of right side     Sacroiliac joint dysfunction of left side

## 2022-08-24 ENCOUNTER — OFFICE VISIT (OUTPATIENT)
Dept: INTERNAL MEDICINE | Age: 47
End: 2022-08-24
Payer: MEDICARE

## 2022-08-24 ENCOUNTER — HOSPITAL ENCOUNTER (OUTPATIENT)
Age: 47
Setting detail: SPECIMEN
Discharge: HOME | End: 2022-08-24

## 2022-08-24 VITALS
HEART RATE: 82 BPM | WEIGHT: 138.6 LBS | BODY MASS INDEX: 25.51 KG/M2 | HEIGHT: 62 IN | OXYGEN SATURATION: 97 % | DIASTOLIC BLOOD PRESSURE: 80 MMHG | SYSTOLIC BLOOD PRESSURE: 116 MMHG

## 2022-08-24 DIAGNOSIS — E78.5 DYSLIPIDEMIA: ICD-10-CM

## 2022-08-24 DIAGNOSIS — I10 ESSENTIAL HYPERTENSION: ICD-10-CM

## 2022-08-24 DIAGNOSIS — M54.16 CHRONIC LUMBAR RADICULOPATHY: ICD-10-CM

## 2022-08-24 DIAGNOSIS — I10 ESSENTIAL HYPERTENSION: Primary | ICD-10-CM

## 2022-08-24 PROBLEM — N18.30 CHRONIC RENAL DISEASE, STAGE III (HCC): Status: ACTIVE | Noted: 2022-08-24

## 2022-08-24 LAB
ANION GAP SERPL CALCULATED.3IONS-SCNC: 11 MMOL/L (ref 9–17)
BUN BLDV-MCNC: 15 MG/DL (ref 6–20)
CALCIUM SERPL-MCNC: 9.1 MG/DL (ref 8.6–10.4)
CHLORIDE BLD-SCNC: 105 MMOL/L (ref 98–107)
CO2: 22 MMOL/L (ref 20–31)
CREAT SERPL-MCNC: 1.37 MG/DL (ref 0.7–1.2)
GFR AFRICAN AMERICAN: >60 ML/MIN
GFR NON-AFRICAN AMERICAN: 55 ML/MIN
GFR SERPL CREATININE-BSD FRML MDRD: ABNORMAL ML/MIN/{1.73_M2}
GLUCOSE BLD-MCNC: 95 MG/DL (ref 70–99)
POTASSIUM SERPL-SCNC: 4 MMOL/L (ref 3.7–5.3)
SODIUM BLD-SCNC: 138 MMOL/L (ref 135–144)

## 2022-08-24 PROCEDURE — 99211 OFF/OP EST MAY X REQ PHY/QHP: CPT | Performed by: INTERNAL MEDICINE

## 2022-08-24 RX ORDER — CYCLOBENZAPRINE HCL 10 MG
10 TABLET ORAL 3 TIMES DAILY PRN
Qty: 30 TABLET | Refills: 0 | Status: SHIPPED | OUTPATIENT
Start: 2022-08-24 | End: 2023-01-11 | Stop reason: SDUPTHER

## 2022-08-24 ASSESSMENT — ENCOUNTER SYMPTOMS
SORE THROAT: 0
ABDOMINAL PAIN: 0
SINUS PAIN: 0
CHEST TIGHTNESS: 0
NAUSEA: 0
APNEA: 0
COUGH: 0
CONSTIPATION: 0
DIARRHEA: 0
BLOOD IN STOOL: 0
BACK PAIN: 1
SHORTNESS OF BREATH: 0
SINUS PRESSURE: 0
ABDOMINAL DISTENTION: 0
COLOR CHANGE: 0
CHOKING: 0

## 2022-08-24 NOTE — PROGRESS NOTES
[facility], operated by [facility] [address] [address]  Dept: [phone]  Dept Fax: [phone]    Office Progress/Follow Up Note  Date ofpatient's visit: 8/24/2022  Patient's Name:  Niki Gunter YOB: 1975            Patient Care Team:  Antonio Donato MD as PCP - General (Internal Medicine)  Ron Harrell MD as PCP - [address] [place] [address] [facility]  MD Kristy Dominguez DO as Consulting Physician (General Surgery)  ================================================================    REASON FOR VISIT/CHIEF COMPLAINT:  Establish Care (Former patient of Dr Renny Wilson), Hypertension, and Health Maintenance (Hiv, Hep C ,Lipid (pending) pneumovax)    HISTORY OF PRESENTING ILLNESS:  History was obtained from: patient, electronic medical record. Elvin oconnor 49 y.o. is here for a follow up visit. Patient has history of hypertension, taking Coreg, HCTZ, Norvasc. Well-controlled. Per patient he checks his blood pressure at home. Patient has history of  hip surgery. (S/p right sacroiliac joint fusion 11/8/2021, left sacroiliac joint fusion 12/20/2021). He is following with pain management and neurosurgery. Currently on Flexeril. Patient is at home and has family. Is on disability. Has been compliant with his medications. He had colonoscopy done in 2018. Per patient no abnormality.      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 Hypercholesterolemia. On atorvastatin    Denies alcohol consumption/smoking cigarettes/substance use     Denies hep C/HIV screening     Diagnosis Orders   1. Essential hypertension  Basic Metabolic Panel      2. Chronic lumbar radiculopathy  cyclobenzaprine (FLEXERIL) 10 MG tablet      3.  Dyslipidemia           Patient Active Problem List   Diagnosis    GERD (gastroesophageal reflux disease)    Chronic bilateral low back pain with right-sided sciatica    Lumbar disc disease    Hemorrhoids, external    Essential hypertension    Pure hypercholesterolemia    Spondylosis without myelopathy or radiculopathy, lumbar region    Hx of right BKA (HCC)    Chronic lumbar radiculopathy    Spinal stenosis of lumbar region with neurogenic claudication    Lumbar foraminal stenosis    Arthritis of right hip    Right hip pain    Sacroiliac joint dysfunction of right side    Sacroiliac joint dysfunction of left side    Chronic renal disease, stage III [facility]) [[id number]]       Health Maintenance Due   Topic Date Due    Colorectal Cancer Screen  Never done    COVID-19 Vaccine (3 - Booster for Pfizer series) 02/22/2022       Allergies   Allergen Reactions    Shellfish-Derived Products Anaphylaxis    Penicillins Other (See Comments)     UNKNOWN REACTION         Current Outpatient Medications   Medication Sig Dispense Refill    cyclobenzaprine (FLEXERIL) 10 MG tablet Take 1 tablet by mouth 3 times daily as needed for Muscle spasms 30 tablet 0    gabapentin (NEURONTIN) 300 MG capsule take 1 capsule by mouth three times a day 90 capsule 2    pantoprazole (PROTONIX) 20 MG tablet take 1 tablet by mouth once daily 30 tablet 3    carvedilol (COREG) 6.25 MG tablet take 1 tablet by mouth twice a day 60 tablet 3    amLODIPine (NORVASC) 10 MG tablet take 1 tablet by mouth once daily 30 tablet 3    hydroCHLOROthiazide (HYDRODIURIL) 25 MG tablet take 2 tablets by mouth once daily 60 tablet 3    atorvastatin (LIPITOR) 40 MG tablet take 1 tablet by mouth once daily 90 tablet 3     No current facility-administered medications for this visit. Social History     Tobacco Use    Smoking status: Never    Smokeless tobacco: Never   Vaping Use    Vaping Use: Former   Substance Use Topics    Alcohol use: No     Alcohol/week: 0.0 standard drinks    Drug use: No       Family History   Problem Relation Age of Onset    High Blood Pressure Mother     Diabetes Mother     High Blood Pressure Father     Cancer Father         prostate    Diabetes Father     Coronary Art Dis Father     Heart Attack Father     Heart Disease Father     No Known Problems Sister     No Known Problems Brother         REVIEW OF SYSTEMS:  Review of Systems   Constitutional:  Negative for activity change, appetite change and fatigue. HENT:  Negative for hearing loss, postnasal drip, sinus pressure, sinus pain and sore throat. Respiratory:  Negative for apnea, cough, choking, chest tightness and shortness of breath. Cardiovascular:  Negative for chest pain, palpitations and leg swelling. Gastrointestinal:  Negative for abdominal distention, abdominal pain, blood in stool, constipation, diarrhea and nausea. Genitourinary:  Negative for difficulty urinating, dysuria, flank pain, frequency and hematuria. Musculoskeletal:  Positive for arthralgias, back pain and gait problem. Skin:  Negative for color change, pallor, rash and wound. Neurological:  Negative for dizziness, seizures, syncope, light-headedness and headaches. Psychiatric/Behavioral:  Negative for agitation, confusion and dysphoric mood. The patient is not hyperactive. PHYSICAL EXAM:  Vitals:    08/24/22 1435   BP: 116/80   Pulse: 82   SpO2: 97%   Weight: 138 lb 9.6 oz (62.9 kg)   Height: 5' 2\" (1.575 m)     BP Readings from Last 3 Encounters:   08/24/22 116/80   06/16/22 (!) 143/83   04/06/22 129/83        Physical Exam  Constitutional:       General: He is not in acute distress.      Appearance: He is not ill-appearing, toxic-appearing or diaphoretic.   Cardiovascular:      Heart sounds: No murmur heard.    No friction rub. No gallop.   Pulmonary:      Effort: No respiratory distress.      Breath sounds: No stridor. No wheezing, rhonchi or rales.   Chest:      Chest wall: No tenderness.   Abdominal:      General: There is no distension.      Palpations: There is no mass.      Tenderness: no abdominal tenderness There is no guarding or rebound.      Hernia: No hernia is present.   Musculoskeletal:         General: No swelling, tenderness, deformity or signs of injury.      Right lower leg: No edema.      Left lower leg: No edema.   Neurological:      Cranial Nerves: No cranial nerve deficit.      Sensory: No sensory deficit.      Motor: No weakness.      Coordination: Coordination normal.      Gait: Gait abnormal.       DIAGNOSTIC FINDINGS:  CBC:  Lab Results   Component Value Date/Time    WBC 8.5 06/15/2022 10:56 PM    HGB 10.9 06/15/2022 10:56 PM     06/15/2022 10:56 PM       BMP:    Lab Results   Component Value Date/Time     06/15/2022 10:56 PM    K 3.2 06/15/2022 10:56 PM     06/15/2022 10:56 PM    CO2 21 06/15/2022 10:56 PM    BUN 21 06/15/2022 10:56 PM    CREATININE 1.58 06/15/2022 10:56 PM    GLUCOSE 103 06/15/2022 10:56 PM       HEMOGLOBIN A1C:   Lab Results   Component Value Date/Time    LABA1C 5.5 01/28/2021 11:30 AM       FASTING LIPID PANEL:  Lab Results   Component Value Date    CHOL 218 (H) 10/21/2019    HDL 48 04/06/2022    TRIG 68 10/21/2019       ASSESSMENT AND PLAN:  Vincent was seen today for establish care, hypertension and health maintenance.    Diagnoses and all orders for this visit:    Essential hypertension, well controlled  -     Basic Metabolic Panel; Future  -     Continue on Coreg 6.25, Norvasc 10 mg, HCTZ 25    Chronic lumbar radiculopathy  -     cyclobenzaprine (FLEXERIL) 10 MG tablet; Take 1 tablet by mouth 3 times daily as needed for Muscle spasms    Dyslipidemia      -     continue on atorvastatin 40 daily     FOLLOW UP AND INSTRUCTIONS:  Return in about 6 months (around 2/24/2023). Vincent received counseling on the following healthy behaviors: nutrition and medication adherence    Discussed use, benefit, and side effects of prescribed medications. Barriers to medication compliance addressed. All patient questions answered. Pt voiced understanding. Patient given educational materials - see patient instructions    Scott Sher MD  Resident Internal Medicine  [facility],  [facility].  8/24/2022, 3:45 PM    This note is created with the assistance of a speech-recognition program. While intending to generate a document that actually reflects the content of thevisit, the document can still have some mistakes which may not have been identified and corrected by editing.

## 2022-08-24 NOTE — PROGRESS NOTES
Attending Physician Statement  I have discussed the care of Jose Miguel Beltre, including pertinent history and exam findings,  with the resident. I have reviewed the key elements of all parts of the encounter with the resident. I agree with the assessment, plan and orders as documented by the resident.   (GE Modifier)

## 2022-11-17 DIAGNOSIS — I10 ESSENTIAL HYPERTENSION: ICD-10-CM

## 2022-11-17 NOTE — TELEPHONE ENCOUNTER
Refill request for hydroCHLOROthiazide (HYDRODIURIL) 25 MG tablet. If appropriate please send medication(s) to patients pharmacy. Next appt: Patient is currently on wait list for provider. Last appt: 8/24/2022    Health Maintenance   Topic Date Due    Colorectal Cancer Screen  Never done    COVID-19 Vaccine (3 - Booster for Pfizer series) 11/17/2021    Flu vaccine (1) 08/01/2022    Lipids  04/06/2023    Depression Screen  04/06/2023    DTaP/Tdap/Td vaccine (2 - Td or Tdap) 10/19/2025    Hepatitis C screen  Completed    HIV screen  Completed    Hepatitis A vaccine  Aged Out    Hib vaccine  Aged Out    Meningococcal (ACWY) vaccine  Aged Out    Pneumococcal 0-64 years Vaccine  Aged Out       Hemoglobin A1C (%)   Date Value   01/28/2021 5.5   01/13/2015 5.2             ( goal A1C is < 7)   Microalb/Crt.  Ratio (mcg/mg creat)   Date Value   10/20/2015 23     LDL Cholesterol (mg/dL)   Date Value   04/06/2022 96       (goal LDL is <100)   AST (U/L)   Date Value   04/06/2022 17     ALT (U/L)   Date Value   04/06/2022 9     BUN (mg/dL)   Date Value   08/24/2022 15     BP Readings from Last 3 Encounters:   08/24/22 116/80   06/16/22 (!) 143/83   04/06/22 129/83          (goal 120/80)          Patient Active Problem List:     GERD (gastroesophageal reflux disease)     Chronic bilateral low back pain with right-sided sciatica     Lumbar disc disease     Hemorrhoids, external     Essential hypertension     Pure hypercholesterolemia     Spondylosis without myelopathy or radiculopathy, lumbar region     Hx of right BKA (HCC)     Chronic lumbar radiculopathy     Spinal stenosis of lumbar region with neurogenic claudication     Lumbar foraminal stenosis     Arthritis of right hip     Right hip pain     Sacroiliac joint dysfunction of right side     Sacroiliac joint dysfunction of left side     Chronic renal disease, stage III ([address] [946454]

## 2022-11-18 RX ORDER — HYDROCHLOROTHIAZIDE 25 MG/1
TABLET ORAL
Qty: 60 TABLET | Refills: 4 | Status: SHIPPED | OUTPATIENT
Start: 2022-11-18

## 2022-11-20 DIAGNOSIS — G89.29 CHRONIC BILATERAL LOW BACK PAIN WITH BILATERAL SCIATICA: ICD-10-CM

## 2022-11-20 DIAGNOSIS — M54.41 CHRONIC BILATERAL LOW BACK PAIN WITH BILATERAL SCIATICA: ICD-10-CM

## 2022-11-20 DIAGNOSIS — M54.42 CHRONIC BILATERAL LOW BACK PAIN WITH BILATERAL SCIATICA: ICD-10-CM

## 2022-11-21 NOTE — TELEPHONE ENCOUNTER
Request for gabapentin      Next Visit Date:2/24/23  No future appointments.    Health Maintenance   Topic Date Due    Colorectal Cancer Screen  Never done    COVID-19 Vaccine (3 - Booster for Pfizer series) 11/17/2021    Flu vaccine (1) 08/01/2022    Lipids  04/06/2023    Depression Screen  04/06/2023    DTaP/Tdap/Td vaccine (2 - Td or Tdap) 10/19/2025    Hepatitis C screen  Completed    HIV screen  Completed    Hepatitis A vaccine  Aged Out    Hib vaccine  Aged Out    Meningococcal (ACWY) vaccine  Aged Out    Pneumococcal 0-64 years Vaccine  Aged Out       Hemoglobin A1C (%)   Date Value   01/28/2021 5.5   01/13/2015 5.2             ( goal A1C is < 7)   Microalb/Crt. Ratio (mcg/mg creat)   Date Value   10/20/2015 23     LDL Cholesterol (mg/dL)   Date Value   04/06/2022 96       (goal LDL is <100)   AST (U/L)   Date Value   04/06/2022 17     ALT (U/L)   Date Value   04/06/2022 9     BUN (mg/dL)   Date Value   08/24/2022 15     BP Readings from Last 3 Encounters:   08/24/22 116/80   06/16/22 (!) 143/83   04/06/22 129/83          (goal 120/80)    All Future Testing planned in [facility]  Lab Frequency Next Occurrence         Patient Active Problem List:     GERD (gastroesophageal reflux disease)     Chronic bilateral low back pain with right-sided sciatica     Lumbar disc disease     Hemorrhoids, external     Essential hypertension     Pure hypercholesterolemia     Spondylosis without myelopathy or radiculopathy, lumbar region     Hx of right BKA (HCC)     Chronic lumbar radiculopathy     Spinal stenosis of lumbar region with neurogenic claudication     Lumbar foraminal stenosis     Arthritis of right hip     Right hip pain     Sacroiliac joint dysfunction of right side     Sacroiliac joint dysfunction of left side     Chronic renal disease, stage III (HCC) [[id number]]

## 2022-11-22 RX ORDER — GABAPENTIN 300 MG/1
CAPSULE ORAL
Qty: 90 CAPSULE | Refills: 0 | Status: SHIPPED | OUTPATIENT
Start: 2022-11-22 | End: 2023-01-09 | Stop reason: SDUPTHER

## 2022-12-09 ENCOUNTER — HOSPITAL ENCOUNTER (EMERGENCY)
Age: 47
Discharge: HOME | End: 2022-12-09
Attending: EMERGENCY MEDICINE
Payer: MEDICARE

## 2022-12-09 VITALS
TEMPERATURE: 98.1 F | SYSTOLIC BLOOD PRESSURE: 115 MMHG | OXYGEN SATURATION: 97 % | BODY MASS INDEX: 25.76 KG/M2 | DIASTOLIC BLOOD PRESSURE: 78 MMHG | RESPIRATION RATE: 16 BRPM | HEART RATE: 90 BPM | HEIGHT: 62 IN | WEIGHT: 140 LBS

## 2022-12-09 DIAGNOSIS — H61.23 BILATERAL IMPACTED CERUMEN: ICD-10-CM

## 2022-12-09 DIAGNOSIS — M62.838 SPASM OF MUSCLE: Primary | ICD-10-CM

## 2022-12-09 LAB
ANION GAP SERPL CALCULATED.3IONS-SCNC: 8 MMOL/L (ref 9–17)
BUN BLDV-MCNC: 16 MG/DL (ref 6–20)
CALCIUM SERPL-MCNC: 9.3 MG/DL (ref 8.6–10.4)
CHLORIDE BLD-SCNC: 104 MMOL/L (ref 98–107)
CO2: 25 MMOL/L (ref 20–31)
CREAT SERPL-MCNC: 1.28 MG/DL (ref 0.7–1.2)
GFR SERPL CREATININE-BSD FRML MDRD: >60 ML/MIN/1.73M2
GLUCOSE BLD-MCNC: 101 MG/DL (ref 70–99)
POTASSIUM SERPL-SCNC: 3.7 MMOL/L (ref 3.7–5.3)
SODIUM BLD-SCNC: 137 MMOL/L (ref 135–144)
TOTAL CK: 183 U/L (ref 39–308)

## 2022-12-09 PROCEDURE — 80048 BASIC METABOLIC PNL TOTAL CA: CPT

## 2022-12-09 PROCEDURE — 82550 ASSAY OF CK (CPK): CPT

## 2022-12-09 PROCEDURE — 99284 EMERGENCY DEPT VISIT MOD MDM: CPT

## 2022-12-09 PROCEDURE — 6370000000 HC RX 637 (ALT 250 FOR IP): Performed by: EMERGENCY MEDICINE

## 2022-12-09 PROCEDURE — 2580000003 HC RX 258: Performed by: EMERGENCY MEDICINE

## 2022-12-09 PROCEDURE — 36415 COLL VENOUS BLD VENIPUNCTURE: CPT

## 2022-12-09 RX ORDER — DIAZEPAM 2 MG/1
2 TABLET ORAL EVERY 6 HOURS PRN
Qty: 12 TABLET | Refills: 0 | Status: SHIPPED | OUTPATIENT
Start: 2022-12-09 | End: 2022-12-19

## 2022-12-09 RX ORDER — POTASSIUM CHLORIDE 20 MEQ/1
40 TABLET, EXTENDED RELEASE ORAL ONCE
Status: COMPLETED | OUTPATIENT
Start: 2022-12-09 | End: 2022-12-09

## 2022-12-09 RX ORDER — DIAZEPAM 5 MG/1
5 TABLET ORAL ONCE
Status: COMPLETED | OUTPATIENT
Start: 2022-12-09 | End: 2022-12-09

## 2022-12-09 RX ORDER — 0.9 % SODIUM CHLORIDE 0.9 %
1000 INTRAVENOUS SOLUTION INTRAVENOUS ONCE
Status: COMPLETED | OUTPATIENT
Start: 2022-12-09 | End: 2022-12-09

## 2022-12-09 RX ADMIN — POTASSIUM CHLORIDE 40 MEQ: 1500 TABLET, EXTENDED RELEASE ORAL at 12:42

## 2022-12-09 RX ADMIN — SODIUM CHLORIDE 1000 ML: 9 INJECTION, SOLUTION INTRAVENOUS at 11:43

## 2022-12-09 RX ADMIN — DIAZEPAM 5 MG: 5 TABLET ORAL at 11:42

## 2022-12-09 ASSESSMENT — PAIN - FUNCTIONAL ASSESSMENT: PAIN_FUNCTIONAL_ASSESSMENT: 0-10

## 2022-12-09 ASSESSMENT — PAIN SCALES - GENERAL: PAINLEVEL_OUTOF10: 10

## 2022-12-09 NOTE — ED TRIAGE NOTES
Mode of arrival (squad #, walk in, police, etc) : walk-in        Chief complaint(s): muscle spasms, ear fullness        Arrival Note (brief scenario, treatment PTA, etc). : Pt reports bilateral ear fullness and muscle spasms x2 days. C= \"Have you ever felt that you should Cut down on your drinking? \"  No  A= \"Have people Annoyed you by criticizing your drinking? \"  No  G= \"Have you ever felt bad or Guilty about your drinking? \"  No  E= \"Have you ever had a drink as an Eye-opener first thing in the morning to steady your nerves or to help a hangover? \"  No      Deferred []      Reason for deferring: N/A    *If yes to two or more: probable alcohol abuse. *

## 2022-12-09 NOTE — ED NOTES
Pt discharged to home. 1 prescription sent to pharmacy. F/U with PCP. Educated on F/U plan and medication.  Verbalized understanding     Tali Jackson RN  12/09/22 [id number]

## 2022-12-09 NOTE — ED PROVIDER NOTES
EMERGENCY DEPARTMENT ENCOUNTER    Pt Name: Nicole Pagan  MRN: [id number]  [place] 1975  Date of evaluation: 12/9/22  CHIEF COMPLAINT       Chief Complaint   Patient presents with    Spasms    Ear Fullness     HISTORY OF PRESENT ILLNESS   HPI  Spasms in his arms and legs, this happens from time to time, needed muscle relaxants in the past, Valium worked in the past.  Compliant with his medications, no trauma or falls. No shortness of breath. No seizure activity. No tongue biting or urinary incontinence. Also having some fullness in both of his ears, feeling difficulty hearing. New symptoms, constant, 1 day, nothing making better or worse. REVIEW OF SYSTEMS     Review of Systems   All other systems reviewed and are negative.   PASTMEDICAL HISTORY     Past Medical History:   Diagnosis Date    JOELLEN (acute kidney injury) ([address] 2/12/2015    Allergic rhinitis     Chronic abdominal pain     ED (erectile dysfunction) of organic origin     GERD (gastroesophageal reflux disease)     Gynecomastia, male     History of hepatitis 07/18/2017    PT DENIES    Hypertension     Lumbar disc disease     Neuroblastoma ([address] 1976    Neuroblastoma ([address]     Neurogenic dysfunction of the urinary bladder     Osteoarthritis     Phantom limb pain ([address]     Pure hypercholesterolemia 7/18/2017    RSD (reflex sympathetic dystrophy)      Past Problem List  Patient Active Problem List   Diagnosis Code    GERD (gastroesophageal reflux disease) K21.9    Chronic bilateral low back pain with right-sided sciatica M54.41, G89.29    Lumbar disc disease M51.9    Hemorrhoids, external K64.4    Essential hypertension I10    Pure hypercholesterolemia E78.00    Spondylosis without myelopathy or radiculopathy, lumbar region M47.816    Hx of right BKA ([address] Z89.511    Chronic lumbar radiculopathy M54.16    Spinal stenosis of lumbar region with neurogenic claudication M48.062    Lumbar foraminal stenosis M48.061    Arthritis of right hip M16.11 Right hip pain M25.551    Sacroiliac joint dysfunction of right side M53.3    Sacroiliac joint dysfunction of left side M53.3    Chronic renal disease, stage III ([address] [190215] N18.30     SURGICAL HISTORY       Past Surgical History:   Procedure Laterality Date    ABDOMINAL ADHESION SURGERY      BACK SURGERY Right 11/8/2021    RIGHT SI JOINT FUSION PERCUTANEOUS -SI BONE WILLIE performed by Roque Ingram MD at [facility]. Left 12/20/2021    LEFT SI JOINT FUSION [address] performed by Roque Ingram MD at [facility] Right 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    PAIN MANAGEMENT PROCEDURE Bilateral 2/20/2020    EPIDURAL STEROID INJECTION MOIZ L5S1 performed by Jhonathan Dow MD at [address] Bilateral 3/2/2020    EPIDURAL STEROID INJECTION MOIZ L5S1 performed by Jhonathan Dow MD at [address] Bilateral 8/6/2020    EPIDURAL STEROID INJECTION BILATERAL L5 performed by Jhonathan Dow MD at [address] Right 2/8/2021    RIGHT L5 S1 EPIDURAL STEROID INJECTION performed by Jhonathan Dow MD at [address] [address]      r/t bowel obstruction     CURRENT MEDICATIONS       Discharge Medication List as of 12/9/2022 12:38 PM        CONTINUE these medications which have NOT CHANGED    Details   gabapentin (NEURONTIN) 300 MG capsule take 1 capsule by mouth three times a day, Disp-90 capsule, R-0Normal      hydroCHLOROthiazide (HYDRODIURIL) 25 MG tablet take 2 tablets by mouth once daily, Disp-60 tablet, R-4Normal      cyclobenzaprine (FLEXERIL) 10 MG tablet Take 1 tablet by mouth 3 times daily as needed for Muscle spasms, Disp-30 tablet, R-0Normal      pantoprazole (PROTONIX) 20 MG tablet take 1 tablet by mouth once daily, Disp-30 tablet, R-3Normal      carvedilol (COREG) 6.25 MG tablet take 1 tablet by mouth twice a day, Disp-60 tablet, R-3Normal      amLODIPine (NORVASC) 10 MG tablet take 1 tablet by mouth once daily, Disp-30 tablet, R-3Normal      atorvastatin (LIPITOR) 40 MG tablet take 1 tablet by mouth once daily, Disp-90 tablet, R-3Normal           ALLERGIES     is allergic to shellfish-derived products and penicillins. FAMILY HISTORY     He indicated that his mother is alive. He indicated that his father is alive. He indicated that his sister is alive. He indicated that his brother is alive. SOCIAL HISTORY       Social History     Tobacco Use    Smoking status: Never    Smokeless tobacco: Never   Vaping Use    Vaping Use: Former   Substance Use Topics    Alcohol use: No     Alcohol/week: 0.0 standard drinks    Drug use: No     PHYSICAL EXAM     INITIAL VITALS: /78   Pulse 90   Temp 98.1 °F (36.7 °C) (Oral)   Resp 16   Ht 5' 2\" (1.575 m)   Wt 140 lb (63.5 kg)   SpO2 97%   BMI 25.61 kg/m²    Physical Exam  Constitutional:       General: He is not in acute distress. Appearance: Normal appearance. He is well-developed. He is not diaphoretic. Comments: Intermittent spasms in arms or legs   HENT:      Head: Normocephalic and atraumatic. Right Ear: External ear normal.      Left Ear: External ear normal.      Ears:      Comments: Mild cerumen left ear, more cerumen in the right ear, unable to see TM right, able to see the TM on the left, with normal landmarks. Nose: Nose normal. No congestion. Mouth/Throat:      Mouth: Mucous membranes are moist.      Pharynx: Oropharynx is clear. Eyes:      General:         Right eye: No discharge. Left eye: No discharge. Conjunctiva/sclera: Conjunctivae normal.      Pupils: Pupils are equal, round, and reactive to light. Neck:      Trachea: No tracheal deviation. Cardiovascular:      Rate and Rhythm: Normal rate and regular rhythm. Pulses: Normal pulses. Heart sounds: Normal heart sounds.    Pulmonary:      Effort: Pulmonary effort is normal. No respiratory distress. Breath sounds: Normal breath sounds. No stridor. No wheezing or rales. Abdominal:      Palpations: Abdomen is soft. Tenderness: There is no abdominal tenderness. There is no guarding or rebound. Musculoskeletal:         General: No tenderness or deformity. Normal range of motion. Cervical back: Normal range of motion and neck supple. Skin:     General: Skin is warm and dry. Capillary Refill: Capillary refill takes less than 2 seconds. Findings: No erythema or rash. Neurological:      General: No focal deficit present. Mental Status: He is alert and oriented to person, place, and time. Coordination: Coordination normal.      Comments: GCS 15  Strength in arms 5/5  Strength in legs 5/5  Sensation intact bl arms and legs  No facial droop  Speech is clear, no dysarthria or aphasia  No ataxia in arms or legs  No gaze preference or nystagums  Visual fields intact     Psychiatric:         Mood and Affect: Mood normal.         Behavior: Behavior normal.         Thought Content: Thought content normal.         Judgment: Judgment normal.       MEDICAL DECISION MAKING:     Cramps improve with valium po  Discussed with patient anticipatory guidance, discharge instructions, follow up PCP 24 hours  Labs reviewed  K replaced po  Rx valium prn at home and carbamide peroxide           Procedures    DIAGNOSTIC RESULTS       LABS: All lab results were reviewed by myself, and all abnormals are listed below.   Labs Reviewed   BASIC METABOLIC PANEL - Abnormal; Notable for the following components:       Result Value    Glucose 101 (*)     Creatinine 1.28 (*)     Anion Gap 8 (*)     All other components within normal limits   CK       EMERGENCY DEPARTMENTCOURSE:         Vitals:    Vitals:    12/09/22 1006 12/09/22 1244 12/09/22 1247   BP: (!) 148/98 115/78 115/78   Pulse: 93 90    Resp: 18 16    Temp: 98.1 °F (36.7 °C)     TempSrc: Oral     SpO2: 98% 97% 97%   Weight: 140 lb (63.5 kg)     Height: 5' 2\" (1.575 m)         The patient was given the following medications while in the emergency department:  Orders Placed This Encounter   Medications    0.9 % sodium chloride bolus    diazePAM (VALIUM) tablet 5 mg    potassium chloride (KLOR-CON M) extended release tablet 40 mEq    diazePAM (VALIUM) 2 MG tablet     Sig: Take 1 tablet by mouth every 6 hours as needed (muscle cramps) for up to 10 days. Dispense:  12 tablet     Refill:  0    carbamide peroxide (DEBROX) 6.5 % otic solution     Sig: Place 5 drops into both ears 2 times daily     Dispense:  15 mL     Refill:  0     FINAL IMPRESSION      1. Spasm of muscle          DISPOSITION/PLAN   DISPOSITION Decision To Discharge 12/09/2022 12:33:42 PM      PATIENT REFERRED TO:  Ramy Edgar MD  [facility] [address]    Schedule an appointment as soon as possible for a visit in 1 day      DISCHARGE MEDICATIONS:  Discharge Medication List as of 12/9/2022 12:38 PM        START taking these medications    Details   diazePAM (VALIUM) 2 MG tablet Take 1 tablet by mouth every 6 hours as needed (muscle cramps) for up to 10 days. , Disp-12 tablet, R-0Normal           The care is provided during an unprecedented national emergency due to the novel coronavirus, COVID 19.   MD Sallie Dickey MD  12/09/22 3032

## 2022-12-24 DIAGNOSIS — K21.9 GASTROESOPHAGEAL REFLUX DISEASE, UNSPECIFIED WHETHER ESOPHAGITIS PRESENT: ICD-10-CM

## 2022-12-24 DIAGNOSIS — M54.42 CHRONIC BILATERAL LOW BACK PAIN WITH BILATERAL SCIATICA: ICD-10-CM

## 2022-12-24 DIAGNOSIS — I10 ESSENTIAL HYPERTENSION: ICD-10-CM

## 2022-12-24 DIAGNOSIS — M54.41 CHRONIC BILATERAL LOW BACK PAIN WITH BILATERAL SCIATICA: ICD-10-CM

## 2022-12-24 DIAGNOSIS — G89.29 CHRONIC BILATERAL LOW BACK PAIN WITH BILATERAL SCIATICA: ICD-10-CM

## 2022-12-27 DIAGNOSIS — M54.41 CHRONIC BILATERAL LOW BACK PAIN WITH BILATERAL SCIATICA: ICD-10-CM

## 2022-12-27 DIAGNOSIS — M54.42 CHRONIC BILATERAL LOW BACK PAIN WITH BILATERAL SCIATICA: ICD-10-CM

## 2022-12-27 DIAGNOSIS — G89.29 CHRONIC BILATERAL LOW BACK PAIN WITH BILATERAL SCIATICA: ICD-10-CM

## 2022-12-27 RX ORDER — GABAPENTIN 300 MG/1
CAPSULE ORAL
Qty: 90 CAPSULE | Refills: 0 | OUTPATIENT
Start: 2022-12-27

## 2022-12-27 NOTE — TELEPHONE ENCOUNTER
Gabapentin pending refills       Health Maintenance   Topic Date Due    Colorectal Cancer Screen  Never done    COVID-19 Vaccine (3 - Booster for Pfizer series) 11/17/2021    Flu vaccine (1) 08/01/2022    Lipids  04/06/2023    Depression Screen  04/06/2023    GFR test (Diabetes, CKD 3-4, OR last GFR 15-59)  12/09/2023    Diabetes screen  01/28/2024    DTaP/Tdap/Td vaccine (2 - Td or Tdap) 10/19/2025    Hepatitis C screen  Completed    HIV screen  Completed    Hepatitis A vaccine  Aged Out    Hib vaccine  Aged Out    Meningococcal (ACWY) vaccine  Aged Out    Pneumococcal 0-64 years Vaccine  Aged Out             (applicable per patient's age: Cancer Screenings, Depression Screening, Fall Risk Screening, Immunizations)    Hemoglobin A1C (%)   Date Value   01/28/2021 5.5   01/13/2015 5.2     Microalb/Crt. Ratio (mcg/mg creat)   Date Value   10/20/2015 23     LDL Cholesterol (mg/dL)   Date Value   04/06/2022 96     AST (U/L)   Date Value   04/06/2022 17     ALT (U/L)   Date Value   04/06/2022 9     BUN (mg/dL)   Date Value   12/09/2022 16      (goal A1C is < 7)   (goal LDL is <100) need 30-50% reduction from baseline     BP Readings from Last 3 Encounters:   12/09/22 115/78   08/24/22 116/80   06/16/22 (!) 143/83    (goal /80)      All Future Testing planned in [facility]:  Lab Frequency Next Occurrence       Next Visit Date:  No future appointments.          Patient Active Problem List:     GERD (gastroesophageal reflux disease)     Chronic bilateral low back pain with right-sided sciatica     Lumbar disc disease     Hemorrhoids, external     Essential hypertension     Pure hypercholesterolemia     Spondylosis without myelopathy or radiculopathy, lumbar region     Hx of right BKA (HCC)     Chronic lumbar radiculopathy     Spinal stenosis of lumbar region with neurogenic claudication     Lumbar foraminal stenosis     Arthritis of right hip     Right hip pain     Sacroiliac joint dysfunction of right side Sacroiliac joint dysfunction of left side     Chronic renal disease, stage III ([facility] [address] [[id number]]

## 2022-12-28 NOTE — TELEPHONE ENCOUNTER
Pt due for f/u appt. PC to pt to schedule, no answer. Left HIPAA compliant message identifying self and nature of call, requested call back to office to schedule appt, phone number given.

## 2023-01-09 RX ORDER — CARVEDILOL 6.25 MG/1
TABLET ORAL
Qty: 60 TABLET | Refills: 3 | Status: SHIPPED | OUTPATIENT
Start: 2023-01-09

## 2023-01-09 RX ORDER — PANTOPRAZOLE SODIUM 20 MG/1
TABLET, DELAYED RELEASE ORAL
Qty: 30 TABLET | Refills: 0 | Status: SHIPPED | OUTPATIENT
Start: 2023-01-09 | End: 2023-02-03

## 2023-01-09 RX ORDER — GABAPENTIN 300 MG/1
CAPSULE ORAL
Qty: 90 CAPSULE | Refills: 2 | Status: SHIPPED | OUTPATIENT
Start: 2023-01-09 | End: 2023-03-29

## 2023-01-09 NOTE — TELEPHONE ENCOUNTER
Pt due for 2/23 appt   Multiple meds pended    Health Maintenance   Topic Date Due    Colorectal Cancer Screen  Never done    COVID-19 Vaccine (3 - Booster for Pfizer series) 11/17/2021    Flu vaccine (1) 08/01/2022    Lipids  04/06/2023    Depression Screen  04/06/2023    GFR test (Diabetes, CKD 3-4, OR last GFR 15-59)  12/09/2023    Diabetes screen  01/28/2024    DTaP/Tdap/Td vaccine (2 - Td or Tdap) 10/19/2025    Hepatitis C screen  Completed    HIV screen  Completed    Hepatitis A vaccine  Aged Out    Hib vaccine  Aged Out    Meningococcal (ACWY) vaccine  Aged Out    Pneumococcal 0-64 years Vaccine  Aged Out             (applicable per patient's age: Cancer Screenings, Depression Screening, Fall Risk Screening, Immunizations)    Hemoglobin A1C (%)   Date Value   01/28/2021 5.5   01/13/2015 5.2     Microalb/Crt.  Ratio (mcg/mg creat)   Date Value   10/20/2015 23     LDL Cholesterol (mg/dL)   Date Value   04/06/2022 96     AST (U/L)   Date Value   04/06/2022 17     ALT (U/L)   Date Value   04/06/2022 9     BUN (mg/dL)   Date Value   12/09/2022 16      (goal A1C is < 7)   (goal LDL is <100) need 30-50% reduction from baseline     BP Readings from Last 3 Encounters:   12/09/22 115/78   08/24/22 116/80   06/16/22 (!) 143/83    (goal /80)      All Future Testing planned in [facility]:  Lab Frequency Next Occurrence       Next Visit Date:  Future Appointments   Date Time Provider Ximena Goodman   1/11/2023  2:30 PM Mitch Daniels MD [facility] IM [id number]            Patient Active Problem List:     GERD (gastroesophageal reflux disease)     Chronic bilateral low back pain with right-sided sciatica     Lumbar disc disease     Hemorrhoids, external     Essential hypertension     Pure hypercholesterolemia     Spondylosis without myelopathy or radiculopathy, lumbar region     Hx of right BKA ([facility] [address]     Chronic lumbar radiculopathy     Spinal stenosis of lumbar region with neurogenic claudication     Lumbar foraminal stenosis Arthritis of right hip     Right hip pain     Sacroiliac joint dysfunction of right side     Sacroiliac joint dysfunction of left side     Chronic renal disease, stage III ([address] [[id number]]

## 2023-01-11 ENCOUNTER — HOSPITAL ENCOUNTER (OUTPATIENT)
Age: 48
Setting detail: SPECIMEN
Discharge: HOME | End: 2023-01-11

## 2023-01-11 ENCOUNTER — OFFICE VISIT (OUTPATIENT)
Dept: INTERNAL MEDICINE | Age: 48
End: 2023-01-11
Payer: MEDICARE

## 2023-01-11 VITALS
OXYGEN SATURATION: 97 % | DIASTOLIC BLOOD PRESSURE: 82 MMHG | WEIGHT: 142 LBS | HEART RATE: 83 BPM | HEIGHT: 62 IN | SYSTOLIC BLOOD PRESSURE: 132 MMHG | BODY MASS INDEX: 26.13 KG/M2 | TEMPERATURE: 98.1 F

## 2023-01-11 DIAGNOSIS — M46.1 BILATERAL SACROILIITIS (HCC): ICD-10-CM

## 2023-01-11 DIAGNOSIS — N18.31 STAGE 3A CHRONIC KIDNEY DISEASE (HCC): ICD-10-CM

## 2023-01-11 DIAGNOSIS — Z23 NEED FOR INFLUENZA VACCINATION: ICD-10-CM

## 2023-01-11 DIAGNOSIS — I10 ESSENTIAL HYPERTENSION: Primary | ICD-10-CM

## 2023-01-11 DIAGNOSIS — K21.9 GASTROESOPHAGEAL REFLUX DISEASE, UNSPECIFIED WHETHER ESOPHAGITIS PRESENT: ICD-10-CM

## 2023-01-11 DIAGNOSIS — M54.16 CHRONIC LUMBAR RADICULOPATHY: ICD-10-CM

## 2023-01-11 DIAGNOSIS — I10 ESSENTIAL HYPERTENSION: ICD-10-CM

## 2023-01-11 LAB
ANION GAP SERPL CALCULATED.3IONS-SCNC: 11 MMOL/L (ref 9–17)
BUN BLDV-MCNC: 21 MG/DL (ref 6–20)
CALCIUM SERPL-MCNC: 9.1 MG/DL (ref 8.6–10.4)
CHLORIDE BLD-SCNC: 102 MMOL/L (ref 98–107)
CO2: 23 MMOL/L (ref 20–31)
CREAT SERPL-MCNC: 1.47 MG/DL (ref 0.7–1.2)
GFR SERPL CREATININE-BSD FRML MDRD: 58 ML/MIN/1.73M2
GLUCOSE BLD-MCNC: 87 MG/DL (ref 70–99)
POTASSIUM SERPL-SCNC: 4 MMOL/L (ref 3.7–5.3)
SODIUM BLD-SCNC: 136 MMOL/L (ref 135–144)

## 2023-01-11 PROCEDURE — G8427 DOCREV CUR MEDS BY ELIG CLIN: HCPCS

## 2023-01-11 PROCEDURE — 1036F TOBACCO NON-USER: CPT

## 2023-01-11 PROCEDURE — 90686 IIV4 VACC NO PRSV 0.5 ML IM: CPT | Performed by: STUDENT IN AN ORGANIZED HEALTH CARE EDUCATION/TRAINING PROGRAM

## 2023-01-11 PROCEDURE — 99214 OFFICE O/P EST MOD 30 MIN: CPT

## 2023-01-11 PROCEDURE — G8419 CALC BMI OUT NRM PARAM NOF/U: HCPCS

## 2023-01-11 PROCEDURE — 3074F SYST BP LT 130 MM HG: CPT

## 2023-01-11 PROCEDURE — 3078F DIAST BP <80 MM HG: CPT

## 2023-01-11 PROCEDURE — G8482 FLU IMMUNIZE ORDER/ADMIN: HCPCS

## 2023-01-11 RX ORDER — CYCLOBENZAPRINE HCL 10 MG
10 TABLET ORAL 3 TIMES DAILY PRN
Qty: 30 TABLET | Refills: 0 | Status: CANCELLED | OUTPATIENT
Start: 2023-01-11

## 2023-01-11 RX ORDER — CYCLOBENZAPRINE HCL 10 MG
10 TABLET ORAL 3 TIMES DAILY PRN
Qty: 30 TABLET | Refills: 2 | Status: SHIPPED | OUTPATIENT
Start: 2023-01-11

## 2023-01-11 RX ORDER — AMLODIPINE BESYLATE 10 MG/1
10 TABLET ORAL DAILY
Qty: 30 TABLET | Refills: 5 | Status: SHIPPED | OUTPATIENT
Start: 2023-01-11

## 2023-01-11 ASSESSMENT — ENCOUNTER SYMPTOMS
SORE THROAT: 0
WHEEZING: 0
DIARRHEA: 0
VOMITING: 0
BACK PAIN: 1
ABDOMINAL PAIN: 0
NAUSEA: 0
RHINORRHEA: 0
COUGH: 0
CONSTIPATION: 0
SHORTNESS OF BREATH: 0
BLOOD IN STOOL: 0

## 2023-01-11 ASSESSMENT — PATIENT HEALTH QUESTIONNAIRE - PHQ9
2. FEELING DOWN, DEPRESSED OR HOPELESS: 0
SUM OF ALL RESPONSES TO PHQ QUESTIONS 1-9: 0
SUM OF ALL RESPONSES TO PHQ9 QUESTIONS 1 & 2: 0
1. LITTLE INTEREST OR PLEASURE IN DOING THINGS: 0
SUM OF ALL RESPONSES TO PHQ QUESTIONS 1-9: 0

## 2023-01-11 NOTE — PATIENT INSTRUCTIONS
Medications e-scribe to pharmacy of pt's choice. Laboratory Instructions: Your doctor has ordered blood or urine testing. You can get this testing done at the Lab located on the first floor of the [facility], or at any other [facility]. Please stop at Main Registration, before going to the lab, as you must be registered first.   Please get this lab done before your next visit. You may eat or drink before this test.  Labs given to patient     Patient was put on a wait list and will be contacted to schedule their next follow up appointment once the schedule is available. If the patient is in need of an appointment before their next visit please call the office at [phone]. After Visit Summary  given and reviewed.

## 2023-01-11 NOTE — PROGRESS NOTES
[facility]  [facility] [address] [address]  Dept: [phone]  Dept Fax: [phone]    Office Progress/Follow Up Note  Date ofpatient's visit: 1/11/2023  Patient's Name:  Angi Rod YOB: 1975            Patient Care Team:  Kevin Isaacs MD as PCP - General (Internal Medicine)  Angelica Rivera MD as PCP - [facility] Empaneled Provider  MD Constanza Noyola DO as Consulting Physician (General Surgery)  ================================================================    REASON FOR VISIT/CHIEF COMPLAINT:  Hypertension and Health Maintenance (Pt agrees to flu vaccine)    HISTORY OF PRESENTING ILLNESS:  History was obtained from: patient, electronic medical record. Radha Ornelas a 49 y.o. is here for a follow-up visit. Patient has history of hypertension, taking Coreg 6.25, HCTZ 50, Norvasc 10. Well-controlled. Per patient he checks his blood pressure at home. Patient has history of  hip surgery. S/p right sacroiliac joint fusion 11/8/2021, left sacroiliac joint fusion 12/20/2021. 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 Lumbar spinal foraminal narrowing s/p laminectomy March 2021. 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 Pain controlled with  Flexeril and Neurontin. Patient is at home and has family. Is on disability. Patient reported he is colonoscopy for lower GI bleed in 2018. No record found in the epic. We will refer him for screening colonoscopy once he is 47years of age. Patient has history hypercholesterolemia. Lipid levels reviewed from 4/22. Patient has history of CKD stage IIIa, stable. Patient has a history of GERD, symptoms controlled. Denies alcohol consumption/smoking cigarettes/substance use. Diagnosis Orders   1. Essential hypertension  amLODIPine (NORVASC) 10 MG tablet    Basic Metabolic Panel      2. Bilateral sacroiliitis (HCC)        3. Stage 3a chronic kidney disease ([facility] [address]  Basic Metabolic Panel      4. Chronic lumbar radiculopathy  cyclobenzaprine (FLEXERIL) 10 MG tablet      5.  Need for influenza vaccination  Influenza, AFLURIA, (age 1 y+), IM, Preservative Free, 0.5 mL    [place] IM ADM PRQ ID SUBQ/IM NJXS 1 VACCINE         Patient Active Problem List   Diagnosis    GERD (gastroesophageal reflux disease)    Chronic bilateral low back pain with right-sided sciatica    Lumbar disc disease    Hemorrhoids, external    Essential hypertension    Pure hypercholesterolemia    Spondylosis without myelopathy or radiculopathy, lumbar region    Hx of right BKA (HCC)    Chronic lumbar radiculopathy    Spinal stenosis of lumbar region with neurogenic claudication    Lumbar foraminal stenosis    Arthritis of right hip    Right hip pain    Sacroiliac joint dysfunction of right side    Sacroiliac joint dysfunction of left side    Chronic renal disease, stage III (HCC) [825992]    Bilateral sacroiliitis ([facility] [address]       Health Maintenance Due   Topic Date Due    Colorectal Cancer Screen  Never done    COVID-19 Vaccine (3 - Booster for Pfizer series) 11/17/2021    Flu vaccine (1) 08/01/2022       Allergies   Allergen Reactions    Shellfish-Derived Products Anaphylaxis    Penicillins Other (See Comments)     UNKNOWN REACTION         Current Outpatient Medications   Medication Sig Dispense Refill    amLODIPine (NORVASC) 10 MG tablet Take 1 tablet by mouth daily 30 tablet 5    cyclobenzaprine (FLEXERIL) 10 MG tablet Take 1 tablet by mouth 3 times daily as needed for Muscle spasms 30 tablet 2    pantoprazole (PROTONIX) 20 MG tablet take 1 tablet by mouth once daily 30 tablet 0    carvedilol (COREG) 6.25 MG tablet take 1 tablet by mouth twice a day 60 tablet 3    gabapentin (NEURONTIN) 300 MG capsule take 1 capsule by mouth three times a day 90 capsule 2    hydroCHLOROthiazide (HYDRODIURIL) 25 MG tablet take 2 tablets by mouth once daily 60 tablet 4    atorvastatin (LIPITOR) 40 MG tablet take 1 tablet by mouth once daily 90 tablet 3     No current facility-administered medications for this visit. Social History     Tobacco Use    Smoking status: Never    Smokeless tobacco: Never   Vaping Use    Vaping Use: Former   Substance Use Topics    Alcohol use: No     Alcohol/week: 0.0 standard drinks    Drug use: No       Family History   Problem Relation Age of Onset    High Blood Pressure Mother     Diabetes Mother     High Blood Pressure Father     Cancer Father         prostate    Diabetes Father     Coronary Art Dis Father     Heart Attack Father     Heart Disease Father     No Known Problems Sister     No Known Problems Brother         REVIEW OF SYSTEMS:  Review of Systems   Constitutional:  Negative for chills and fever. HENT:  Negative for rhinorrhea, sneezing and sore throat. Respiratory:  Negative for cough, shortness of breath and wheezing. Cardiovascular:  Negative for chest pain and leg swelling. Gastrointestinal:  Negative for abdominal pain, blood in stool, constipation, diarrhea, nausea and vomiting. Genitourinary:  Negative for dysuria, flank pain, frequency, hematuria and urgency. Musculoskeletal:  Positive for arthralgias, back pain and gait problem. Neurological:  Negative for weakness and headaches. Psychiatric/Behavioral:  Negative for dysphoric mood. The patient is not nervous/anxious. PHYSICAL EXAM:  Vitals:    01/11/23 1427   BP: 132/82   Site: Left Upper Arm   Position: Sitting   Cuff Size: Medium Adult   Pulse: 83   Temp: 98.1 °F (36.7 °C)   SpO2: 97%   Weight: 142 lb (64.4 kg)   Height: 5' 2\" (1.575 m)     BP Readings from Last 3 Encounters:   01/11/23 132/82   12/09/22 115/78   08/24/22 116/80        Physical Exam  Constitutional:       General: He is not in acute distress. Appearance: He is not ill-appearing. Cardiovascular:      Rate and Rhythm: Normal rate and regular rhythm.       Pulses: Normal pulses. Heart sounds: Normal heart sounds. No murmur heard. Pulmonary:      Effort: Pulmonary effort is normal.      Breath sounds: Normal breath sounds. Abdominal:      General: Abdomen is flat. Bowel sounds are normal.      Palpations: Abdomen is soft. Musculoskeletal:      Left lower leg: No edema. Neurological:      General: No focal deficit present. Mental Status: He is oriented to person, place, and time. DIAGNOSTIC FINDINGS:  CBC:  Lab Results   Component Value Date/Time    WBC 8.5 06/15/2022 10:56 PM    HGB 10.9 06/15/2022 10:56 PM     06/15/2022 10:56 PM       BMP:    Lab Results   Component Value Date/Time     12/09/2022 11:46 AM    K 3.7 12/09/2022 11:46 AM     12/09/2022 11:46 AM    CO2 25 12/09/2022 11:46 AM    BUN 16 12/09/2022 11:46 AM    CREATININE 1.28 12/09/2022 11:46 AM    GLUCOSE 101 12/09/2022 11:46 AM       HEMOGLOBIN A1C:   Lab Results   Component Value Date/Time    LABA1C 5.5 01/28/2021 11:30 AM       FASTING LIPID PANEL:  Lab Results   Component Value Date    CHOL 218 (H) 10/21/2019    HDL 48 04/06/2022    TRIG 68 10/21/2019       ASSESSMENT AND PLAN:  Suzan Ron was seen today for hypertension and health maintenance. Diagnoses and all orders for this visit:    Essential hypertension  -Well-controlled  -Regularly checks his blood pressure at home  -Continue on Norvasc 10 daily  -Continue on Coreg 6.25 twice daily  -Continue on HydroDIURIL 50 daily    Stage 3a chronic kidney disease-stable  -Basic Metabolic Panel;  Future    Chronic lumbar radiculopathy  Bilateral sacroiliitis   History of right BKA  -Pain moderately controlled  -Continue on Flexeril  -Continue Neurontin    History of GERD  -Symptoms well controlled with Protonix 20 daily    Need for influenza vaccination  - Influenza, AFLURIA, (age 1 y+), IM, Preservative Free, 0.5 mL  - [place] IM ADM PRQ ID SUBQ/IM NJXS 1 VACCINE       FOLLOW UP AND INSTRUCTIONS:  Return in about 6 months (around 7/11/2023). Vincent received counseling on the following healthy behaviors: exercise and medication adherence    Discussed use, benefit, and side effects of prescribed medications. Barriers to medication compliance addressed. All patient questions answered. Pt voiced understanding. Patient given educational materials - see patient instructions    Deandra Barrow MD  Resident Internal Medicine  [facility],  [facility]. 1/11/2023, 3:07 PM    This note is created with the assistance of a speech-recognition program. While intending to generate a document that actually reflects the content of thevisit, the document can still have some mistakes which may not have been identified and corrected by editing.

## 2023-01-12 PROCEDURE — 90686 IIV4 VACC NO PRSV 0.5 ML IM: CPT | Performed by: STUDENT IN AN ORGANIZED HEALTH CARE EDUCATION/TRAINING PROGRAM

## 2023-02-01 NOTE — ANESTHESIA POSTPROCEDURE EVALUATION
Department of Anesthesiology  Postprocedure Note    Patient: Eliezer Huang  MRN: [id number]  YOB: 1975  Date of evaluation: 11/8/2021  Time:  9:39 AM     Procedure Summary     Date: 11/08/21 [place] [address] [facility] - [facility]PATIENT    Anesthesia Start: 2638 Anesthesia Stop: 5794    Procedure: RIGHT SI JOINT FUSION PERCUTANEOUS -SI BONE WILLIE (Right Back) Diagnosis: (DX RIGHT SI DYSFUNCTION)    Surgeons: Lyndon Lennox, MD Responsible Provider: Tea Floyd MD    Anesthesia Type: general ASA Status: 3          Anesthesia Type: general    Shahrzad Phase I: Shahrzad Score: 4    Shahrzad Phase II:      Last vitals: Reviewed and per EMR flowsheets.        Anesthesia Post Evaluation    Complications: no Document As Units Or Cc?: units

## 2023-02-03 DIAGNOSIS — K21.9 GASTROESOPHAGEAL REFLUX DISEASE, UNSPECIFIED WHETHER ESOPHAGITIS PRESENT: ICD-10-CM

## 2023-02-03 RX ORDER — PANTOPRAZOLE SODIUM 20 MG/1
TABLET, DELAYED RELEASE ORAL
Qty: 30 TABLET | Refills: 5 | Status: SHIPPED | OUTPATIENT
Start: 2023-02-03

## 2023-02-03 NOTE — TELEPHONE ENCOUNTER
Last visit: 1-11-23  Last Med refill: 1-11-23  Does patient have enough medication for 72 hours: Yes    Next Visit Date:  No future appointments. Health Maintenance   Topic Date Due    Colorectal Cancer Screen  Never done    COVID-19 Vaccine (3 - Booster for Pfizer series) 11/17/2021    Lipids  04/06/2023    Depression Screen  01/11/2024    GFR test (Diabetes, CKD 3-4, OR last GFR 15-59)  01/11/2024    DTaP/Tdap/Td vaccine (2 - Td or Tdap) 10/19/2025    Flu vaccine  Completed    Hepatitis C screen  Completed    HIV screen  Completed    Hepatitis A vaccine  Aged Out    Hib vaccine  Aged Out    Meningococcal (ACWY) vaccine  Aged Out    Pneumococcal 0-64 years Vaccine  Aged Out       Hemoglobin A1C (%)   Date Value   01/28/2021 5.5   01/13/2015 5.2             ( goal A1C is < 7)   Microalb/Crt.  Ratio (mcg/mg creat)   Date Value   10/20/2015 23     LDL Cholesterol (mg/dL)   Date Value   04/06/2022 96   01/28/2021 112       (goal LDL is <100)   AST (U/L)   Date Value   04/06/2022 17     ALT (U/L)   Date Value   04/06/2022 9     BUN (mg/dL)   Date Value   01/11/2023 21 (H)     BP Readings from Last 3 Encounters:   01/11/23 132/82   12/09/22 115/78   08/24/22 116/80          (goal 120/80)    All Future Testing planned in [facility]  Lab Frequency Next Occurrence               Patient Active Problem List:     GERD (gastroesophageal reflux disease)     Chronic bilateral low back pain with right-sided sciatica     Lumbar disc disease     Hemorrhoids, external     Essential hypertension     Pure hypercholesterolemia     Spondylosis without myelopathy or radiculopathy, lumbar region     Hx of right BKA (HCC)     Chronic lumbar radiculopathy     Spinal stenosis of lumbar region with neurogenic claudication     Lumbar foraminal stenosis     Arthritis of right hip     Right hip pain     Sacroiliac joint dysfunction of right side     Sacroiliac joint dysfunction of left side     Chronic renal disease, stage III ([facility] [address] [[id number]]

## 2023-02-10 PROBLEM — Z23 NEED FOR INFLUENZA VACCINATION: Status: RESOLVED | Noted: 2023-01-11 | Resolved: 2023-02-10

## 2023-02-15 ENCOUNTER — TELEPHONE (OUTPATIENT)
Dept: INTERNAL MEDICINE | Age: 48
End: 2023-02-15

## 2023-02-15 NOTE — TELEPHONE ENCOUNTER
Called patient [phone]. Discussed possible options. He could schedule an appointment with other members of team or wait until June. Patient verbalized understanding. All questions answered.  Patient educated regarding why he needs to see nephrologist.

## 2023-02-15 NOTE — TELEPHONE ENCOUNTER
----- Message from Gelacio Butch sent at 2/13/2023  2:55 PM EST -----  Subject: Message to Provider    QUESTIONS  Information for Provider? Patient was referred to Dr. Arthur Holland   (Nephrology), but wasn't able to get an appointment until 06/01. He is   concerned about his symptoms and wanting to know if there is anything his   PCP can do to move the appointment to a sooner date or if there is another   Nephrologist he can contact to schedule a sooner appointment. Can you   please contact patient with information?  ---------------------------------------------------------------------------  --------------  Fausto Matternet INFO  [phone]; OK to leave message on voicemail  ---------------------------------------------------------------------------  --------------  SCRIPT ANSWERS  Relationship to Patient?  Self

## 2023-03-21 NOTE — TELEPHONE ENCOUNTER
Request for Lipitor. Next Visit Date:  No future appointments. Health Maintenance   Topic Date Due    Colon cancer screen colonoscopy  Never done    Flu vaccine (1) 09/01/2021    Annual Wellness Visit (AWV)  01/23/2022    Lipid screen  01/28/2022    Potassium monitoring  07/27/2022    Creatinine monitoring  07/27/2022    DTaP/Tdap/Td vaccine (2 - Td or Tdap) 10/19/2025    COVID-19 Vaccine  Completed    Hepatitis C screen  Completed    HIV screen  Completed    Hepatitis A vaccine  Aged Out    Hepatitis B vaccine  Aged Out    Hib vaccine  Aged Out    Meningococcal (ACWY) vaccine  Aged Out    Pneumococcal 0-64 years Vaccine  Aged Out       Hemoglobin A1C (%)   Date Value   01/28/2021 5.5   01/13/2015 5.2             ( goal A1C is < 7)   Microalb/Crt.  Ratio (mcg/mg creat)   Date Value   10/20/2015 23     LDL Cholesterol (mg/dL)   Date Value   01/28/2021 112       (goal LDL is <100)   AST (U/L)   Date Value   10/31/2020 13     ALT (U/L)   Date Value   10/31/2020 8     BUN (mg/dL)   Date Value   07/27/2021 21 (H)     BP Readings from Last 3 Encounters:   07/27/21 (!) 134/93   03/11/21 (!) 131/96   03/10/21 (!) 153/94          (goal 120/80)    All Future Testing planned in [facility]  Lab Frequency Next Occurrence   Cologuard (For External Results Only) Once 07/27/2022         Patient Active Problem List:     GERD (gastroesophageal reflux disease)     Chronic bilateral low back pain with right-sided sciatica     Lumbar disc disease     Hemorrhoids, external     Essential hypertension     Pure hypercholesterolemia     Spondylosis without myelopathy or radiculopathy, lumbar region     Hx of right BKA (HCC)     Chronic lumbar radiculopathy     Spinal stenosis of lumbar region with neurogenic claudication     Lumbar foraminal stenosis     Arthritis of right hip     Right hip pain
Ipledge Number (Optional): [id number]
Detail Level: Zone

## 2023-04-21 DIAGNOSIS — M54.41 CHRONIC BILATERAL LOW BACK PAIN WITH BILATERAL SCIATICA: ICD-10-CM

## 2023-04-21 DIAGNOSIS — G89.29 CHRONIC BILATERAL LOW BACK PAIN WITH BILATERAL SCIATICA: ICD-10-CM

## 2023-04-21 DIAGNOSIS — M54.42 CHRONIC BILATERAL LOW BACK PAIN WITH BILATERAL SCIATICA: ICD-10-CM

## 2023-04-23 RX ORDER — GABAPENTIN 300 MG/1
CAPSULE ORAL
Qty: 90 CAPSULE | Refills: 2 | Status: SHIPPED | OUTPATIENT
Start: 2023-04-23 | End: 2024-06-23

## 2023-04-27 DIAGNOSIS — E78.5 DYSLIPIDEMIA: ICD-10-CM

## 2023-04-27 RX ORDER — ATORVASTATIN CALCIUM 40 MG/1
TABLET, FILM COATED ORAL
Qty: 90 TABLET | Refills: 3 | Status: SHIPPED | OUTPATIENT
Start: 2023-04-27

## 2023-06-09 DIAGNOSIS — I10 ESSENTIAL HYPERTENSION: ICD-10-CM

## 2023-06-09 NOTE — TELEPHONE ENCOUNTER
right hip     Right hip pain     Sacroiliac joint dysfunction of right side     Sacroiliac joint dysfunction of left side     Chronic renal disease, stage III ([address]) [[id number]]     Bilateral sacroiliitis ([address])

## 2023-06-10 RX ORDER — HYDROCHLOROTHIAZIDE 25 MG/1
TABLET ORAL
Qty: 60 TABLET | Refills: 4 | Status: SHIPPED | OUTPATIENT
Start: 2023-06-10

## 2023-08-05 DIAGNOSIS — K21.9 GASTROESOPHAGEAL REFLUX DISEASE, UNSPECIFIED WHETHER ESOPHAGITIS PRESENT: ICD-10-CM

## 2023-08-07 RX ORDER — PANTOPRAZOLE SODIUM 20 MG/1
TABLET, DELAYED RELEASE ORAL
Qty: 30 TABLET | Refills: 3 | Status: SHIPPED | OUTPATIENT
Start: 2023-08-07

## 2023-08-07 NOTE — TELEPHONE ENCOUNTER
Pharmacy requesting refills for Pantoprazole 20mg tablets daily. Please review and e-scribe to pharmacy listed in chart if appropriate. Thank you.       Next Visit Date: 08/09/23  Last Visit Date: 01/11/23    Future Appointments   Date Time Provider [address]   11/2/2023  9:10 AM Macarena Lim  [address] None       Health Maintenance   Topic Date Due    Colorectal Cancer Screen  Never done    COVID-19 Vaccine (3 - Booster for Pfizer series) 11/17/2021    Lipids  04/06/2023    Flu vaccine (1) 08/01/2023    Depression Screen  01/11/2024    GFR test (Diabetes, CKD 3-4, OR last GFR 15-59)  01/11/2024    DTaP/Tdap/Td vaccine (2 - Td or Tdap) 10/19/2025    Hepatitis C screen  Completed    HIV screen  Completed    Hepatitis A vaccine  Aged Out    Hib vaccine  Aged Out    Meningococcal (ACWY) vaccine  Aged Out    Pneumococcal 0-64 years Vaccine  Aged Out    Diabetes screen  Discontinued       Hemoglobin A1C (%)   Date Value   01/28/2021 5.5   01/13/2015 5.2             ( goal A1C is < 7)   No components found for: LABMICR  LDL Cholesterol (mg/dL)   Date Value   04/06/2022 96       (goal LDL is <100)   AST (U/L)   Date Value   04/06/2022 17     ALT (U/L)   Date Value   04/06/2022 9     BUN (mg/dL)   Date Value   01/11/2023 21 (H)     BP Readings from Last 3 Encounters:   01/11/23 132/82   12/09/22 115/78   08/24/22 116/80          (goal 120/80)    All Future Testing planned in [facility]  Lab Frequency Next Occurrence         Patient Active Problem List:     GERD (gastroesophageal reflux disease)     Chronic bilateral low back pain with right-sided sciatica     Lumbar disc disease     Hemorrhoids, external     Essential hypertension     Pure hypercholesterolemia     Spondylosis without myelopathy or radiculopathy, lumbar region     Hx of right BKA (HCC)     Chronic lumbar radiculopathy     Spinal stenosis of lumbar region with neurogenic claudication     Lumbar foraminal stenosis     Arthritis of right hip

## 2023-09-05 DIAGNOSIS — I10 ESSENTIAL HYPERTENSION: ICD-10-CM

## 2023-09-06 ENCOUNTER — OFFICE VISIT (OUTPATIENT)
Dept: INTERNAL MEDICINE | Age: 48
End: 2023-09-06

## 2023-09-06 VITALS
BODY MASS INDEX: 25.28 KG/M2 | DIASTOLIC BLOOD PRESSURE: 78 MMHG | SYSTOLIC BLOOD PRESSURE: 124 MMHG | TEMPERATURE: 98.2 F | WEIGHT: 137.4 LBS | HEART RATE: 83 BPM | OXYGEN SATURATION: 98 % | HEIGHT: 62 IN

## 2023-09-06 DIAGNOSIS — E78.00 PURE HYPERCHOLESTEROLEMIA: ICD-10-CM

## 2023-09-06 DIAGNOSIS — I10 ESSENTIAL HYPERTENSION: Primary | ICD-10-CM

## 2023-09-06 DIAGNOSIS — M54.41 CHRONIC BILATERAL LOW BACK PAIN WITH BILATERAL SCIATICA: ICD-10-CM

## 2023-09-06 DIAGNOSIS — M46.1 BILATERAL SACROILIITIS (HCC): ICD-10-CM

## 2023-09-06 DIAGNOSIS — G89.29 CHRONIC BILATERAL LOW BACK PAIN WITH BILATERAL SCIATICA: ICD-10-CM

## 2023-09-06 DIAGNOSIS — M54.42 CHRONIC BILATERAL LOW BACK PAIN WITH BILATERAL SCIATICA: ICD-10-CM

## 2023-09-06 DIAGNOSIS — N18.31 STAGE 3A CHRONIC KIDNEY DISEASE (HCC): ICD-10-CM

## 2023-09-06 DIAGNOSIS — Z12.11 COLON CANCER SCREENING: ICD-10-CM

## 2023-09-06 PROBLEM — M53.3 SACROILIAC JOINT DYSFUNCTION OF RIGHT SIDE: Status: ACTIVE | Noted: 2021-11-08

## 2023-09-06 PROBLEM — M53.3 SACROILIAC JOINT DYSFUNCTION OF LEFT SIDE: Status: ACTIVE | Noted: 2021-12-20

## 2023-09-06 RX ORDER — GABAPENTIN 300 MG/1
300 CAPSULE ORAL 3 TIMES DAILY
Qty: 90 CAPSULE | Refills: 2 | Status: SHIPPED | OUTPATIENT
Start: 2023-09-06 | End: 2024-11-06

## 2023-09-06 RX ORDER — CYCLOBENZAPRINE HCL 10 MG
10 TABLET ORAL 3 TIMES DAILY PRN
COMMUNITY
Start: 2023-07-15 | End: 2023-09-06 | Stop reason: SDUPTHER

## 2023-09-06 RX ORDER — CYCLOBENZAPRINE HCL 10 MG
10 TABLET ORAL 3 TIMES DAILY PRN
Qty: 30 TABLET | Refills: 3 | Status: SHIPPED | OUTPATIENT
Start: 2023-09-06

## 2023-09-06 RX ORDER — AMLODIPINE BESYLATE 10 MG/1
TABLET ORAL
Qty: 60 TABLET | Refills: 3 | Status: SHIPPED | OUTPATIENT
Start: 2023-09-06

## 2023-09-06 SDOH — ECONOMIC STABILITY: HOUSING INSECURITY
IN THE LAST 12 MONTHS, WAS THERE A TIME WHEN YOU DID NOT HAVE A STEADY PLACE TO SLEEP OR SLEPT IN A SHELTER (INCLUDING NOW)?: NO

## 2023-09-06 SDOH — ECONOMIC STABILITY: FOOD INSECURITY: WITHIN THE PAST 12 MONTHS, YOU WORRIED THAT YOUR FOOD WOULD RUN OUT BEFORE YOU GOT MONEY TO BUY MORE.: NEVER TRUE

## 2023-09-06 SDOH — ECONOMIC STABILITY: FOOD INSECURITY: WITHIN THE PAST 12 MONTHS, THE FOOD YOU BOUGHT JUST DIDN'T LAST AND YOU DIDN'T HAVE MONEY TO GET MORE.: NEVER TRUE

## 2023-09-06 SDOH — ECONOMIC STABILITY: INCOME INSECURITY: HOW HARD IS IT FOR YOU TO PAY FOR THE VERY BASICS LIKE FOOD, HOUSING, MEDICAL CARE, AND HEATING?: NOT HARD AT ALL

## 2023-09-06 ASSESSMENT — ENCOUNTER SYMPTOMS
GASTROINTESTINAL NEGATIVE: 1
RESPIRATORY NEGATIVE: 1
BACK PAIN: 1

## 2023-09-06 NOTE — PROGRESS NOTES
Attending Physician Statement  I have discussed the care of [address], including pertinent history and exam findings with the resident. I have reviewed the key elements of all parts of the encounter with the resident. I agree with the assessment, and status of the problem list as documented. The plan and orders should include   Orders Placed This Encounter   Procedures    [address] Screening Colonoscopy    and this was also documented by the resident. I agree with the referral to Colonoscopy. 1. Essential hypertension    2. Chronic bilateral low back pain with bilateral sciatica  - gabapentin (NEURONTIN) 300 MG capsule; Take 1 capsule by mouth 3 times daily. Dispense: 90 capsule; Refill: 2  - cyclobenzaprine (FLEXERIL) 10 MG tablet; Take 1 tablet by mouth 3 times daily as needed for Muscle spasms  Dispense: 30 tablet; Refill: 3    3. Arthritis of right hip    4. Stage 3a chronic kidney disease ([facility])    5. Pure hypercholesterolemia  - Lipid Panel; Future    6. Colon cancer screening  - [facility] Screening Colonoscopy    Pt left clinic before I could evaluate him personally        The medication list was reviewed with the resident and is up to date. The return visit should be in 6 months .     Debora Rowan MD  Attending Physician,  Department of Internal Medicine  [address] [facility] Internal Medicine  [facility]      9/6/2023, 5:27 PM

## 2023-09-06 NOTE — TELEPHONE ENCOUNTER
Pharmacy requesting refills for Amlodipine 10mg tablets. Please review and e-scribe to pharmacy listed in chart if appropriate. Thank you.       Next Visit Date: 9/6/23  Last Visit Date: 1/11/23    Future Appointments   Date Time Provider [address]   9/6/2023  2:20 PM Marco Antonio Castrejon MD [facility]   9/19/2023  1:30 PM Summer Joseph, PT [facility]   11/2/2023  9:10 AM Lindsay Devi MD [facility] None       Health Maintenance   Topic Date Due    Colorectal Cancer Screen  Never done    COVID-19 Vaccine (3 - Booster for Pfizer series) 11/17/2021    Lipids  04/06/2023    Flu vaccine (1) 08/01/2023    Depression Screen  01/11/2024    GFR test (Diabetes, CKD 3-4, OR last GFR 15-59)  01/11/2024    DTaP/Tdap/Td vaccine (2 - Td or Tdap) 10/19/2025    Hepatitis C screen  Completed    HIV screen  Completed    Hepatitis A vaccine  Aged Out    Hib vaccine  Aged Out    Meningococcal (ACWY) vaccine  Aged Out    Pneumococcal 0-64 years Vaccine  Aged Out    Diabetes screen  Discontinued       Hemoglobin A1C (%)   Date Value   01/28/2021 5.5   01/13/2015 5.2             ( goal A1C is < 7)   No components found for: LABMICR  LDL Cholesterol (mg/dL)   Date Value   04/06/2022 96       (goal LDL is <100)   AST (U/L)   Date Value   04/06/2022 17     ALT (U/L)   Date Value   04/06/2022 9     BUN (mg/dL)   Date Value   01/11/2023 21 (H)     BP Readings from Last 3 Encounters:   01/11/23 132/82   12/09/22 115/78   08/24/22 116/80          (goal 120/80)    All Future Testing planned in [facility]  Lab Frequency Next Occurrence         Patient Active Problem List:     GERD (gastroesophageal reflux disease)     Chronic bilateral low back pain with right-sided sciatica     Lumbar disc disease     Hemorrhoids, external     Essential hypertension     Pure hypercholesterolemia     Spondylosis without myelopathy or radiculopathy, lumbar region     Hx of right BKA (HCC)     Chronic lumbar radiculopathy     Spinal stenosis of

## 2023-09-07 NOTE — PROGRESS NOTES
[facility]  [facility] [address]   [address] [address]  Dept: [phone]  Dept Fax: [phone]    Office Progress/Follow Up Note  Date ofpatient's visit: 9/6/2023  Patient's Name:  Vaibhav Cheung YOB: 1975            Patient Care Team:  Marco Antonio Castrejon MD as PCP - General (Internal Medicine)  Mary Ann Russell MD as PCP - Empaneled Provider  MD Matthew Soto DO as Consulting Physician (General Surgery)  ================================================================    REASON FOR VISIT/CHIEF COMPLAINT:  Hypertension, Gastroesophageal Reflux, and Chronic Kidney Disease    HISTORY OF PRESENTING ILLNESS:  History was obtained from: patient, electronic medical record. Libby Ornelas a 51 y.o. is here for a follow up visit. Patient has history of hypertension, taking Coreg 6.25, HCTZ 50, Norvasc 10. Well-controlled. Per patient he checks his blood pressure at home. Patient has history of back pain. He has history of hip surgery. S/p right sacroiliac joint fusion 11/8/2021, left sacroiliac joint fusion 12/20/2021. 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 Lumbar spinal foraminal narrowing s/p laminectomy March 2021. 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 Pain controlled with  Flexeril and Neurontin. Patient is at home and has family. Is on disability. Patient reported he is colonoscopy for lower GI bleed in 2018. No record found in the epic. We will refer him for screening colonoscopy. Patient has history hypercholesterolemia. Lipid levels ordered. Patient has history of CKD stage IIIa. Patient referred to nephrology in past. Didn't follow  Patient has a history of GERD, symptoms controlled. Denies alcohol consumption/smoking cigarettes/substance use. Diagnosis Orders   1. Essential hypertension        2.  Chronic bilateral low

## 2023-09-12 ENCOUNTER — TELEPHONE (OUTPATIENT)
Age: 48
End: 2023-09-12

## 2023-09-19 ENCOUNTER — HOSPITAL ENCOUNTER (OUTPATIENT)
Age: 48
Discharge: HOME | End: 2023-09-19
Payer: MEDICARE

## 2023-09-19 ENCOUNTER — HOSPITAL ENCOUNTER (OUTPATIENT)
Dept: PHYSICAL THERAPY | Facility: CLINIC | Age: 48
Setting detail: THERAPIES SERIES
Discharge: HOME | End: 2023-09-19
Payer: MEDICARE

## 2023-09-19 DIAGNOSIS — E78.00 PURE HYPERCHOLESTEROLEMIA: ICD-10-CM

## 2023-09-19 LAB
CHOLEST SERPL-MCNC: 168 MG/DL
CHOLESTEROL/HDL RATIO: 3.2
HDLC SERPL-MCNC: 52 MG/DL
LDLC SERPL CALC-MCNC: 105 MG/DL (ref 0–130)
TRIGL SERPL-MCNC: 54 MG/DL

## 2023-09-19 PROCEDURE — 97750 PHYSICAL PERFORMANCE TEST: CPT

## 2023-09-19 PROCEDURE — 36415 COLL VENOUS BLD VENIPUNCTURE: CPT

## 2023-09-19 PROCEDURE — 80061 LIPID PANEL: CPT

## 2023-10-06 PROBLEM — Z12.11 COLON CANCER SCREENING: Status: RESOLVED | Noted: 2023-09-06 | Resolved: 2023-10-06

## 2023-10-10 ENCOUNTER — TELEPHONE (OUTPATIENT)
Dept: GASTROENTEROLOGY | Age: 48
End: 2023-10-10

## 2023-10-10 RX ORDER — POLYETHYLENE GLYCOL 3350, SODIUM SULFATE ANHYDROUS, SODIUM BICARBONATE, SODIUM CHLORIDE, POTASSIUM CHLORIDE 236; 22.74; 6.74; 5.86; 2.97 G/4L; G/4L; G/4L; G/4L; G/4L
4 POWDER, FOR SOLUTION ORAL ONCE
Qty: 4000 ML | Refills: 0 | Status: SHIPPED | OUTPATIENT
Start: 2023-10-10 | End: 2023-10-10

## 2023-10-10 NOTE — TELEPHONE ENCOUNTER
Procedure scheduled/Hamdani  Colonoscpy/Screening  (WQ)  Referring:  Dr. Elly Abdullahi  11/3/23   10:30am/arrive 9:00am        [facility]        Surgery registration 2nd floor    [facility] bowel prep instructions mailed to patient.     Patient advised by phone/mail

## 2023-10-27 DIAGNOSIS — I10 ESSENTIAL HYPERTENSION: ICD-10-CM

## 2023-10-27 RX ORDER — HYDROCHLOROTHIAZIDE 25 MG/1
TABLET ORAL
Qty: 60 TABLET | Refills: 4 | Status: SHIPPED | OUTPATIENT
Start: 2023-10-27

## 2023-10-27 NOTE — TELEPHONE ENCOUNTER
.. Request for   Requested Prescriptions     Pending Prescriptions Disp Refills    hydroCHLOROthiazide (HYDRODIURIL) 25 MG tablet [Pharmacy Med Name: HYDROCHLOROTHIAZIDE 25 MG TAB] 60 tablet 4     Sig: take 2 tablets by mouth once daily    . Please review and e-scribe to pharmacy listed in chart if appropriate. Thank you.       Last Visit Date: 9/6/2023  Next Visit Date: Visit date not found    Future Appointments   Date Time Provider [address]   11/20/2023  9:10 AM Brneda Arriaza MD [facility] None       Health Maintenance   Topic Date Due    Hepatitis B vaccine (1 of 3 - 3-dose series) Never done    Colorectal Cancer Screen  Never done    COVID-19 Vaccine (3 - Pfizer series) 11/17/2021    Flu vaccine (1) 08/01/2023    Depression Screen  01/11/2024    GFR test (Diabetes, CKD 3-4, OR last GFR 15-59)  01/11/2024    Lipids  09/19/2024    DTaP/Tdap/Td vaccine (2 - Td or Tdap) 10/19/2025    Hepatitis C screen  Completed    HIV screen  Completed    Hepatitis A vaccine  Aged Out    Hib vaccine  Aged Out    Meningococcal (ACWY) vaccine  Aged Out    Pneumococcal 0-64 years Vaccine  Aged Out    Diabetes screen  Discontinued       Hemoglobin A1C (%)   Date Value   01/28/2021 5.5   01/13/2015 5.2             ( goal A1C is < 7)   No components found for: \"LABMICR\"  LDL Cholesterol (mg/dL)   Date Value   09/19/2023 105       (goal LDL is <100)   AST (U/L)   Date Value   04/06/2022 17     ALT (U/L)   Date Value   04/06/2022 9     BUN (mg/dL)   Date Value   01/11/2023 21 (H)     BP Readings from Last 3 Encounters:   09/06/23 124/78   01/11/23 132/82   12/09/22 115/78          (goal 120/80)    All Future Testing planned in [facility]  Lab Frequency Next Occurrence         Patient Active Problem List:     GERD (gastroesophageal reflux disease)     Chronic bilateral low back pain with bilateral sciatica     Hemorrhoids, external     Essential hypertension     Pure hypercholesterolemia     Spondylosis without myelopathy or

## 2023-11-03 ENCOUNTER — ANESTHESIA (OUTPATIENT)
Dept: OPERATING ROOM | Age: 48
End: 2023-11-03
Payer: MEDICARE

## 2023-11-03 ENCOUNTER — HOSPITAL ENCOUNTER (OUTPATIENT)
Age: 48
Setting detail: OUTPATIENT SURGERY
Discharge: HOME | End: 2023-11-03
Attending: INTERNAL MEDICINE | Admitting: INTERNAL MEDICINE
Payer: MEDICARE

## 2023-11-03 ENCOUNTER — ANESTHESIA EVENT (OUTPATIENT)
Dept: OPERATING ROOM | Age: 48
End: 2023-11-03
Payer: MEDICARE

## 2023-11-03 ENCOUNTER — TELEPHONE (OUTPATIENT)
Age: 48
End: 2023-11-03

## 2023-11-03 VITALS
HEART RATE: 73 BPM | SYSTOLIC BLOOD PRESSURE: 102 MMHG | WEIGHT: 138 LBS | TEMPERATURE: 97.3 F | HEIGHT: 62 IN | OXYGEN SATURATION: 94 % | DIASTOLIC BLOOD PRESSURE: 73 MMHG | RESPIRATION RATE: 26 BRPM | BODY MASS INDEX: 25.4 KG/M2

## 2023-11-03 DIAGNOSIS — Z12.11 SCREENING FOR COLON CANCER: ICD-10-CM

## 2023-11-03 PROCEDURE — 93005 ELECTROCARDIOGRAM TRACING: CPT | Performed by: INTERNAL MEDICINE

## 2023-11-03 PROCEDURE — 7100000011 HC PHASE II RECOVERY - ADDTL 15 MIN: Performed by: INTERNAL MEDICINE

## 2023-11-03 PROCEDURE — 3700000000 HC ANESTHESIA ATTENDED CARE: Performed by: INTERNAL MEDICINE

## 2023-11-03 PROCEDURE — 3700000001 HC ADD 15 MINUTES (ANESTHESIA): Performed by: INTERNAL MEDICINE

## 2023-11-03 PROCEDURE — 6360000002 HC RX W HCPCS: Performed by: NURSE ANESTHETIST, CERTIFIED REGISTERED

## 2023-11-03 PROCEDURE — 2580000003 HC RX 258: Performed by: ANESTHESIOLOGY

## 2023-11-03 PROCEDURE — 3609010300 HC COLONOSCOPY W/BIOPSY SINGLE/MULTIPLE: Performed by: INTERNAL MEDICINE

## 2023-11-03 PROCEDURE — 2500000003 HC RX 250 WO HCPCS: Performed by: NURSE ANESTHETIST, CERTIFIED REGISTERED

## 2023-11-03 PROCEDURE — 88305 TISSUE EXAM BY PATHOLOGIST: CPT

## 2023-11-03 PROCEDURE — 7100000010 HC PHASE II RECOVERY - FIRST 15 MIN: Performed by: INTERNAL MEDICINE

## 2023-11-03 PROCEDURE — 2709999900 HC NON-CHARGEABLE SUPPLY: Performed by: INTERNAL MEDICINE

## 2023-11-03 PROCEDURE — 45380 COLONOSCOPY AND BIOPSY: CPT | Performed by: INTERNAL MEDICINE

## 2023-11-03 RX ORDER — PROPOFOL 10 MG/ML
INJECTION, EMULSION INTRAVENOUS PRN
Status: DISCONTINUED | OUTPATIENT
Start: 2023-11-03 | End: 2023-11-03 | Stop reason: SDUPTHER

## 2023-11-03 RX ORDER — SODIUM CHLORIDE 9 MG/ML
INJECTION, SOLUTION INTRAVENOUS CONTINUOUS
Status: DISCONTINUED | OUTPATIENT
Start: 2023-11-03 | End: 2023-11-03

## 2023-11-03 RX ORDER — SODIUM CHLORIDE, SODIUM LACTATE, POTASSIUM CHLORIDE, CALCIUM CHLORIDE 600; 310; 30; 20 MG/100ML; MG/100ML; MG/100ML; MG/100ML
INJECTION, SOLUTION INTRAVENOUS CONTINUOUS
Status: DISCONTINUED | OUTPATIENT
Start: 2023-11-03 | End: 2023-11-03 | Stop reason: HOSPADM

## 2023-11-03 RX ORDER — SODIUM CHLORIDE 0.9 % (FLUSH) 0.9 %
5-40 SYRINGE (ML) INJECTION EVERY 12 HOURS SCHEDULED
Status: DISCONTINUED | OUTPATIENT
Start: 2023-11-03 | End: 2023-11-03 | Stop reason: HOSPADM

## 2023-11-03 RX ORDER — SODIUM CHLORIDE 0.9 % (FLUSH) 0.9 %
5-40 SYRINGE (ML) INJECTION PRN
Status: DISCONTINUED | OUTPATIENT
Start: 2023-11-03 | End: 2023-11-03 | Stop reason: HOSPADM

## 2023-11-03 RX ORDER — SODIUM CHLORIDE 9 MG/ML
INJECTION, SOLUTION INTRAVENOUS PRN
Status: DISCONTINUED | OUTPATIENT
Start: 2023-11-03 | End: 2023-11-03 | Stop reason: HOSPADM

## 2023-11-03 RX ORDER — PHENYLEPHRINE HCL IN 0.9% NACL 1 MG/10 ML
SYRINGE (ML) INTRAVENOUS PRN
Status: DISCONTINUED | OUTPATIENT
Start: 2023-11-03 | End: 2023-11-03 | Stop reason: SDUPTHER

## 2023-11-03 RX ORDER — LIDOCAINE HYDROCHLORIDE 20 MG/ML
INJECTION, SOLUTION EPIDURAL; INFILTRATION; INTRACAUDAL; PERINEURAL PRN
Status: DISCONTINUED | OUTPATIENT
Start: 2023-11-03 | End: 2023-11-03 | Stop reason: SDUPTHER

## 2023-11-03 RX ORDER — LIDOCAINE HYDROCHLORIDE 10 MG/ML
1 INJECTION, SOLUTION EPIDURAL; INFILTRATION; INTRACAUDAL; PERINEURAL
Status: DISCONTINUED | OUTPATIENT
Start: 2023-11-03 | End: 2023-11-03 | Stop reason: HOSPADM

## 2023-11-03 RX ADMIN — Medication 200 MCG: at 11:34

## 2023-11-03 RX ADMIN — Medication 100 MCG: at 11:12

## 2023-11-03 RX ADMIN — LIDOCAINE HYDROCHLORIDE 60 MG: 20 INJECTION, SOLUTION EPIDURAL; INFILTRATION; INTRACAUDAL; PERINEURAL at 11:05

## 2023-11-03 RX ADMIN — PROPOFOL 50 MG: 10 INJECTION, EMULSION INTRAVENOUS at 11:17

## 2023-11-03 RX ADMIN — PROPOFOL 100 MG: 10 INJECTION, EMULSION INTRAVENOUS at 11:30

## 2023-11-03 RX ADMIN — PROPOFOL 100 MG: 10 INJECTION, EMULSION INTRAVENOUS at 11:23

## 2023-11-03 RX ADMIN — Medication 100 MCG: at 11:19

## 2023-11-03 RX ADMIN — PROPOFOL 50 MG: 10 INJECTION, EMULSION INTRAVENOUS at 11:13

## 2023-11-03 RX ADMIN — Medication 100 MCG: at 11:45

## 2023-11-03 RX ADMIN — Medication 100 MCG: at 11:27

## 2023-11-03 RX ADMIN — LIDOCAINE HYDROCHLORIDE 40 MG: 20 INJECTION, SOLUTION EPIDURAL; INFILTRATION; INTRACAUDAL; PERINEURAL at 11:18

## 2023-11-03 RX ADMIN — SODIUM CHLORIDE, POTASSIUM CHLORIDE, SODIUM LACTATE AND CALCIUM CHLORIDE: 600; 310; 30; 20 INJECTION, SOLUTION INTRAVENOUS at 09:38

## 2023-11-03 RX ADMIN — PROPOFOL 50 MG: 10 INJECTION, EMULSION INTRAVENOUS at 11:35

## 2023-11-03 RX ADMIN — Medication 100 MCG: at 11:40

## 2023-11-03 RX ADMIN — PROPOFOL 100 MG: 10 INJECTION, EMULSION INTRAVENOUS at 11:08

## 2023-11-03 ASSESSMENT — PAIN - FUNCTIONAL ASSESSMENT: PAIN_FUNCTIONAL_ASSESSMENT: 0-10

## 2023-11-03 NOTE — TELEPHONE ENCOUNTER
----- Message from Kimball Ganser, [place] sent at 11/3/2023  1:27 PM EDT -----    ----- Message -----  From: Idris Russell  Sent: 11/3/2023  12:57 PM EDT  To: Kimball Ganser, [place]    This is a high priority appt     ----- Message -----  From: Jumana Perera MD  Sent: 11/3/2023  11:42 AM EDT  To: Idris Russell    Please arrange follow-up office visit with Dr. Sia Elizabeth. This patient was found to have a moderate  stricture in the sigmoid colon.

## 2023-11-03 NOTE — ANESTHESIA POSTPROCEDURE EVALUATION
Department of Anesthesiology  Postprocedure Note    Patient: Skip Clemens  MRN: [id number]  YOB: 1975  Date of evaluation: 11/3/2023      Procedure Summary     Date: 11/03/23 Room / Location: [place] [address] / [facility] - INPATIENT    Anesthesia Start: 8270 Anesthesia Stop: 1152    Procedure: COLONOSCOPY WITH BIOPSY Diagnosis:       Screening for colon cancer      (Screening for colon cancer [Z12.11])    Surgeons: Waldemar Chapman MD Responsible Provider: Lizet Dalton DO    Anesthesia Type: MAC, general ASA Status: 3          Anesthesia Type: No value filed.     Shahrzad Phase I:      Shahrzad Phase II: Shahrzad Score: 5      Anesthesia Post Evaluation    Patient location during evaluation: PACU  Patient participation: complete - patient participated  Level of consciousness: awake and alert  Airway patency: patent  Nausea & Vomiting: no nausea and no vomiting  Complications: no  Cardiovascular status: hemodynamically stable  Respiratory status: acceptable  Hydration status: stable  Pain management: adequate

## 2023-11-03 NOTE — FLOWSHEET NOTE
Dr. Pedro Alston was called regarding patient complaint of chest pain after using the restroom and coughing. 12 lead EKG ordered.

## 2023-11-03 NOTE — H&P
History and Physical Service   [address] [place] [address]     HISTORY AND PHYSICAL EXAMINATION            Date of Evaluation: 11/3/2023  Patient name:  Riccardo Curry  MRN:   [id number]  YOB: 1975  PCP:    Ce Barragan MD    History Obtained From:     Patient, medical records    History of Present Illness: This is Riccardo Curry a 51 y.o. male who presents today for a COLORECTAL CANCER SCREENING, NOT HIGH RISK by Ana Maria Joaquin MD for Screening for colon cancer. Denies fever, chills, shortness of breath, cough, congestion, wheezing, chest pain, open sores or wounds. Patient denies bowel changes. He denies bloody tarry stools, diarrhea alternating with constipation, nausea, vomiting, abdominal pain or unintentional weight loss. Occasionally sees blood in the stool states he has a hx of hemorrhoids Patient followed bowel prep until watery clear. No Previous colonoscopy. No Previous EGD. No FH colon cancer or polyps.      The pt does not have DM  The pt is not taking any anticoagulation meds    Past Medical History:     Past Medical History:   Diagnosis Date    JOELLEN (acute kidney injury) ([address]) 2/12/2015    Allergic rhinitis     Chronic abdominal pain     ED (erectile dysfunction) of organic origin     GERD (gastroesophageal reflux disease)     Gynecomastia, male     History of hepatitis 07/18/2017    PT DENIES    Hypertension     Lumbar disc disease     Neuroblastoma ([address]) 1975    Neuroblastoma ([address])     Neurogenic dysfunction of the urinary bladder     Osteoarthritis     Phantom limb pain ([address])     Pure hypercholesterolemia 7/18/2017    RSD (reflex sympathetic dystrophy)         Past Surgical History:     Past Surgical History:   Procedure Laterality Date    ABDOMINAL ADHESION SURGERY      APPENDECTOMY      BACK SURGERY Right 11/08/2021    RIGHT SI JOINT FUSION PERCUTANEOUS -SI BONE WILLIE performed by Jessica Dorsey MD at [facility] Left 12/20/2021    LEFT SI JOINT FUSION PERCUTANEOU- SI BONE WILLIE performed by Marvene Krabbe, MD at [facility] Right 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    PAIN MANAGEMENT PROCEDURE Bilateral 02/20/2020    EPIDURAL STEROID INJECTION MOIZ L5S1 performed by Pricila Altman MD at [address] Bilateral 03/02/2020    EPIDURAL STEROID INJECTION MOIZ L5S1 performed by Pricila Altman MD at [address] Bilateral 08/06/2020    EPIDURAL STEROID INJECTION BILATERAL L5 performed by Pricila Altman MD at [address] Right 02/08/2021    RIGHT L5 S1 EPIDURAL STEROID INJECTION performed by Pricila Altman MD at [facility]      r/t bowel obstruction        Medications Prior to Admission:     Prior to Admission medications    Medication Sig Start Date End Date Taking? Authorizing Provider   hydroCHLOROthiazide (HYDRODIURIL) 25 MG tablet take 2 tablets by mouth once daily 10/27/23   Marco Antonio Castrejon MD   amLODIPine (NORVASC) 10 MG tablet take 1 tablet by mouth once daily 9/6/23   Marco Antonio Castrejon MD   gabapentin (NEURONTIN) 300 MG capsule Take 1 capsule by mouth 3 times daily. 9/6/23 11/6/24  Marco Antonio Castrejon MD   cyclobenzaprine (FLEXERIL) 10 MG tablet Take 1 tablet by mouth 3 times daily as needed for Muscle spasms 9/6/23   Marco Antonio Castrejon MD   pantoprazole (PROTONIX) 20 MG tablet take 1 tablet by mouth once daily 8/7/23   Marco Antonoi Castrejon MD   atorvastatin (LIPITOR) 40 MG tablet take 1 tablet by mouth once daily 4/27/23   Marco Antonio Castrejon MD   carvedilol (COREG) 6.25 MG tablet take 1 tablet by mouth twice a day 1/9/23   Marco Antonio Castrejon MD        Allergies:     Shellfish-derived products and Penicillins    Social History:     Tobacco:    reports that he has never smoked. He has never used smokeless tobacco.  Alcohol:      reports no history of alcohol use.   Drug Use:  reports no

## 2023-11-03 NOTE — TELEPHONE ENCOUNTER
FW: Inpatient Notes  Received: Today  Andrew Gardner MD  [facility], [address] [facility]; Dalia Flores MA  Please schedule follow-up.            Previous Messages    Attached Notes    Op Note by Dottie Bush MD at 11/3/2023 11:02 AM    Author: Dottie Bush MD Service: Gastroenterology         Patient was called to make urgent appt lvm - sent letter

## 2023-11-03 NOTE — DISCHARGE INSTRUCTIONS
[facility]    POST-ENDOSCOPY INSTRUCTIONS    1. ACTIVITY   No driving, operating machinery, or making important decisions for 24 hours. Resume normal activity after 24 hours. You may return to work after 24 hours. 2. DIET        Resume your usual diet unless specified below. ***    3. MEDICATIONS    Resume your usual medications. Do not consume alcohol, tranquilizers, or sleeping medications for 24 hour unless advised by your physician. 4. PHYSICIAN FOLLOW-UP / INSTRUCTIONS    Please call the office/clinic in 10 days for biopsy results:      [  ] GI office:  [phone]          Follow up with your family physician as planned. 6. NORMAL CHANGES YOU MAY EXPERIENCE AFTER ENDOSCOPY:         COLONOSCOPY     Passing of gas for several hours. Some mild abdominal cramping. You may feel fatigued for the next 24-48   hours due to the prep and sedation    7. CALL YOUR PHYSICIAN IF YOU EXPERIENCE ANY OF THE FOLLOWING      A. Passing blood rectally or vomiting blood (color may be red or black)      B. Severe abdominal pain or tenderness (that is not relieved by passing air)      C.   Fever, chills, or excessive sweating      D.  Persistent nausea or vomiting      E.  Redness or swelling at the IV site    If you have additional questions, PLEASE call your doctor or the [address] [facility] ([phone])

## 2023-11-03 NOTE — OP NOTE
Operative Note      Patient: Blake Ibrahim  YOB: 1975  MRN: [id number]    Date of Procedure: 11/3/2023    Pre-Op Diagnosis Codes:     * Screening for colon cancer [Z12.11]        Family history of colon cancer in the father    Post-Op Diagnosis:  Moderate benign appearing stricture in the sigmoid colon status post biopsy, internal hemorrhoids and hypertrophied anal papilla. Procedure(s):  COLONOSCOPY WITH BIOPSY    Surgeon(s):  Corry Jhaveri MD    Assistant:   * No surgical staff found *    Anesthesia: Monitor Anesthesia Care    Estimated Blood Loss (mL): Minimal    Complications: None    Specimens:   ID Type Source Tests Collected by Time Destination   A : BIOPSY OF ABDOMINAL MUCOSA  AT 25 CM  Tissue Colon SURGICAL PATHOLOGY Corry Jhaveri MD 11/3/2023 1129        Implants:  * No implants in log *      Drains: * No LDAs found *        Scope withdrawal time: 17 min     Description of Procedure:  Informed consent was obtained from the patient after explanation of the procedure including indications, description of the procedure,  benefits and possible risks and complications of the procedure, and alternatives. Questions were answered. The patient's history was reviewed and a directed physical examination was performed prior to the procedure. Patient was monitored throughout the procedure with pulse oximetry and periodic assessment of vital signs. Patient was sedated as noted above. With the patient initially in the left lateral decubitus position, a digital rectal examination was performed and revealed negative without mass, lesions or tenderness. Moderate size anal tag was seen. the Olympus video colonoscope was placed in the patient's rectum and advanced without difficulty  to the cecum, which was identified by the ileocecal valve and appendiceal orifice. The prep was good. Examination of the mucosa was performed during both introduction and withdrawal of the colonoscope.

## 2023-11-03 NOTE — ANESTHESIA PRE PROCEDURE
CREATININE 1.47 01/11/2023 03:20 PM    GFRAA >60 08/24/2022 03:39 PM    LABGLOM 58 01/11/2023 03:20 PM    GLUCOSE 87 01/11/2023 03:20 PM    PROT 7.5 04/06/2022 11:17 AM    CALCIUM 9.1 01/11/2023 03:20 PM    BILITOT 0.35 04/06/2022 11:17 AM    ALKPHOS 88 04/06/2022 11:17 AM    AST 17 04/06/2022 11:17 AM    ALT 9 04/06/2022 11:17 AM       POC Tests: No results for input(s): \"POCGLU\", \"POCNA\", \"POCK\", \"POCCL\", \"POCBUN\", \"POCHEMO\", \"POCHCT\" in the last 72 hours. Coags:   Lab Results   Component Value Date/Time    PROTIME 12.6 10/19/2021 08:30 AM    INR 1.0 10/19/2021 08:30 AM    APTT 28.6 10/19/2021 08:30 AM       HCG (If Applicable): No results found for: \"PREGTESTUR\", \"PREGSERUM\", \"HCG\", \"HCGQUANT\"     ABGs: No results found for: \"PHART\", \"PO2ART\", \"ICN2FSV\", \"FJL6XDY\", \"BEART\", \"N9YUZFNL\"     Type & Screen (If Applicable):  No results found for: \"LABABO\", \"LABRH\"    Drug/Infectious Status (If Applicable):  Lab Results   Component Value Date/Time    HEPCAB NONREACTIVE 01/04/2018 12:23 PM       COVID-19 Screening (If Applicable): No results found for: \"COVID19\"        Anesthesia Evaluation  Patient summary reviewed and Nursing notes reviewed no history of anesthetic complications:   Airway: Mallampati: II  TM distance: >3 FB   Neck ROM: full  Mouth opening: > = 3 FB   Dental: normal exam         Pulmonary:       (-) COPD                           Cardiovascular:  Exercise tolerance: no interval change,   (+) hypertension:,     (-)  angina        Rate: normal                    Neuro/Psych:   (+) neuromuscular disease:,             GI/Hepatic/Renal:   (+) GERD:,           Endo/Other:    (+) : arthritis:., .                 Abdominal:             Vascular: Other Findings:           Anesthesia Plan      MAC and general     ASA 3       Induction: intravenous. MIPS: prophylactic pharmacologic antiemetic agents not administered perioperatively for documented reasons.   Anesthetic plan and risks discussed with

## 2023-11-04 LAB
EKG ATRIAL RATE: 78 BPM
EKG P AXIS: 40 DEGREES
EKG P-R INTERVAL: 160 MS
EKG Q-T INTERVAL: 376 MS
EKG QRS DURATION: 88 MS
EKG QTC CALCULATION (BAZETT): 428 MS
EKG R AXIS: -3 DEGREES
EKG T AXIS: -2 DEGREES
EKG VENTRICULAR RATE: 78 BPM

## 2023-11-04 PROCEDURE — 93010 ELECTROCARDIOGRAM REPORT: CPT | Performed by: INTERNAL MEDICINE

## 2023-11-06 ENCOUNTER — TELEPHONE (OUTPATIENT)
Age: 48
End: 2023-11-06

## 2023-11-06 LAB — SURGICAL PATHOLOGY REPORT: NORMAL

## 2023-11-06 NOTE — TELEPHONE ENCOUNTER
----- Message from Juan David Jiménez sent at 11/3/2023 12:57 PM EDT -----  This is a high priority appt     ----- Message -----  From: Rose Yeh MD  Sent: 11/3/2023  11:42 AM EDT  To: Juan David Jiménez    Please arrange follow-up office visit with Dr. Micheline Santos. This patient was found to have a moderate  stricture in the sigmoid colon.

## 2023-11-08 ENCOUNTER — OFFICE VISIT (OUTPATIENT)
Age: 48
End: 2023-11-08
Payer: MEDICARE

## 2023-11-08 ENCOUNTER — HOSPITAL ENCOUNTER (OUTPATIENT)
Age: 48
Discharge: HOME | End: 2023-11-08
Payer: MEDICARE

## 2023-11-08 ENCOUNTER — TELEPHONE (OUTPATIENT)
Age: 48
End: 2023-11-08

## 2023-11-08 VITALS
HEIGHT: 62 IN | SYSTOLIC BLOOD PRESSURE: 117 MMHG | BODY MASS INDEX: 26.5 KG/M2 | HEART RATE: 84 BPM | WEIGHT: 144 LBS | TEMPERATURE: 97 F | OXYGEN SATURATION: 98 % | DIASTOLIC BLOOD PRESSURE: 78 MMHG

## 2023-11-08 DIAGNOSIS — R12 HEARTBURN: ICD-10-CM

## 2023-11-08 DIAGNOSIS — K64.4 HEMORRHOIDS, EXTERNAL: Primary | ICD-10-CM

## 2023-11-08 DIAGNOSIS — K56.699 STENOSIS COLON (HCC): ICD-10-CM

## 2023-11-08 DIAGNOSIS — K62.5 RECTAL BLEEDING: ICD-10-CM

## 2023-11-08 DIAGNOSIS — Z90.49 HISTORY OF PARTIAL COLECTOMY: ICD-10-CM

## 2023-11-08 LAB
CRP SERPL HS-MCNC: 7 MG/L (ref 0–5)
ERYTHROCYTE [SEDIMENTATION RATE] IN BLOOD BY PHOTOMETRIC METHOD: 32 MM/HR (ref 0–15)

## 2023-11-08 PROCEDURE — 1036F TOBACCO NON-USER: CPT | Performed by: INTERNAL MEDICINE

## 2023-11-08 PROCEDURE — G8419 CALC BMI OUT NRM PARAM NOF/U: HCPCS | Performed by: INTERNAL MEDICINE

## 2023-11-08 PROCEDURE — 99214 OFFICE O/P EST MOD 30 MIN: CPT | Performed by: INTERNAL MEDICINE

## 2023-11-08 PROCEDURE — 85652 RBC SED RATE AUTOMATED: CPT

## 2023-11-08 PROCEDURE — G8484 FLU IMMUNIZE NO ADMIN: HCPCS | Performed by: INTERNAL MEDICINE

## 2023-11-08 PROCEDURE — 36415 COLL VENOUS BLD VENIPUNCTURE: CPT

## 2023-11-08 PROCEDURE — 3078F DIAST BP <80 MM HG: CPT | Performed by: INTERNAL MEDICINE

## 2023-11-08 PROCEDURE — 3074F SYST BP LT 130 MM HG: CPT | Performed by: INTERNAL MEDICINE

## 2023-11-08 PROCEDURE — G8427 DOCREV CUR MEDS BY ELIG CLIN: HCPCS | Performed by: INTERNAL MEDICINE

## 2023-11-08 PROCEDURE — 86140 C-REACTIVE PROTEIN: CPT

## 2023-11-08 RX ORDER — POLYETHYLENE GLYCOL 3350 17 G/17G
17 POWDER, FOR SOLUTION ORAL DAILY
Qty: 1530 G | Refills: 2 | Status: SHIPPED | OUTPATIENT
Start: 2023-11-08 | End: 2023-12-08

## 2023-11-08 RX ORDER — HYDROCORTISONE ACETATE 25 MG/1
25 SUPPOSITORY RECTAL EVERY 12 HOURS
Qty: 20 SUPPOSITORY | Refills: 0 | Status: SHIPPED | OUTPATIENT
Start: 2023-11-08 | End: 2023-11-18

## 2023-11-08 ASSESSMENT — ENCOUNTER SYMPTOMS
BACK PAIN: 1
NAUSEA: 0
VOMITING: 0
TROUBLE SWALLOWING: 0
CONSTIPATION: 1
DIARRHEA: 1
ANAL BLEEDING: 1
ABDOMINAL PAIN: 1

## 2023-11-08 NOTE — TELEPHONE ENCOUNTER
Procedure scheduled    EGD/Aziz  Dx: Heartburn  Monday 11/27/23 at 12:15 pm/Arrive at 10:15 am  [facility]; Surgery Entrance, back of hospital    PAT Phone Call: Monday 11/13/23 at 10:15 am    EGD prep given to patient in office. Patient instructed in office.

## 2023-11-08 NOTE — PROGRESS NOTES
Reason for Referral:   Constipation, rectal bleeding, heartburn    No referring provider defined for this encounter. Chief Complaint   Patient presents with    New Patient     Change bowel Movements           HISTORY OF PRESENT ILLNESS: Claritza Mendez is a 51 y.o. male with a past history remarkable for GERD, HTN,, HLD, sacroiliitis , referred for evaluation of Constipation, rectal bleeding, heartburn. Patient reports that previously he had multiple colonic resections due to bowel obstruction. He recently had colonoscopy with Dr. Sinan Mayorga and noted to have colonic stenosis (scope able to traverse) at 25 cm from colon. Patient denied any prior history of IBD. He does endorse constipation (BSS1-2 stool), no BM for 3-4 days and rectal bleeding almost on daily basis. He said he previously used suppository for this. He also reports having heartburn for which he takes protonix once daily. Does sometime use NSAIDs. Previous Endoscopies  Colonoscopy 11/2023:  1. Moderate appearing stricture with erythema was seen 25 cm from the anus. No obvious mass lesion seen. Biopsies were done. Adult scope was able to traverse the stricture with some manipulation. 2.  Hypertrophic and the papilla was seen to retroflexion view. 3.  Internal hemorrhoids were seen to retroflexion view. 4. Good Prep    BX:  COLON AT 25 CM, BIOPSY:   -UNREMARKABLE COLONIC MUCOSA WITH FOCAL ARCHITECTURAL DISTORTION, NO   SIGNIFICANT INFLAMMATION, AND NO FEATURES OF MICROSCOPIC COLITIS. Previous GI workup       Past Medical,Family, and Social History reviewed and does not contribute to the patient presentingcondition. Patient's PMH/PSH,SH,PSYCH Hx, MEDs, ALLERGIES, and ROS were all reviewed and updated in the appropriate sections.     PAST MEDICAL HISTORY:  Past Medical History:   Diagnosis Date    JOELLEN (acute kidney injury) ([address]) 2/12/2015    Allergic rhinitis     Chronic abdominal pain     ED (erectile dysfunction) of organic

## 2023-11-10 ENCOUNTER — HOSPITAL ENCOUNTER (OUTPATIENT)
Age: 48
Setting detail: SPECIMEN
Discharge: HOME | End: 2023-11-10
Payer: MEDICARE

## 2023-11-10 DIAGNOSIS — K62.5 RECTAL BLEEDING: ICD-10-CM

## 2023-11-10 DIAGNOSIS — K56.699 STENOSIS COLON (HCC): ICD-10-CM

## 2023-11-10 PROCEDURE — 83993 ASSAY FOR CALPROTECTIN FECAL: CPT

## 2023-11-13 ENCOUNTER — HOSPITAL ENCOUNTER (OUTPATIENT)
Dept: PREADMISSION TESTING | Age: 48
Discharge: HOME | End: 2023-11-17

## 2023-11-13 VITALS — HEIGHT: 62 IN | BODY MASS INDEX: 25.76 KG/M2 | WEIGHT: 140 LBS

## 2023-11-13 NOTE — PROGRESS NOTES
Pre-op Instructions For Out-Patient Endoscopy Surgery    Medication Instructions:  Please stop herbs and any supplements now (includes vitamins and minerals). Please contact your surgeon and prescribing physician for pre-op instructions for any blood thinners. Ibuprofen     If you have inhalers/aerosol treatments at home, please use them the morning of your surgery and bring the inhalers with you to the hospital.    Please take the following medications the morning of your surgery with a sip of water:    Amlodipine and Carvedilol     Surgery Instructions:  After midnight before surgery:  Do not eat or drink anything, including water, mints, gum, and hard candy. You may brush your teeth without swallowing. No smoking, chewing tobacco, or street drugs. Please shower or bathe before surgery. Please do not wear any cologne, lotion, powder, jewelry, piercings, perfume, makeup, nail polish, hair accessories, or hair spray on the day of surgery. Wear loose comfortable clothing. Leave your valuables at home but bring a payment source for any after-surgery prescriptions you plan to fill at [facility]. Bring a storage case for any glasses/contacts. An adult who is responsible for you MUST drive you home and should be with you for the first 24 hours after surgery. The Day of Surgery:  Arrive at [facility] AT [facility] Surgery Entrance at the time directed by your surgeon and check in at the desk. If you have a living will or healthcare power of , please bring a copy. You will be taken to the pre-op holding area where you will be prepared for surgery. A physical assessment will be performed by a nurse practitioner or house officer. Your IV will be started and you will meet your anesthesiologist.    When you go to surgery, your family will be directed to the surgical waiting room, where the doctor should speak with them after your surgery.     After surgery, you will be

## 2023-11-14 ENCOUNTER — TELEPHONE (OUTPATIENT)
Dept: SURGERY | Age: 48
End: 2023-11-14

## 2023-11-14 DIAGNOSIS — K56.699 COLON STRICTURE (HCC): Primary | ICD-10-CM

## 2023-11-14 LAB — CALPROTECTIN, FECAL: 330 UG/G

## 2023-11-14 NOTE — TELEPHONE ENCOUNTER
11/14/23-1st attempt, lvm, to sched consult with dr Morgan Tim due to hemorrhoids, colon stenosis, and rectal bleeding

## 2023-11-22 NOTE — PRE-PROCEDURE INSTRUCTIONS
Have you received your Prep? Any questions with prep instructions? Only Clear Liquid Diet day before. Nothing to eat after midnight day before procedure. Are you taking any blood thinners? If so, you need to Stop. Remove any jewelry and body piercings. Are you having any Covid symptoms? Do you have any new rashes, infections, etc. that we should be aware of? Do you have a ride home the day of surgery? It cannot be a cab or medical transportation. Verify surgery time/date and what time to arrive at hospital.     NO ANSWER. NO VOICEMAIL OPTION GIVEN .

## 2023-11-24 ENCOUNTER — ANESTHESIA EVENT (OUTPATIENT)
Dept: ENDOSCOPY | Age: 48
End: 2023-11-24
Payer: MEDICARE

## 2023-11-27 ENCOUNTER — ANESTHESIA (OUTPATIENT)
Dept: ENDOSCOPY | Age: 48
End: 2023-11-27
Payer: MEDICARE

## 2023-11-27 ENCOUNTER — HOSPITAL ENCOUNTER (OUTPATIENT)
Age: 48
Setting detail: OUTPATIENT SURGERY
Discharge: HOME | End: 2023-11-27
Attending: INTERNAL MEDICINE | Admitting: INTERNAL MEDICINE
Payer: MEDICARE

## 2023-11-27 VITALS
HEIGHT: 62 IN | WEIGHT: 135 LBS | BODY MASS INDEX: 24.84 KG/M2 | RESPIRATION RATE: 17 BRPM | OXYGEN SATURATION: 99 % | TEMPERATURE: 98.1 F | HEART RATE: 75 BPM | DIASTOLIC BLOOD PRESSURE: 92 MMHG | SYSTOLIC BLOOD PRESSURE: 119 MMHG

## 2023-11-27 DIAGNOSIS — R12 HEARTBURN: ICD-10-CM

## 2023-11-27 PROCEDURE — 2580000003 HC RX 258: Performed by: ANESTHESIOLOGY

## 2023-11-27 PROCEDURE — 2500000003 HC RX 250 WO HCPCS: Performed by: NURSE ANESTHETIST, CERTIFIED REGISTERED

## 2023-11-27 PROCEDURE — 3609013500 HC EGD REMOVAL TUMOR POLYP/OTHER LESION SNARE TECH: Performed by: INTERNAL MEDICINE

## 2023-11-27 PROCEDURE — 2709999900 HC NON-CHARGEABLE SUPPLY: Performed by: INTERNAL MEDICINE

## 2023-11-27 PROCEDURE — 7100000011 HC PHASE II RECOVERY - ADDTL 15 MIN: Performed by: INTERNAL MEDICINE

## 2023-11-27 PROCEDURE — 3700000001 HC ADD 15 MINUTES (ANESTHESIA): Performed by: INTERNAL MEDICINE

## 2023-11-27 PROCEDURE — 88342 IMHCHEM/IMCYTCHM 1ST ANTB: CPT

## 2023-11-27 PROCEDURE — 88305 TISSUE EXAM BY PATHOLOGIST: CPT

## 2023-11-27 PROCEDURE — 7100000010 HC PHASE II RECOVERY - FIRST 15 MIN: Performed by: INTERNAL MEDICINE

## 2023-11-27 PROCEDURE — 3700000000 HC ANESTHESIA ATTENDED CARE: Performed by: INTERNAL MEDICINE

## 2023-11-27 PROCEDURE — 6360000002 HC RX W HCPCS: Performed by: NURSE ANESTHETIST, CERTIFIED REGISTERED

## 2023-11-27 RX ORDER — LIDOCAINE HYDROCHLORIDE 10 MG/ML
INJECTION, SOLUTION EPIDURAL; INFILTRATION; INTRACAUDAL; PERINEURAL PRN
Status: DISCONTINUED | OUTPATIENT
Start: 2023-11-27 | End: 2023-11-27 | Stop reason: SDUPTHER

## 2023-11-27 RX ORDER — SODIUM CHLORIDE 0.9 % (FLUSH) 0.9 %
5-40 SYRINGE (ML) INJECTION PRN
Status: DISCONTINUED | OUTPATIENT
Start: 2023-11-27 | End: 2023-11-27 | Stop reason: HOSPADM

## 2023-11-27 RX ORDER — LIDOCAINE HYDROCHLORIDE 10 MG/ML
1 INJECTION, SOLUTION EPIDURAL; INFILTRATION; INTRACAUDAL; PERINEURAL
Status: DISCONTINUED | OUTPATIENT
Start: 2023-11-27 | End: 2023-11-27 | Stop reason: HOSPADM

## 2023-11-27 RX ORDER — PROPOFOL 10 MG/ML
INJECTION, EMULSION INTRAVENOUS PRN
Status: DISCONTINUED | OUTPATIENT
Start: 2023-11-27 | End: 2023-11-27 | Stop reason: SDUPTHER

## 2023-11-27 RX ORDER — SODIUM CHLORIDE 9 MG/ML
INJECTION, SOLUTION INTRAVENOUS PRN
Status: DISCONTINUED | OUTPATIENT
Start: 2023-11-27 | End: 2023-11-27 | Stop reason: HOSPADM

## 2023-11-27 RX ORDER — SODIUM CHLORIDE, SODIUM LACTATE, POTASSIUM CHLORIDE, CALCIUM CHLORIDE 600; 310; 30; 20 MG/100ML; MG/100ML; MG/100ML; MG/100ML
INJECTION, SOLUTION INTRAVENOUS CONTINUOUS
Status: DISCONTINUED | OUTPATIENT
Start: 2023-11-27 | End: 2023-11-27 | Stop reason: HOSPADM

## 2023-11-27 RX ORDER — SODIUM CHLORIDE 0.9 % (FLUSH) 0.9 %
5-40 SYRINGE (ML) INJECTION EVERY 12 HOURS SCHEDULED
Status: DISCONTINUED | OUTPATIENT
Start: 2023-11-27 | End: 2023-11-27 | Stop reason: HOSPADM

## 2023-11-27 RX ADMIN — PROPOFOL 200 MG: 10 INJECTION, EMULSION INTRAVENOUS at 12:52

## 2023-11-27 RX ADMIN — LIDOCAINE HYDROCHLORIDE 40 MG: 10 INJECTION, SOLUTION EPIDURAL; INFILTRATION; INTRACAUDAL; PERINEURAL at 12:52

## 2023-11-27 RX ADMIN — SODIUM CHLORIDE, POTASSIUM CHLORIDE, SODIUM LACTATE AND CALCIUM CHLORIDE: 600; 310; 30; 20 INJECTION, SOLUTION INTRAVENOUS at 11:24

## 2023-11-27 ASSESSMENT — PAIN - FUNCTIONAL ASSESSMENT: PAIN_FUNCTIONAL_ASSESSMENT: 0-10

## 2023-11-27 ASSESSMENT — PAIN SCALES - WONG BAKER: WONGBAKER_NUMERICALRESPONSE: 0

## 2023-11-27 NOTE — H&P
HISTORY and [address]       NAME:  Marvin Leslie  MRN: [id number]   YOB: 1975   Date: 11/27/2023   Age: 51 y.o. Gender: male       COMPLAINT AND PRESENT HISTORY:             Marvin Leslie is 51 y.o.,  male, undergoing for EGD BIOPSY. ESOPHAGOGASTRODUODENOSCOPY. Pt is being seen for hx of:  Pre-Op Diagnosis Codes:     * Heartburn     Patient has not had previous endoscopies done before. Patient admits to some  heartburn, indigestion, acid reflux (GERD), but not daily. .  Pt is taking Protonix. No dysphagia . Pt admits to   regurgitation. Sometimes  when lying down. No epigastric pain, nausea or  vomiting. No changes in appetite. Wt loss. Pt  denies any  changes in bowel habits. Denies any  blood in stools, or tarry stools. Denies any FH of esophageal cancer. Medical history reviewed:  Patient voices feeling well today. Denies any recent fever or chills, chest pain /pressure . Pt reports no SOB. Papo Ren Hx of smoking : no    Patient has been NPO since midnight. No blood thinners. Pt took [place], norvasc and coreg. Pt has prolonged emergence from general  Anesthesia.      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

## 2023-11-27 NOTE — OP NOTE
EGD report    Esophagogastroduodenoscopy (EGD) Procedure Note    Procedure:  EGD with Biopsies and snare polypectomy    Indications:  Epigastric pain, heartburn    Sedation:  MAC    Attending Physician:  Dr. Elis Bynum MD    Assistant:  None    Procedure Details:    Informed consent was obtained for the procedure, including sedation. Risks of infection, perforation, hemorrhage, adverse drug reaction, and aspiration were discussed. The patient was placed in the left lateral decubitus position. The patient was monitored continuously with ECG tracing, pulse oximetry, blood pressure monitoring, and direct observation. The gastroscope was inserted into the mouth and advanced under direct vision to second portion of the duodenum. A careful inspection was made as the gastroscope was withdrawn, including a retroflexed view of the proximal stomach; findings and interventions are described below. Appropriate photodocumentation was obtained. Findings:  Retropharyngeal area was grossly normal appearing     Esophagus: normal                          Esophagogastric markings: Diaphragmatic hiatus- 37 cm; GE junction- 37 cm     Stomach:    Fundus: Few benign appearing polyps noted, two of the large ones measuring 7-8 mm removed with cold snare     Body: Few benign appearing polyps noted    Antrum: A 3-4 mm subepithelial nodule noted 10 o'clock to the antrum. Pillow sign for not elicited. Stomach biopsies obtained to rule out H pylori     Duodenum:     Bulb: normal    First part: Normal    Second Part: Normal      Complications:  None           Estimated blood loss:  Minimal    Disposition:  Home           Condition: stable    Impression:    Few benign appearing polyps in stomach and fundus. Two of the large ones measuring 7-8 mm removed with cold snare. Stomach biopsies obtained from antrum and body. Recommendations:   Follow pathology results  Follow-up in clinic in 4-6 weeks  Will likely need EUS for

## 2023-11-30 ENCOUNTER — TELEPHONE (OUTPATIENT)
Dept: GASTROENTEROLOGY | Age: 48
End: 2023-11-30

## 2023-11-30 LAB — SURGICAL PATHOLOGY REPORT: NORMAL

## 2023-11-30 NOTE — TELEPHONE ENCOUNTER
----- Message from Navneet Tate MD sent at 11/27/2023  2:51 PM EST -----  Please schedule this patient for EGD with EUS for subepithelial stomach lesion noted.

## 2023-11-30 NOTE — TELEPHONE ENCOUNTER
Procedure scheduled/Hamdani  EGD/EUS  Dx: subepithelial stomach lesion   Ordering Dr. Persaud Head  1/9/24   10:00am/arrive 8:30am      [address] [facility]    EGD/EUS instructions mailed to patient  Patient advised by phone/mail.

## 2023-12-11 DIAGNOSIS — K21.9 GASTROESOPHAGEAL REFLUX DISEASE, UNSPECIFIED WHETHER ESOPHAGITIS PRESENT: ICD-10-CM

## 2023-12-11 DIAGNOSIS — Z89.511 HX OF RIGHT BKA (HCC): Primary | ICD-10-CM

## 2023-12-11 RX ORDER — PANTOPRAZOLE SODIUM 20 MG/1
TABLET, DELAYED RELEASE ORAL
Qty: 30 TABLET | Refills: 4 | Status: SHIPPED | OUTPATIENT
Start: 2023-12-11

## 2023-12-11 NOTE — TELEPHONE ENCOUNTER
----- Message from Pam Pillai sent at 12/8/2023 10:45 AM EST -----  Subject: Message to Provider    QUESTIONS  Information for Provider? Patient needs renewal for [facility] handicap sticker.   Please call when able to pick it up.   ---------------------------------------------------------------------------  --------------  CALL BACK INFO  [phone]; OK to leave message on voicemail  ---------------------------------------------------------------------------  --------------  SCRIPT ANSWERS  Relationship to Patient? Self

## 2023-12-11 NOTE — TELEPHONE ENCOUNTER
..Request for   Requested Prescriptions     Pending Prescriptions Disp Refills    pantoprazole (PROTONIX) 20 MG tablet [Pharmacy Med Name: PANTOPRAZOLE SOD DR 20 MG TAB] 30 tablet 3     Sig: take 1 tablet by mouth once daily    .      Please review and e-scribe to pharmacy listed in chart if appropriate. Thank you.      Last Visit Date: 9/6/2023  Next Visit Date: Visit date not found    Future Appointments   Date Time Provider Department Center   1/8/2024  8:30 AM Bethany Garland MD [facility]       Health Maintenance   Topic Date Due    Hepatitis B vaccine (1 of 3 - 3-dose series) Never done    Flu vaccine (1) 08/01/2023    COVID-19 Vaccine (3 - 2023-24 season) 09/01/2023    Depression Screen  01/11/2024    GFR test (Diabetes, CKD 3-4, OR last GFR 15-59)  01/11/2024    Lipids  09/19/2024    DTaP/Tdap/Td vaccine (2 - Td or Tdap) 10/19/2025    Colorectal Cancer Screen  11/03/2033    Hepatitis C screen  Completed    HIV screen  Completed    Hepatitis A vaccine  Aged Out    Hib vaccine  Aged Out    Meningococcal (ACWY) vaccine  Aged Out    Pneumococcal 0-64 years Vaccine  Aged Out    Diabetes screen  Discontinued       Hemoglobin A1C (%)   Date Value   01/28/2021 5.5   01/13/2015 5.2             ( goal A1C is < 7)   No components found for: \"LABMICR\"  LDL Cholesterol (mg/dL)   Date Value   09/19/2023 105       (goal LDL is <100)   AST (U/L)   Date Value   04/06/2022 17     ALT (U/L)   Date Value   04/06/2022 9     BUN (mg/dL)   Date Value   01/11/2023 21 (H)     BP Readings from Last 3 Encounters:   11/27/23 (!) 119/92   11/08/23 117/78   11/03/23 102/73          (goal 120/80)    All Future Testing planned in [facility]  Lab Frequency Next Occurrence   MRI ENTEROGRAPHY Once 02/08/2024   EGD Routine Once 11/15/2023   CT COLONOGRAPHY Additional Contrast? Radiologist Recommendation Once 11/14/2023         Patient Active Problem List:     GERD (gastroesophageal reflux disease)     Chronic bilateral low back pain with

## 2023-12-14 ENCOUNTER — TELEPHONE (OUTPATIENT)
Dept: INTERNAL MEDICINE | Age: 48
End: 2023-12-14

## 2023-12-14 DIAGNOSIS — Z89.511 HX OF RIGHT BKA (HCC): ICD-10-CM

## 2023-12-14 DIAGNOSIS — Z89.511 HX OF RIGHT BKA (HCC): Primary | ICD-10-CM

## 2024-01-09 ENCOUNTER — ANESTHESIA (OUTPATIENT)
Dept: OPERATING ROOM | Age: 49
End: 2024-01-09
Payer: MEDICARE

## 2024-01-09 ENCOUNTER — ANESTHESIA EVENT (OUTPATIENT)
Dept: OPERATING ROOM | Age: 49
End: 2024-01-09
Payer: MEDICARE

## 2024-01-09 ENCOUNTER — TELEPHONE (OUTPATIENT)
Age: 49
End: 2024-01-09

## 2024-01-09 ENCOUNTER — APPOINTMENT (OUTPATIENT)
Dept: ULTRASOUND IMAGING | Age: 49
End: 2024-01-09
Attending: INTERNAL MEDICINE
Payer: MEDICARE

## 2024-01-09 ENCOUNTER — HOSPITAL ENCOUNTER (OUTPATIENT)
Age: 49
Setting detail: OUTPATIENT SURGERY
Discharge: HOME | End: 2024-01-09
Attending: INTERNAL MEDICINE | Admitting: INTERNAL MEDICINE
Payer: MEDICARE

## 2024-01-09 VITALS
HEIGHT: 62 IN | SYSTOLIC BLOOD PRESSURE: 119 MMHG | BODY MASS INDEX: 24.84 KG/M2 | DIASTOLIC BLOOD PRESSURE: 78 MMHG | OXYGEN SATURATION: 92 % | RESPIRATION RATE: 20 BRPM | WEIGHT: 135 LBS | HEART RATE: 79 BPM | TEMPERATURE: 97.2 F

## 2024-01-09 DIAGNOSIS — K31.9 LESION OF STOMACH: ICD-10-CM

## 2024-01-09 LAB
ANION GAP SERPL CALCULATED.3IONS-SCNC: 10 MMOL/L (ref 9–17)
BUN BLD-MCNC: NORMAL MG/DL (ref 8–26)
CA-I BLD-SCNC: 1.29 MMOL/L (ref 1.15–1.33)
CASE NUMBER:: NORMAL
CHLORIDE BLD-SCNC: 110 MMOL/L (ref 98–107)
CHLORIDE SERPL-SCNC: 105 MMOL/L (ref 98–107)
CO2 SERPL-SCNC: 22 MMOL/L (ref 20–31)
EGFR, POC: NORMAL ML/MIN/1.73M2
GLUCOSE BLD-MCNC: 98 MG/DL (ref 74–100)
HCT VFR BLD AUTO: NORMAL % (ref 41–53)
POC CREATININE: NORMAL MG/DL (ref 0.51–1.19)
POC HEMOGLOBIN (CALC): NORMAL G/DL (ref 13.5–17.5)
POC LACTIC ACID: 1.2 MMOL/L (ref 0.56–1.39)
POTASSIUM BLD-SCNC: NORMAL MMOL/L (ref 3.5–4.5)
POTASSIUM SERPL-SCNC: 3.4 MMOL/L (ref 3.7–5.3)
SODIUM BLD-SCNC: 139 MMOL/L (ref 138–146)
SODIUM SERPL-SCNC: 137 MMOL/L (ref 135–144)

## 2024-01-09 PROCEDURE — 7100000011 HC PHASE II RECOVERY - ADDTL 15 MIN: Performed by: INTERNAL MEDICINE

## 2024-01-09 PROCEDURE — 2500000003 HC RX 250 WO HCPCS: Performed by: NURSE ANESTHETIST, CERTIFIED REGISTERED

## 2024-01-09 PROCEDURE — 3609012400 HC EGD TRANSORAL BIOPSY SINGLE/MULTIPLE: Performed by: INTERNAL MEDICINE

## 2024-01-09 PROCEDURE — 88305 TISSUE EXAM BY PATHOLOGIST: CPT

## 2024-01-09 PROCEDURE — 76975 GI ENDOSCOPIC ULTRASOUND: CPT | Performed by: INTERNAL MEDICINE

## 2024-01-09 PROCEDURE — 3700000000 HC ANESTHESIA ATTENDED CARE: Performed by: INTERNAL MEDICINE

## 2024-01-09 PROCEDURE — 80051 ELECTROLYTE PANEL: CPT

## 2024-01-09 PROCEDURE — 82947 ASSAY GLUCOSE BLOOD QUANT: CPT

## 2024-01-09 PROCEDURE — 2720000010 HC SURG SUPPLY STERILE: Performed by: INTERNAL MEDICINE

## 2024-01-09 PROCEDURE — 2580000003 HC RX 258: Performed by: NURSE ANESTHETIST, CERTIFIED REGISTERED

## 2024-01-09 PROCEDURE — 3700000001 HC ADD 15 MINUTES (ANESTHESIA): Performed by: INTERNAL MEDICINE

## 2024-01-09 PROCEDURE — 83605 ASSAY OF LACTIC ACID: CPT

## 2024-01-09 PROCEDURE — 43242 EGD US FINE NEEDLE BX/ASPIR: CPT | Performed by: INTERNAL MEDICINE

## 2024-01-09 PROCEDURE — 2709999900 HC NON-CHARGEABLE SUPPLY: Performed by: INTERNAL MEDICINE

## 2024-01-09 PROCEDURE — 7100000010 HC PHASE II RECOVERY - FIRST 15 MIN: Performed by: INTERNAL MEDICINE

## 2024-01-09 PROCEDURE — 88173 CYTOPATH EVAL FNA REPORT: CPT

## 2024-01-09 PROCEDURE — 43239 EGD BIOPSY SINGLE/MULTIPLE: CPT | Performed by: INTERNAL MEDICINE

## 2024-01-09 PROCEDURE — 82330 ASSAY OF CALCIUM: CPT

## 2024-01-09 PROCEDURE — 6360000002 HC RX W HCPCS: Performed by: NURSE ANESTHETIST, CERTIFIED REGISTERED

## 2024-01-09 PROCEDURE — 3609020800 HC EGD W/EUS FNA: Performed by: INTERNAL MEDICINE

## 2024-01-09 PROCEDURE — 84295 ASSAY OF SERUM SODIUM: CPT

## 2024-01-09 PROCEDURE — 82435 ASSAY OF BLOOD CHLORIDE: CPT

## 2024-01-09 PROCEDURE — 88342 IMHCHEM/IMCYTCHM 1ST ANTB: CPT

## 2024-01-09 PROCEDURE — 88341 IMHCHEM/IMCYTCHM EA ADD ANTB: CPT

## 2024-01-09 RX ORDER — FENTANYL CITRATE 50 UG/ML
INJECTION, SOLUTION INTRAMUSCULAR; INTRAVENOUS PRN
Status: DISCONTINUED | OUTPATIENT
Start: 2024-01-09 | End: 2024-01-09 | Stop reason: SDUPTHER

## 2024-01-09 RX ORDER — GLYCOPYRROLATE 1 MG/5 ML
SYRINGE (ML) INTRAVENOUS PRN
Status: DISCONTINUED | OUTPATIENT
Start: 2024-01-09 | End: 2024-01-09 | Stop reason: SDUPTHER

## 2024-01-09 RX ORDER — SODIUM CHLORIDE 0.9 % (FLUSH) 0.9 %
5-40 SYRINGE (ML) INJECTION PRN
Status: DISCONTINUED | OUTPATIENT
Start: 2024-01-09 | End: 2024-01-09 | Stop reason: HOSPADM

## 2024-01-09 RX ORDER — LIDOCAINE HYDROCHLORIDE 10 MG/ML
INJECTION, SOLUTION EPIDURAL; INFILTRATION; INTRACAUDAL; PERINEURAL PRN
Status: DISCONTINUED | OUTPATIENT
Start: 2024-01-09 | End: 2024-01-09 | Stop reason: SDUPTHER

## 2024-01-09 RX ORDER — SODIUM CHLORIDE, SODIUM LACTATE, POTASSIUM CHLORIDE, CALCIUM CHLORIDE 600; 310; 30; 20 MG/100ML; MG/100ML; MG/100ML; MG/100ML
INJECTION, SOLUTION INTRAVENOUS CONTINUOUS PRN
Status: DISCONTINUED | OUTPATIENT
Start: 2024-01-09 | End: 2024-01-09 | Stop reason: SDUPTHER

## 2024-01-09 RX ORDER — SODIUM CHLORIDE 0.9 % (FLUSH) 0.9 %
5-40 SYRINGE (ML) INJECTION EVERY 12 HOURS SCHEDULED
Status: DISCONTINUED | OUTPATIENT
Start: 2024-01-09 | End: 2024-01-09 | Stop reason: HOSPADM

## 2024-01-09 RX ORDER — PROPOFOL 10 MG/ML
INJECTION, EMULSION INTRAVENOUS PRN
Status: DISCONTINUED | OUTPATIENT
Start: 2024-01-09 | End: 2024-01-09 | Stop reason: SDUPTHER

## 2024-01-09 RX ORDER — ONDANSETRON 2 MG/ML
4 INJECTION INTRAMUSCULAR; INTRAVENOUS
Status: DISCONTINUED | OUTPATIENT
Start: 2024-01-09 | End: 2024-01-09 | Stop reason: HOSPADM

## 2024-01-09 RX ORDER — FENTANYL CITRATE 50 UG/ML
50 INJECTION, SOLUTION INTRAMUSCULAR; INTRAVENOUS EVERY 5 MIN PRN
Status: DISCONTINUED | OUTPATIENT
Start: 2024-01-09 | End: 2024-01-09 | Stop reason: HOSPADM

## 2024-01-09 RX ORDER — FENTANYL CITRATE 50 UG/ML
25 INJECTION, SOLUTION INTRAMUSCULAR; INTRAVENOUS EVERY 5 MIN PRN
Status: DISCONTINUED | OUTPATIENT
Start: 2024-01-09 | End: 2024-01-09 | Stop reason: HOSPADM

## 2024-01-09 RX ORDER — SODIUM CHLORIDE 9 MG/ML
INJECTION, SOLUTION INTRAVENOUS PRN
Status: DISCONTINUED | OUTPATIENT
Start: 2024-01-09 | End: 2024-01-09 | Stop reason: HOSPADM

## 2024-01-09 RX ADMIN — PHENYLEPHRINE HYDROCHLORIDE 100 MCG: 10 INJECTION INTRAVENOUS at 10:45

## 2024-01-09 RX ADMIN — Medication 0.2 MG: at 10:10

## 2024-01-09 RX ADMIN — SODIUM CHLORIDE, POTASSIUM CHLORIDE, SODIUM LACTATE AND CALCIUM CHLORIDE: 600; 310; 30; 20 INJECTION, SOLUTION INTRAVENOUS at 10:05

## 2024-01-09 RX ADMIN — FENTANYL CITRATE 100 MCG: 50 INJECTION, SOLUTION INTRAMUSCULAR; INTRAVENOUS at 10:07

## 2024-01-09 RX ADMIN — LIDOCAINE HYDROCHLORIDE 50 MG: 10 INJECTION, SOLUTION EPIDURAL; INFILTRATION; INTRACAUDAL; PERINEURAL at 10:10

## 2024-01-09 RX ADMIN — PROPOFOL 100 MG: 10 INJECTION, EMULSION INTRAVENOUS at 10:11

## 2024-01-09 RX ADMIN — PROPOFOL 30 MG: 10 INJECTION, EMULSION INTRAVENOUS at 10:30

## 2024-01-09 RX ADMIN — PROPOFOL 200 MCG/KG/MIN: 10 INJECTION, EMULSION INTRAVENOUS at 10:13

## 2024-01-09 RX ADMIN — PHENYLEPHRINE HYDROCHLORIDE 100 MCG: 10 INJECTION INTRAVENOUS at 10:41

## 2024-01-09 ASSESSMENT — PAIN SCALES - GENERAL
PAINLEVEL_OUTOF10: 0

## 2024-01-09 ASSESSMENT — PAIN - FUNCTIONAL ASSESSMENT
PAIN_FUNCTIONAL_ASSESSMENT: 0-10
PAIN_FUNCTIONAL_ASSESSMENT: FACE, LEGS, ACTIVITY, CRY, AND CONSOLABILITY (FLACC)

## 2024-01-09 ASSESSMENT — PAIN SCALES - WONG BAKER: WONGBAKER_NUMERICALRESPONSE: 0

## 2024-01-09 NOTE — DISCHARGE INSTRUCTIONS
[facility]    POST-ENDOSCOPY INSTRUCTIONS    1. ACTIVITY   No driving, operating machinery, or making important decisions for 24 hours.    Resume normal activity after 24 hours.  You may return to work after 24 hours.    2. DIET        Resume your usual diet unless specified below.   ***    3. MEDICATIONS    Resume your usual medications.     Do not consume alcohol, tranquilizers, or sleeping medications for 24 hour unless advised by your physician.                 4. PHYSICIAN FOLLOW-UP / INSTRUCTIONS    Please call the office/clinic in 10 days for biopsy results:      [  ] GI office:  [phone]          Follow up with your family physician as planned.    6. NORMAL CHANGES YOU MAY EXPERIENCE AFTER ENDOSCOPY:         EGD/EUS         Sore throat after EGD/EUS       A bloated feeling and belching from       air in stomach                You may feel fatigued for the next 24-48    hours due to the prep and sedation    7. CALL YOUR PHYSICIAN IF YOU EXPERIENCE ANY OF THE FOLLOWING      A.  Passing blood rectally or vomiting blood (color may be red or black)      B.  Severe abdominal pain or tenderness (that is not relieved by passing air)      C.  Fever, chills, or excessive sweating      D.  Persistent nausea or vomiting      E.  Redness or swelling at the IV site    If you have additional questions, PLEASE call your doctor or the [facility] ([phone])    No alcoholic beverages, no driving or operating machinery, no making important decisions for 24 hours.   You may have a normal diet but should eat lightly day of surgery.  Drink plenty of fluids.  Urinate within 8 hours after surgery, if unable to urinate call your doctor

## 2024-01-09 NOTE — H&P
History and Physical    Pt Name: Vincent Steiner  MRN: [id number]  YOB: 1975  Date of evaluation: 1/9/2024  Primary Care Physician: Cari Pulido MD    SUBJECTIVE:   History of Chief Complaint:    Vincent Steiner is a 48 y.o. male who is scheduled today for ENDOSCOPIC ULTRASOUND WITH PATHOLOGY, EGD. He reports being s/p recent EGD  (hx GERD) and states found to have lesion of stomach so he presents today for further evaluation of this. He reports a 6 month history of abdominal pain, typically to lower abdomen and rates his pain a 5/10 today. He reports history of neuroblastoma as a child and that he had chemotherapy for this. He reports treatment for the neuroblastoma caused nerve damage to his right lower extremity that resulted in a right lower leg amputation, for which he endorses a prosthetic.   Allergies  is allergic to shellfish-derived products and penicillins.  Medications  Prior to Admission medications    Medication Sig Start Date End Date Taking? Authorizing Provider   Handicap Placard MISC by Does not apply route 12/14/23   Cari Pulido MD   Handicap Placard MISC by Does not apply route 12/11/23   Cari Pulido MD   pantoprazole (PROTONIX) 20 MG tablet take 1 tablet by mouth once daily 12/11/23   Cari Pulido MD   [facility] Does not apply route 12/8/23   Cari Pulido MD   hydroCHLOROthiazide (HYDRODIURIL) 25 MG tablet take 2 tablets by mouth once daily 10/27/23   Cari Pulido MD   amLODIPine (NORVASC) 10 MG tablet take 1 tablet by mouth once daily 9/6/23   Cari Pulido MD   gabapentin (NEURONTIN) 300 MG capsule Take 1 capsule by mouth 3 times daily. 9/6/23 11/6/24  Cari Pulido MD   cyclobenzaprine (FLEXERIL) 10 MG tablet Take 1 tablet by mouth 3 times daily as needed for Muscle spasms 9/6/23   Cari Pulido MD   atorvastatin (LIPITOR) 40 MG tablet take 1 tablet by mouth once daily 4/27/23   Cari Pulido MD   carvedilol (COREG) 6.25 MG tablet take 1 tablet by mouth

## 2024-01-09 NOTE — OP NOTE
Operative Note      Patient: Vincent Steiner  YOB: 1975  MRN: [id number]    Date of Procedure: 1/9/2024    Pre-Op Diagnosis Codes:     * Lesion of stomach [K31.9]    Post-Op Diagnosis: Gastric nodule and duodenal nodule       Procedure(s):  EGD W/EUS FNA  EGD BIOPSY    Surgeon(s):  Jairon Zhou MD    Assistant:   First Assistant: Nancy Das RN    Anesthesia: Monitor Anesthesia Care    Estimated Blood Loss (mL): Minimal    Complications: None    Specimens:   ID Type Source Tests Collected by Time Destination   A : gastric mass Tissue Gastric CYTOLOGY, NON-GYN Jairon Zhou MD 1/9/2024 1046    B : duodenal nodule bx Tissue Duodenum SURGICAL PATHOLOGY Jairon Zhou MD 1/9/2024 1014    C : gastric nodule bx Tissue Gastric SURGICAL PATHOLOGY Jairon Zhou MD 1/9/2024 1015        Implants:  * No implants in log *      Drains: * No LDAs found *      Description of Procedure:  Informed consent was obtained from the patient after explanation of the procedure including indications, description of the procedure,  benefits and possible risks and complications of the procedure, and alternatives. Questions were answered.  The patient's history was reviewed and a directed physical examination was performed prior to the procedure.    Patient was monitored throughout the procedure with pulse oximetry and periodic assessment of vital signs. Patient was sedated as noted above. With the patient in the left lateral decubitus position, the Olympus videoendoscope followed by endoechoendoscope was placed in the patient's mouth and under direct visualization passed into the esophagus. The scope was passed to the 2nd portion of the duodenum.  The patient tolerated the procedure well and was taken to the recovery area in good condition.    EGD Findings::   Esophagus: normal.   Stomach: A small to medium size subepithelial nodule was seen in the antrum of the stomach.  Biopsies were done.                   Small sessile gastric polyps were seen in the body of the stomach.                  Normal stomach was seen to retroflexion view.  Duodenum: A small mucosal nodule was seen in the second portion of the duodenum.  Biopsies were done.                       Rest of the duodenal examination was within normal limits    EUS findings:  Stomach: An oval, hypoechoic, homogeneous, solid mass measuring 5 x 17 mm in maximum cross-sectional diameter was seen arising from the muscularis propria layer of the stomach. Fine needle biopsy was performed. Color Doppler imaging was utilized prior to needle puncture to confirm a lack of significant vascular structures within the needle path. Three passes were made with the 25 gauge ultrasound biopsy needle using a trans gastric approach. A stylet was used. Estimated blood loss was minimal. Verification of patient identification for the specimen was done by the physician and nurse using the patient's name and medical record number.  Lymphadenopathy: No pathologic lymphadenopathy seen in upper abdomen.     COMMENT: Subepithelial nodule seen in the antrum of the stomach.  Differential includes GIST/Leiomyoma      Recommendations: Follow-up with the biopsy results.                                    Repeat EUS in 1 year to follow size stability.                                    Follow-up with Dr. Garland        Electronically signed by Jairon Zhou MD on 1/9/2024 at 11:10 AM

## 2024-01-09 NOTE — ANESTHESIA PRE PROCEDURE
Department of Anesthesiology  Preprocedure Note       Name:  Vincent Steiner   Age:  48 y.o.  :  1975                                          MRN:  [id number]         Date:  2024      Surgeon: Surgeon(s):  Jairon Zhou MD    Procedure: Procedure(s):  ENDOSCOPIC ULTRASOUND WITH PATHOLOGY, EGD    Medications prior to admission:   Prior to Admission medications    Medication Sig Start Date End Date Taking? Authorizing Provider   Handicap Placard MISC by Does not apply route 23   Cari Pulido MD   Handicap Placard MISC by Does not apply route 23   Cari Pulido MD   pantoprazole (PROTONIX) 20 MG tablet take 1 tablet by mouth once daily 23   Cari Pulido MD Handicap Placard MISC by Does not apply route 23   Cari Pulido MD   hydroCHLOROthiazide (HYDRODIURIL) 25 MG tablet take 2 tablets by mouth once daily 10/27/23   Cari Pulido MD   amLODIPine (NORVASC) 10 MG tablet take 1 tablet by mouth once daily 23   Cari Pulido MD   gabapentin (NEURONTIN) 300 MG capsule Take 1 capsule by mouth 3 times daily. 23  Cari Pulido MD   cyclobenzaprine (FLEXERIL) 10 MG tablet Take 1 tablet by mouth 3 times daily as needed for Muscle spasms 23   Cari Pulido MD   atorvastatin (LIPITOR) 40 MG tablet take 1 tablet by mouth once daily 23   Cari Pulido MD   carvedilol (COREG) 6.25 MG tablet take 1 tablet by mouth twice a day 23   Cari Pulido MD       Current medications:    No current facility-administered medications for this encounter.       Allergies:    Allergies   Allergen Reactions    Shellfish-Derived Products Anaphylaxis    Penicillins Other (See Comments)     UNKNOWN REACTION       Problem List:    Patient Active Problem List   Diagnosis Code    GERD (gastroesophageal reflux disease) K21.9    Chronic bilateral low back pain with bilateral sciatica M54.42, M54.41, G89.29    Hemorrhoids, external K64.4    Essential hypertension I10    Pure

## 2024-01-09 NOTE — ANESTHESIA POSTPROCEDURE EVALUATION
Department of Anesthesiology  Postprocedure Note    Patient: Vincent Steiner  MRN: [id number]  YOB: 1975  Date of evaluation: 1/9/2024    Procedure Summary     Date: 01/09/24 Room / Location: [address]    Anesthesia Start: 1005 Anesthesia Stop: 1106    Procedures:       EGD W/EUS FNA      EGD BIOPSY Diagnosis:       Lesion of stomach      (Lesion of stomach [K31.9])    Surgeons: Jairon Zhou MD Responsible Provider: Alexis Pena MD    Anesthesia Type: MAC ASA Status: 3          Anesthesia Type: No value filed.    Shahrzad Phase I: Shahrzad Score: 10    Shahrzad Phase II: Shahrzad Score: 8  POST-OP ANESTHESIA NOTE       BP 93/63   Pulse 82   Temp 96.8 °F (36 °C) (Temporal)   Resp 16   Ht 1.575 m (5' 2\")   Wt 61.2 kg (135 lb)   SpO2 97%   BMI 24.69 kg/m²    Pain Assessment: Alcazar-Baker FACES  Pain Level: 0      Anesthesia Post Evaluation    Patient location during evaluation: PACU  Patient participation: complete - patient participated  Level of consciousness: awake  Pain score: 0  Airway patency: patent  Nausea & Vomiting: no vomiting and no nausea  Cardiovascular status: hemodynamically stable  Respiratory status: acceptable  Hydration status: stable        No notable events documented.

## 2024-01-10 ENCOUNTER — TELEPHONE (OUTPATIENT)
Age: 49
End: 2024-01-10

## 2024-01-10 DIAGNOSIS — M54.41 CHRONIC BILATERAL LOW BACK PAIN WITH BILATERAL SCIATICA: ICD-10-CM

## 2024-01-10 DIAGNOSIS — G89.29 CHRONIC BILATERAL LOW BACK PAIN WITH BILATERAL SCIATICA: ICD-10-CM

## 2024-01-10 DIAGNOSIS — M54.42 CHRONIC BILATERAL LOW BACK PAIN WITH BILATERAL SCIATICA: ICD-10-CM

## 2024-01-10 LAB — SURGICAL PATHOLOGY REPORT: NORMAL

## 2024-01-10 NOTE — TELEPHONE ENCOUNTER
----- Message from Kady Glass sent at 1/10/2024  2:18 PM EST -----  Subject: Refill Request    QUESTIONS  Name of Medication? cyclobenzaprine (FLEXERIL) 10 MG tablet  Patient-reported dosage and instructions? twice a day  How many days do you have left? 0  Preferred Pharmacy? [id number]  Pharmacy phone number (if available)? [phone]  ---------------------------------------------------------------------------  --------------,  Name of Medication? gabapentin (NEURONTIN) 300 MG capsule  Patient-reported dosage and instructions? three times daily  How many days do you have left? 0  Preferred Pharmacy? [id number]  Pharmacy phone number (if available)? [phone]  ---------------------------------------------------------------------------  --------------  CALL BACK INFO  What is the best way for the office to contact you? OK to leave message on   voicemail  Preferred Call Back Phone Number? [phone]  ---------------------------------------------------------------------------  --------------  SCRIPT ANSWERS  Relationship to Patient? Self

## 2024-01-11 LAB — SURGICAL PATHOLOGY REPORT: NORMAL

## 2024-01-11 RX ORDER — CYCLOBENZAPRINE HCL 10 MG
10 TABLET ORAL 3 TIMES DAILY PRN
Qty: 30 TABLET | Refills: 3 | Status: SHIPPED | OUTPATIENT
Start: 2024-01-11

## 2024-01-11 RX ORDER — GABAPENTIN 300 MG/1
300 CAPSULE ORAL 3 TIMES DAILY
Qty: 90 CAPSULE | Refills: 3 | Status: SHIPPED | OUTPATIENT
Start: 2024-01-11 | End: 2024-05-10

## 2024-01-12 ENCOUNTER — TELEPHONE (OUTPATIENT)
Dept: GASTROENTEROLOGY | Age: 49
End: 2024-01-12

## 2024-01-17 ENCOUNTER — OFFICE VISIT (OUTPATIENT)
Dept: INTERNAL MEDICINE | Age: 49
End: 2024-01-17
Payer: MEDICARE

## 2024-01-17 VITALS
HEIGHT: 62 IN | BODY MASS INDEX: 26.28 KG/M2 | SYSTOLIC BLOOD PRESSURE: 132 MMHG | WEIGHT: 142.8 LBS | OXYGEN SATURATION: 98 % | DIASTOLIC BLOOD PRESSURE: 80 MMHG | HEART RATE: 90 BPM | TEMPERATURE: 96.8 F

## 2024-01-17 DIAGNOSIS — M25.511 CHRONIC PAIN OF BOTH SHOULDERS: Primary | ICD-10-CM

## 2024-01-17 DIAGNOSIS — G89.29 CHRONIC PAIN OF BOTH SHOULDERS: Primary | ICD-10-CM

## 2024-01-17 DIAGNOSIS — Z23 NEED FOR INFLUENZA VACCINATION: ICD-10-CM

## 2024-01-17 DIAGNOSIS — M46.1 BILATERAL SACROILIITIS (HCC): ICD-10-CM

## 2024-01-17 DIAGNOSIS — Z89.511 HX OF RIGHT BKA (HCC): ICD-10-CM

## 2024-01-17 DIAGNOSIS — I10 ESSENTIAL HYPERTENSION: ICD-10-CM

## 2024-01-17 DIAGNOSIS — M25.512 CHRONIC PAIN OF BOTH SHOULDERS: Primary | ICD-10-CM

## 2024-01-17 DIAGNOSIS — N18.31 STAGE 3A CHRONIC KIDNEY DISEASE (HCC): ICD-10-CM

## 2024-01-17 DIAGNOSIS — M41.9 SCOLIOSIS OF LUMBAR SPINE, UNSPECIFIED SCOLIOSIS TYPE: ICD-10-CM

## 2024-01-17 PROBLEM — K64.8 HEMORRHOIDS, INTERNAL: Status: ACTIVE | Noted: 2024-01-17

## 2024-01-17 PROBLEM — K56.699 COLONIC STRICTURE (HCC): Status: ACTIVE | Noted: 2024-01-17

## 2024-01-17 PROBLEM — D13.2 ADENOMA OF DUODENUM: Status: ACTIVE | Noted: 2024-01-17

## 2024-01-17 PROBLEM — K64.8 HEMORRHOIDS, INTERNAL: Status: RESOLVED | Noted: 2024-01-17 | Resolved: 2024-01-17

## 2024-01-17 PROCEDURE — G8482 FLU IMMUNIZE ORDER/ADMIN: HCPCS

## 2024-01-17 PROCEDURE — G8419 CALC BMI OUT NRM PARAM NOF/U: HCPCS

## 2024-01-17 PROCEDURE — 99214 OFFICE O/P EST MOD 30 MIN: CPT

## 2024-01-17 PROCEDURE — 1036F TOBACCO NON-USER: CPT

## 2024-01-17 PROCEDURE — 99213 OFFICE O/P EST LOW 20 MIN: CPT

## 2024-01-17 PROCEDURE — 3075F SYST BP GE 130 - 139MM HG: CPT

## 2024-01-17 PROCEDURE — 3079F DIAST BP 80-89 MM HG: CPT

## 2024-01-17 PROCEDURE — 90686 IIV4 VACC NO PRSV 0.5 ML IM: CPT | Performed by: HOSPITALIST

## 2024-01-17 PROCEDURE — G8427 DOCREV CUR MEDS BY ELIG CLIN: HCPCS

## 2024-01-17 ASSESSMENT — PATIENT HEALTH QUESTIONNAIRE - PHQ9
SUM OF ALL RESPONSES TO PHQ QUESTIONS 1-9: 0
SUM OF ALL RESPONSES TO PHQ9 QUESTIONS 1 & 2: 0
SUM OF ALL RESPONSES TO PHQ QUESTIONS 1-9: 0
SUM OF ALL RESPONSES TO PHQ QUESTIONS 1-9: 0
2. FEELING DOWN, DEPRESSED OR HOPELESS: 0
SUM OF ALL RESPONSES TO PHQ QUESTIONS 1-9: 0
1. LITTLE INTEREST OR PLEASURE IN DOING THINGS: 0

## 2024-01-17 NOTE — PROGRESS NOTES
[facility]  [facility]  [address] [place] [address]  Dept: [phone]  Dept Fax: [phone]    Office Progress/Follow Up Note  Date ofpatient's visit: 1/17/2024  Patient's Name:  Vincent Steiner YOB: 1975            Patient Care Team:  Cari Pulido MD as PCP - General (Internal Medicine)  Mercy Ramírez MD as PCP - Empaneled Provider  Tish Avila MD Cashen, Constance P, DO as Consulting Physician (General Surgery)  Erick Arias, RN as Ambulatory Care Manager  ================================================================    REASON FOR VISIT/CHIEF COMPLAINT:  Shoulder Pain (Increased pain) and handicap Placard (Pt requesting)    HISTORY OF PRESENTING ILLNESS:  History was obtained from: patient, electronic medical record. Vincent Ornelas a 48 y.o. is here for a for back and shoulder pain with bilateral upper extremity tingling sensation.    Patient has a history of scoliosis of lumbar spine,.  Patient has previous history of chronic back pain.  He has history of hip surgery.  S/p right sacroiliac joint fusion 11/8/2021, left sacroiliac joint fusion 12/20/2021.   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   Lumbar spinal foraminal narrowing s/p laminectomy March 2021.  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   Pain was initially controlled with  Flexeril and Neurontin but now it is getting worse.  Patient is currently not working, on disability  Patient reported he is colonoscopy for lower GI bleed in 2018 with multiple bowel surgeries.  Evaluated recently by the GI, EGD and colonoscopy done.  Notes reviewed.  EGD is positive for hyperplasia of the gastric mucosa.  EUS shows adenoma of the duodenum.  Colonoscopy showed colonic stenosis with hemorrhoids.  Unclear reasons for bowel stenosis and prior resection.  GI recommended to have MR enterography and

## 2024-01-17 NOTE — PROGRESS NOTES
Attending Physician Statement  I have discussed the care of Vincent Steiner, including pertinent history and exam findings with the resident. I have reviewed the key elements of all parts of the encounter with the resident. I have seen and examined the patient with the resident and the key elements of all parts of the encounter have been performed by me.  I agree with the assessment, and status of the problem list as documented. The plan and orders should include   Orders Placed This Encounter   Procedures    Influenza, FLUARIX, (age 6 mo+),  IM, Preservative Free, 0.5 mL    CBC with Auto Differential    Basic Metabolic Panel    [facility] Physical [facility]    and this was also documented by the resident.  The medication list was reviewed with the resident and is up to date. The return visit should be in 6 months .    Mati Hsu MD  Attending Physician,  Department of Internal Medicine  [facility] Internal Medicine  [facility]      1/17/2024, 3:46 PM

## 2024-02-04 DIAGNOSIS — I10 ESSENTIAL HYPERTENSION: ICD-10-CM

## 2024-02-05 RX ORDER — CARVEDILOL 6.25 MG/1
TABLET ORAL
Qty: 60 TABLET | Refills: 4 | Status: SHIPPED | OUTPATIENT
Start: 2024-02-05

## 2024-02-05 NOTE — TELEPHONE ENCOUNTER
..Request for   Requested Prescriptions     Pending Prescriptions Disp Refills    carvedilol (COREG) 6.25 MG tablet [Pharmacy Med Name: CARVEDILOL 6.25 MG TABLET] 60 tablet 3     Sig: take 1 tablet by mouth twice a day    .      Please review and e-scribe to pharmacy listed in chart if appropriate. Thank you.      Last Visit Date: 1/17/2024  Next Visit Date: Visit date not found    Future Appointments   Date Time Provider Department Center   2/26/2024  8:00 AM Bethany Garland MD [facility]       Health Maintenance   Topic Date Due    Hepatitis B vaccine (1 of 3 - 3-dose series) Never done    COVID-19 Vaccine (3 - 2023-24 season) 09/01/2023    Annual Wellness Visit (Medicare Advantage)  01/01/2024    GFR test (Diabetes, CKD 3-4, OR last GFR 15-59)  01/11/2024    Lipids  09/19/2024    Depression Screen  01/17/2025    DTaP/Tdap/Td vaccine (2 - Td or Tdap) 10/19/2025    Colorectal Cancer Screen  11/03/2033    Flu vaccine  Completed    Hepatitis C screen  Completed    HIV screen  Completed    Hepatitis A vaccine  Aged Out    Hib vaccine  Aged Out    Polio vaccine  Aged Out    Meningococcal (ACWY) vaccine  Aged Out    Pneumococcal 0-64 years Vaccine  Aged Out    Diabetes screen  Discontinued       Hemoglobin A1C (%)   Date Value   01/28/2021 5.5   01/13/2015 5.2             ( goal A1C is < 7)   No components found for: \"LABMICR\"  LDL Cholesterol (mg/dL)   Date Value   09/19/2023 105       (goal LDL is <100)   AST (U/L)   Date Value   04/06/2022 17     ALT (U/L)   Date Value   04/06/2022 9     BUN (mg/dL)   Date Value   01/11/2023 21 (H)     BP Readings from Last 3 Encounters:   01/17/24 132/80   01/09/24 119/78   11/27/23 (!) 119/92          (goal 120/80)    All Future Testing planned in [facility]  Lab Frequency Next Occurrence   MRI ENTEROGRAPHY Once 02/08/2024   EGD Routine Once 11/15/2023   CT COLONOGRAPHY Additional Contrast? Radiologist Recommendation Once 11/14/2023   CBC with Auto Differential Once 01/17/2024

## 2024-03-19 DIAGNOSIS — I10 ESSENTIAL HYPERTENSION: ICD-10-CM

## 2024-03-19 RX ORDER — HYDROCHLOROTHIAZIDE 25 MG/1
TABLET ORAL
Qty: 60 TABLET | Refills: 4 | Status: SHIPPED | OUTPATIENT
Start: 2024-03-19

## 2024-03-19 NOTE — TELEPHONE ENCOUNTER
A Refill Has Been Requested for Vincent STEPHANIE Steiner    Medication Requested  Requested Prescriptions     Pending Prescriptions Disp Refills    hydroCHLOROthiazide (HYDRODIURIL) 25 MG tablet [Pharmacy Med Name: HYDROCHLOROTHIAZIDE 25 MG TAB] 60 tablet 4     Sig: take 2 tablets by mouth once daily       Last Visit Date (If Applicable)  1/17/2024    Next Visit Date (If Applicable)  Visit date not found

## 2024-04-23 DIAGNOSIS — E78.5 DYSLIPIDEMIA: ICD-10-CM

## 2024-04-23 NOTE — TELEPHONE ENCOUNTER
A Refill Has Been Requested for Vincent STEPHANIE Obdulia    Medication Requested  Requested Prescriptions     Pending Prescriptions Disp Refills    atorvastatin (LIPITOR) 40 MG tablet [Pharmacy Med Name: ATORVASTATIN 40 MG TABLET] 90 tablet 3     Sig: take 1 tablet by mouth once daily       Last Visit Date (If Applicable)  1/17/2024    Next Visit Date (If Applicable)  Visit date not found

## 2024-04-24 RX ORDER — ATORVASTATIN CALCIUM 40 MG/1
TABLET, FILM COATED ORAL
Qty: 90 TABLET | Refills: 1 | Status: SHIPPED | OUTPATIENT
Start: 2024-04-24 | End: 2024-05-09 | Stop reason: SDUPTHER

## 2024-05-09 DIAGNOSIS — E78.5 DYSLIPIDEMIA: ICD-10-CM

## 2024-05-09 DIAGNOSIS — G89.29 CHRONIC BILATERAL LOW BACK PAIN WITH BILATERAL SCIATICA: ICD-10-CM

## 2024-05-09 DIAGNOSIS — I10 ESSENTIAL HYPERTENSION: ICD-10-CM

## 2024-05-09 DIAGNOSIS — M54.41 CHRONIC BILATERAL LOW BACK PAIN WITH BILATERAL SCIATICA: ICD-10-CM

## 2024-05-09 DIAGNOSIS — K21.9 GASTROESOPHAGEAL REFLUX DISEASE, UNSPECIFIED WHETHER ESOPHAGITIS PRESENT: ICD-10-CM

## 2024-05-09 DIAGNOSIS — M54.42 CHRONIC BILATERAL LOW BACK PAIN WITH BILATERAL SCIATICA: ICD-10-CM

## 2024-05-09 NOTE — TELEPHONE ENCOUNTER
..Request for   Requested Prescriptions     Pending Prescriptions Disp Refills    amLODIPine (NORVASC) 10 MG tablet 60 tablet 3     Sig: Take 1 tablet by mouth daily    atorvastatin (LIPITOR) 40 MG tablet 90 tablet 1     Sig: Take 1 tablet by mouth daily    cyclobenzaprine (FLEXERIL) 10 MG tablet 30 tablet 3     Sig: Take 1 tablet by mouth 3 times daily as needed for Muscle spasms    gabapentin (NEURONTIN) 300 MG capsule 90 capsule 3     Sig: Take 1 capsule by mouth 3 times daily for 120 days.    pantoprazole (PROTONIX) 20 MG tablet 30 tablet 4     Sig: Take 1 tablet by mouth daily    .      Please review and e-scribe to pharmacy listed in chart if appropriate. Thank you.      Last Visit Date: 1/17/2024  Next Visit Date: Visit date not found    No future appointments.    Health Maintenance   Topic Date Due    Hepatitis B vaccine (1 of 3 - 3-dose series) Never done    COVID-19 Vaccine (3 - 2023-24 season) 09/01/2023    Annual Wellness Visit (Medicare Advantage)  01/01/2024    GFR test (Diabetes, CKD 3-4, OR last GFR 15-59)  01/11/2024    Lipids  09/19/2024    Depression Screen  01/17/2025    DTaP/Tdap/Td vaccine (2 - Td or Tdap) 10/19/2025    Colorectal Cancer Screen  11/03/2033    Flu vaccine  Completed    Hepatitis C screen  Completed    HIV screen  Completed    Hepatitis A vaccine  Aged Out    Hib vaccine  Aged Out    Polio vaccine  Aged Out    Meningococcal (ACWY) vaccine  Aged Out    Pneumococcal 0-64 years Vaccine  Aged Out    Diabetes screen  Discontinued       Hemoglobin A1C (%)   Date Value   01/28/2021 5.5   01/13/2015 5.2             ( goal A1C is < 7)   No components found for: \"LABMICR\"  No components found for: \"LDLCHOLESTEROL\", \"LDLCALC\"    (goal LDL is <100)   AST (U/L)   Date Value   04/06/2022 17     ALT (U/L)   Date Value   04/06/2022 9     BUN (mg/dL)   Date Value   01/11/2023 21 (H)     BP Readings from Last 3 Encounters:   01/17/24 132/80   01/09/24 119/78   11/27/23 (!) 119/92          (goal

## 2024-05-10 RX ORDER — PANTOPRAZOLE SODIUM 20 MG/1
20 TABLET, DELAYED RELEASE ORAL DAILY
Qty: 30 TABLET | Refills: 4 | Status: SHIPPED | OUTPATIENT
Start: 2024-05-10

## 2024-05-10 RX ORDER — AMLODIPINE BESYLATE 10 MG/1
10 TABLET ORAL DAILY
Qty: 60 TABLET | Refills: 3 | Status: SHIPPED | OUTPATIENT
Start: 2024-05-10

## 2024-05-10 RX ORDER — ATORVASTATIN CALCIUM 40 MG/1
40 TABLET, FILM COATED ORAL DAILY
Qty: 90 TABLET | Refills: 1 | Status: SHIPPED | OUTPATIENT
Start: 2024-05-10

## 2024-05-10 RX ORDER — GABAPENTIN 300 MG/1
300 CAPSULE ORAL 3 TIMES DAILY
Qty: 90 CAPSULE | Refills: 1 | Status: SHIPPED | OUTPATIENT
Start: 2024-05-10 | End: 2024-07-09

## 2024-05-10 RX ORDER — CYCLOBENZAPRINE HCL 10 MG
10 TABLET ORAL 3 TIMES DAILY PRN
Qty: 30 TABLET | Refills: 3 | Status: SHIPPED | OUTPATIENT
Start: 2024-05-10

## 2024-08-12 ENCOUNTER — OFFICE VISIT (OUTPATIENT)
Dept: INTERNAL MEDICINE | Age: 49
End: 2024-08-12
Payer: MEDICARE

## 2024-08-12 VITALS
BODY MASS INDEX: 25.03 KG/M2 | WEIGHT: 136 LBS | OXYGEN SATURATION: 95 % | SYSTOLIC BLOOD PRESSURE: 128 MMHG | HEIGHT: 62 IN | HEART RATE: 87 BPM | RESPIRATION RATE: 16 BRPM | TEMPERATURE: 97.2 F | DIASTOLIC BLOOD PRESSURE: 82 MMHG

## 2024-08-12 DIAGNOSIS — E78.5 DYSLIPIDEMIA: ICD-10-CM

## 2024-08-12 DIAGNOSIS — M54.42 CHRONIC BILATERAL LOW BACK PAIN WITH BILATERAL SCIATICA: ICD-10-CM

## 2024-08-12 DIAGNOSIS — K60.2 FISSURE, ANORECTAL: Primary | ICD-10-CM

## 2024-08-12 DIAGNOSIS — K64.8 HEMORRHOID PROLAPSE: ICD-10-CM

## 2024-08-12 DIAGNOSIS — K21.9 GASTROESOPHAGEAL REFLUX DISEASE, UNSPECIFIED WHETHER ESOPHAGITIS PRESENT: ICD-10-CM

## 2024-08-12 DIAGNOSIS — G89.29 CHRONIC BILATERAL LOW BACK PAIN WITH BILATERAL SCIATICA: ICD-10-CM

## 2024-08-12 DIAGNOSIS — M54.41 CHRONIC BILATERAL LOW BACK PAIN WITH BILATERAL SCIATICA: ICD-10-CM

## 2024-08-12 DIAGNOSIS — I10 ESSENTIAL HYPERTENSION: ICD-10-CM

## 2024-08-12 DIAGNOSIS — H10.31 ACUTE CONJUNCTIVITIS OF RIGHT EYE, UNSPECIFIED ACUTE CONJUNCTIVITIS TYPE: ICD-10-CM

## 2024-08-12 PROCEDURE — 99211 OFF/OP EST MAY X REQ PHY/QHP: CPT | Performed by: STUDENT IN AN ORGANIZED HEALTH CARE EDUCATION/TRAINING PROGRAM

## 2024-08-12 RX ORDER — CYCLOBENZAPRINE HCL 10 MG
10 TABLET ORAL 3 TIMES DAILY PRN
Qty: 30 TABLET | Refills: 3 | Status: SHIPPED | OUTPATIENT
Start: 2024-08-12

## 2024-08-12 RX ORDER — CARVEDILOL 6.25 MG/1
TABLET ORAL
Qty: 60 TABLET | Refills: 4 | Status: SHIPPED | OUTPATIENT
Start: 2024-08-12

## 2024-08-12 RX ORDER — LIDOCAINE 50 MG/G
OINTMENT TOPICAL
Qty: 240 G | Refills: 0 | Status: SHIPPED | OUTPATIENT
Start: 2024-08-12

## 2024-08-12 RX ORDER — GABAPENTIN 300 MG/1
300 CAPSULE ORAL 3 TIMES DAILY
Qty: 90 CAPSULE | Refills: 0 | Status: SHIPPED | OUTPATIENT
Start: 2024-08-12 | End: 2024-10-11

## 2024-08-12 RX ORDER — ATORVASTATIN CALCIUM 40 MG/1
40 TABLET, FILM COATED ORAL DAILY
Qty: 90 TABLET | Refills: 1 | Status: SHIPPED | OUTPATIENT
Start: 2024-08-12

## 2024-08-12 RX ORDER — HYDROCHLOROTHIAZIDE 25 MG/1
TABLET ORAL
Qty: 60 TABLET | Refills: 4 | Status: SHIPPED | OUTPATIENT
Start: 2024-08-12

## 2024-08-12 RX ORDER — PANTOPRAZOLE SODIUM 20 MG/1
20 TABLET, DELAYED RELEASE ORAL DAILY
Qty: 30 TABLET | Refills: 4 | Status: SHIPPED | OUTPATIENT
Start: 2024-08-12

## 2024-08-12 RX ORDER — AMLODIPINE BESYLATE 10 MG/1
10 TABLET ORAL DAILY
Qty: 60 TABLET | Refills: 3 | Status: SHIPPED | OUTPATIENT
Start: 2024-08-12

## 2024-08-12 ASSESSMENT — PATIENT HEALTH QUESTIONNAIRE - PHQ9
2. FEELING DOWN, DEPRESSED OR HOPELESS: NOT AT ALL
SUM OF ALL RESPONSES TO PHQ QUESTIONS 1-9: 0
SUM OF ALL RESPONSES TO PHQ9 QUESTIONS 1 & 2: 0
3. TROUBLE FALLING OR STAYING ASLEEP: NOT AT ALL
SUM OF ALL RESPONSES TO PHQ QUESTIONS 1-9: 0
6. FEELING BAD ABOUT YOURSELF - OR THAT YOU ARE A FAILURE OR HAVE LET YOURSELF OR YOUR FAMILY DOWN: NOT AT ALL
7. TROUBLE CONCENTRATING ON THINGS, SUCH AS READING THE NEWSPAPER OR WATCHING TELEVISION: NOT AT ALL
4. FEELING TIRED OR HAVING LITTLE ENERGY: NOT AT ALL
SUM OF ALL RESPONSES TO PHQ QUESTIONS 1-9: 0
1. LITTLE INTEREST OR PLEASURE IN DOING THINGS: NOT AT ALL
8. MOVING OR SPEAKING SO SLOWLY THAT OTHER PEOPLE COULD HAVE NOTICED. OR THE OPPOSITE, BEING SO FIGETY OR RESTLESS THAT YOU HAVE BEEN MOVING AROUND A LOT MORE THAN USUAL: NOT AT ALL
9. THOUGHTS THAT YOU WOULD BE BETTER OFF DEAD, OR OF HURTING YOURSELF: NOT AT ALL
SUM OF ALL RESPONSES TO PHQ QUESTIONS 1-9: 0
10. IF YOU CHECKED OFF ANY PROBLEMS, HOW DIFFICULT HAVE THESE PROBLEMS MADE IT FOR YOU TO DO YOUR WORK, TAKE CARE OF THINGS AT HOME, OR GET ALONG WITH OTHER PEOPLE: NOT DIFFICULT AT ALL
5. POOR APPETITE OR OVEREATING: NOT AT ALL

## 2024-08-12 NOTE — PROGRESS NOTES
Attending Physician Statement  I have discussed the care of Vincent Steiner, including pertinent history and exam findings with the resident. I have reviewed the key elements of all parts of the encounter with the resident. I have seen and examined the patient with the resident and the key elements of all parts of the encounter have been performed by me.  I agree with the assessment, and status of the problem list as documented. The plan and orders should include   Orders Placed This Encounter   Procedures    [facility]    and this was also documented by the resident.The medication list was reviewed with the resident and is up to date.      Diagnosis Orders   1. Fissure, anorectal  [facility]    lidocaine (XYLOCAINE) 5 % ointment      2. Hemorrhoid prolapse  [facility], [facility]    lidocaine (XYLOCAINE) 5 % ointment      3. Acute conjunctivitis of right eye, unspecified acute conjunctivitis type        4. Chronic bilateral low back pain with bilateral sciatica  gabapentin (NEURONTIN) 300 MG capsule    cyclobenzaprine (FLEXERIL) 10 MG tablet      5. Essential hypertension  amLODIPine (NORVASC) 10 MG tablet    carvedilol (COREG) 6.25 MG tablet    hydroCHLOROthiazide (HYDRODIURIL) 25 MG tablet      6. Dyslipidemia  atorvastatin (LIPITOR) 40 MG tablet      7. Gastroesophageal reflux disease, unspecified whether esophagitis present  pantoprazole (PROTONIX) 20 MG tablet           Hamida Laureano MD   Attending Physician, [facility]   Faculty, Internal Medicine Residency Program  [facility]     
Lumbar disc disease     Neuroblastoma (HCC) 1975    Neurogenic dysfunction of the urinary bladder     Osteoarthritis     Phantom limb pain (HCC)     Prolonged emergence from general anesthesia     Pure hypercholesterolemia 07/18/2017    Rectal bleeding     RSD (reflex sympathetic dystrophy)     Stenosis colon (HCC)            Procedure Laterality Date    ABDOMINAL ADHESION SURGERY      APPENDECTOMY      BACK SURGERY Right 11/08/2021    RIGHT SI JOINT FUSION PERCUTANEOUS -SI BONE WILLIE performed by Atul Guo MD at [facility] OR    BACK SURGERY Left 12/20/2021    LEFT SI JOINT FUSION PERCUTANEOU- SI BONE WILLIE performed by Atul Guo MD at [facility] OR    BLADDER REPAIR      CHOLECYSTECTOMY      COLONOSCOPY N/A 11/03/2023    COLONOSCOPY WITH BIOPSY performed by Jairon Zhou MD at [facility] OR    ESOPHAGOGASTRODUODENOSCOPY  01/09/2024    EGD W/EUS FNA    LEG AMPUTATION THROUGH LOWER TIBIA AND FIBULA Right 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    PAIN MANAGEMENT PROCEDURE Bilateral 02/20/2020    EPIDURAL STEROID INJECTION MOIZ L5S1 performed by Ricardo Nguyen MD at [facility] OR    PAIN MANAGEMENT PROCEDURE Bilateral 03/02/2020    EPIDURAL STEROID INJECTION MOIZ L5S1 performed by Ricardo Nguyen MD at [facility] OR    PAIN MANAGEMENT PROCEDURE Bilateral 08/06/2020    EPIDURAL STEROID INJECTION BILATERAL L5 performed by Ricardo Nguyen MD at [facility] OR    PAIN MANAGEMENT PROCEDURE Right 02/08/2021    RIGHT L5 S1 EPIDURAL STEROID INJECTION performed by Ricardo Nguyen MD at [facility] OR    SMALL INTESTINE SURGERY      r/t bowel obstruction    UPPER GASTROINTESTINAL ENDOSCOPY N/A 11/27/2023    EGD POLYP SNARE performed by Bethany Garland MD at [facility]    UPPER GASTROINTESTINAL ENDOSCOPY N/A 1/9/2024    EGD W/EUS FNA performed by Jairon Zhou MD at [facility] OR    UPPER GASTROINTESTINAL ENDOSCOPY  1/9/2024    EGD BIOPSY performed by Jairon Zhou MD at [facility] OR

## 2024-08-14 DIAGNOSIS — K64.8 HEMORRHOID PROLAPSE: ICD-10-CM

## 2024-08-14 DIAGNOSIS — K60.2 FISSURE, ANORECTAL: Primary | ICD-10-CM

## 2024-08-14 NOTE — TELEPHONE ENCOUNTER
Received request for PA in/on Epic for Lidocaine 5% topical cream.    This is available OTC and likely not covered.    PA completed on [email or web address].

## 2024-08-19 NOTE — TELEPHONE ENCOUNTER
Response received:     The drug you asked for is not listed in your preferred drug list (formulary). The preferred drug(s), you may not have tried, are: lidocaine-prilocaine topical cream Your provider needs to give us medical reasons why the preferred drug(s) would not work for you and/or would have bad side effects. Sometimes a preferred drug needs more review for approval. Additionally, some preferred drugs listed may be the same drugs with different strengths or forms. [employer] may only require one strength or form of that drug to be tried. This decision was from [employer] Non-Formulary Exceptions Coverage Policy at [email or web address].  Payer: [employer] - Medicare Case ID: [id number]    [phone]    Script for preferred medication pended, please review for appropriateness and e-scribe.

## 2024-08-27 RX ORDER — LIDOCAINE/PRILOCAINE 2.5 %-2.5%
CREAM (GRAM) TOPICAL
Qty: 30 G | Refills: 1 | Status: SHIPPED | OUTPATIENT
Start: 2024-08-27

## 2024-09-26 PROBLEM — R26.9 ABNORMALITY OF GAIT: Status: ACTIVE | Noted: 2020-06-16

## 2024-10-01 ENCOUNTER — OFFICE VISIT (OUTPATIENT)
Dept: GASTROENTEROLOGY | Age: 49
End: 2024-10-01
Payer: MEDICARE

## 2024-10-01 VITALS
SYSTOLIC BLOOD PRESSURE: 121 MMHG | OXYGEN SATURATION: 100 % | DIASTOLIC BLOOD PRESSURE: 80 MMHG | RESPIRATION RATE: 18 BRPM | TEMPERATURE: 97.9 F | BODY MASS INDEX: 25.43 KG/M2 | WEIGHT: 138.2 LBS | HEART RATE: 84 BPM | HEIGHT: 62 IN

## 2024-10-01 DIAGNOSIS — D50.9 IRON DEFICIENCY ANEMIA, UNSPECIFIED IRON DEFICIENCY ANEMIA TYPE: Primary | ICD-10-CM

## 2024-10-01 DIAGNOSIS — K64.9 HEMORRHOIDS, UNSPECIFIED HEMORRHOID TYPE: ICD-10-CM

## 2024-10-01 DIAGNOSIS — Z98.890 OTHER SPECIFIED POSTPROCEDURAL STATES: ICD-10-CM

## 2024-10-01 DIAGNOSIS — D50.0 IRON DEFICIENCY ANEMIA SECONDARY TO BLOOD LOSS (CHRONIC): ICD-10-CM

## 2024-10-01 PROCEDURE — 3079F DIAST BP 80-89 MM HG: CPT | Performed by: INTERNAL MEDICINE

## 2024-10-01 PROCEDURE — G8419 CALC BMI OUT NRM PARAM NOF/U: HCPCS | Performed by: INTERNAL MEDICINE

## 2024-10-01 PROCEDURE — G8427 DOCREV CUR MEDS BY ELIG CLIN: HCPCS | Performed by: INTERNAL MEDICINE

## 2024-10-01 PROCEDURE — G8484 FLU IMMUNIZE NO ADMIN: HCPCS | Performed by: INTERNAL MEDICINE

## 2024-10-01 PROCEDURE — 3074F SYST BP LT 130 MM HG: CPT | Performed by: INTERNAL MEDICINE

## 2024-10-01 PROCEDURE — 1036F TOBACCO NON-USER: CPT | Performed by: INTERNAL MEDICINE

## 2024-10-01 PROCEDURE — 99214 OFFICE O/P EST MOD 30 MIN: CPT | Performed by: INTERNAL MEDICINE

## 2024-10-01 PROCEDURE — 3017F COLORECTAL CA SCREEN DOC REV: CPT | Performed by: INTERNAL MEDICINE

## 2024-10-01 PROCEDURE — G2211 COMPLEX E/M VISIT ADD ON: HCPCS | Performed by: INTERNAL MEDICINE

## 2024-10-01 RX ORDER — HYDROCORTISONE ACETATE 25 MG/1
25 SUPPOSITORY RECTAL EVERY 12 HOURS
Qty: 28 SUPPOSITORY | Refills: 0 | Status: SHIPPED | OUTPATIENT
Start: 2024-10-01 | End: 2024-10-15

## 2024-10-01 ASSESSMENT — ENCOUNTER SYMPTOMS
COUGH: 0
BACK PAIN: 1
CHOKING: 0
DIARRHEA: 0
CONSTIPATION: 0
NAUSEA: 0
SORE THROAT: 0
ABDOMINAL PAIN: 1
TROUBLE SWALLOWING: 0
ABDOMINAL DISTENTION: 1
VOICE CHANGE: 0
ANAL BLEEDING: 1
VOMITING: 0
BLOOD IN STOOL: 0
RECTAL PAIN: 0
COLOR CHANGE: 0
WHEEZING: 0

## 2024-10-01 NOTE — PROGRESS NOTES
Reason for Referral:   Constipation, rectal bleeding, heartburn    No referring provider defined for this encounter.    Chief Complaint   Patient presents with    Follow-up     Hosp FU Lesion of Stomach, Anorectal Fissure, Hemorrhoid Prolapse, GERD, EGD/EUS           HISTORY OF PRESENT ILLNESS: Mr.Tyrone STEPHANIE Steienr is a 49 y.o. male with a past history remarkable for GERD, HTN,, HLD, sacroiliitis , referred for evaluation of Constipation, rectal bleeding, heartburn. Patient reports that previously he had multiple colonic resections due to bowel obstruction. He recently had colonoscopy with Dr. Zhou and noted to have colonic stenosis (scope able to traverse) at 25 cm from colon. Patient denied any prior history of IBD. He does endorse constipation (BSS1-2 stool), no BM for 3-4 days and rectal bleeding almost on daily basis. He said he previously used suppository for this.     He also reports having heartburn for which he takes protonix once daily. Does sometime use NSAIDs.     Interval history 10/1/2024:  Recently went to emergency room with complaints of chest pain.  He was found to have anemia with iron deficiency.  He was given iron replacement as well as cream for hemorrhoids.  He reports having rectal bleeding for the last 2 months.      Previous Endoscopies  EGD 11/27/2023:  Impression:    Few benign appearing polyps in stomach and fundus. Two of the large ones measuring 7-8 mm removed with cold snare.  Stomach biopsies obtained from antrum and body.    EGD 1/9/2024:  Small to medium subepithelial nodule seen in the antrum of stomach, biopsies done.  Small mucosal nodule seen in second portion of the duodenum, biopsies done.    EUS 1/9/2024:  COMMENT: Subepithelial nodule seen in the antrum of the stomach.  Differential includes GIST/Leiomyoma     A.  DUODENAL NODULE, BIOPSY:-DUODENAL ADENOMA.   B.  GASTRIC NODULE, BIOPSY:-GASTRIC MUCOSA WITH SLIGHT HYPERPLASTIC   CHANGES.     FINE NEEDLE ASPIRATION

## 2024-10-03 ENCOUNTER — TELEPHONE (OUTPATIENT)
Dept: SURGERY | Age: 49
End: 2024-10-03

## 2024-10-03 RX ORDER — HYDROCORTISONE 25 MG/G
CREAM TOPICAL
Qty: 28 G | Refills: 2 | Status: SHIPPED | OUTPATIENT
Start: 2024-10-03

## 2024-10-08 DIAGNOSIS — K21.9 GASTROESOPHAGEAL REFLUX DISEASE, UNSPECIFIED WHETHER ESOPHAGITIS PRESENT: ICD-10-CM

## 2024-10-08 DIAGNOSIS — M54.41 CHRONIC BILATERAL LOW BACK PAIN WITH BILATERAL SCIATICA: ICD-10-CM

## 2024-10-08 DIAGNOSIS — G89.29 CHRONIC BILATERAL LOW BACK PAIN WITH BILATERAL SCIATICA: ICD-10-CM

## 2024-10-08 DIAGNOSIS — M54.42 CHRONIC BILATERAL LOW BACK PAIN WITH BILATERAL SCIATICA: ICD-10-CM

## 2024-10-08 RX ORDER — PANTOPRAZOLE SODIUM 20 MG/1
40 TABLET, DELAYED RELEASE ORAL DAILY
Qty: 30 TABLET | Refills: 4 | Status: SHIPPED | OUTPATIENT
Start: 2024-10-08

## 2024-10-08 RX ORDER — CYCLOBENZAPRINE HCL 10 MG
10 TABLET ORAL 3 TIMES DAILY
Qty: 90 TABLET | Refills: 3 | Status: SHIPPED | OUTPATIENT
Start: 2024-10-08

## 2024-10-08 RX ORDER — CYCLOBENZAPRINE HCL 10 MG
10 TABLET ORAL 3 TIMES DAILY PRN
Qty: 30 TABLET | Refills: 3 | Status: SHIPPED | OUTPATIENT
Start: 2024-10-08 | End: 2024-10-08 | Stop reason: SDUPTHER

## 2024-10-08 NOTE — TELEPHONE ENCOUNTER
Patient advised to increase Protonix to 40 mg daily.    Patient clarified Flexeril 10 mg 3 times daily helps but he only got #30 tablets so he was requesting  1 tablet 3 times daily #90 so it lasts him a month.    Please Advise

## 2024-10-08 NOTE — TELEPHONE ENCOUNTER
Patient states he feels like the Protonix and the Flexeril are no longer covering him at their current dose. Patient would like to know if they can be increased or possible medication change.    Please Advise.

## 2024-10-11 ENCOUNTER — OFFICE VISIT (OUTPATIENT)
Dept: SURGERY | Age: 49
End: 2024-10-11

## 2024-10-11 ENCOUNTER — TELEPHONE (OUTPATIENT)
Dept: SURGERY | Age: 49
End: 2024-10-11

## 2024-10-11 VITALS
DIASTOLIC BLOOD PRESSURE: 77 MMHG | HEART RATE: 80 BPM | BODY MASS INDEX: 26.13 KG/M2 | WEIGHT: 142 LBS | SYSTOLIC BLOOD PRESSURE: 112 MMHG | HEIGHT: 62 IN | OXYGEN SATURATION: 98 %

## 2024-10-11 DIAGNOSIS — K62.6 ANAL ULCERATION: ICD-10-CM

## 2024-10-11 DIAGNOSIS — K64.9 HEMORRHOIDS, UNSPECIFIED HEMORRHOID TYPE: Primary | ICD-10-CM

## 2024-10-11 RX ORDER — HYDROCORTISONE ACETATE PRAMOXINE HCL 1; 1 G/100G; G/100G
CREAM TOPICAL
Qty: 30 G | Refills: 2 | Status: SHIPPED | OUTPATIENT
Start: 2024-10-11

## 2024-10-11 ASSESSMENT — ENCOUNTER SYMPTOMS
BACK PAIN: 0
STRIDOR: 0
ABDOMINAL DISTENTION: 0
NAUSEA: 0
CONSTIPATION: 0
SHORTNESS OF BREATH: 0
DIARRHEA: 0
COUGH: 0
ANAL BLEEDING: 1
CHEST TIGHTNESS: 0
BLOOD IN STOOL: 0
WHEEZING: 0
ABDOMINAL PAIN: 0
APNEA: 0
COLOR CHANGE: 0
RECTAL PAIN: 1
VOMITING: 0

## 2024-10-11 NOTE — PROGRESS NOTES
[facility] PHYSICIANS [facility]  [address] [id number]  Dept: [phone]    Patient:  Vincent Steiner  YOB: 1975  Date: 10/11/2024     The patient is a 49 y.o. male who presents today for consult of the following problems:     Chief Complaint: New Patient (Mr. Steiner presents to clinic as a new patient with concerns of a prolapsed hemorrhoid. He's had hemorrhoids for years, but they are now becoming bothersome for him. He does have rectal bleeding that is on the tissue.)       HPI:     Last Colonoscopy: 11/03/2023 with Dr. Zhou  Imaging:   CT: 10/31/2020    History:     Past Medical History:   Diagnosis Date    JOELLEN (acute kidney injury) (HCC) 02/12/2015    Allergic rhinitis     Chronic abdominal pain     Constipation     ED (erectile dysfunction) of organic origin     GERD (gastroesophageal reflux disease)     Gynecomastia, male     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) 1986    neuroblastoma rt leg prosthesis    History of hepatitis 07/18/2017    PT DENIES    Hypertension     Lumbar disc disease     Neuroblastoma (HCC) 1975    Neurogenic dysfunction of the urinary bladder     Osteoarthritis     Phantom limb pain (HCC)     Prolonged emergence from general anesthesia     Pure hypercholesterolemia 07/18/2017    Rectal bleeding     RSD (reflex sympathetic dystrophy)     Stenosis colon (HCC)      Past Surgical History:   Procedure Laterality Date    ABDOMINAL ADHESION SURGERY      APPENDECTOMY      BACK SURGERY Right 11/08/2021    RIGHT SI JOINT FUSION PERCUTANEOUS -SI BONE WILLIE performed by Atul Guo MD at [facility] OR    BACK SURGERY Left 12/20/2021    LEFT SI JOINT FUSION PERCUTANEOU- SI BONE WILLIE performed by Atul Guo MD at [facility] OR    BLADDER REPAIR      CHOLECYSTECTOMY      COLONOSCOPY N/A 11/03/2023    COLONOSCOPY WITH BIOPSY performed by Jairon Zhou MD at [facility]

## 2024-10-11 NOTE — H&P (VIEW-ONLY)
[facility] PHYSICIANS [facility] SUR[facility] SPECIALISTS  [address] [id number]  Dept: [phone]    Patient:  Vincent Steiner  YOB: 1975  Date: 10/11/2024     The patient is a 49 y.o. male who presents today for consult of the following problems:     Chief Complaint: New Patient (Mr. Steiner presents to clinic as a new patient with concerns of a prolapsed hemorrhoid. He's had hemorrhoids for years, but they are now becoming bothersome for him. He does have rectal bleeding that is on the tissue.)       HPI:     Last Colonoscopy: 11/03/2023 with Dr. Zhou  Imaging:   CT: 10/31/2020    History:     Past Medical History:   Diagnosis Date    JOELLEN (acute kidney injury) (HCC) 02/12/2015    Allergic rhinitis     Chronic abdominal pain     Constipation     ED (erectile dysfunction) of organic origin     GERD (gastroesophageal reflux disease)     Gynecomastia, male     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) 1986    neuroblastoma rt leg prosthesis    History of hepatitis 07/18/2017    PT DENIES    Hypertension     Lumbar disc disease     Neuroblastoma (HCC) 1975    Neurogenic dysfunction of the urinary bladder     Osteoarthritis     Phantom limb pain (HCC)     Prolonged emergence from general anesthesia     Pure hypercholesterolemia 07/18/2017    Rectal bleeding     RSD (reflex sympathetic dystrophy)     Stenosis colon (HCC)      Past Surgical History:   Procedure Laterality Date    ABDOMINAL ADHESION SURGERY      APPENDECTOMY      BACK SURGERY Right 11/08/2021    RIGHT SI JOINT FUSION PERCUTANEOUS -SI BONE WILLIE performed by Atul Guo MD at [facility] OR    BACK SURGERY Left 12/20/2021    LEFT SI JOINT FUSION PERCUTANEOU- SI BONE WILLIE performed by Atul Guo MD at [facility] OR    BLADDER REPAIR      CHOLECYSTECTOMY      COLONOSCOPY N/A 11/03/2023    COLONOSCOPY WITH BIOPSY performed by Jairon Zhou MD at [facility]

## 2024-10-14 ENCOUNTER — TELEPHONE (OUTPATIENT)
Dept: ONCOLOGY | Age: 49
End: 2024-10-14

## 2024-10-14 ENCOUNTER — INITIAL CONSULT (OUTPATIENT)
Dept: ONCOLOGY | Age: 49
End: 2024-10-14
Payer: MEDICARE

## 2024-10-14 ENCOUNTER — TELEPHONE (OUTPATIENT)
Dept: GASTROENTEROLOGY | Age: 49
End: 2024-10-14

## 2024-10-14 VITALS
RESPIRATION RATE: 16 BRPM | TEMPERATURE: 97.5 F | BODY MASS INDEX: 26.17 KG/M2 | DIASTOLIC BLOOD PRESSURE: 91 MMHG | HEART RATE: 90 BPM | WEIGHT: 142.2 LBS | HEIGHT: 62 IN | SYSTOLIC BLOOD PRESSURE: 130 MMHG

## 2024-10-14 DIAGNOSIS — K31.89 GASTRIC MASS: ICD-10-CM

## 2024-10-14 DIAGNOSIS — D50.9 IRON DEFICIENCY ANEMIA, UNSPECIFIED IRON DEFICIENCY ANEMIA TYPE: ICD-10-CM

## 2024-10-14 DIAGNOSIS — E61.1 IRON DEFICIENCY: Primary | ICD-10-CM

## 2024-10-14 PROCEDURE — 99202 OFFICE O/P NEW SF 15 MIN: CPT | Performed by: INTERNAL MEDICINE

## 2024-10-14 PROCEDURE — 3075F SYST BP GE 130 - 139MM HG: CPT | Performed by: INTERNAL MEDICINE

## 2024-10-14 PROCEDURE — 3017F COLORECTAL CA SCREEN DOC REV: CPT | Performed by: INTERNAL MEDICINE

## 2024-10-14 PROCEDURE — 99204 OFFICE O/P NEW MOD 45 MIN: CPT | Performed by: INTERNAL MEDICINE

## 2024-10-14 PROCEDURE — G8419 CALC BMI OUT NRM PARAM NOF/U: HCPCS | Performed by: INTERNAL MEDICINE

## 2024-10-14 PROCEDURE — 1036F TOBACCO NON-USER: CPT | Performed by: INTERNAL MEDICINE

## 2024-10-14 PROCEDURE — 3080F DIAST BP >= 90 MM HG: CPT | Performed by: INTERNAL MEDICINE

## 2024-10-14 PROCEDURE — G8484 FLU IMMUNIZE NO ADMIN: HCPCS | Performed by: INTERNAL MEDICINE

## 2024-10-14 PROCEDURE — G8427 DOCREV CUR MEDS BY ELIG CLIN: HCPCS | Performed by: INTERNAL MEDICINE

## 2024-10-14 NOTE — TELEPHONE ENCOUNTER
Pt returned phone call. Writer informed him his order for the capsule endoscopy will be faxed to [facility] due to [facility] not being able to provide services. Pt was in agreement to have order faxed to [facility]. Writer told pt, [facility] will call him to schedule procedure.

## 2024-10-14 NOTE — PROGRESS NOTES
PATHOLOGY CONSULTATION       [facility] [id number]  [facility]  ANATOMIC PATHOLOGY  [address] [place] [id number] [phone]  Fax: [phone]     SURGICAL PATHOLOGY REPORT   Result Value Ref Range    Surgical Pathology Report       Path Number: [id number]    INTERPRETATION    FINE NEEDLE ASPIRATION GASTRIC MASS:  -SCANT FRAGMENTS OF SMOOTH MUSCLE.  SEE COMMENT                  **Electronically Signed Out**         Alaina Adam  /1/11/2024  Comment  Scant smooth muscle tissue fragments are present (positive for SMA,  and negative for ).  Given the scant nature of the fragments, it  is difficult to definitively say if they represent sampling of  muscularis layer, smooth muscle hyperplasia or leiomyoma.  Clinical  and endoscopic correlation is warranted.  If clinically indicated, a  larger specimen is needed for definite diagnosis.    Source of Specimen:  A: FINE NEEDLE ASPIRATION GASTRIC MASS    Clinical History    Lesion of stomach K31.9.       Gross Description  A. \"GASTRIC MASS\" Specimen received in CytoLyt solution, colorless  fluid with few fragments.      MICROSCOPIC DESCRIPTION     Microscopic examination performed.        Non Gyn Thin Prep x 1, Cell Block w/ H&E x 1, , initial x 1,  SMA, add'l x 1              Processing Lab: [address] [address]  Interpretation performed at [address] [address]  NONGYNECOLOGICAL CYTOPATHOLOGY CONSULTATION    [facility]: [facility] Rec: [id number]  [facility]  ANATOMIC PATHOLOGY  [address] [place] [phone]  Fax: [phone]     Creatinine W/GFR Point of Care   Result Value Ref Range    POC Creatinine Can not be calculated 0.51 - 1.19 mg/dL    eGFR, POC Can not be calculated

## 2024-10-14 NOTE — TELEPHONE ENCOUNTER
PIPE HERE FOR CONSULT   Labs now   Feraheme ordered  Rv with ferahme infusion   LABS ORDERED: BMP, CBC, IRON & TIBC, FERRITION, HEMOGLOBINOPATHY, VIT B12 & FOLATE, PATH REVIEW SMEAR   LABS PRINTED AND DONE ON EXIT   NEW ORDER PENDING PRECERT   MD VISIT:   AVS PRINTED AND GIVEN ON EXIT

## 2024-10-15 ENCOUNTER — HOSPITAL ENCOUNTER (OUTPATIENT)
Age: 49
Discharge: HOME | End: 2024-10-15
Payer: MEDICARE

## 2024-10-15 DIAGNOSIS — K31.89 GASTRIC MASS: ICD-10-CM

## 2024-10-15 DIAGNOSIS — E61.1 IRON DEFICIENCY: ICD-10-CM

## 2024-10-15 DIAGNOSIS — D50.9 IRON DEFICIENCY ANEMIA, UNSPECIFIED IRON DEFICIENCY ANEMIA TYPE: ICD-10-CM

## 2024-10-15 LAB
BASOPHILS # BLD: 0.06 K/UL (ref 0–0.2)
BASOPHILS NFR BLD: 1 % (ref 0–2)
EOSINOPHIL # BLD: 0.51 K/UL (ref 0–0.44)
EOSINOPHILS RELATIVE PERCENT: 9 % (ref 1–4)
ERYTHROCYTE [DISTWIDTH] IN BLOOD BY AUTOMATED COUNT: 27.2 % (ref 11.8–14.4)
FERRITIN SERPL-MCNC: 37 NG/ML (ref 30–400)
FOLATE SERPL-MCNC: 4.7 NG/ML (ref 4.8–24.2)
HCT VFR BLD AUTO: 40.6 % (ref 40.7–50.3)
HGB BLD-MCNC: 12.6 G/DL (ref 13–17)
IMM GRANULOCYTES # BLD AUTO: 0 K/UL (ref 0–0.3)
IMM GRANULOCYTES NFR BLD: 0 %
IMM RETICS NFR: 22.5 % (ref 2.7–18.3)
IRON SATN MFR SERPL: 22 % (ref 20–55)
IRON SERPL-MCNC: 67 UG/DL (ref 61–157)
LYMPHOCYTES NFR BLD: 1.88 K/UL (ref 1.1–3.7)
LYMPHOCYTES RELATIVE PERCENT: 33 % (ref 24–43)
MCH RBC QN AUTO: 24.3 PG (ref 25.2–33.5)
MCHC RBC AUTO-ENTMCNC: 31 G/DL (ref 28.4–34.8)
MCV RBC AUTO: 78.4 FL (ref 82.6–102.9)
MONOCYTES NFR BLD: 0.63 K/UL (ref 0.1–1.2)
MONOCYTES NFR BLD: 11 % (ref 3–12)
MORPHOLOGY: ABNORMAL
NEUTROPHILS NFR BLD: 46 % (ref 36–65)
NEUTS SEG NFR BLD: 2.62 K/UL (ref 1.5–8.1)
NRBC BLD-RTO: 0 PER 100 WBC
PLATELET # BLD AUTO: 364 K/UL (ref 138–453)
PMV BLD AUTO: 9.1 FL (ref 8.1–13.5)
RBC # BLD AUTO: 5.18 M/UL (ref 4.21–5.77)
RETIC HEMOGLOBIN: 30 PG (ref 28.2–35.7)
RETICS # AUTO: 0.07 M/UL (ref 0.03–0.08)
RETICS/RBC NFR AUTO: 1.4 % (ref 0.5–1.9)
TIBC SERPL-MCNC: 309 UG/DL (ref 250–450)
UNSATURATED IRON BINDING CAPACITY: 242 UG/DL (ref 112–347)
VIT B12 SERPL-MCNC: 421 PG/ML (ref 232–1245)
WBC OTHER # BLD: 5.7 K/UL (ref 3.5–11.3)

## 2024-10-15 PROCEDURE — 82728 ASSAY OF FERRITIN: CPT

## 2024-10-15 PROCEDURE — 85045 AUTOMATED RETICULOCYTE COUNT: CPT

## 2024-10-15 PROCEDURE — 85025 COMPLETE CBC W/AUTO DIFF WBC: CPT

## 2024-10-15 PROCEDURE — 83550 IRON BINDING TEST: CPT

## 2024-10-15 PROCEDURE — 83540 ASSAY OF IRON: CPT

## 2024-10-15 PROCEDURE — 83020 HEMOGLOBIN ELECTROPHORESIS: CPT

## 2024-10-15 PROCEDURE — 82607 VITAMIN B-12: CPT

## 2024-10-15 PROCEDURE — 82746 ASSAY OF FOLIC ACID SERUM: CPT

## 2024-10-15 NOTE — PROGRESS NOTES
Preoperative Instructions:    Stop eating solid foods at midnight the night prior to your surgery. - or as per any Dr. Jarrell navarro instructions.     Stop drinking clear liquids at midnight the night prior to your surgery.    Arrive at the surgery center (3rd entrance) on ____43-09-20___________ by _____0630__________.     Please stop any blood thinning medications as directed by your surgeon or prescribing physician. Failure to stop certain medications may interfere with your scheduled surgery. These may include: Aspirin, Coumadin, Plavix, NSAIDS (Motrin, Aleve, Advil, Mobic, Celebrex), Eliquis, Pradaxa, Xarelto, Fish oil, and herbal supplements.     You may continue the rest of your medications through the night before surgery unless instructed otherwise.     Day of surgery please take only the following medication(s) with a small sip of water: Amlodipine- Carvedilol, Protonix      Please shower with antibacterial soap and water the day of surgery.       Reminders:  -If you are going home the day of your procedure, you will need a family member or friend to stay during the procedure and drive you home after your procedure. Your  must be 18 years of age or older and able to sign off on your discharge instructions.    -If you are going home the same day of your surgery, someone must remain with you for the first 24 hours after your surgery if you receive sedation or anesthesia.     -Please do not wear any jewelery , lotions, contacts or body piercing the day of surgery

## 2024-10-16 DIAGNOSIS — D52.9 ANEMIA DUE TO FOLIC ACID DEFICIENCY, UNSPECIFIED DEFICIENCY TYPE: Primary | ICD-10-CM

## 2024-10-16 LAB
HGB ELECTROPHORESIS INTERP: NORMAL
PATH REV BLD -IMP: NORMAL
PATHOLOGIST: NORMAL
SURGICAL PATHOLOGY REPORT: NORMAL

## 2024-10-16 RX ORDER — FOLIC ACID 1 MG/1
1 TABLET ORAL DAILY
Qty: 90 TABLET | Refills: 1 | Status: SHIPPED | OUTPATIENT
Start: 2024-10-16

## 2024-10-17 ENCOUNTER — ANESTHESIA EVENT (OUTPATIENT)
Dept: OPERATING ROOM | Age: 49
End: 2024-10-17
Payer: MEDICARE

## 2024-10-17 ENCOUNTER — TELEPHONE (OUTPATIENT)
Dept: SURGERY | Age: 49
End: 2024-10-17

## 2024-10-17 NOTE — TELEPHONE ENCOUNTER
Called in error, needed to speak with pharmacy first. However, if patient calls back, writer has given pharmacy directions on how to use Hydrocortisone. He should be able to pick this up once pharmacy fills it.     Chet Ferrer, MA

## 2024-10-18 ENCOUNTER — ANESTHESIA (OUTPATIENT)
Dept: OPERATING ROOM | Age: 49
End: 2024-10-18
Payer: MEDICARE

## 2024-10-18 ENCOUNTER — HOSPITAL ENCOUNTER (OUTPATIENT)
Age: 49
Setting detail: OUTPATIENT SURGERY
Discharge: HOME | End: 2024-10-18
Attending: COLON & RECTAL SURGERY | Admitting: COLON & RECTAL SURGERY
Payer: MEDICARE

## 2024-10-18 VITALS
HEART RATE: 86 BPM | SYSTOLIC BLOOD PRESSURE: 109 MMHG | BODY MASS INDEX: 25.76 KG/M2 | TEMPERATURE: 97.6 F | DIASTOLIC BLOOD PRESSURE: 76 MMHG | RESPIRATION RATE: 20 BRPM | HEIGHT: 62 IN | OXYGEN SATURATION: 97 % | WEIGHT: 140 LBS

## 2024-10-18 DIAGNOSIS — K64.3 FOURTH DEGREE HEMORRHOIDS: Primary | ICD-10-CM

## 2024-10-18 DIAGNOSIS — K62.6 ANAL ULCER: ICD-10-CM

## 2024-10-18 DIAGNOSIS — K64.9 HEMORRHOIDS, UNSPECIFIED HEMORRHOID TYPE: ICD-10-CM

## 2024-10-18 DIAGNOSIS — K64.4 ANAL SKIN TAG: ICD-10-CM

## 2024-10-18 PROCEDURE — 2720000010 HC SURG SUPPLY STERILE: Performed by: COLON & RECTAL SURGERY

## 2024-10-18 PROCEDURE — 88305 TISSUE EXAM BY PATHOLOGIST: CPT

## 2024-10-18 PROCEDURE — 7100000011 HC PHASE II RECOVERY - ADDTL 15 MIN: Performed by: COLON & RECTAL SURGERY

## 2024-10-18 PROCEDURE — 3600000002 HC SURGERY LEVEL 2 BASE: Performed by: COLON & RECTAL SURGERY

## 2024-10-18 PROCEDURE — 3700000001 HC ADD 15 MINUTES (ANESTHESIA): Performed by: COLON & RECTAL SURGERY

## 2024-10-18 PROCEDURE — 88304 TISSUE EXAM BY PATHOLOGIST: CPT

## 2024-10-18 PROCEDURE — C9290 INJ, BUPIVACAINE LIPOSOME: HCPCS | Performed by: COLON & RECTAL SURGERY

## 2024-10-18 PROCEDURE — 2709999900 HC NON-CHARGEABLE SUPPLY: Performed by: COLON & RECTAL SURGERY

## 2024-10-18 PROCEDURE — 7100000000 HC PACU RECOVERY - FIRST 15 MIN: Performed by: COLON & RECTAL SURGERY

## 2024-10-18 PROCEDURE — 6370000000 HC RX 637 (ALT 250 FOR IP)

## 2024-10-18 PROCEDURE — 6360000002 HC RX W HCPCS: Performed by: COLON & RECTAL SURGERY

## 2024-10-18 PROCEDURE — 2580000003 HC RX 258: Performed by: COLON & RECTAL SURGERY

## 2024-10-18 PROCEDURE — 7100000010 HC PHASE II RECOVERY - FIRST 15 MIN: Performed by: COLON & RECTAL SURGERY

## 2024-10-18 PROCEDURE — 7100000001 HC PACU RECOVERY - ADDTL 15 MIN: Performed by: COLON & RECTAL SURGERY

## 2024-10-18 PROCEDURE — 3700000000 HC ANESTHESIA ATTENDED CARE: Performed by: COLON & RECTAL SURGERY

## 2024-10-18 PROCEDURE — 88341 IMHCHEM/IMCYTCHM EA ADD ANTB: CPT

## 2024-10-18 PROCEDURE — 3600000012 HC SURGERY LEVEL 2 ADDTL 15MIN: Performed by: COLON & RECTAL SURGERY

## 2024-10-18 PROCEDURE — 2500000003 HC RX 250 WO HCPCS: Performed by: NURSE ANESTHETIST, CERTIFIED REGISTERED

## 2024-10-18 PROCEDURE — 88342 IMHCHEM/IMCYTCHM 1ST ANTB: CPT

## 2024-10-18 PROCEDURE — 6360000002 HC RX W HCPCS: Performed by: NURSE ANESTHETIST, CERTIFIED REGISTERED

## 2024-10-18 RX ORDER — SODIUM CHLORIDE 0.9 % (FLUSH) 0.9 %
5-40 SYRINGE (ML) INJECTION EVERY 12 HOURS SCHEDULED
Status: DISCONTINUED | OUTPATIENT
Start: 2024-10-18 | End: 2024-10-18 | Stop reason: HOSPADM

## 2024-10-18 RX ORDER — LIDOCAINE HYDROCHLORIDE 10 MG/ML
1 INJECTION, SOLUTION EPIDURAL; INFILTRATION; INTRACAUDAL; PERINEURAL
Status: DISCONTINUED | OUTPATIENT
Start: 2024-10-18 | End: 2024-10-18 | Stop reason: HOSPADM

## 2024-10-18 RX ORDER — OXYCODONE AND ACETAMINOPHEN 5; 325 MG/1; MG/1
1 TABLET ORAL ONCE
Status: COMPLETED | OUTPATIENT
Start: 2024-10-18 | End: 2024-10-18

## 2024-10-18 RX ORDER — DEXAMETHASONE SODIUM PHOSPHATE 10 MG/ML
INJECTION, SOLUTION INTRAMUSCULAR; INTRAVENOUS
Status: DISCONTINUED | OUTPATIENT
Start: 2024-10-18 | End: 2024-10-18 | Stop reason: SDUPTHER

## 2024-10-18 RX ORDER — SODIUM CHLORIDE 0.9 % (FLUSH) 0.9 %
SYRINGE (ML) INJECTION PRN
Status: DISCONTINUED | OUTPATIENT
Start: 2024-10-18 | End: 2024-10-18 | Stop reason: ALTCHOICE

## 2024-10-18 RX ORDER — MIDAZOLAM HYDROCHLORIDE 1 MG/ML
INJECTION INTRAMUSCULAR; INTRAVENOUS
Status: DISCONTINUED | OUTPATIENT
Start: 2024-10-18 | End: 2024-10-18 | Stop reason: SDUPTHER

## 2024-10-18 RX ORDER — SODIUM CHLORIDE, SODIUM LACTATE, POTASSIUM CHLORIDE, CALCIUM CHLORIDE 600; 310; 30; 20 MG/100ML; MG/100ML; MG/100ML; MG/100ML
INJECTION, SOLUTION INTRAVENOUS CONTINUOUS
Status: DISCONTINUED | OUTPATIENT
Start: 2024-10-18 | End: 2024-10-18 | Stop reason: HOSPADM

## 2024-10-18 RX ORDER — ONDANSETRON 2 MG/ML
INJECTION INTRAMUSCULAR; INTRAVENOUS
Status: DISCONTINUED | OUTPATIENT
Start: 2024-10-18 | End: 2024-10-18 | Stop reason: SDUPTHER

## 2024-10-18 RX ORDER — KETOROLAC TROMETHAMINE 30 MG/ML
INJECTION, SOLUTION INTRAMUSCULAR; INTRAVENOUS
Status: DISCONTINUED | OUTPATIENT
Start: 2024-10-18 | End: 2024-10-18 | Stop reason: SDUPTHER

## 2024-10-18 RX ORDER — LABETALOL HYDROCHLORIDE 5 MG/ML
10 INJECTION, SOLUTION INTRAVENOUS
Status: DISCONTINUED | OUTPATIENT
Start: 2024-10-18 | End: 2024-10-18 | Stop reason: HOSPADM

## 2024-10-18 RX ORDER — ROCURONIUM BROMIDE 10 MG/ML
INJECTION, SOLUTION INTRAVENOUS
Status: DISCONTINUED | OUTPATIENT
Start: 2024-10-18 | End: 2024-10-18 | Stop reason: SDUPTHER

## 2024-10-18 RX ORDER — LIDOCAINE HYDROCHLORIDE 10 MG/ML
INJECTION, SOLUTION EPIDURAL; INFILTRATION; INTRACAUDAL; PERINEURAL
Status: DISCONTINUED | OUTPATIENT
Start: 2024-10-18 | End: 2024-10-18 | Stop reason: SDUPTHER

## 2024-10-18 RX ORDER — SODIUM CHLORIDE 9 MG/ML
INJECTION, SOLUTION INTRAVENOUS PRN
Status: DISCONTINUED | OUTPATIENT
Start: 2024-10-18 | End: 2024-10-18 | Stop reason: HOSPADM

## 2024-10-18 RX ORDER — HYDRALAZINE HYDROCHLORIDE 20 MG/ML
10 INJECTION INTRAMUSCULAR; INTRAVENOUS
Status: DISCONTINUED | OUTPATIENT
Start: 2024-10-18 | End: 2024-10-18 | Stop reason: HOSPADM

## 2024-10-18 RX ORDER — OXYCODONE AND ACETAMINOPHEN 5; 325 MG/1; MG/1
TABLET ORAL
Status: COMPLETED
Start: 2024-10-18 | End: 2024-10-18

## 2024-10-18 RX ORDER — PROPOFOL 10 MG/ML
INJECTION, EMULSION INTRAVENOUS
Status: DISCONTINUED | OUTPATIENT
Start: 2024-10-18 | End: 2024-10-18 | Stop reason: SDUPTHER

## 2024-10-18 RX ORDER — BUPIVACAINE HYDROCHLORIDE AND EPINEPHRINE 2.5; 5 MG/ML; UG/ML
INJECTION, SOLUTION INFILTRATION; PERINEURAL
Status: DISCONTINUED
Start: 2024-10-18 | End: 2024-10-18 | Stop reason: HOSPADM

## 2024-10-18 RX ORDER — OXYCODONE AND ACETAMINOPHEN 5; 325 MG/1; MG/1
1 TABLET ORAL EVERY 6 HOURS PRN
Qty: 28 TABLET | Refills: 0 | Status: SHIPPED | OUTPATIENT
Start: 2024-10-18 | End: 2024-10-25

## 2024-10-18 RX ORDER — FENTANYL CITRATE 50 UG/ML
INJECTION, SOLUTION INTRAMUSCULAR; INTRAVENOUS
Status: DISCONTINUED | OUTPATIENT
Start: 2024-10-18 | End: 2024-10-18 | Stop reason: SDUPTHER

## 2024-10-18 RX ORDER — NALOXONE HYDROCHLORIDE 0.4 MG/ML
INJECTION, SOLUTION INTRAMUSCULAR; INTRAVENOUS; SUBCUTANEOUS PRN
Status: DISCONTINUED | OUTPATIENT
Start: 2024-10-18 | End: 2024-10-18 | Stop reason: HOSPADM

## 2024-10-18 RX ORDER — PROCHLORPERAZINE EDISYLATE 5 MG/ML
5 INJECTION INTRAMUSCULAR; INTRAVENOUS
Status: DISCONTINUED | OUTPATIENT
Start: 2024-10-18 | End: 2024-10-18 | Stop reason: HOSPADM

## 2024-10-18 RX ORDER — BUPIVACAINE HYDROCHLORIDE AND EPINEPHRINE 2.5; 5 MG/ML; UG/ML
INJECTION, SOLUTION EPIDURAL; INFILTRATION; INTRACAUDAL; PERINEURAL PRN
Status: DISCONTINUED | OUTPATIENT
Start: 2024-10-18 | End: 2024-10-18 | Stop reason: ALTCHOICE

## 2024-10-18 RX ORDER — SODIUM CHLORIDE 9 MG/ML
INJECTION, SOLUTION INTRAMUSCULAR; INTRAVENOUS; SUBCUTANEOUS
Status: DISCONTINUED
Start: 2024-10-18 | End: 2024-10-18 | Stop reason: HOSPADM

## 2024-10-18 RX ORDER — SODIUM CHLORIDE 0.9 % (FLUSH) 0.9 %
5-40 SYRINGE (ML) INJECTION PRN
Status: DISCONTINUED | OUTPATIENT
Start: 2024-10-18 | End: 2024-10-18 | Stop reason: HOSPADM

## 2024-10-18 RX ADMIN — ONDANSETRON 4 MG: 2 INJECTION INTRAMUSCULAR; INTRAVENOUS at 09:42

## 2024-10-18 RX ADMIN — OXYCODONE HYDROCHLORIDE AND ACETAMINOPHEN 1 TABLET: 5; 325 TABLET ORAL at 11:56

## 2024-10-18 RX ADMIN — OXYCODONE AND ACETAMINOPHEN 1 TABLET: 5; 325 TABLET ORAL at 11:56

## 2024-10-18 RX ADMIN — LIDOCAINE HYDROCHLORIDE 30 MG: 10 INJECTION, SOLUTION EPIDURAL; INFILTRATION; INTRACAUDAL; PERINEURAL at 09:21

## 2024-10-18 RX ADMIN — ROCURONIUM BROMIDE 40 MG: 10 INJECTION, SOLUTION INTRAVENOUS at 09:21

## 2024-10-18 RX ADMIN — MIDAZOLAM 2 MG: 1 INJECTION INTRAMUSCULAR; INTRAVENOUS at 09:16

## 2024-10-18 RX ADMIN — KETOROLAC TROMETHAMINE 30 MG: 30 INJECTION, SOLUTION INTRAMUSCULAR at 09:57

## 2024-10-18 RX ADMIN — SUGAMMADEX 200 MG: 100 INJECTION, SOLUTION INTRAVENOUS at 09:58

## 2024-10-18 RX ADMIN — FENTANYL CITRATE 100 MCG: 50 INJECTION, SOLUTION INTRAMUSCULAR; INTRAVENOUS at 09:21

## 2024-10-18 RX ADMIN — DEXAMETHASONE SODIUM PHOSPHATE 10 MG: 10 INJECTION INTRAMUSCULAR; INTRAVENOUS at 09:32

## 2024-10-18 RX ADMIN — PROPOFOL 200 MG: 10 INJECTION, EMULSION INTRAVENOUS at 09:21

## 2024-10-18 ASSESSMENT — PAIN DESCRIPTION - DESCRIPTORS
DESCRIPTORS: BURNING;PRESSURE
DESCRIPTORS: BURNING

## 2024-10-18 ASSESSMENT — PAIN SCALES - GENERAL
PAINLEVEL_OUTOF10: 8
PAINLEVEL_OUTOF10: 7

## 2024-10-18 ASSESSMENT — PAIN DESCRIPTION - LOCATION: LOCATION: BUTTOCKS

## 2024-10-18 ASSESSMENT — PAIN - FUNCTIONAL ASSESSMENT
PAIN_FUNCTIONAL_ASSESSMENT: NONE - DENIES PAIN
PAIN_FUNCTIONAL_ASSESSMENT: 0-10

## 2024-10-18 NOTE — OP NOTE
Hemorrhoidectomy Operative Report    Procedure Details   Name of procedure:  Internal hemorrhoidectomy - 1 columns and biopsy of anal ulcer.    Surgeon:  WAYNE Mack    Assistant:  RN/ Surgical Technician    Type of anesthesia:  Local and General    Supplemental anesthetic injected into perianal soft tissues for post-operative analgesia  10 ml of 0.25% Marcaine with Epinephrine  20 ml of Exparel diluted with 10 ml of preservative free saline to a total volume of 20 ml injected in 5 ml aliquots in 6 locations around anal opening.    Complications:  None; patient tolerated the procedure well.    Estimated Blood Loss:  Minimal    Implants: none    Findings:  Hemorrhoids noted as follows:      Prolapsed internal 1 columns. Degree 4th degree  Location in anal quadrants left lateral.  External hemorrhoid in the right anterior quadrant was noted.  This was not excised at the anal area looked extremely unhealthy and macerated and adequate healing was unlikely.  Anal ulceration was noted both anteriorly and posteriorly with thickened raised margins.  Biopsies were taken from these areas    Technique:  The patient was placed in prone jsoe-knife position on the operating table after the administration of anesthesia, and the buttocks were strapped apart with adhesive tape.  Perianal skin was prepared with betadine solution, and isolated with sterile draping in usual manner.  Digital rectal examination was undertaken following which local exploration was done after inserting a medium Hill-Fergusson retractor.  Findings listed above were appreciated.    The hemorrhoid in left lateral quadrant location was excised.  The retractor was placed diametrically opposite to the hemorrhoid and ipsilateral skin traction was exerted by the assistant thereby making the hemorrhoidal column prominent.  This was grasped with two Allies tissue forceps and two linear incisions were made on each side using a needle point cautery.  The incisions  were made to meet at an apex in perianal skin.  Sharp dissection was started at this apex and carried cephalad with the same cautery point, in the plane between the sphincter muscles on the deep aspect and hemorrhoidal tissue superficially.  As soon as sphincter muscle fibers were clearly recognized, the rest of the hemorrhoidal tissue including its pedicle were taken down using serial .applications of the small jaws ligasure device.  This left a wound which was now closed starting from the deep end using 2-0 vicryl suture.  The short end was tagged with a hemostat and the long end carried distally as a continuous interlocking stich till the mucocutaneous junction was reached.  At this point the direction of suturing was reversed and the stitch was carried cephalad till the short end was reached again to which it was securely knotted.  The residual defect in perianal skin was closed with a separate continuous stitch of 3-0 Vicryl.  The suture line was squirted with saline and hemostasis ensured.  2 biopsies were taken 1 from the anterior 1 from the posterior ulcer edge.  These were too scarred for any suture closure.    Local anesthetic was injected as stated above, a gelfoam rectal pack was inserted, which was covered with sterile dressings.  All this was held in place with mesh underwear.    The patient was transferred to recovery room in stable condition.

## 2024-10-18 NOTE — INTERVAL H&P NOTE
Update History & Physical    The patient's History and Physical of October 11, 2024 was reviewed with the patient and I examined the patient. There was no change. The surgical site was confirmed by the patient and me.     Plan: The risks, benefits, expected outcome, and alternative to the recommended procedure have been discussed with the patient. Patient understands and wants to proceed with the procedure.     Electronically signed by Juancarlos Mack MD on 10/18/2024 at 8:51 AM

## 2024-10-18 NOTE — ANESTHESIA POSTPROCEDURE EVALUATION
Department of Anesthesiology  Postprocedure Note    Patient: Vincent Steiner  MRN: [id number]  YOB: 1975  Date of evaluation: 10/18/2024    Procedure Summary       Date: 10/18/24 Room / Location: [address]    Anesthesia Start: 0917 Anesthesia Stop: 1018    Procedure: HEMORRHOIDECTOMY WITH BIOPSY ANAL ULCER Diagnosis:       Hemorrhoids, unspecified hemorrhoid type      Anal skin tag      (Hemorrhoids, unspecified hemorrhoid type [K64.9])      (Anal skin tag [K64.4])    Surgeons: Juancarlos Mack MD Responsible Provider: Dimitris Gutiérrez MD    Anesthesia Type: general ASA Status: 3            Anesthesia Type: No value filed.    Shahrzad Phase I: Shahrzad Score: 10    Shahrzad Phase II: Shahrzad Score: 10    Anesthesia Post Evaluation    Patient location during evaluation: PACU  Patient participation: complete - patient participated  Level of consciousness: awake and awake and alert  Pain score: 2  Nausea & Vomiting: no nausea and no vomiting  Cardiovascular status: hemodynamically stable and blood pressure returned to baseline  Respiratory status: acceptable  Hydration status: euvolemic  Multimodal analgesia pain management approach  Pain management: adequate    No notable events documented.

## 2024-10-18 NOTE — DISCHARGE INSTRUCTIONS
Post Hemorrhoidectomy Instructions    You may have a regular diet.  Please avoid spicy foods, popcorn and nuts.  Eat a high bulk diet including bran cereal, whole grain breads, fruits, and vegetables.    Starting the morning after day of surgery, soak in a Sitz bath basin 3 times a day and after bowel movements.  Shower or bathe as usual.    Keep a dry dressing between your buttocks, and change after each Sitz bath.  Examples of dressings are dry cotton squares or gauze.  Oval facial pads are recommended.    A rectal pack made of a special material called gel foam has been inserted to prevent bleeding.  This will dissolve slowly and extrude itself in a day or two.  Do not be concerned, or try to pull it out.    It is normal not to move bowels for several days after rectal surgery.  Do not try to push, which will only cause pain and possibly bleeding.  If you have not moved bowels for 5 days, call the doctor or take 10 oz magnesium citrate OTC.    Medications:    [x]  No changes to home medication therapy.        []  Changes to home medication therapy:      [x]  Additions to home medication therapy:   - Stool softener: Senkot-s 2 tablets every evening until first good bowel movement only.  - Bulk Medication: Metamucil 1 tablespoonful 2 times a day mixed in juice or water (10 oz.).  - If you experience diarrhea, decrease the Senkot-s, but continue the Metamucil.  - A prescription will be given to you for pain medication. Take as needed.    Do not lift more than 10 lbs for 2 weeks after surgery.    Call the office to make a follow up appointment for 2 weeks after surgery.    If you have any problems, please call the office.  If it is after office hours, the answering service will connect you with the doctor on call.    If you have not urinated in 12 hours contact Dr. Mack.    If is normal to have a pressure sensation for a few days after surgery.  It is also normal to have minor bleeding, drainage and some

## 2024-10-18 NOTE — ANESTHESIA PRE PROCEDURE
History:    Social History     Tobacco Use    Smoking status: Never    Smokeless tobacco: Never   Substance Use Topics    Alcohol use: No     Alcohol/week: 0.0 standard drinks of alcohol                                Counseling given: Not Answered      Vital Signs (Current):   Vitals:    10/18/24 0752 10/18/24 0802   BP:  115/83   Pulse:  93   Resp:  17   Temp:  97 °F (36.1 °C)   SpO2:  97%   Weight: 63.5 kg (140 lb)    Height: 1.575 m (5' 2.01\")                                               BP Readings from Last 3 Encounters:   10/18/24 115/83   10/14/24 (!) 130/91   10/11/24 112/77       NPO Status: Time of last liquid consumption: 0500                        Time of last solid consumption: 2200                        Date of last liquid consumption: 10/18/24                        Date of last solid food consumption: 10/17/24    BMI:   Wt Readings from Last 3 Encounters:   10/18/24 63.5 kg (140 lb)   10/14/24 64.5 kg (142 lb 3.2 oz)   10/11/24 64.4 kg (142 lb)     Body mass index is 25.6 kg/m².    CBC:   Lab Results   Component Value Date/Time    WBC 5.7 10/15/2024 12:10 PM    RBC 5.18 10/15/2024 12:10 PM    HGB  10/15/2024 12:10 PM     Hb A2 prime trait (benign HbA2 variant). HbA= 97.9% HbA2= 1.1% HbA2'= 1.0%    HGB 12.6 10/15/2024 12:10 PM    HCT 40.6 10/15/2024 12:10 PM    MCV 78.4 10/15/2024 12:10 PM    RDW 27.2 10/15/2024 12:10 PM     10/15/2024 12:10 PM       CMP:   Lab Results   Component Value Date/Time     01/09/2024 09:28 AM    K 3.4 01/09/2024 09:28 AM     01/09/2024 09:28 AM    CO2 22 01/09/2024 09:28 AM    BUN 21 01/11/2023 03:20 PM    CREATININE Can not be calculated 01/09/2024 09:08 AM    CREATININE 1.47 01/11/2023 03:20 PM    GFRAA >60 08/24/2022 03:39 PM    LABGLOM 58 01/11/2023 03:20 PM    GLUCOSE 87 01/11/2023 03:20 PM    CALCIUM 9.1 01/11/2023 03:20 PM    BILITOT 0.35 04/06/2022 11:17 AM    ALKPHOS 88 04/06/2022 11:17 AM    AST 17 04/06/2022 11:17 AM    ALT 9 04/06/2022

## 2024-10-20 RX ORDER — FOLIC ACID 1 MG/1
1 TABLET ORAL DAILY
Qty: 90 TABLET | Refills: 1 | Status: SHIPPED | OUTPATIENT
Start: 2024-10-20

## 2024-10-23 LAB — SURGICAL PATHOLOGY REPORT: NORMAL

## 2024-10-28 ENCOUNTER — TELEPHONE (OUTPATIENT)
Dept: INTERNAL MEDICINE | Age: 49
End: 2024-10-28

## 2024-10-28 NOTE — TELEPHONE ENCOUNTER
No answer on Patient phone, left message on machine to call back to schedule 2 month f/u and AWV with pcp.

## 2024-11-01 ENCOUNTER — OFFICE VISIT (OUTPATIENT)
Dept: SURGERY | Age: 49
End: 2024-11-01

## 2024-11-01 VITALS
HEIGHT: 62 IN | DIASTOLIC BLOOD PRESSURE: 80 MMHG | WEIGHT: 145 LBS | SYSTOLIC BLOOD PRESSURE: 124 MMHG | BODY MASS INDEX: 26.68 KG/M2

## 2024-11-01 DIAGNOSIS — G89.18 POST-OP PAIN: Primary | ICD-10-CM

## 2024-11-01 DIAGNOSIS — Z09 POSTOP CHECK: ICD-10-CM

## 2024-11-01 DIAGNOSIS — K64.9 HEMORRHOIDS, UNSPECIFIED HEMORRHOID TYPE: ICD-10-CM

## 2024-11-01 PROCEDURE — 99024 POSTOP FOLLOW-UP VISIT: CPT | Performed by: COLON & RECTAL SURGERY

## 2024-11-01 RX ORDER — OXYCODONE AND ACETAMINOPHEN 5; 325 MG/1; MG/1
1 TABLET ORAL EVERY 6 HOURS PRN
Qty: 20 TABLET | Refills: 0 | Status: SHIPPED | OUTPATIENT
Start: 2024-11-01 | End: 2024-11-06

## 2024-11-01 ASSESSMENT — ENCOUNTER SYMPTOMS
WHEEZING: 0
VOMITING: 0
RECTAL PAIN: 1
COUGH: 0
CONSTIPATION: 0
ABDOMINAL PAIN: 0
COLOR CHANGE: 0
ANAL BLEEDING: 0
DIARRHEA: 0
APNEA: 0
BLOOD IN STOOL: 0
BACK PAIN: 0
CHEST TIGHTNESS: 0
STRIDOR: 0
ABDOMINAL DISTENTION: 0
SHORTNESS OF BREATH: 0
NAUSEA: 0

## 2024-11-01 NOTE — PROGRESS NOTES
[facility]  [address] [id number]  Dept: [phone]    Patient:  Vincent Steiner  YOB: 1975  Date: 11/1/2024     The patient is a 49 y.o. male who presents today for consult of the following problems:     Chief Complaint:  Post-Op Check    HPI:   Mr. Steiner returns to clinic after undergoing a hemorrhoidectomy with biopsy, which came back consistent with warts. He states that he is still sore from the hemorrhoidectomy. He is having a bowel movement daily, and stools are soft. Rectal bleeding on paper is noted when he wipes, but not all the time.   No blood in stool. No constipation concerns.    Surgery: Hemorrhoidectomy w/ Bx   DOS: 10/18/2024  Last Colonoscopy: 11/03/2023 with Dr. Zhou  Imaging:   CT: 10/31/2020    History:     Past Medical History:   Diagnosis Date    JOELLEN (acute kidney injury) (HCC) 02/12/2015    Allergic rhinitis     Anemia     MARLY    Chronic abdominal pain     Constipation     ED (erectile dysfunction) of organic origin     GERD (gastroesophageal reflux disease)     Gynecomastia, male     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) 1986    neuroblastoma rt leg prosthesis    History of hepatitis 07/18/2017    PT DENIES    History of intestinal obstruction     Hypertension     Lumbar disc disease     Neuroblastoma (HCC) 1975    Neurogenic dysfunction of the urinary bladder     Osteoarthritis     Phantom limb pain (HCC)     Prolonged emergence from general anesthesia     after back surgery- denied reintubation    Pure hypercholesterolemia 07/18/2017    Rectal bleeding     RSD (reflex sympathetic dystrophy)     pt unaware of this diagnosis    Stenosis colon (HCC)      Past Surgical History:   Procedure Laterality Date    ABDOMINAL ADHESION SURGERY      APPENDECTOMY      BACK SURGERY Right 11/08/2021    RIGHT SI JOINT FUSION PERCUTANEOUS -SI BONE

## 2024-11-01 NOTE — PATIENT INSTRUCTIONS
Maintain local hygiene.     Avoid soap.     Keep stools soft. Continue Metamucil.    Use ibuprofen for mild pain, and oxycodone for severe pain.     If you have any concerns please give our office a call.       Office Number: [phone]     Chet Ferrer, MA

## 2024-11-11 ENCOUNTER — OFFICE VISIT (OUTPATIENT)
Dept: INTERNAL MEDICINE | Age: 49
End: 2024-11-11
Payer: MEDICARE

## 2024-11-11 VITALS
DIASTOLIC BLOOD PRESSURE: 72 MMHG | HEART RATE: 82 BPM | WEIGHT: 143 LBS | BODY MASS INDEX: 25.34 KG/M2 | SYSTOLIC BLOOD PRESSURE: 116 MMHG | OXYGEN SATURATION: 97 % | HEIGHT: 63 IN

## 2024-11-11 DIAGNOSIS — Z23 NEED FOR HEPATITIS B VACCINATION: ICD-10-CM

## 2024-11-11 DIAGNOSIS — M54.42 CHRONIC BILATERAL LOW BACK PAIN WITH BILATERAL SCIATICA: ICD-10-CM

## 2024-11-11 DIAGNOSIS — I10 ESSENTIAL HYPERTENSION: ICD-10-CM

## 2024-11-11 DIAGNOSIS — E78.00 PURE HYPERCHOLESTEROLEMIA: ICD-10-CM

## 2024-11-11 DIAGNOSIS — E61.1 IRON DEFICIENCY: ICD-10-CM

## 2024-11-11 DIAGNOSIS — E53.8 FOLIC ACID DEFICIENCY: ICD-10-CM

## 2024-11-11 DIAGNOSIS — K64.4 HEMORRHOIDS, EXTERNAL: ICD-10-CM

## 2024-11-11 DIAGNOSIS — K21.9 GASTROESOPHAGEAL REFLUX DISEASE, UNSPECIFIED WHETHER ESOPHAGITIS PRESENT: ICD-10-CM

## 2024-11-11 DIAGNOSIS — Z00.00 ENCOUNTER FOR SUBSEQUENT ANNUAL WELLNESS VISIT (AWV) IN MEDICARE PATIENT: Primary | ICD-10-CM

## 2024-11-11 DIAGNOSIS — Z00.00 MEDICARE ANNUAL WELLNESS VISIT, SUBSEQUENT: ICD-10-CM

## 2024-11-11 DIAGNOSIS — Z23 FLU VACCINE NEED: ICD-10-CM

## 2024-11-11 DIAGNOSIS — G89.29 CHRONIC BILATERAL LOW BACK PAIN WITH BILATERAL SCIATICA: ICD-10-CM

## 2024-11-11 DIAGNOSIS — M54.41 CHRONIC BILATERAL LOW BACK PAIN WITH BILATERAL SCIATICA: ICD-10-CM

## 2024-11-11 PROCEDURE — 90746 HEPB VACCINE 3 DOSE ADULT IM: CPT | Performed by: STUDENT IN AN ORGANIZED HEALTH CARE EDUCATION/TRAINING PROGRAM

## 2024-11-11 PROCEDURE — 90656 IIV3 VACC NO PRSV 0.5 ML IM: CPT

## 2024-11-11 RX ORDER — GABAPENTIN 300 MG/1
300 CAPSULE ORAL 3 TIMES DAILY
Qty: 270 CAPSULE | Refills: 0 | Status: SHIPPED | OUTPATIENT
Start: 2024-11-11 | End: 2025-02-09

## 2024-11-11 RX ORDER — FERROUS SULFATE 325(65) MG
325 TABLET ORAL 2 TIMES DAILY
COMMUNITY
Start: 2024-08-28

## 2024-11-11 RX ORDER — HYDROCORTISONE 25 MG/G
CREAM TOPICAL
COMMUNITY
Start: 2024-10-03

## 2024-11-11 SDOH — ECONOMIC STABILITY: FOOD INSECURITY: WITHIN THE PAST 12 MONTHS, THE FOOD YOU BOUGHT JUST DIDN'T LAST AND YOU DIDN'T HAVE MONEY TO GET MORE.: NEVER TRUE

## 2024-11-11 SDOH — ECONOMIC STABILITY: FOOD INSECURITY: WITHIN THE PAST 12 MONTHS, YOU WORRIED THAT YOUR FOOD WOULD RUN OUT BEFORE YOU GOT MONEY TO BUY MORE.: NEVER TRUE

## 2024-11-11 SDOH — ECONOMIC STABILITY: INCOME INSECURITY: HOW HARD IS IT FOR YOU TO PAY FOR THE VERY BASICS LIKE FOOD, HOUSING, MEDICAL CARE, AND HEATING?: NOT HARD AT ALL

## 2024-11-11 ASSESSMENT — ENCOUNTER SYMPTOMS
SHORTNESS OF BREATH: 0
COLOR CHANGE: 0
COUGH: 0
ABDOMINAL PAIN: 0
ABDOMINAL DISTENTION: 0
EYE DISCHARGE: 0

## 2024-11-11 ASSESSMENT — PATIENT HEALTH QUESTIONNAIRE - PHQ9
SUM OF ALL RESPONSES TO PHQ QUESTIONS 1-9: 0
SUM OF ALL RESPONSES TO PHQ QUESTIONS 1-9: 0
SUM OF ALL RESPONSES TO PHQ9 QUESTIONS 1 & 2: 0
1. LITTLE INTEREST OR PLEASURE IN DOING THINGS: NOT AT ALL
SUM OF ALL RESPONSES TO PHQ QUESTIONS 1-9: 0
SUM OF ALL RESPONSES TO PHQ QUESTIONS 1-9: 0
2. FEELING DOWN, DEPRESSED OR HOPELESS: NOT AT ALL

## 2024-11-11 ASSESSMENT — LIFESTYLE VARIABLES
HOW OFTEN DO YOU HAVE A DRINK CONTAINING ALCOHOL: NEVER
HOW MANY STANDARD DRINKS CONTAINING ALCOHOL DO YOU HAVE ON A TYPICAL DAY: PATIENT DOES NOT DRINK

## 2024-11-11 NOTE — PROGRESS NOTES
[facility]  [facility]  [address] [place] [address]  Dept: [phone]  Dept Fax: [phone]    Office Progress/Follow Up Note  Date ofpatient's visit: 11/11/2024  Patient's Name:  Vincent Steiner YOB: 1975            Patient Care Team:  Anmol Guerrero MD as PCP - General (Internal Medicine)  Tish Avila MD Cashen, Constance P, DO as Consulting Physician (General Surgery)  ================================================================    REASON FOR VISIT/CHIEF COMPLAINT:  Medicare AWV (Last AWV 01.22.2021), Hypertension (Routine 3 month f/u), and Health Maintenance (Flu pended, lipids/A1C/BMP pended)    HISTORY OF PRESENTING ILLNESS:  History was obtained from: patient, electronic medical record. Vincent Steineris a 49 y.o. is here for a annual wellness visit and routine 3-month follow-up.  He did not have any active symptoms or concerns.    Hemorrhoids and anal ulcer:  Patient is known to have grade 4 hemorrhoids and was previously reviewed by general surgery around 2 years ago.  He did not require surgical intervention at that time.  Hemorrhoids were also noted on his colonoscopy earlier in the year.  He had significant worsening of his symptoms and was referred to colorectal surgery.  He had hemorrhoidectomy done on 10/18 and was followed up by colorectal surgery on 11/1/2024.  Histology was positive for warts but no HPV virus of high risk strain detected.     Patient informs that there has been gradual improvement in the pain and bleeding is now only occasional.  He is opening his bowels daily and they are soft.  He did not have concerns about his ulcer or hemorrhoids on this visit.  He is scheduled to follow-up with colorectal surgery in few weeks time.     Iron deficiency anemia:  Folic acid deficiency:  Patient presented to [facility] with chest pain and 8/28/2024 and was found to have significant anemia.  He was given iron and blood

## 2024-11-11 NOTE — PATIENT INSTRUCTIONS
chances of quitting for good. Quitting is one of the most important things you can do to protect your heart. It is never too late to quit. Try to avoid secondhand smoke too.     Stay at a weight that's healthy for you. Talk to your doctor if you need help losing weight.     Try to get 7 to 9 hours of sleep each night.     Limit alcohol to 2 drinks a day for men and 1 drink a day for women. Too much alcohol can cause health problems.     Manage other health problems such as diabetes, high blood pressure, and high cholesterol. If you think you may have a problem with alcohol or drug use, talk to your doctor.   Medicines    Take your medicines exactly as prescribed. Call your doctor if you think you are having a problem with your medicine.     If your doctor recommends aspirin, take the amount directed each day. Make sure you take aspirin and not another kind of pain reliever, such as acetaminophen (Tylenol).   When should you call for help?   Call 911 if you have symptoms of a heart attack. These may include:    Chest pain or pressure, or a strange feeling in the chest.     Sweating.     Shortness of breath.     Pain, pressure, or a strange feeling in the back, neck, jaw, or upper belly or in one or both shoulders or arms.     Lightheadedness or sudden weakness.     A fast or irregular heartbeat.   After you call 911, the  may tell you to chew 1 adult-strength or 2 to 4 low-dose aspirin. Wait for an ambulance. Do not try to drive yourself.  Watch closely for changes in your health, and be sure to contact your doctor if you have any problems.  Where can you learn more?  Go to https://www.healthSpor.net/patientEd and enter F075 to learn more about \"A Healthy Heart: Care Instructions.\"  Current as of: June 24, 2023  Content Version: 14.2  © 2024 [employer].   Care instructions adapted under license by [employer]. If you have questions about a medical condition or this instruction, always ask your

## 2024-11-11 NOTE — PROGRESS NOTES
Medicare Annual Wellness Visit    Vincent Steiner is here for Medicare AWV (Last AWV 01.22.2021), Hypertension (Routine 3 month f/u), and Health Maintenance (Flu pended, lipids/A1C/BMP pended)    Assessment & Plan   Encounter for subsequent annual wellness visit (AWV) in Medicare patient  Chronic bilateral low back pain with bilateral sciatica  The following orders have not been finalized:  -     gabapentin (NEURONTIN) 300 MG capsule  Essential hypertension  Flu vaccine need  Medicare annual wellness visit, subsequent    Recommendations for Preventive Services Due: see orders and patient instructions/AVS.  Recommended screening schedule for the next 5-10 years is provided to the patient in written form: see Patient Instructions/AVS.     No follow-ups on file.     Subjective       Patient's complete Health Risk Assessment and screening values have been reviewed and are found in Flowsheets. The following problems were reviewed today and where indicated follow up appointments were made and/or referrals ordered.    Positive Risk Factor Screenings with Interventions:                 Dentist Screen:  Have you seen the dentist within the past year?: (!) No (denies trouble with teeth/gums, does have a dentist)    Intervention:  Patient declines any further evaluation or treatment      Safety:  Do you have non-slip mats or non-slip surfaces or shower bars or grab bars in your shower or bathtub?: (!) No (education provided)  Interventions:  See above     Advanced Directives:  Do you have a Living Will?: (!) No (education provided, not interested at this time)    Intervention:  has NO advanced directive - information provided                   Objective   Vitals:    11/11/24 1031   BP: 116/72   Site: Left Upper Arm   Position: Sitting   Cuff Size: Large Adult   Pulse: 82   SpO2: 97%   Weight: 64.9 kg (143 lb)   Height: 1.588 m (5' 2.5\")      Body mass index is 25.74 kg/m².                   Allergies   Allergen Reactions

## 2024-11-13 ENCOUNTER — HOSPITAL ENCOUNTER (OUTPATIENT)
Age: 49
Setting detail: SPECIMEN
Discharge: HOME | End: 2024-11-13

## 2024-11-13 DIAGNOSIS — Z00.00 MEDICARE ANNUAL WELLNESS VISIT, SUBSEQUENT: ICD-10-CM

## 2024-11-13 DIAGNOSIS — E61.1 IRON DEFICIENCY: ICD-10-CM

## 2024-11-13 DIAGNOSIS — I10 ESSENTIAL HYPERTENSION: ICD-10-CM

## 2024-11-13 LAB
ANION GAP SERPL CALCULATED.3IONS-SCNC: 13 MMOL/L (ref 9–16)
BASOPHILS # BLD: 0.05 K/UL (ref 0–0.2)
BASOPHILS NFR BLD: 1 % (ref 0–2)
BUN SERPL-MCNC: 23 MG/DL (ref 6–20)
CALCIUM SERPL-MCNC: 9.5 MG/DL (ref 8.6–10.4)
CHLORIDE SERPL-SCNC: 103 MMOL/L (ref 98–107)
CHOLEST SERPL-MCNC: 181 MG/DL (ref 0–199)
CHOLESTEROL/HDL RATIO: 3.9
CO2 SERPL-SCNC: 23 MMOL/L (ref 20–31)
CREAT SERPL-MCNC: 1.3 MG/DL (ref 0.7–1.2)
EOSINOPHIL # BLD: 0.26 K/UL (ref 0–0.44)
EOSINOPHILS RELATIVE PERCENT: 5 % (ref 1–4)
ERYTHROCYTE [DISTWIDTH] IN BLOOD BY AUTOMATED COUNT: 20.9 % (ref 11.8–14.4)
GFR, ESTIMATED: 67 ML/MIN/1.73M2
GLUCOSE SERPL-MCNC: 77 MG/DL (ref 74–99)
HCT VFR BLD AUTO: 40.3 % (ref 40.7–50.3)
HDLC SERPL-MCNC: 47 MG/DL
HGB BLD-MCNC: 12.8 G/DL (ref 13–17)
IMM GRANULOCYTES # BLD AUTO: 0 K/UL (ref 0–0.3)
IMM GRANULOCYTES NFR BLD: 0 %
LDLC SERPL CALC-MCNC: 119 MG/DL (ref 0–100)
LYMPHOCYTES NFR BLD: 1.79 K/UL (ref 1.1–3.7)
LYMPHOCYTES RELATIVE PERCENT: 35 % (ref 24–43)
MCH RBC QN AUTO: 25.3 PG (ref 25.2–33.5)
MCHC RBC AUTO-ENTMCNC: 31.8 G/DL (ref 28.4–34.8)
MCV RBC AUTO: 79.8 FL (ref 82.6–102.9)
MONOCYTES NFR BLD: 0.51 K/UL (ref 0.1–1.2)
MONOCYTES NFR BLD: 10 % (ref 3–12)
MORPHOLOGY: ABNORMAL
NEUTROPHILS NFR BLD: 49 % (ref 36–65)
NEUTS SEG NFR BLD: 2.49 K/UL (ref 1.5–8.1)
NRBC BLD-RTO: 0 PER 100 WBC
PLATELET # BLD AUTO: 353 K/UL (ref 138–453)
PMV BLD AUTO: 9.4 FL (ref 8.1–13.5)
POTASSIUM SERPL-SCNC: 3.9 MMOL/L (ref 3.7–5.3)
RBC # BLD AUTO: 5.05 M/UL (ref 4.21–5.77)
SODIUM SERPL-SCNC: 139 MMOL/L (ref 136–145)
TRIGL SERPL-MCNC: 76 MG/DL (ref 0–149)
VLDLC SERPL CALC-MCNC: 15 MG/DL (ref 1–30)
WBC OTHER # BLD: 5.1 K/UL (ref 3.5–11.3)

## 2024-11-14 LAB
EST. AVERAGE GLUCOSE BLD GHB EST-MCNC: 103 MG/DL
HBA1C MFR BLD: 5.2 % (ref 4–6)

## 2024-11-26 ENCOUNTER — TELEPHONE (OUTPATIENT)
Dept: SURGERY | Age: 49
End: 2024-11-26

## 2024-11-27 ENCOUNTER — OFFICE VISIT (OUTPATIENT)
Dept: SURGERY | Age: 49
End: 2024-11-27

## 2024-11-27 VITALS
BODY MASS INDEX: 26.68 KG/M2 | HEIGHT: 62 IN | DIASTOLIC BLOOD PRESSURE: 87 MMHG | HEART RATE: 83 BPM | WEIGHT: 145 LBS | OXYGEN SATURATION: 96 % | SYSTOLIC BLOOD PRESSURE: 135 MMHG

## 2024-11-27 DIAGNOSIS — G89.18 POST-OP PAIN: Primary | ICD-10-CM

## 2024-11-27 DIAGNOSIS — Z09 POSTOP CHECK: ICD-10-CM

## 2024-11-27 PROCEDURE — 99024 POSTOP FOLLOW-UP VISIT: CPT | Performed by: COLON & RECTAL SURGERY

## 2024-11-27 ASSESSMENT — ENCOUNTER SYMPTOMS
RECTAL PAIN: 0
ABDOMINAL PAIN: 0
CHEST TIGHTNESS: 0
APNEA: 0
BACK PAIN: 0
COUGH: 0
ABDOMINAL DISTENTION: 0
COLOR CHANGE: 0
SHORTNESS OF BREATH: 0
BLOOD IN STOOL: 0
STRIDOR: 0
VOMITING: 0
DIARRHEA: 0
ANAL BLEEDING: 1
NAUSEA: 0
CONSTIPATION: 0
WHEEZING: 0

## 2024-11-27 NOTE — PROGRESS NOTES
check.    Diagnoses and all orders for this visit:    Post-op pain  -     [facility]    Postop check         1. Post-op pain    2. Postop check    Assessment:  S/p hemorrhoidectomy 6 weeks ago.  Patient had ulceration in the anal area the biopsy of which showed neurofibroma.  Hemorrhoidectomy wound is showing delayed healing.    Plan:   Continue with local hygiene.  Referral sent to [facility] for collagen or other appropriate therapy.  Follow-up in 2 weeks.    Followup: Return in about 2 weeks (around 12/11/2024) for (2WPO) Wound Check.      Electronically signed by Juancarlos Mack MD 11/27/2024 3:16 PM

## 2024-12-11 ENCOUNTER — NURSE ONLY (OUTPATIENT)
Dept: INTERNAL MEDICINE | Age: 49
End: 2024-12-11
Payer: MEDICARE

## 2024-12-11 ENCOUNTER — OFFICE VISIT (OUTPATIENT)
Dept: SURGERY | Age: 49
End: 2024-12-11

## 2024-12-11 VITALS
BODY MASS INDEX: 28.52 KG/M2 | HEIGHT: 62 IN | SYSTOLIC BLOOD PRESSURE: 118 MMHG | HEART RATE: 87 BPM | OXYGEN SATURATION: 95 % | DIASTOLIC BLOOD PRESSURE: 78 MMHG | WEIGHT: 155 LBS

## 2024-12-11 DIAGNOSIS — Z23 NEED FOR HEPATITIS B VACCINATION: Primary | ICD-10-CM

## 2024-12-11 DIAGNOSIS — G89.18 POST-OP PAIN: Primary | ICD-10-CM

## 2024-12-11 PROCEDURE — 90746 HEPB VACCINE 3 DOSE ADULT IM: CPT | Performed by: INTERNAL MEDICINE

## 2024-12-11 PROCEDURE — 99024 POSTOP FOLLOW-UP VISIT: CPT | Performed by: COLON & RECTAL SURGERY

## 2024-12-11 ASSESSMENT — ENCOUNTER SYMPTOMS
ABDOMINAL PAIN: 0
CHEST TIGHTNESS: 0
STRIDOR: 0
SHORTNESS OF BREATH: 0
CONSTIPATION: 0
ABDOMINAL DISTENTION: 0
BACK PAIN: 0
COLOR CHANGE: 0
WHEEZING: 0
NAUSEA: 0
ANAL BLEEDING: 1
DIARRHEA: 0
VOMITING: 0
COUGH: 0
APNEA: 0
RECTAL PAIN: 0
BLOOD IN STOOL: 0

## 2024-12-11 NOTE — PROGRESS NOTES
Assessment and Plan:       Vincent was seen today for post-op check.    Diagnoses and all orders for this visit:    Post-op pain         1. Post-op pain    Assessment:  Nonhealing hemorrhoidectomy wound following removal of a large hemorrhoid.  Biopsy showed areas with evidence of neurofibroma.  Patient was also noted to have a moderate sigmoid stricture at 25 cm possibly from previous surgery.  The stricture is totally asymptomatic and biopsies taken from this area did not show dysplasia.    Plan:   1.  Start with seen in wound care center for collagen application.  First appointment is tomorrow.  2.  Follow-up in 4 weeks for reassessment.  3.  If no signs of wound healing is seen in another 4 weeks and a referral will be considered for second opinion regarding the nonhealing wound.  4.  A CT colonography as recommended by Dr. Zhou will be ordered in due course after the wound is healed somewhat so that is easy for the patient to tolerate rectal administration.    Followup: Return in about 4 weeks (around 1/8/2025) for (3MPO) Hemorrhoidectomy, DOS: 10/18/2024.      Electronically signed by Juancarlos Mack MD 12/11/2024 3:13 PM

## 2024-12-12 ENCOUNTER — HOSPITAL ENCOUNTER (OUTPATIENT)
Dept: WOUND CARE | Age: 49
Discharge: HOME | End: 2024-12-12
Payer: MEDICARE

## 2024-12-12 VITALS
DIASTOLIC BLOOD PRESSURE: 88 MMHG | WEIGHT: 155 LBS | HEIGHT: 62 IN | BODY MASS INDEX: 28.52 KG/M2 | TEMPERATURE: 95.9 F | HEART RATE: 94 BPM | SYSTOLIC BLOOD PRESSURE: 131 MMHG | RESPIRATION RATE: 18 BRPM

## 2024-12-12 DIAGNOSIS — L98.492 SKIN ULCER OF PERIRECTAL REGION WITH FAT LAYER EXPOSED (HCC): Primary | ICD-10-CM

## 2024-12-12 PROCEDURE — 11042 DBRDMT SUBQ TIS 1ST 20SQCM/<: CPT

## 2024-12-12 PROCEDURE — 99213 OFFICE O/P EST LOW 20 MIN: CPT

## 2024-12-12 RX ORDER — LIDOCAINE 50 MG/G
OINTMENT TOPICAL ONCE
OUTPATIENT
Start: 2024-12-12 | End: 2024-12-12

## 2024-12-12 RX ORDER — MUPIROCIN 20 MG/G
OINTMENT TOPICAL ONCE
OUTPATIENT
Start: 2024-12-12 | End: 2024-12-12

## 2024-12-12 RX ORDER — LIDOCAINE 40 MG/G
CREAM TOPICAL ONCE
OUTPATIENT
Start: 2024-12-12 | End: 2024-12-12

## 2024-12-12 RX ORDER — LIDOCAINE HYDROCHLORIDE 20 MG/ML
JELLY TOPICAL ONCE
OUTPATIENT
Start: 2024-12-12 | End: 2024-12-12

## 2024-12-12 RX ORDER — TRIAMCINOLONE ACETONIDE 1 MG/G
OINTMENT TOPICAL ONCE
OUTPATIENT
Start: 2024-12-12 | End: 2024-12-12

## 2024-12-12 RX ORDER — GINSENG 100 MG
CAPSULE ORAL ONCE
OUTPATIENT
Start: 2024-12-12 | End: 2024-12-12

## 2024-12-12 RX ORDER — BACITRACIN ZINC AND POLYMYXIN B SULFATE 500; 1000 [USP'U]/G; [USP'U]/G
OINTMENT TOPICAL ONCE
OUTPATIENT
Start: 2024-12-12 | End: 2024-12-12

## 2024-12-12 RX ORDER — NEOMYCIN/BACITRACIN/POLYMYXINB 3.5-400-5K
OINTMENT (GRAM) TOPICAL ONCE
OUTPATIENT
Start: 2024-12-12 | End: 2024-12-12

## 2024-12-12 RX ORDER — SODIUM CHLOR/HYPOCHLOROUS ACID 0.033 %
SOLUTION, IRRIGATION IRRIGATION ONCE
OUTPATIENT
Start: 2024-12-12 | End: 2024-12-12

## 2024-12-12 RX ORDER — CLOBETASOL PROPIONATE 0.5 MG/G
OINTMENT TOPICAL ONCE
OUTPATIENT
Start: 2024-12-12 | End: 2024-12-12

## 2024-12-12 RX ORDER — GENTAMICIN SULFATE 1 MG/G
OINTMENT TOPICAL ONCE
OUTPATIENT
Start: 2024-12-12 | End: 2024-12-12

## 2024-12-12 RX ORDER — LIDOCAINE HYDROCHLORIDE 20 MG/ML
JELLY TOPICAL ONCE
Status: COMPLETED | OUTPATIENT
Start: 2024-12-12 | End: 2024-12-12

## 2024-12-12 RX ORDER — SILVER SULFADIAZINE 10 MG/G
CREAM TOPICAL ONCE
OUTPATIENT
Start: 2024-12-12 | End: 2024-12-12

## 2024-12-12 RX ORDER — BETAMETHASONE DIPROPIONATE 0.5 MG/G
CREAM TOPICAL ONCE
OUTPATIENT
Start: 2024-12-12 | End: 2024-12-12

## 2024-12-12 RX ORDER — LIDOCAINE HYDROCHLORIDE 40 MG/ML
SOLUTION TOPICAL ONCE
OUTPATIENT
Start: 2024-12-12 | End: 2024-12-12

## 2024-12-12 RX ADMIN — LIDOCAINE HYDROCHLORIDE 6 ML: 20 JELLY TOPICAL at 14:36

## 2024-12-12 ASSESSMENT — PAIN SCALES - GENERAL: PAINLEVEL_OUTOF10: 5

## 2024-12-12 NOTE — DISCHARGE INSTRUCTIONS
[facility] -Phone: [phone] Fax: [phone]   Visit  Discharge Instructions / Physician Orders    DATE: 12/12/24     Home Care: NA     SUPPLIES ORDERED THRU:      Wound Location: buttocks     Cleanse with: Liquid antibacterial soap and water, rinse well      Dressing Orders: vashe moistened gauze, dry dressing     Frequency: daily and as needed if soiled     Additional Orders: Increase protein to diet (meat, cheese, eggs, fish, peanut butter, nuts and beans)  ELEVATE LEGS AS MUCH AS POSSIBLE    Your next appointment with Wound Care Center is in 1 week     (Please note your next appointment above and if you are unable to keep, kindly give a 24 hour notice. Thank you.)  If more than 15 min late we cannot guarantee you will be seen due to clinician schedule  Per Policy, Excessive cancellation will call for dismissal from program.     If you experience any of the following, please call the Wound Care Center during business hours:  [phone]     * Increase in Pain  * Temperature over 101  * Increase in drainage from your wound  * Drainage with a foul odor  * Bleeding  * Increase in swelling  * Need for compression bandage changes due to slippage, breakthrough drainage.     If you need medical attention outside of the business hours of the Wound Care Centers please contact your PCP or go to the an urgent care or emergency department     The information contained in the After Visit Summary has been reviewed with me, the patient and/or responsible adult, by my health care provider(s). I had the opportunity to ask questions regarding this information. I have elected to receive;      []After Visit Summary  [x]Comprehensive Discharge Instruction      Patient signature______________________________________Date:________   Electronically signed by Jaylin Patel RN on 12/12/2024 at 2:02 PM  Electronically signed by REY Peraza CNP on 12/12/2024 at 2:16 PM

## 2024-12-12 NOTE — PROGRESS NOTES
[facility]   Progress Note and Procedure Note      Vincent Steiner  MEDICAL RECORD NUMBER:  [id number]  AGE: 49 y.o.   GENDER: male  : 1975  EPISODE DATE:  2024    Subjective:     Chief Complaint   Patient presents with    Wound Check     Buttocks         HISTORY of PRESENT ILLNESS HPI     Vincent Steiner is a 49 y.o. male who presents today for wound/ulcer evaluation.     History of Wound Context: presents today with perirectal wound from recent removal of hemorrhoid/neurofibroma by Dr. Mack on 10/18/2024.  He has been cleansing with plain water and using plain gauze to cover the wound area, changing twice daily after bowel movements.    Wound/Ulcer Pain Timing/Severity: constant  Quality of pain: burning, tender  Severity:  4 / 10   Modifying Factors: Pain is relieved/improved with rest  Associated Signs/Symptoms: drainage    Wound/Ulcer Identification:  Ulcer Type: non-healing surgical  Contributing Factors: none          PAST MEDICAL HISTORY        Diagnosis Date    JOELLEN (acute kidney injury) (HCC) 2015    Allergic rhinitis     Anemia     MARLY    Chronic abdominal pain     Constipation     ED (erectile dysfunction) of organic origin     GERD (gastroesophageal reflux disease)     Gynecomastia, male     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)     neuroblastoma rt leg prosthesis    History of hepatitis 2017    PT DENIES    History of intestinal obstruction     Hypertension     Lumbar disc disease     Neuroblastoma (HCC)     Neurogenic dysfunction of the urinary bladder     Osteoarthritis     Phantom limb pain (HCC)     Prolonged emergence from general anesthesia     after back surgery- denied reintubation    Pure hypercholesterolemia 2017    Rectal bleeding     RSD (reflex sympathetic dystrophy)     pt unaware of this diagnosis    Stenosis colon (HCC)        PAST SURGICAL HISTORY    Past Surgical History:

## 2024-12-19 ENCOUNTER — HOSPITAL ENCOUNTER (OUTPATIENT)
Dept: WOUND CARE | Age: 49
Discharge: HOME | End: 2024-12-19
Payer: MEDICARE

## 2024-12-19 DIAGNOSIS — L98.492 SKIN ULCER OF PERIRECTAL REGION WITH FAT LAYER EXPOSED (HCC): Primary | ICD-10-CM

## 2024-12-19 PROCEDURE — 11042 DBRDMT SUBQ TIS 1ST 20SQCM/<: CPT

## 2024-12-19 RX ORDER — CLOBETASOL PROPIONATE 0.5 MG/G
OINTMENT TOPICAL ONCE
OUTPATIENT
Start: 2024-12-19 | End: 2024-12-19

## 2024-12-19 RX ORDER — LIDOCAINE 40 MG/G
CREAM TOPICAL ONCE
OUTPATIENT
Start: 2024-12-19 | End: 2024-12-19

## 2024-12-19 RX ORDER — LIDOCAINE HYDROCHLORIDE 20 MG/ML
JELLY TOPICAL ONCE
OUTPATIENT
Start: 2024-12-19 | End: 2024-12-19

## 2024-12-19 RX ORDER — LIDOCAINE HYDROCHLORIDE 20 MG/ML
JELLY TOPICAL ONCE
Status: COMPLETED | OUTPATIENT
Start: 2024-12-19 | End: 2024-12-19

## 2024-12-19 RX ORDER — TRIAMCINOLONE ACETONIDE 1 MG/G
OINTMENT TOPICAL ONCE
OUTPATIENT
Start: 2024-12-19 | End: 2024-12-19

## 2024-12-19 RX ORDER — BETAMETHASONE DIPROPIONATE 0.5 MG/G
CREAM TOPICAL ONCE
OUTPATIENT
Start: 2024-12-19 | End: 2024-12-19

## 2024-12-19 RX ORDER — BACITRACIN ZINC AND POLYMYXIN B SULFATE 500; 1000 [USP'U]/G; [USP'U]/G
OINTMENT TOPICAL ONCE
OUTPATIENT
Start: 2024-12-19 | End: 2024-12-19

## 2024-12-19 RX ORDER — MUPIROCIN 20 MG/G
OINTMENT TOPICAL ONCE
OUTPATIENT
Start: 2024-12-19 | End: 2024-12-19

## 2024-12-19 RX ORDER — GINSENG 100 MG
CAPSULE ORAL ONCE
OUTPATIENT
Start: 2024-12-19 | End: 2024-12-19

## 2024-12-19 RX ORDER — SODIUM CHLOR/HYPOCHLOROUS ACID 0.033 %
SOLUTION, IRRIGATION IRRIGATION ONCE
OUTPATIENT
Start: 2024-12-19 | End: 2024-12-19

## 2024-12-19 RX ORDER — NEOMYCIN/BACITRACIN/POLYMYXINB 3.5-400-5K
OINTMENT (GRAM) TOPICAL ONCE
OUTPATIENT
Start: 2024-12-19 | End: 2024-12-19

## 2024-12-19 RX ORDER — SILVER SULFADIAZINE 10 MG/G
CREAM TOPICAL ONCE
OUTPATIENT
Start: 2024-12-19 | End: 2024-12-19

## 2024-12-19 RX ORDER — LIDOCAINE HYDROCHLORIDE 40 MG/ML
SOLUTION TOPICAL ONCE
OUTPATIENT
Start: 2024-12-19 | End: 2024-12-19

## 2024-12-19 RX ORDER — LIDOCAINE 50 MG/G
OINTMENT TOPICAL ONCE
OUTPATIENT
Start: 2024-12-19 | End: 2024-12-19

## 2024-12-19 RX ORDER — GENTAMICIN SULFATE 1 MG/G
OINTMENT TOPICAL ONCE
OUTPATIENT
Start: 2024-12-19 | End: 2024-12-19

## 2024-12-19 RX ADMIN — LIDOCAINE HYDROCHLORIDE 6 ML: 20 JELLY TOPICAL at 09:13

## 2024-12-19 NOTE — PROGRESS NOTES
Width (cm) 2 cm 12/19/24 0912   Post-Procedure Depth (cm) 0.1 cm 12/19/24 0912   Post-Procedure Surface Area (cm^2) 6 cm^2 12/19/24 0912   Post-Procedure Volume (cm^3) 0.6 cm^3 12/19/24 0912   Wound Assessment Pink/red 12/19/24 0912   Drainage Amount Moderate (25-50%) 12/19/24 0912   Drainage Description Serosanguinous 12/19/24 0912   Odor None 12/19/24 0912   Jodee-wound Assessment Intact 12/19/24 0912   Margins Attached edges;Defined edges 12/19/24 0912   Wound Thickness Description not for Pressure Injury Full thickness 12/19/24 0912   Number of days: 6     Incision 10/18/24 Rectum (Active)   Number of days: 62       Percent of Wound(s)/Ulcer(s) Debrided: 100%    Total Surface Area Debrided:  6 sq cm     Diabetic/Pressure/Non Pressure Ulcers only:  Ulcer: Non-Pressure ulcer, fat layer exposed      Estimated Blood Loss:  Minimal    Hemostasis Achieved:  by pressure    Procedural Pain:  5  / 10     Post Procedural Pain:  2 / 10     Response to treatment:  Well tolerated by patient.         Addressed wound healing factors:       [x] Chronic condition concerns: n/a                              [x] Compliance concerns: none anticipated     [x] Medications that may affect: n/a  [x] Smoking: n/a             Plan:     -Local wound care: cont Vashe solution dressing for two weeks. Will consider Collagen at next visit.     -Signs of wound infection/cellulitis present: n/a    -Return to clinic: 2 week(s)    -Education provided/reinforced for wound healing: reinforced proper cleansing and dressing mgmt    -I have reviewed instructions with the patient, answering all questions to satisfaction    -Patient to call or return to clinic as needed if wound symptoms worsen or fail to improve as anticipated    -Provided with printed Discharge Instructions for wound management        Written patient dismissal instructions given to patient and signed by patient or POA.           Electronically signed by REY Peraza - CHARLENE on

## 2024-12-19 NOTE — DISCHARGE INSTRUCTIONS
[facility] -Phone: [phone] Fax: [phone]    Visit  Discharge Instructions / Physician Orders     DATE: 12/19/2024     Home Care: NA     SUPPLIES ORDERED THRU:      Wound Location: buttocks     Cleanse with: Liquid antibacterial soap and water, rinse well      Dressing Orders: Vashe moistened gauze, dry dressing     Frequency: daily and as needed if soiled     Additional Orders: Increase protein to diet (meat, cheese, eggs, fish, peanut butter, nuts and beans)  ELEVATE LEGS AS MUCH AS POSSIBLE     Your next appointment with Wound Care Center is in 2 weeks     (Please note your next appointment above and if you are unable to keep, kindly give a 24 hour notice. Thank you.)  If more than 15 min late we cannot guarantee you will be seen due to clinician schedule  Per Policy, Excessive cancellation will call for dismissal from program.     If you experience any of the following, please call the Wound Care Center during business hours:  [phone]     * Increase in Pain  * Temperature over 101  * Increase in drainage from your wound  * Drainage with a foul odor  * Bleeding  * Increase in swelling  * Need for compression bandage changes due to slippage, breakthrough drainage.     If you need medical attention outside of the business hours of the Wound Care Centers please contact your PCP or go to the an urgent care or emergency department     The information contained in the After Visit Summary has been reviewed with me, the patient and/or responsible adult, by my health care provider(s). I had the opportunity to ask questions regarding this information. I have elected to receive;      []After Visit Summary  [x]Comprehensive Discharge Instruction        Patient signature______________________________________Date:________  Electronically signed by Sergio Rojas RN on 12/19/2024 at 9:20 AM  Electronically signed by REY Peraza CNP on 12/19/2024 at 9:20 AM

## 2024-12-27 DIAGNOSIS — M54.42 CHRONIC BILATERAL LOW BACK PAIN WITH BILATERAL SCIATICA: ICD-10-CM

## 2024-12-27 DIAGNOSIS — M54.41 CHRONIC BILATERAL LOW BACK PAIN WITH BILATERAL SCIATICA: ICD-10-CM

## 2024-12-27 DIAGNOSIS — G89.29 CHRONIC BILATERAL LOW BACK PAIN WITH BILATERAL SCIATICA: ICD-10-CM

## 2024-12-30 RX ORDER — GABAPENTIN 300 MG/1
300 CAPSULE ORAL 3 TIMES DAILY
Qty: 270 CAPSULE | Refills: 0 | Status: SHIPPED | OUTPATIENT
Start: 2024-12-30 | End: 2025-03-30

## 2024-12-30 NOTE — TELEPHONE ENCOUNTER
Vincent Steiner is calling to request a refill on the following medication(s):    Medication Request:  Requested Prescriptions     Pending Prescriptions Disp Refills    gabapentin (NEURONTIN) 300 MG capsule [Pharmacy Med Name: GABAPENTIN 300 MG CAPSULE] 270 capsule 0     Sig: Take 1 capsule by mouth 3 times daily.       Last Visit Date (If Applicable):  11/11/2024    Next Visit Date:    2/17/2025

## 2025-01-02 ENCOUNTER — HOSPITAL ENCOUNTER (OUTPATIENT)
Dept: WOUND CARE | Age: 50
Discharge: HOME | End: 2025-01-02
Payer: MEDICARE

## 2025-01-02 VITALS
SYSTOLIC BLOOD PRESSURE: 136 MMHG | TEMPERATURE: 97.7 F | RESPIRATION RATE: 16 BRPM | DIASTOLIC BLOOD PRESSURE: 81 MMHG | HEART RATE: 96 BPM

## 2025-01-02 DIAGNOSIS — L98.492 SKIN ULCER OF PERIRECTAL REGION WITH FAT LAYER EXPOSED (HCC): Primary | ICD-10-CM

## 2025-01-02 PROCEDURE — 11042 DBRDMT SUBQ TIS 1ST 20SQCM/<: CPT

## 2025-01-02 RX ORDER — GENTAMICIN SULFATE 1 MG/G
OINTMENT TOPICAL ONCE
OUTPATIENT
Start: 2025-01-02 | End: 2025-01-02

## 2025-01-02 RX ORDER — LIDOCAINE HYDROCHLORIDE 20 MG/ML
JELLY TOPICAL ONCE
OUTPATIENT
Start: 2025-01-02 | End: 2025-01-02

## 2025-01-02 RX ORDER — SODIUM CHLOR/HYPOCHLOROUS ACID 0.033 %
SOLUTION, IRRIGATION IRRIGATION ONCE
OUTPATIENT
Start: 2025-01-02 | End: 2025-01-02

## 2025-01-02 RX ORDER — LIDOCAINE 40 MG/G
CREAM TOPICAL ONCE
OUTPATIENT
Start: 2025-01-02 | End: 2025-01-02

## 2025-01-02 RX ORDER — LIDOCAINE HYDROCHLORIDE 20 MG/ML
JELLY TOPICAL ONCE
Status: COMPLETED | OUTPATIENT
Start: 2025-01-02 | End: 2025-01-02

## 2025-01-02 RX ORDER — MUPIROCIN 20 MG/G
OINTMENT TOPICAL ONCE
OUTPATIENT
Start: 2025-01-02 | End: 2025-01-02

## 2025-01-02 RX ORDER — SILVER SULFADIAZINE 10 MG/G
CREAM TOPICAL ONCE
OUTPATIENT
Start: 2025-01-02 | End: 2025-01-02

## 2025-01-02 RX ORDER — BACITRACIN ZINC AND POLYMYXIN B SULFATE 500; 1000 [USP'U]/G; [USP'U]/G
OINTMENT TOPICAL ONCE
OUTPATIENT
Start: 2025-01-02 | End: 2025-01-02

## 2025-01-02 RX ORDER — BETAMETHASONE DIPROPIONATE 0.5 MG/G
CREAM TOPICAL ONCE
OUTPATIENT
Start: 2025-01-02 | End: 2025-01-02

## 2025-01-02 RX ORDER — LIDOCAINE HYDROCHLORIDE 40 MG/ML
SOLUTION TOPICAL ONCE
OUTPATIENT
Start: 2025-01-02 | End: 2025-01-02

## 2025-01-02 RX ORDER — LIDOCAINE 50 MG/G
OINTMENT TOPICAL ONCE
OUTPATIENT
Start: 2025-01-02 | End: 2025-01-02

## 2025-01-02 RX ORDER — TRIAMCINOLONE ACETONIDE 1 MG/G
OINTMENT TOPICAL ONCE
OUTPATIENT
Start: 2025-01-02 | End: 2025-01-02

## 2025-01-02 RX ORDER — CLOBETASOL PROPIONATE 0.5 MG/G
OINTMENT TOPICAL ONCE
OUTPATIENT
Start: 2025-01-02 | End: 2025-01-02

## 2025-01-02 RX ORDER — NEOMYCIN/BACITRACIN/POLYMYXINB 3.5-400-5K
OINTMENT (GRAM) TOPICAL ONCE
OUTPATIENT
Start: 2025-01-02 | End: 2025-01-02

## 2025-01-02 RX ORDER — GINSENG 100 MG
CAPSULE ORAL ONCE
OUTPATIENT
Start: 2025-01-02 | End: 2025-01-02

## 2025-01-02 RX ADMIN — LIDOCAINE HYDROCHLORIDE 6 ML: 20 JELLY TOPICAL at 09:45

## 2025-01-02 ASSESSMENT — PAIN DESCRIPTION - LOCATION: LOCATION: BUTTOCKS

## 2025-01-02 ASSESSMENT — PAIN SCALES - GENERAL: PAINLEVEL_OUTOF10: 6

## 2025-01-02 ASSESSMENT — PAIN DESCRIPTION - DESCRIPTORS: DESCRIPTORS: SORE

## 2025-01-02 NOTE — PLAN OF CARE
Problem: Pain  Goal: Verbalizes/displays adequate comfort level or baseline comfort level  1/2/2025 0958 by Sergio Rojas RN  Outcome: Progressing     Problem: Wound:  Goal: Will show signs of wound healing; wound closure and no evidence of infection  Description: Will show signs of wound healing; wound closure and no evidence of infection  1/2/2025 1016 by Sarika Akers RN  Outcome: Progressing  1/2/2025 0958 by Sergio Rojas RN  Outcome: Progressing     Problem: Falls - Risk of:  Goal: Will remain free from falls  Description: Will remain free from falls  1/2/2025 1016 by Sarika Akers RN  Outcome: Progressing  1/2/2025 0958 by Sergio Rojas RN  Outcome: Progressing

## 2025-01-02 NOTE — PROGRESS NOTES
[facility]   Progress Note and Procedure Note      Vincent Steiner  MEDICAL RECORD NUMBER:  [id number]  AGE: 49 y.o.   GENDER: male  : 1975  EPISODE DATE:  2025    Subjective:     Chief Complaint   Patient presents with    Wound Check     Buttocks         HISTORY of PRESENT ILLNESS HPI     Vincent Steiner is a 49 y.o. male who presents today for wound/ulcer evaluation.     History of Wound Context: presents today with perirectal wound from recent removal of hemorrhoid/neurofibroma by Dr. Mack on 10/18/2024.  He has been cleansing with plain water and using plain gauze to cover the wound area, changing twice daily after bowel movements.     Interval history: improvement noted this week. Denies fever/chills or signs of infection otherwise. He has a follow up with Dr. Mack on 2025.     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

## 2025-01-02 NOTE — DISCHARGE INSTRUCTIONS
[facility] -Phone: [phone] Fax: [phone]    Visit  Discharge Instructions / Physician Orders     DATE: 1/2/2025     Home Care: NA     SUPPLIES ORDERED THRU: [employer]     Wound Location: buttocks     Cleanse with: Liquid antibacterial soap and water, rinse well      Dressing Orders: Collagen with Silver to wound, Dry Gauze     Frequency: Daily     Additional Orders: Increase protein to diet (meat, cheese, eggs, fish, peanut butter, nuts and beans)     Your next appointment with Wound Care Center is in 2 weeks     (Please note your next appointment above and if you are unable to keep, kindly give a 24 hour notice. Thank you.)  If more than 15 min late we cannot guarantee you will be seen due to clinician schedule  Per Policy, Excessive cancellation will call for dismissal from program.     If you experience any of the following, please call the Wound Care Center during business hours:  [phone]     * Increase in Pain  * Temperature over 101  * Increase in drainage from your wound  * Drainage with a foul odor  * Bleeding  * Increase in swelling  * Need for compression bandage changes due to slippage, breakthrough drainage.     If you need medical attention outside of the business hours of the Wound Care Centers please contact your PCP or go to the an urgent care or emergency department     The information contained in the After Visit Summary has been reviewed with me, the patient and/or responsible adult, by my health care provider(s). I had the opportunity to ask questions regarding this information. I have elected to receive;      []After Visit Summary  [x]Comprehensive Discharge Instruction        Patient signature______________________________________Date:________  Electronically signed by Sergio Rojas RN on 1/2/2025 at 9:53 AM  Electronically signed by REY Peraza CNP on 1/2/2025 at 9:48 AM

## 2025-01-02 NOTE — PROGRESS NOTES
Wound Care Supplies      Supply Company:     [employer]  Phone: [phone]  Fax: [phone]    Ordering Center:     [facility]  [address] [place] [address] [id number] [phone]  WOUND CARE Dept: [phone]   FAX NUMBER [phone]    Patient Information:      Vincent Steiner  [address] [address]   [phone]   : 1975  AGE: 49 y.o.     GENDER: male   TODAYS DATE:  2025    Insurance:      PRIMARY INSURANCE:  Plan: [employer] GOLD PLUS HMO  Coverage: [employer] MEDICARE  Effective Date: 3/1/2020  [id number] - (Medicare Managed)  Group: [id number]     SECONDARY INSURANCE:  Plan: [employer] [employer]  Coverage: [employer]  Effective Date: 2020  ID: [id number]       Patient Wound Information:      Diagnoses     Codes Comments   Skin ulcer of perirectal region with fat layer exposed (HCC)  - Primary L98.492        WOUNDS REQUIRING DRESSING SUPPLIES:     Wound 24 Buttocks Left Wound #1 (Active)   Wound Image   24 1354   Wound Etiology Non-Healing Surgical 25   Dressing Status New drainage noted;Old drainage noted 25   Wound Cleansed Cleansed with saline 25   Wound Length (cm) 2.3 cm 25   Wound Width (cm) 1 cm 25   Wound Depth (cm) 0.1 cm 25   Wound Surface Area (cm^2) 2.3 cm^2 25 09   Change in Wound Size % (l*w) 4.17 25 0938   Wound Volume (cm^3) 0.23 cm^3 25 09   Wound Healing % 4 25   Post-Procedure Length (cm) 2.3 cm 25   Post-Procedure Width (cm) 1 cm 25   Post-Procedure Depth (cm) 0.1 cm 25   Post-Procedure Surface Area (cm^2) 2.3 cm^2 25   Post-Procedure Volume (cm^3) 0.23 cm^3 25   Wound Assessment Pink/red 25   Drainage Amount Moderate (25-50%) 25   Drainage Description Serosanguinous 25   Odor None 25   Jodee-wound Assessment

## 2025-01-13 ENCOUNTER — TELEPHONE (OUTPATIENT)
Dept: GASTROENTEROLOGY | Age: 50
End: 2025-01-13

## 2025-01-13 ENCOUNTER — OFFICE VISIT (OUTPATIENT)
Dept: GASTROENTEROLOGY | Age: 50
End: 2025-01-13
Payer: MEDICARE

## 2025-01-13 VITALS
RESPIRATION RATE: 17 BRPM | WEIGHT: 153 LBS | TEMPERATURE: 98.2 F | HEART RATE: 90 BPM | SYSTOLIC BLOOD PRESSURE: 128 MMHG | HEIGHT: 62 IN | DIASTOLIC BLOOD PRESSURE: 82 MMHG | BODY MASS INDEX: 28.16 KG/M2

## 2025-01-13 DIAGNOSIS — D50.9 IRON DEFICIENCY ANEMIA, UNSPECIFIED IRON DEFICIENCY ANEMIA TYPE: ICD-10-CM

## 2025-01-13 DIAGNOSIS — K31.9 SUBEPITHELIAL GASTRIC LESION: Primary | ICD-10-CM

## 2025-01-13 DIAGNOSIS — K64.4 HEMORRHOIDS, EXTERNAL: ICD-10-CM

## 2025-01-13 DIAGNOSIS — Z90.49 HISTORY OF PARTIAL COLECTOMY: ICD-10-CM

## 2025-01-13 PROCEDURE — G8419 CALC BMI OUT NRM PARAM NOF/U: HCPCS | Performed by: INTERNAL MEDICINE

## 2025-01-13 PROCEDURE — 99214 OFFICE O/P EST MOD 30 MIN: CPT | Performed by: INTERNAL MEDICINE

## 2025-01-13 PROCEDURE — 3074F SYST BP LT 130 MM HG: CPT | Performed by: INTERNAL MEDICINE

## 2025-01-13 PROCEDURE — 3079F DIAST BP 80-89 MM HG: CPT | Performed by: INTERNAL MEDICINE

## 2025-01-13 PROCEDURE — 1036F TOBACCO NON-USER: CPT | Performed by: INTERNAL MEDICINE

## 2025-01-13 PROCEDURE — G8427 DOCREV CUR MEDS BY ELIG CLIN: HCPCS | Performed by: INTERNAL MEDICINE

## 2025-01-13 PROCEDURE — 3017F COLORECTAL CA SCREEN DOC REV: CPT | Performed by: INTERNAL MEDICINE

## 2025-01-13 PROCEDURE — G2211 COMPLEX E/M VISIT ADD ON: HCPCS | Performed by: INTERNAL MEDICINE

## 2025-01-13 ASSESSMENT — ENCOUNTER SYMPTOMS
ABDOMINAL DISTENTION: 0
CONSTIPATION: 0
ABDOMINAL PAIN: 0
NAUSEA: 0
ANAL BLEEDING: 1
BLOOD IN STOOL: 0
COLOR CHANGE: 0
TROUBLE SWALLOWING: 0
VOMITING: 0
BACK PAIN: 1
DIARRHEA: 0
WHEEZING: 0
SORE THROAT: 0
COUGH: 0
RECTAL PAIN: 0
VOICE CHANGE: 0
CHOKING: 0

## 2025-01-13 NOTE — TELEPHONE ENCOUNTER
TBS for EGD/EUS  Dx: subepithelial lesion  Referring:  Dr Garland    Attempt 1 - writer [facility] for return call to schedule EGD/EUS

## 2025-01-13 NOTE — PROGRESS NOTES
Reason for Referral:   Constipation, rectal bleeding, heartburn    No referring provider defined for this encounter.    Chief Complaint   Patient presents with    Follow-up     Capsule Endoscopy, Iron Def Anemia, Hemorrhoids            HISTORY OF PRESENT ILLNESS: Mr.Tyrone STEPHANIE Steiner is a 49 y.o. male with a past history remarkable for GERD, HTN,, HLD, sacroiliitis , referred for evaluation of Constipation, rectal bleeding, heartburn. Patient reports that previously he had multiple colonic resections due to bowel obstruction. He recently had colonoscopy with Dr. Zhou and noted to have colonic stenosis (scope able to traverse) at 25 cm from colon. Patient denied any prior history of IBD. He does endorse constipation (BSS1-2 stool), no BM for 3-4 days and rectal bleeding almost on daily basis. He said he previously used suppository for this.     He also reports having heartburn for which he takes protonix once daily. Does sometime use NSAIDs.     Interval history 10/1/2024:  Recently went to emergency room with complaints of chest pain.  He was found to have anemia with iron deficiency.  He was given iron replacement as well as cream for hemorrhoids.  He reports having rectal bleeding for the last 2 months.    Interval history 1/13/2025:  Reports that he has some rectal bleeding likely due to wound nonhealing.  He is following colorectal surgery and wound clinic for this.  He is having soft bowel movements on daily Metamucil twice daily.    Previous Endoscopies  EGD 11/27/2023:  Impression:    Few benign appearing polyps in stomach and fundus. Two of the large ones measuring 7-8 mm removed with cold snare.  Stomach biopsies obtained from antrum and body.    EGD 1/9/2024:  Small to medium subepithelial nodule seen in the antrum of stomach, biopsies done.  Small mucosal nodule seen in second portion of the duodenum, biopsies done.    EUS 1/9/2024:  COMMENT: Subepithelial nodule seen in the antrum of the stomach.

## 2025-01-15 NOTE — DISCHARGE INSTRUCTIONS
[facility] -Phone: [phone] Fax: [phone]    Visit  Discharge Instructions / Physician Orders     DATE: 1/16/2025     Home Care: NA     SUPPLIES ORDERED THRU: [employer]     Wound Location: buttocks     Cleanse with: Liquid antibacterial soap and water, rinse well      Dressing Orders: Collagen with Silver to wound, Dry Gauze     Frequency: Daily     Additional Orders: Increase protein to diet (meat, cheese, eggs, fish, peanut butter, nuts and beans)     Your next appointment with Wound Care Center is in 2 weeks with Elda NP     (Please note your next appointment above and if you are unable to keep, kindly give a 24 hour notice. Thank you.)  If more than 15 min late we cannot guarantee you will be seen due to clinician schedule  Per Policy, Excessive cancellation will call for dismissal from program.     If you experience any of the following, please call the Wound Care Center during business hours:  [phone]     * Increase in Pain  * Temperature over 101  * Increase in drainage from your wound  * Drainage with a foul odor  * Bleeding  * Increase in swelling  * Need for compression bandage changes due to slippage, breakthrough drainage.     If you need medical attention outside of the business hours of the Wound Care Centers please contact your PCP or go to the an urgent care or emergency department     The information contained in the After Visit Summary has been reviewed with me, the patient and/or responsible adult, by my health care provider(s). I had the opportunity to ask questions regarding this information. I have elected to receive;      []After Visit Summary  [x]Comprehensive Discharge Instruction        Patient signature______________________________________Date:________

## 2025-01-16 ENCOUNTER — HOSPITAL ENCOUNTER (OUTPATIENT)
Dept: WOUND CARE | Age: 50
Discharge: HOME | End: 2025-01-16

## 2025-02-07 ENCOUNTER — TELEPHONE (OUTPATIENT)
Dept: GASTROENTEROLOGY | Age: 50
End: 2025-02-07

## 2025-02-10 ENCOUNTER — PREP FOR PROCEDURE (OUTPATIENT)
Dept: GASTROENTEROLOGY | Age: 50
End: 2025-02-10

## 2025-02-10 DIAGNOSIS — K31.9 SUBEPITHELIAL LESION OF STOMACH: ICD-10-CM

## 2025-02-14 ENCOUNTER — HOSPITAL ENCOUNTER (OUTPATIENT)
Age: 50
Setting detail: OUTPATIENT SURGERY
Discharge: HOME | End: 2025-02-14
Attending: INTERNAL MEDICINE | Admitting: INTERNAL MEDICINE
Payer: MEDICARE

## 2025-02-14 ENCOUNTER — HOSPITAL ENCOUNTER (OUTPATIENT)
Dept: ULTRASOUND IMAGING | Age: 50
Setting detail: OUTPATIENT SURGERY
Discharge: HOME | End: 2025-02-16
Payer: MEDICARE

## 2025-02-14 ENCOUNTER — HOSPITAL ENCOUNTER (OUTPATIENT)
Dept: ULTRASOUND IMAGING | Age: 50
Discharge: HOME | End: 2025-02-16
Payer: MEDICARE

## 2025-02-14 ENCOUNTER — ANESTHESIA (OUTPATIENT)
Dept: OPERATING ROOM | Age: 50
End: 2025-02-14
Payer: MEDICARE

## 2025-02-14 ENCOUNTER — ANESTHESIA EVENT (OUTPATIENT)
Dept: OPERATING ROOM | Age: 50
End: 2025-02-14
Payer: MEDICARE

## 2025-02-14 VITALS
BODY MASS INDEX: 27.97 KG/M2 | SYSTOLIC BLOOD PRESSURE: 108 MMHG | WEIGHT: 152 LBS | HEART RATE: 80 BPM | DIASTOLIC BLOOD PRESSURE: 85 MMHG | RESPIRATION RATE: 18 BRPM | HEIGHT: 62 IN | OXYGEN SATURATION: 98 % | TEMPERATURE: 97 F

## 2025-02-14 DIAGNOSIS — R10.84 GENERALIZED ABDOMINAL PAIN: ICD-10-CM

## 2025-02-14 DIAGNOSIS — R10.84 ABDOMINAL PAIN, GENERALIZED: ICD-10-CM

## 2025-02-14 DIAGNOSIS — K31.9 SUBEPITHELIAL LESION OF STOMACH: ICD-10-CM

## 2025-02-14 LAB
BUN BLD-MCNC: 19 MG/DL (ref 8–26)
EGFR, POC: 52 ML/MIN/1.73M2
GLUCOSE BLD-MCNC: 103 MG/DL (ref 74–100)
POC CREATININE: 1.6 MG/DL (ref 0.51–1.19)
POTASSIUM BLD-SCNC: 3.6 MMOL/L (ref 3.5–4.5)

## 2025-02-14 PROCEDURE — 84132 ASSAY OF SERUM POTASSIUM: CPT

## 2025-02-14 PROCEDURE — 88305 TISSUE EXAM BY PATHOLOGIST: CPT

## 2025-02-14 PROCEDURE — 7100000010 HC PHASE II RECOVERY - FIRST 15 MIN: Performed by: INTERNAL MEDICINE

## 2025-02-14 PROCEDURE — 2709999900 HC NON-CHARGEABLE SUPPLY: Performed by: INTERNAL MEDICINE

## 2025-02-14 PROCEDURE — 2580000003 HC RX 258: Performed by: NURSE ANESTHETIST, CERTIFIED REGISTERED

## 2025-02-14 PROCEDURE — 43259 EGD US EXAM DUODENUM/JEJUNUM: CPT | Performed by: INTERNAL MEDICINE

## 2025-02-14 PROCEDURE — 93005 ELECTROCARDIOGRAM TRACING: CPT | Performed by: ANESTHESIOLOGY

## 2025-02-14 PROCEDURE — 76975 GI ENDOSCOPIC ULTRASOUND: CPT | Performed by: INTERNAL MEDICINE

## 2025-02-14 PROCEDURE — 82565 ASSAY OF CREATININE: CPT

## 2025-02-14 PROCEDURE — 3609018500 HC EGD US SCOPE W/ADJACENT STRUCTURES: Performed by: INTERNAL MEDICINE

## 2025-02-14 PROCEDURE — 43239 EGD BIOPSY SINGLE/MULTIPLE: CPT | Performed by: INTERNAL MEDICINE

## 2025-02-14 PROCEDURE — 3700000001 HC ADD 15 MINUTES (ANESTHESIA): Performed by: INTERNAL MEDICINE

## 2025-02-14 PROCEDURE — 82947 ASSAY GLUCOSE BLOOD QUANT: CPT

## 2025-02-14 PROCEDURE — 3700000000 HC ANESTHESIA ATTENDED CARE: Performed by: INTERNAL MEDICINE

## 2025-02-14 PROCEDURE — 3609012400 HC EGD TRANSORAL BIOPSY SINGLE/MULTIPLE: Performed by: INTERNAL MEDICINE

## 2025-02-14 PROCEDURE — 84520 ASSAY OF UREA NITROGEN: CPT

## 2025-02-14 PROCEDURE — 6360000002 HC RX W HCPCS: Performed by: NURSE ANESTHETIST, CERTIFIED REGISTERED

## 2025-02-14 PROCEDURE — 7100000011 HC PHASE II RECOVERY - ADDTL 15 MIN: Performed by: INTERNAL MEDICINE

## 2025-02-14 RX ORDER — PROCHLORPERAZINE EDISYLATE 5 MG/ML
5 INJECTION INTRAMUSCULAR; INTRAVENOUS
Status: DISCONTINUED | OUTPATIENT
Start: 2025-02-14 | End: 2025-02-14 | Stop reason: HOSPADM

## 2025-02-14 RX ORDER — SODIUM CHLORIDE, SODIUM LACTATE, POTASSIUM CHLORIDE, CALCIUM CHLORIDE 600; 310; 30; 20 MG/100ML; MG/100ML; MG/100ML; MG/100ML
INJECTION, SOLUTION INTRAVENOUS
Status: DISCONTINUED | OUTPATIENT
Start: 2025-02-14 | End: 2025-02-14 | Stop reason: SDUPTHER

## 2025-02-14 RX ORDER — NALOXONE HYDROCHLORIDE 0.4 MG/ML
INJECTION, SOLUTION INTRAMUSCULAR; INTRAVENOUS; SUBCUTANEOUS PRN
Status: DISCONTINUED | OUTPATIENT
Start: 2025-02-14 | End: 2025-02-14 | Stop reason: HOSPADM

## 2025-02-14 RX ORDER — HYDRALAZINE HYDROCHLORIDE 20 MG/ML
10 INJECTION INTRAMUSCULAR; INTRAVENOUS
Status: DISCONTINUED | OUTPATIENT
Start: 2025-02-14 | End: 2025-02-14 | Stop reason: HOSPADM

## 2025-02-14 RX ORDER — PROPOFOL 10 MG/ML
INJECTION, EMULSION INTRAVENOUS
Status: DISCONTINUED | OUTPATIENT
Start: 2025-02-14 | End: 2025-02-14 | Stop reason: SDUPTHER

## 2025-02-14 RX ORDER — FENTANYL CITRATE 50 UG/ML
25 INJECTION, SOLUTION INTRAMUSCULAR; INTRAVENOUS EVERY 5 MIN PRN
Status: DISCONTINUED | OUTPATIENT
Start: 2025-02-14 | End: 2025-02-14 | Stop reason: HOSPADM

## 2025-02-14 RX ORDER — SODIUM CHLORIDE 0.9 % (FLUSH) 0.9 %
5-40 SYRINGE (ML) INJECTION PRN
Status: DISCONTINUED | OUTPATIENT
Start: 2025-02-14 | End: 2025-02-14 | Stop reason: HOSPADM

## 2025-02-14 RX ORDER — DIPHENHYDRAMINE HYDROCHLORIDE 50 MG/ML
12.5 INJECTION INTRAMUSCULAR; INTRAVENOUS
Status: DISCONTINUED | OUTPATIENT
Start: 2025-02-14 | End: 2025-02-14 | Stop reason: HOSPADM

## 2025-02-14 RX ORDER — LABETALOL HYDROCHLORIDE 5 MG/ML
10 INJECTION, SOLUTION INTRAVENOUS
Status: DISCONTINUED | OUTPATIENT
Start: 2025-02-14 | End: 2025-02-14 | Stop reason: HOSPADM

## 2025-02-14 RX ORDER — SODIUM CHLORIDE 0.9 % (FLUSH) 0.9 %
5-40 SYRINGE (ML) INJECTION EVERY 12 HOURS SCHEDULED
Status: DISCONTINUED | OUTPATIENT
Start: 2025-02-14 | End: 2025-02-14 | Stop reason: HOSPADM

## 2025-02-14 RX ORDER — MIDAZOLAM HYDROCHLORIDE 1 MG/ML
INJECTION, SOLUTION INTRAMUSCULAR; INTRAVENOUS
Status: DISCONTINUED | OUTPATIENT
Start: 2025-02-14 | End: 2025-02-14 | Stop reason: SDUPTHER

## 2025-02-14 RX ORDER — FENTANYL CITRATE 50 UG/ML
50 INJECTION, SOLUTION INTRAMUSCULAR; INTRAVENOUS EVERY 5 MIN PRN
Status: DISCONTINUED | OUTPATIENT
Start: 2025-02-14 | End: 2025-02-14 | Stop reason: HOSPADM

## 2025-02-14 RX ORDER — SODIUM CHLORIDE 9 MG/ML
INJECTION, SOLUTION INTRAVENOUS PRN
Status: DISCONTINUED | OUTPATIENT
Start: 2025-02-14 | End: 2025-02-14 | Stop reason: HOSPADM

## 2025-02-14 RX ORDER — IPRATROPIUM BROMIDE AND ALBUTEROL SULFATE 2.5; .5 MG/3ML; MG/3ML
1 SOLUTION RESPIRATORY (INHALATION)
Status: DISCONTINUED | OUTPATIENT
Start: 2025-02-14 | End: 2025-02-14 | Stop reason: HOSPADM

## 2025-02-14 RX ADMIN — MIDAZOLAM 2 MG: 1 INJECTION INTRAMUSCULAR; INTRAVENOUS at 15:07

## 2025-02-14 RX ADMIN — PROPOFOL 50 MG: 10 INJECTION, EMULSION INTRAVENOUS at 15:11

## 2025-02-14 RX ADMIN — PROPOFOL 100 MCG/KG/MIN: 10 INJECTION, EMULSION INTRAVENOUS at 15:07

## 2025-02-14 RX ADMIN — SODIUM CHLORIDE, POTASSIUM CHLORIDE, SODIUM LACTATE AND CALCIUM CHLORIDE: 600; 310; 30; 20 INJECTION, SOLUTION INTRAVENOUS at 15:02

## 2025-02-14 ASSESSMENT — PAIN DESCRIPTION - DESCRIPTORS
DESCRIPTORS: CRAMPING;DISCOMFORT
DESCRIPTORS: CRAMPING;DISCOMFORT

## 2025-02-14 ASSESSMENT — PAIN SCALES - GENERAL
PAINLEVEL_OUTOF10: 5
PAINLEVEL_OUTOF10: 5

## 2025-02-14 ASSESSMENT — PAIN DESCRIPTION - LOCATION
LOCATION: ABDOMEN
LOCATION: ABDOMEN

## 2025-02-14 ASSESSMENT — PAIN - FUNCTIONAL ASSESSMENT: PAIN_FUNCTIONAL_ASSESSMENT: 0-10

## 2025-02-14 NOTE — ANESTHESIA PRE PROCEDURE
Department of Anesthesiology  Preprocedure Note       Name:  Vincent Steiner   Age:  49 y.o.  :  1975                                          MRN:  [id number]         Date:  2025      Surgeon: Surgeon(s):  Jairon Zhou MD    Procedure: Procedure(s):  ENDOSCOPIC ULTRASOUND, EGD, PATHOLOGY    Medications prior to admission:   Prior to Admission medications    Medication Sig Start Date End Date Taking? Authorizing Provider   gabapentin (NEURONTIN) 300 MG capsule Take 1 capsule by mouth 3 times daily for 90 days. 12/30/24 3/30/25 Yes Anmol Guerrero MD   ferrous sulfate (IRON 325) 325 (65 Fe) MG tablet Take 1 tablet by mouth 2 times daily 24  Yes Maria C Hutchinson MD   folic acid (FOLVITE) 1 MG tablet Take 1 tablet by mouth daily 10/16/24  Yes Anmol Guerrero MD   pantoprazole (PROTONIX) 20 MG tablet Take 2 tablets by mouth daily 10/8/24  Yes Anmol Guerrero MD   cyclobenzaprine (FLEXERIL) 10 MG tablet Take 1 tablet by mouth in the morning, at noon, and at bedtime 10/8/24  Yes Anmol Guerrero MD   amLODIPine (NORVASC) 10 MG tablet Take 1 tablet by mouth daily 24  Yes Hamida Laureano MD   atorvastatin (LIPITOR) 40 MG tablet Take 1 tablet by mouth daily 24  Yes Hamida Laureano MD   carvedilol (COREG) 6.25 MG tablet take 1 tablet by mouth twice a day 24  Yes Hamida Laureano MD   hydroCHLOROthiazide (HYDRODIURIL) 25 MG tablet take 2 tablets by mouth once daily 24  Yes Hamida Laureano MD   hydrocortisone 2.5 % cream  10/3/24   ProviderMaria C MD   hydrocortisone ace-pramoxine (ANALPRAM-HC) 1-1 % cream Please provide applicator upon pickup  Patient not taking: Reported on 2024 10/11/24   Juancarlos Mack MD   lidocaine-prilocaine (EMLA) 2.5-2.5 % cream Apply topically as needed.  Patient not taking: Reported on 2024   Anmol Guerrero MD   Handicap Placard MISC by Does not apply route 24   Mati Hsu MD       Current medications:    No

## 2025-02-14 NOTE — DISCHARGE INSTRUCTIONS
[facility]    POST-ENDOSCOPY INSTRUCTIONS    1. ACTIVITY   No driving, operating machinery, or making important decisions for 24 hours.    Resume normal activity after 24 hours.  You may return to work after 24 hours.    2. DIET        Resume your usual diet unless specified below.   ***    3. MEDICATIONS    Resume your usual medications.     Do not consume alcohol, tranquilizers, or sleeping medications for 24 hour unless advised by your physician.                 4. PHYSICIAN FOLLOW-UP / INSTRUCTIONS    Please call the office/clinic in 10 days for biopsy results:      [  ] GI office:  [phone] option #2          Follow up with your family physician as planned.    6. NORMAL CHANGES YOU MAY EXPERIENCE AFTER ENDOSCOPY:         EGD/EUS          Sore throat after EGD/EUS       A bloated feeling and belching from       air in stomach              You may feel fatigued for the next 24-48    hours due to the prep and sedation    7. CALL YOUR PHYSICIAN IF YOU EXPERIENCE ANY OF THE FOLLOWING      A.  Passing blood rectally or vomiting blood (color may be red or black)      B.  Severe abdominal pain or tenderness (that is not relieved by passing air)      C.  Fever, chills, or excessive sweating      D.  Persistent nausea or vomiting      E.  Redness or swelling at the IV site    If you have additional questions, PLEASE call your doctor or the [facility] GI Unit ([phone] option #2)    No alcoholic beverages, no driving or operating machinery, no making important decisions for 24 hours.   You may have a normal diet but should eat lightly day of surgery.  Drink plenty of fluids.  Urinate within 8 hours after surgery, if unable to urinate call your doctor

## 2025-02-14 NOTE — ANESTHESIA POSTPROCEDURE EVALUATION
Department of Anesthesiology  Postprocedure Note    Patient: Vincent Steiner  MRN: [id number]  YOB: 1975  Date of evaluation: 2/14/2025    Procedure Summary       Date: 02/14/25 Room / Location: [address]    Anesthesia Start: 1502 Anesthesia Stop: 1550    Procedures:       ESOPHAGOGASTRODUODENOSCOPY BIOPSY      ENDOSCOPIC ULTRASOUND Diagnosis:       Subepithelial lesion of stomach      (Subepithelial lesion of stomach [K31.9])    Surgeons: Jairon Zhou MD Responsible Provider: Bleinda Rdz MD    Anesthesia Type: general ASA Status: 3            Anesthesia Type: No value filed.    Shahrzad Phase I: Shahrzad Score: 10    Shahrzad Phase II: Shahrzad Score: 10    Anesthesia Post Evaluation    Patient location during evaluation: PACU  Patient participation: complete - patient participated  Level of consciousness: awake and alert  Pain score: 1  Airway patency: patent  Nausea & Vomiting: no nausea and no vomiting  Cardiovascular status: hemodynamically stable  Respiratory status: acceptable  Hydration status: euvolemic  Pain management: adequate    No notable events documented.

## 2025-02-14 NOTE — OP NOTE
Operative Note      Patient: Vincent Steiner  YOB: 1975  MRN: [id number]    Date of Procedure: 2/14/2025    Pre-Op Diagnosis Codes:      * Subepithelial lesion of stomach [K31.9]    Post-Op Diagnosis:  Gastric nodule, duodenal nodule.       Procedure(s):  ENDOSCOPIC ULTRASOUND, EGD, PATHOLOGY    Surgeon(s):  Jairon Zhou MD    Assistant:   * No surgical staff found *    Anesthesia: Monitor Anesthesia Care    Estimated Blood Loss (mL): Minimal    Complications: None    Specimens:   ID Type Source Tests Collected by Time Destination   A : gastric nodule bx Tissue Stomach SURGICAL PATHOLOGY Jairon Zhou MD 2/14/2025 1513    B : duodenal nodule bx Tissue Duodenum SURGICAL PATHOLOGY Jairon Zhou MD 2/14/2025 1532        Implants:  * No implants in log *      Drains: * No LDAs found *    Findings:  Infection Present At Time Of Surgery (PATOS) (choose all levels that have infection present):  No infection present      Description of Procedure:  Informed consent was obtained from the patient after explanation of the procedure including indications, description of the procedure,  benefits and possible risks and complications of the procedure, and alternatives. Questions were answered.  The patient's history was reviewed and a directed physical examination was performed prior to the procedure.    Patient was monitored throughout the procedure with pulse oximetry and periodic assessment of vital signs. Patient was sedated as noted above. With the patient in the left lateral decubitus position, the Olympus videoendoscope followed by endoechoendoscope was placed in the patient's mouth and under direct visualization passed into the esophagus. The scope was passed to the 2nd portion of the duodenum.  The patient tolerated the procedure well and was taken to the recovery area in good condition.    EGD Findings::   Esophagus: normal.   Stomach: A small subepithelial nodule was seen in the

## 2025-02-14 NOTE — H&P
History and Physical    Pt Name: Vincent Steiner  MRN: [id number]  YOB: 1975  Date of evaluation: 2/14/2025  Primary Care Physician: Anmol Guerrero MD    SUBJECTIVE:   History of Chief Complaint:    Vincent Steiner is a 49 y.o. male who is scheduled today for ENDOSCOPIC ULTRASOUND, EGD, PATHOLOGY. Patient reports he has been having rectal bleeding, diarrhea and constipation, ongoing for years. He denies any abdominal pain, nausea, vomiting or dysphagia. Patient has a history of subepithelial gastric lesion.   Allergies  is allergic to shellfish-derived products and penicillins.  Medications  Prior to Admission medications    Medication Sig Start Date End Date Taking? Authorizing Provider   gabapentin (NEURONTIN) 300 MG capsule Take 1 capsule by mouth 3 times daily for 90 days. 12/30/24 3/30/25 Yes Anmol Guerrero MD   ferrous sulfate (IRON 325) 325 (65 Fe) MG tablet Take 1 tablet by mouth 2 times daily 8/28/24  Yes Maria C Hutchinson MD   folic acid (FOLVITE) 1 MG tablet Take 1 tablet by mouth daily 10/16/24  Yes Anmol Guerrero MD   pantoprazole (PROTONIX) 20 MG tablet Take 2 tablets by mouth daily 10/8/24  Yes Anmol Guerrero MD   cyclobenzaprine (FLEXERIL) 10 MG tablet Take 1 tablet by mouth in the morning, at noon, and at bedtime 10/8/24  Yes Anmol Guerrero MD   amLODIPine (NORVASC) 10 MG tablet Take 1 tablet by mouth daily 8/12/24  Yes Hamida Laureano MD   atorvastatin (LIPITOR) 40 MG tablet Take 1 tablet by mouth daily 8/12/24  Yes Hamida Laureano MD   carvedilol (COREG) 6.25 MG tablet take 1 tablet by mouth twice a day 8/12/24  Yes Hamida Laureano MD   hydroCHLOROthiazide (HYDRODIURIL) 25 MG tablet take 2 tablets by mouth once daily 8/12/24  Yes Hamida Laureano MD   hydrocortisone 2.5 % cream  10/3/24   ProviderMaria C MD   hydrocortisone ace-pramoxine (ANALPRAM-HC) 1-1 % cream Please provide applicator upon pickup  Patient not taking: Reported on 12/12/2024 10/11/24   Juancarlos Mack MD    lidocaine-prilocaine (EMLA) 2.5-2.5 % cream Apply topically as needed.  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  Past Surgical History   has a past surgical history that includes Leg amputation, lower tibia/fibula (Right, 1986); bladder repair; Abdominal adhesion surgery; Small intestine surgery; Pain management procedure (Bilateral, 02/20/2020); Pain management procedure (Bilateral, 03/02/2020); Pain management procedure (Bilateral, 08/06/2020); Pain management procedure (Right, 02/08/2021); back surgery (Right, 11/08/2021); back surgery (Left, 12/20/2021); Appendectomy; Cholecystectomy; Colonoscopy (N/A, 11/03/2023); Upper gastrointestinal endoscopy (N/A, 11/27/2023); Esophagogastroduodenoscopy (01/09/2024); Upper gastrointestinal endoscopy (N/A, 01/09/2024); Upper gastrointestinal endoscopy (01/09/2024); hip surgery (Bilateral); and Hemorrhoid surgery (N/A, 10/18/2024).  Social History   reports that he has never smoked. He has never used smokeless tobacco.   reports no history of alcohol use.   reports no history of drug use.  Marital Status single   Occupation disabled

## 2025-02-15 LAB
EKG ATRIAL RATE: 84 BPM
EKG P AXIS: 45 DEGREES
EKG P-R INTERVAL: 162 MS
EKG Q-T INTERVAL: 362 MS
EKG QRS DURATION: 84 MS
EKG QTC CALCULATION (BAZETT): 427 MS
EKG R AXIS: 10 DEGREES
EKG T AXIS: 11 DEGREES
EKG VENTRICULAR RATE: 84 BPM

## 2025-02-17 ENCOUNTER — TELEPHONE (OUTPATIENT)
Dept: GASTROENTEROLOGY | Age: 50
End: 2025-02-17

## 2025-02-17 ENCOUNTER — HOSPITAL ENCOUNTER (OUTPATIENT)
Age: 50
Setting detail: SPECIMEN
Discharge: HOME | End: 2025-02-17

## 2025-02-17 ENCOUNTER — OFFICE VISIT (OUTPATIENT)
Dept: INTERNAL MEDICINE | Age: 50
End: 2025-02-17

## 2025-02-17 VITALS
BODY MASS INDEX: 28.08 KG/M2 | HEIGHT: 62 IN | HEART RATE: 93 BPM | DIASTOLIC BLOOD PRESSURE: 92 MMHG | WEIGHT: 152.6 LBS | OXYGEN SATURATION: 98 % | SYSTOLIC BLOOD PRESSURE: 128 MMHG | TEMPERATURE: 97 F

## 2025-02-17 DIAGNOSIS — N18.31 STAGE 3A CHRONIC KIDNEY DISEASE (HCC): ICD-10-CM

## 2025-02-17 DIAGNOSIS — M54.41 CHRONIC BILATERAL LOW BACK PAIN WITH BILATERAL SCIATICA: ICD-10-CM

## 2025-02-17 DIAGNOSIS — K64.4 HEMORRHOIDS, EXTERNAL: Primary | ICD-10-CM

## 2025-02-17 DIAGNOSIS — E61.1 IRON DEFICIENCY: ICD-10-CM

## 2025-02-17 DIAGNOSIS — G89.29 CHRONIC BILATERAL LOW BACK PAIN WITH BILATERAL SCIATICA: ICD-10-CM

## 2025-02-17 DIAGNOSIS — K31.9 SUBEPITHELIAL LESION OF STOMACH: ICD-10-CM

## 2025-02-17 DIAGNOSIS — D13.2 ADENOMA OF DUODENUM: ICD-10-CM

## 2025-02-17 DIAGNOSIS — D52.9 ANEMIA DUE TO FOLIC ACID DEFICIENCY, UNSPECIFIED DEFICIENCY TYPE: ICD-10-CM

## 2025-02-17 DIAGNOSIS — K21.9 GASTROESOPHAGEAL REFLUX DISEASE, UNSPECIFIED WHETHER ESOPHAGITIS PRESENT: ICD-10-CM

## 2025-02-17 DIAGNOSIS — Z89.511 HX OF RIGHT BKA (HCC): ICD-10-CM

## 2025-02-17 DIAGNOSIS — E78.5 DYSLIPIDEMIA: ICD-10-CM

## 2025-02-17 DIAGNOSIS — I10 ESSENTIAL HYPERTENSION: ICD-10-CM

## 2025-02-17 DIAGNOSIS — M54.42 CHRONIC BILATERAL LOW BACK PAIN WITH BILATERAL SCIATICA: ICD-10-CM

## 2025-02-17 LAB
BASOPHILS # BLD: 0.07 K/UL (ref 0–0.2)
BASOPHILS NFR BLD: 1 % (ref 0–2)
EOSINOPHIL # BLD: 0.28 K/UL (ref 0–0.44)
EOSINOPHILS RELATIVE PERCENT: 4 % (ref 1–4)
ERYTHROCYTE [DISTWIDTH] IN BLOOD BY AUTOMATED COUNT: 14.8 % (ref 11.8–14.4)
HCT VFR BLD AUTO: 43.9 % (ref 40.7–50.3)
HGB BLD-MCNC: 14.6 G/DL (ref 13–17)
IMM GRANULOCYTES # BLD AUTO: <0.03 K/UL (ref 0–0.3)
IMM GRANULOCYTES NFR BLD: 0 %
LYMPHOCYTES NFR BLD: 1.93 K/UL (ref 1.1–3.7)
LYMPHOCYTES RELATIVE PERCENT: 29 % (ref 24–43)
MCH RBC QN AUTO: 28.2 PG (ref 25.2–33.5)
MCHC RBC AUTO-ENTMCNC: 33.3 G/DL (ref 28.4–34.8)
MCV RBC AUTO: 84.9 FL (ref 82.6–102.9)
MONOCYTES NFR BLD: 0.64 K/UL (ref 0.1–1.2)
MONOCYTES NFR BLD: 10 % (ref 3–12)
NEUTROPHILS NFR BLD: 56 % (ref 36–65)
NEUTS SEG NFR BLD: 3.69 K/UL (ref 1.5–8.1)
NRBC BLD-RTO: 0 PER 100 WBC
PLATELET # BLD AUTO: ABNORMAL K/UL (ref 138–453)
PLATELET, FLUORESCENCE: 259 K/UL (ref 138–453)
PLATELETS.RETICULATED NFR BLD AUTO: 6.1 % (ref 1.1–10.3)
RBC # BLD AUTO: 5.17 M/UL (ref 4.21–5.77)
RBC # BLD: ABNORMAL 10*6/UL
WBC OTHER # BLD: 6.6 K/UL (ref 3.5–11.3)

## 2025-02-17 RX ORDER — ATORVASTATIN CALCIUM 40 MG/1
40 TABLET, FILM COATED ORAL DAILY
Qty: 90 TABLET | Refills: 1 | Status: SHIPPED | OUTPATIENT
Start: 2025-02-17

## 2025-02-17 RX ORDER — AMLODIPINE BESYLATE 10 MG/1
10 TABLET ORAL DAILY
Qty: 60 TABLET | Refills: 3 | Status: SHIPPED | OUTPATIENT
Start: 2025-02-17

## 2025-02-17 RX ORDER — FOLIC ACID 1 MG/1
1 TABLET ORAL DAILY
Qty: 90 TABLET | Refills: 1 | Status: SHIPPED | OUTPATIENT
Start: 2025-02-17

## 2025-02-17 RX ORDER — PANTOPRAZOLE SODIUM 20 MG/1
40 TABLET, DELAYED RELEASE ORAL DAILY
Qty: 30 TABLET | Refills: 4 | Status: SHIPPED | OUTPATIENT
Start: 2025-02-17

## 2025-02-17 RX ORDER — GABAPENTIN 300 MG/1
300 CAPSULE ORAL 3 TIMES DAILY
Qty: 270 CAPSULE | Refills: 0 | Status: SHIPPED | OUTPATIENT
Start: 2025-02-17 | End: 2025-05-18

## 2025-02-17 RX ORDER — CYCLOBENZAPRINE HCL 10 MG
10 TABLET ORAL 3 TIMES DAILY
Qty: 90 TABLET | Refills: 3 | Status: SHIPPED | OUTPATIENT
Start: 2025-02-17

## 2025-02-17 RX ORDER — FERROUS SULFATE 325(65) MG
325 TABLET ORAL 2 TIMES DAILY
Qty: 30 TABLET | Refills: 2 | Status: SHIPPED | OUTPATIENT
Start: 2025-02-17

## 2025-02-17 RX ORDER — CARVEDILOL 6.25 MG/1
TABLET ORAL
Qty: 60 TABLET | Refills: 4 | Status: SHIPPED | OUTPATIENT
Start: 2025-02-17

## 2025-02-17 SDOH — ECONOMIC STABILITY: FOOD INSECURITY: WITHIN THE PAST 12 MONTHS, YOU WORRIED THAT YOUR FOOD WOULD RUN OUT BEFORE YOU GOT MONEY TO BUY MORE.: NEVER TRUE

## 2025-02-17 SDOH — ECONOMIC STABILITY: FOOD INSECURITY: WITHIN THE PAST 12 MONTHS, THE FOOD YOU BOUGHT JUST DIDN'T LAST AND YOU DIDN'T HAVE MONEY TO GET MORE.: NEVER TRUE

## 2025-02-17 ASSESSMENT — PATIENT HEALTH QUESTIONNAIRE - PHQ9
SUM OF ALL RESPONSES TO PHQ QUESTIONS 1-9: 0
2. FEELING DOWN, DEPRESSED OR HOPELESS: NOT AT ALL
SUM OF ALL RESPONSES TO PHQ QUESTIONS 1-9: 0
SUM OF ALL RESPONSES TO PHQ QUESTIONS 1-9: 0
SUM OF ALL RESPONSES TO PHQ9 QUESTIONS 1 & 2: 0
SUM OF ALL RESPONSES TO PHQ QUESTIONS 1-9: 0
1. LITTLE INTEREST OR PLEASURE IN DOING THINGS: NOT AT ALL

## 2025-02-17 ASSESSMENT — ENCOUNTER SYMPTOMS
SHORTNESS OF BREATH: 0
CONSTIPATION: 0
RECTAL PAIN: 1
SINUS PAIN: 0
CHEST TIGHTNESS: 0
EYE DISCHARGE: 0
CHOKING: 0
VOMITING: 0
DIARRHEA: 0
ABDOMINAL PAIN: 0
BACK PAIN: 0
EYE PAIN: 0
ABDOMINAL DISTENTION: 0
COLOR CHANGE: 0
COUGH: 0
NAUSEA: 0

## 2025-02-17 NOTE — PROGRESS NOTES
Attending Physician Statement  I have discussed the care of Vincent Setiner, including pertinent history and exam findings with the resident. I have reviewed the key elements of all parts of the encounter with the resident. I have seen and examined the patient with the resident and the key elements of all parts of the encounter have been performed by me.  I agree with the assessment, and status of the problem list as documented. The plan and orders should include   Orders Placed This Encounter   Procedures    CBC with Auto Differential    Basic Metabolic Panel    and this was also documented by the resident. The medication list was reviewed with the resident and is up to date. The return visit should be in 3 months .    Mati Hsu MD  Attending Physician,  Department of Internal Medicine  [facility] Internal Medicine  [facility]      2/17/2025, 11:29 AM

## 2025-02-17 NOTE — PROGRESS NOTES
[facility]  [facility]  [address] [place] [address]  Dept: [phone]  Dept Fax: [phone]    Office Progress/Follow Up Note  Date ofpatient's visit: 2/17/2025  Patient's Name:  Vincent Steiner YOB: 1975            Patient Care Team:  Anmol Guerrero MD as PCP - General (Internal Medicine)  Tish Avila MD Cashen, Constance P, DO as Consulting Physician (General Surgery)  ================================================================    REASON FOR VISIT/CHIEF COMPLAINT:  Hypertension (Pt took medication today)    HISTORY OF PRESENTING ILLNESS:  History was obtained from: patient, electronic medical record. Vincent Ornelas a 49 y.o. is here for a routine follow up.        Hemorrhoids and anal ulcer:  Patient is known to have grade 4 hemorrhoids and hemorrhoidectomy was done on 10/18 due to worsening symptoms.  Colorectal surgery continues to follow patient. Histology was positive for warts but no HPV virus of high risk strain detected.      Patient informs that the bleeding and pain from hemorrhoid has significantly improved.  However, he has developed a chronic wound over the surgery site.  It is causing ongoing pain and he continues to follow-up with wound care and colorectal surgery.     Iron deficiency anemia:  Folic acid deficiency:  Patient presented to [facility] with chest pain and 8/28/2024 and was found to have significant anemia.  He was given iron and blood transfusion.  He was also reviewed in hematology clinic recently on 10/21/2024.  The low iron and anemia was likely secondary to ongoing bleeding from the hemorrhoids.  He was also reviewed in the GI clinic on 1st October.  His latest hemoglobin has been 12.6 and iron studies were okay.  His folic acid was low at 4.7.  He continues to take folic acid and iron supplements orally. He denies hematemesis or melena.  He also denies bleeding from anywhere else.      Gastroesophageal

## 2025-02-18 LAB — SURGICAL PATHOLOGY REPORT: NORMAL

## 2025-02-18 NOTE — DISCHARGE INSTRUCTIONS
[facility] -Phone: [phone] Fax: [phone]    Visit  Discharge Instructions / Physician Orders     DATE: 2/20/2025     Home Care: NA     SUPPLIES ORDERED THRU: [facility]     Wound Location: buttocks     Cleanse with: Liquid antibacterial soap and water, rinse well      Dressing Orders: Collagen with Silver to wound, Dry Gauze     Frequency: Daily     Additional Orders: Increase protein to diet (meat, cheese, eggs, fish, peanut butter, nuts and beans)     Your next appointment with Wound Care Center is in 2 weeks     (Please note your next appointment above and if you are unable to keep, kindly give a 24 hour notice. Thank you.)  If more than 15 min late we cannot guarantee you will be seen due to clinician schedule  Per Policy, Excessive cancellation will call for dismissal from program.     If you experience any of the following, please call the Wound Care Center during business hours:  [phone]     * Increase in Pain  * Temperature over 101  * Increase in drainage from your wound  * Drainage with a foul odor  * Bleeding  * Increase in swelling  * Need for compression bandage changes due to slippage, breakthrough drainage.     If you need medical attention outside of the business hours of the Wound Care Centers please contact your PCP or go to the an urgent care or emergency department     The information contained in the After Visit Summary has been reviewed with me, the patient and/or responsible adult, by my health care provider(s). I had the opportunity to ask questions regarding this information. I have elected to receive;      []After Visit Summary  [x]Comprehensive Discharge Instruction        Patient signature______________________________________Date:________

## 2025-02-20 ENCOUNTER — HOSPITAL ENCOUNTER (OUTPATIENT)
Dept: WOUND CARE | Age: 50
Discharge: HOME | End: 2025-02-20

## 2025-02-20 NOTE — DISCHARGE INSTRUCTIONS
[facility] -Phone: [phone] Fax: [phone]    Visit  Discharge Instructions / Physician Orders     DATE: 12/19/2024     Home Care: NA     SUPPLIES ORDERED THRU:      Wound Location: Buttocks     Cleanse with: Liquid antibacterial soap and water, rinse well      Dressing Orders: Juana moistened with saline, Dry Dressing     Frequency: DAILY and PRN     Additional Orders: Increase protein to diet (meat, cheese, eggs, fish, peanut butter, nuts and beans)  ELEVATE LEGS AS MUCH AS POSSIBLE     Your next appointment with Wound Care Center is in 2 weeks     (Please note your next appointment above and if you are unable to keep, kindly give a 24 hour notice. Thank you.)  If more than 15 min late we cannot guarantee you will be seen due to clinician schedule  Per Policy, Excessive cancellation will call for dismissal from program.     If you experience any of the following, please call the Wound Care Center during business hours:  [phone]     * Increase in Pain  * Temperature over 101  * Increase in drainage from your wound  * Drainage with a foul odor  * Bleeding  * Increase in swelling  * Need for compression bandage changes due to slippage, breakthrough drainage.     If you need medical attention outside of the business hours of the Wound Care Centers please contact your PCP or go to the an urgent care or emergency department     The information contained in the After Visit Summary has been reviewed with me, the patient and/or responsible adult, by my health care provider(s). I had the opportunity to ask questions regarding this information. I have elected to receive;      []After Visit Summary  [x]Comprehensive Discharge Instruction        Patient signature______________________________________Date:________  Electronically signed by Sarika Akers RN on 2/24/2025 at 1:52 PM   Electronically signed by REY JACKSON - CNP on 2/24/2025 at 1:59 PM

## 2025-02-24 ENCOUNTER — HOSPITAL ENCOUNTER (OUTPATIENT)
Dept: WOUND CARE | Age: 50
Discharge: HOME | End: 2025-02-24
Payer: MEDICARE

## 2025-02-24 VITALS
DIASTOLIC BLOOD PRESSURE: 85 MMHG | HEART RATE: 92 BPM | RESPIRATION RATE: 19 BRPM | TEMPERATURE: 97.7 F | SYSTOLIC BLOOD PRESSURE: 123 MMHG

## 2025-02-24 DIAGNOSIS — L98.492 SKIN ULCER OF PERIRECTAL REGION WITH FAT LAYER EXPOSED (HCC): Primary | ICD-10-CM

## 2025-02-24 PROCEDURE — 11042 DBRDMT SUBQ TIS 1ST 20SQCM/<: CPT

## 2025-02-24 PROCEDURE — 11042 DBRDMT SUBQ TIS 1ST 20SQCM/<: CPT | Performed by: NURSE PRACTITIONER

## 2025-02-24 RX ORDER — GENTAMICIN SULFATE 1 MG/G
OINTMENT TOPICAL ONCE
Status: CANCELLED | OUTPATIENT
Start: 2025-02-24 | End: 2025-02-24

## 2025-02-24 RX ORDER — LIDOCAINE 50 MG/G
OINTMENT TOPICAL ONCE
Status: CANCELLED | OUTPATIENT
Start: 2025-02-24 | End: 2025-02-24

## 2025-02-24 RX ORDER — LIDOCAINE HYDROCHLORIDE 20 MG/ML
JELLY TOPICAL ONCE
Status: CANCELLED | OUTPATIENT
Start: 2025-02-24 | End: 2025-02-24

## 2025-02-24 RX ORDER — LIDOCAINE HYDROCHLORIDE 20 MG/ML
JELLY TOPICAL ONCE
OUTPATIENT
Start: 2025-02-24 | End: 2025-02-24

## 2025-02-24 RX ORDER — SILVER SULFADIAZINE 10 MG/G
CREAM TOPICAL ONCE
Status: CANCELLED | OUTPATIENT
Start: 2025-02-24 | End: 2025-02-24

## 2025-02-24 RX ORDER — NEOMYCIN/BACITRACIN/POLYMYXINB 3.5-400-5K
OINTMENT (GRAM) TOPICAL ONCE
Status: CANCELLED | OUTPATIENT
Start: 2025-02-24 | End: 2025-02-24

## 2025-02-24 RX ORDER — BACITRACIN ZINC AND POLYMYXIN B SULFATE 500; 1000 [USP'U]/G; [USP'U]/G
OINTMENT TOPICAL ONCE
Status: CANCELLED | OUTPATIENT
Start: 2025-02-24 | End: 2025-02-24

## 2025-02-24 RX ORDER — CLOBETASOL PROPIONATE 0.5 MG/G
OINTMENT TOPICAL ONCE
Status: CANCELLED | OUTPATIENT
Start: 2025-02-24 | End: 2025-02-24

## 2025-02-24 RX ORDER — BETAMETHASONE DIPROPIONATE 0.5 MG/G
CREAM TOPICAL ONCE
Status: CANCELLED | OUTPATIENT
Start: 2025-02-24 | End: 2025-02-24

## 2025-02-24 RX ORDER — LIDOCAINE HYDROCHLORIDE 20 MG/ML
JELLY TOPICAL ONCE
Status: COMPLETED | OUTPATIENT
Start: 2025-02-24 | End: 2025-02-24

## 2025-02-24 RX ORDER — MUPIROCIN 20 MG/G
OINTMENT TOPICAL ONCE
Status: CANCELLED | OUTPATIENT
Start: 2025-02-24 | End: 2025-02-24

## 2025-02-24 RX ORDER — GINSENG 100 MG
CAPSULE ORAL ONCE
Status: CANCELLED | OUTPATIENT
Start: 2025-02-24 | End: 2025-02-24

## 2025-02-24 RX ORDER — SODIUM CHLOR/HYPOCHLOROUS ACID 0.033 %
SOLUTION, IRRIGATION IRRIGATION ONCE
Status: CANCELLED | OUTPATIENT
Start: 2025-02-24 | End: 2025-02-24

## 2025-02-24 RX ORDER — LIDOCAINE HYDROCHLORIDE 40 MG/ML
SOLUTION TOPICAL ONCE
OUTPATIENT
Start: 2025-02-24 | End: 2025-02-24

## 2025-02-24 RX ORDER — TRIAMCINOLONE ACETONIDE 1 MG/G
OINTMENT TOPICAL ONCE
Status: CANCELLED | OUTPATIENT
Start: 2025-02-24 | End: 2025-02-24

## 2025-02-24 RX ORDER — LIDOCAINE 40 MG/G
CREAM TOPICAL ONCE
Status: CANCELLED | OUTPATIENT
Start: 2025-02-24 | End: 2025-02-24

## 2025-02-24 RX ADMIN — LIDOCAINE HYDROCHLORIDE 6 ML: 20 JELLY TOPICAL at 13:58

## 2025-02-24 ASSESSMENT — PAIN DESCRIPTION - ORIENTATION: ORIENTATION: LEFT

## 2025-02-24 ASSESSMENT — ENCOUNTER SYMPTOMS
COUGH: 0
RHINORRHEA: 0
VOMITING: 0
DIARRHEA: 0
NAUSEA: 0
SHORTNESS OF BREATH: 0

## 2025-02-24 ASSESSMENT — PAIN DESCRIPTION - PAIN TYPE: TYPE: CHRONIC PAIN

## 2025-02-24 ASSESSMENT — PAIN DESCRIPTION - FREQUENCY: FREQUENCY: INTERMITTENT

## 2025-02-24 ASSESSMENT — PAIN SCALES - GENERAL: PAINLEVEL_OUTOF10: 7

## 2025-02-24 ASSESSMENT — PAIN DESCRIPTION - DESCRIPTORS: DESCRIPTORS: ACHING;THROBBING

## 2025-02-24 ASSESSMENT — PAIN DESCRIPTION - LOCATION: LOCATION: BUTTOCKS

## 2025-02-24 ASSESSMENT — PAIN - FUNCTIONAL ASSESSMENT: PAIN_FUNCTIONAL_ASSESSMENT: ACTIVITIES ARE NOT PREVENTED

## 2025-02-24 ASSESSMENT — PAIN DESCRIPTION - ONSET: ONSET: ON-GOING

## 2025-02-24 NOTE — PROGRESS NOTES
Blood Pressure Mother     Diabetes Mother     High Blood Pressure Father     Cancer Father         prostate    Diabetes Father     Coronary Art Dis Father     Heart Attack Father     Heart Disease Father     Prostate Cancer Father     No Known Problems Sister     No Known Problems Brother     Prostate Cancer Paternal Uncle        SOCIAL HISTORY    Social History     Tobacco Use    Smoking status: Never    Smokeless tobacco: Never   Vaping Use    Vaping status: Never Used   Substance Use Topics    Alcohol use: No     Alcohol/week: 0.0 standard drinks of alcohol    Drug use: No       ALLERGIES    Allergies   Allergen Reactions    Shellfish-Derived Products Anaphylaxis    Penicillins Other (See Comments)     UNKNOWN REACTION       MEDICATIONS    Current Outpatient Medications on File Prior to Encounter   Medication Sig Dispense Refill    pantoprazole (PROTONIX) 20 MG tablet Take 2 tablets by mouth daily 30 tablet 4    gabapentin (NEURONTIN) 300 MG capsule Take 1 capsule by mouth 3 times daily for 90 days. 270 capsule 0    folic acid (FOLVITE) 1 MG tablet Take 1 tablet by mouth daily 90 tablet 1    cyclobenzaprine (FLEXERIL) 10 MG tablet Take 1 tablet by mouth in the morning, at noon, and at bedtime 90 tablet 3    carvedilol (COREG) 6.25 MG tablet take 1 tablet by mouth twice a day 60 tablet 4    atorvastatin (LIPITOR) 40 MG tablet Take 1 tablet by mouth daily 90 tablet 1    amLODIPine (NORVASC) 10 MG tablet Take 1 tablet by mouth daily 60 tablet 3    ferrous sulfate (IRON 325) 325 (65 Fe) MG tablet Take 1 tablet by mouth 2 times daily 30 tablet 2    hydroCHLOROthiazide (HYDRODIURIL) 25 MG tablet take 2 tablets by mouth once daily 60 tablet 4    hydrocortisone 2.5 % cream  (Patient not taking: Reported on 12/12/2024)      hydrocortisone ace-pramoxine (ANALPRAM-HC) 1-1 % cream Please provide applicator upon pickup (Patient not taking: Reported on 12/12/2024) 30 g 2    lidocaine-prilocaine (EMLA) 2.5-2.5 % cream Apply

## 2025-03-10 ENCOUNTER — HOSPITAL ENCOUNTER (OUTPATIENT)
Dept: WOUND CARE | Age: 50
Discharge: HOME | End: 2025-03-10
Payer: MEDICARE

## 2025-03-10 VITALS
TEMPERATURE: 97.9 F | HEART RATE: 98 BPM | RESPIRATION RATE: 16 BRPM | SYSTOLIC BLOOD PRESSURE: 136 MMHG | DIASTOLIC BLOOD PRESSURE: 83 MMHG

## 2025-03-10 DIAGNOSIS — L98.492 SKIN ULCER OF PERIRECTAL REGION WITH FAT LAYER EXPOSED (HCC): Primary | ICD-10-CM

## 2025-03-10 PROCEDURE — 11042 DBRDMT SUBQ TIS 1ST 20SQCM/<: CPT

## 2025-03-10 PROCEDURE — 11042 DBRDMT SUBQ TIS 1ST 20SQCM/<: CPT | Performed by: NURSE PRACTITIONER

## 2025-03-10 RX ORDER — LIDOCAINE HYDROCHLORIDE 40 MG/ML
SOLUTION TOPICAL ONCE
OUTPATIENT
Start: 2025-03-10 | End: 2025-03-10

## 2025-03-10 RX ORDER — LIDOCAINE HYDROCHLORIDE 20 MG/ML
JELLY TOPICAL ONCE
Status: COMPLETED | OUTPATIENT
Start: 2025-03-10 | End: 2025-03-10

## 2025-03-10 RX ORDER — LIDOCAINE HYDROCHLORIDE 20 MG/ML
JELLY TOPICAL ONCE
OUTPATIENT
Start: 2025-03-10 | End: 2025-03-10

## 2025-03-10 RX ADMIN — LIDOCAINE HYDROCHLORIDE 6 ML: 20 JELLY TOPICAL at 13:13

## 2025-03-10 ASSESSMENT — ENCOUNTER SYMPTOMS
NAUSEA: 0
RHINORRHEA: 0
COUGH: 0
VOMITING: 0
DIARRHEA: 0
SHORTNESS OF BREATH: 0

## 2025-03-10 NOTE — PROGRESS NOTES
[facility]   Progress Note and Procedure Note      Vincent Steiner  MEDICAL RECORD NUMBER:  [id number]  AGE: 49 y.o.   GENDER: male  : 1975  EPISODE DATE:  3/10/2025    Subjective:     Chief Complaint   Patient presents with    Wound Check     Buttocks         HISTORY of PRESENT ILLNESS HPI     Vincent Steiner is a 49 y.o. male who presents today for wound/ulcer evaluation.   History of Wound Context: presents in follow up on perirectal wound     Wound/Ulcer Pain Timing/Severity: constant  Quality of pain: aching  Severity:  4 / 10   Modifying Factors: None  Associated Signs/Symptoms: none    Ulcer Identification:  Ulcer Type: non-healing surgical  Contributing Factors: none         PAST MEDICAL HISTORY        Diagnosis Date    JOELLEN (acute kidney injury) 2015    Allergic rhinitis     Anemia     MARLY    Chronic abdominal pain     Constipation     COVID-19 vaccine administered     ED (erectile dysfunction) of organic origin     Gastric lesion     GERD (gastroesophageal reflux disease)     Gynecomastia, male     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)     neuroblastoma rt leg prosthesis    History of hepatitis 2017    PT DENIES    History of intestinal obstruction     Hypertension     Lumbar disc disease     Neuroblastoma (HCC)     Neurogenic dysfunction of the urinary bladder     Osteoarthritis     Phantom limb pain (HCC)     Prolonged emergence from general anesthesia     after back surgery- denied reintubation    Pure hypercholesterolemia 2017    Rectal bleeding     RSD (reflex sympathetic dystrophy)     pt unaware of this diagnosis    Stenosis colon (HCC)        PAST SURGICAL HISTORY    Past Surgical History:   Procedure Laterality Date    ABDOMINAL ADHESION SURGERY      APPENDECTOMY      BACK SURGERY Right 2021    RIGHT SI JOINT FUSION PERCUTANEOUS -SI BONE WILLIE performed by Atul Guo MD at [facility]    BACK SURGERY

## 2025-03-10 NOTE — DISCHARGE INSTRUCTIONS
[facility] -Phone: [phone] Fax: [phone]    Visit  Discharge Instructions / Physician Orders     DATE: 3/10/2024     Home Care: NA     SUPPLIES ORDERED THRU:      Wound Location: Buttocks     Cleanse with: Liquid antibacterial soap and water, rinse well      Dressing Orders: Juana moistened with saline, Dry Dressing     Frequency: DAILY and PRN     Additional Orders: Increase protein to diet (meat, cheese, eggs, fish, peanut butter, nuts and beans)     Your next appointment with Wound Care Center is in 2 weeks     (Please note your next appointment above and if you are unable to keep, kindly give a 24 hour notice. Thank you.)  If more than 15 min late we cannot guarantee you will be seen due to clinician schedule  Per Policy, Excessive cancellation will call for dismissal from program.     If you experience any of the following, please call the Wound Care Center during business hours:  [phone]     * Increase in Pain  * Temperature over 101  * Increase in drainage from your wound  * Drainage with a foul odor  * Bleeding  * Increase in swelling  * Need for compression bandage changes due to slippage, breakthrough drainage.     If you need medical attention outside of the business hours of the Wound Care Centers please contact your PCP or go to the an urgent care or emergency department     The information contained in the After Visit Summary has been reviewed with me, the patient and/or responsible adult, by my health care provider(s). I had the opportunity to ask questions regarding this information. I have elected to receive;      []After Visit Summary  [x]Comprehensive Discharge Instruction        Patient signature______________________________________Date:________  Electronically signed by Sarika Akers RN on 3/10/2025 at 1:04 PM   Electronically signed by REY JACKSON - CNP on 3/10/2025 at 1:00 PM

## 2025-03-13 ENCOUNTER — TELEPHONE (OUTPATIENT)
Dept: GASTROENTEROLOGY | Age: 50
End: 2025-03-13

## 2025-03-13 NOTE — TELEPHONE ENCOUNTER
----- Message from KATIE SANCHEZ LPN sent at 3/12/2025 10:40 AM EDT -----    ----- Message -----  From: Jairon Zhou MD  Sent: 3/12/2025  10:37 AM EDT  To: Celia Morales; Sarah Ruiz MA; #    Biopsies from your stomach and small bowel did not show any acute pathology.  You should follow-up with Dr. Garland at [facility]

## 2025-05-18 DIAGNOSIS — G89.29 CHRONIC BILATERAL LOW BACK PAIN WITH BILATERAL SCIATICA: ICD-10-CM

## 2025-05-18 DIAGNOSIS — M54.41 CHRONIC BILATERAL LOW BACK PAIN WITH BILATERAL SCIATICA: ICD-10-CM

## 2025-05-18 DIAGNOSIS — M54.42 CHRONIC BILATERAL LOW BACK PAIN WITH BILATERAL SCIATICA: ICD-10-CM

## 2025-05-19 ENCOUNTER — OFFICE VISIT (OUTPATIENT)
Age: 50
End: 2025-05-19
Payer: MEDICARE

## 2025-05-19 VITALS
WEIGHT: 151.4 LBS | HEART RATE: 85 BPM | SYSTOLIC BLOOD PRESSURE: 126 MMHG | DIASTOLIC BLOOD PRESSURE: 86 MMHG | BODY MASS INDEX: 27.86 KG/M2 | OXYGEN SATURATION: 97 % | TEMPERATURE: 98.4 F | HEIGHT: 62 IN

## 2025-05-19 DIAGNOSIS — M25.511 ACUTE PAIN OF RIGHT SHOULDER: ICD-10-CM

## 2025-05-19 DIAGNOSIS — G89.29 CHRONIC BILATERAL LOW BACK PAIN WITH BILATERAL SCIATICA: ICD-10-CM

## 2025-05-19 DIAGNOSIS — M54.41 CHRONIC BILATERAL LOW BACK PAIN WITH BILATERAL SCIATICA: ICD-10-CM

## 2025-05-19 DIAGNOSIS — N18.2 STAGE 2 CHRONIC KIDNEY DISEASE: Primary | ICD-10-CM

## 2025-05-19 DIAGNOSIS — E78.5 DYSLIPIDEMIA: ICD-10-CM

## 2025-05-19 DIAGNOSIS — D52.9 ANEMIA DUE TO FOLIC ACID DEFICIENCY, UNSPECIFIED DEFICIENCY TYPE: ICD-10-CM

## 2025-05-19 DIAGNOSIS — E61.1 IRON DEFICIENCY: ICD-10-CM

## 2025-05-19 DIAGNOSIS — K21.9 GASTROESOPHAGEAL REFLUX DISEASE, UNSPECIFIED WHETHER ESOPHAGITIS PRESENT: ICD-10-CM

## 2025-05-19 DIAGNOSIS — I10 ESSENTIAL HYPERTENSION: ICD-10-CM

## 2025-05-19 DIAGNOSIS — M54.42 CHRONIC BILATERAL LOW BACK PAIN WITH BILATERAL SCIATICA: ICD-10-CM

## 2025-05-19 PROCEDURE — 3079F DIAST BP 80-89 MM HG: CPT

## 2025-05-19 PROCEDURE — 1036F TOBACCO NON-USER: CPT

## 2025-05-19 PROCEDURE — 3074F SYST BP LT 130 MM HG: CPT

## 2025-05-19 PROCEDURE — 99213 OFFICE O/P EST LOW 20 MIN: CPT

## 2025-05-19 PROCEDURE — G8427 DOCREV CUR MEDS BY ELIG CLIN: HCPCS

## 2025-05-19 PROCEDURE — G8419 CALC BMI OUT NRM PARAM NOF/U: HCPCS

## 2025-05-19 PROCEDURE — 3017F COLORECTAL CA SCREEN DOC REV: CPT

## 2025-05-19 RX ORDER — CARVEDILOL 6.25 MG/1
TABLET ORAL
Qty: 60 TABLET | Refills: 4 | Status: SHIPPED | OUTPATIENT
Start: 2025-05-19

## 2025-05-19 RX ORDER — PANTOPRAZOLE SODIUM 20 MG/1
40 TABLET, DELAYED RELEASE ORAL DAILY
Qty: 30 TABLET | Refills: 4 | Status: SHIPPED | OUTPATIENT
Start: 2025-05-19

## 2025-05-19 RX ORDER — CYCLOBENZAPRINE HCL 10 MG
10 TABLET ORAL 3 TIMES DAILY
Qty: 90 TABLET | Refills: 3 | Status: SHIPPED | OUTPATIENT
Start: 2025-05-19

## 2025-05-19 RX ORDER — FERROUS SULFATE 325(65) MG
325 TABLET ORAL 2 TIMES DAILY
Qty: 30 TABLET | Refills: 2 | Status: SHIPPED | OUTPATIENT
Start: 2025-05-19

## 2025-05-19 RX ORDER — ATORVASTATIN CALCIUM 40 MG/1
40 TABLET, FILM COATED ORAL DAILY
Qty: 90 TABLET | Refills: 1 | Status: SHIPPED | OUTPATIENT
Start: 2025-05-19

## 2025-05-19 RX ORDER — FOLIC ACID 1 MG/1
1 TABLET ORAL DAILY
Qty: 90 TABLET | Refills: 1 | Status: SHIPPED | OUTPATIENT
Start: 2025-05-19

## 2025-05-19 RX ORDER — AMLODIPINE BESYLATE 10 MG/1
10 TABLET ORAL DAILY
Qty: 60 TABLET | Refills: 3 | Status: SHIPPED | OUTPATIENT
Start: 2025-05-19

## 2025-05-19 RX ORDER — GABAPENTIN 300 MG/1
300 CAPSULE ORAL 3 TIMES DAILY
Qty: 270 CAPSULE | Refills: 0 | OUTPATIENT
Start: 2025-05-19

## 2025-05-19 RX ORDER — GABAPENTIN 300 MG/1
300 CAPSULE ORAL 3 TIMES DAILY
Qty: 270 CAPSULE | Refills: 0 | Status: SHIPPED | OUTPATIENT
Start: 2025-05-19 | End: 2025-08-17

## 2025-05-19 RX ORDER — HYDROCHLOROTHIAZIDE 25 MG/1
TABLET ORAL
Qty: 60 TABLET | Refills: 4 | Status: SHIPPED | OUTPATIENT
Start: 2025-05-19

## 2025-05-19 SDOH — HEALTH STABILITY: PHYSICAL HEALTH: ON AVERAGE, HOW MANY MINUTES DO YOU ENGAGE IN EXERCISE AT THIS LEVEL?: 30 MIN

## 2025-05-19 SDOH — HEALTH STABILITY: PHYSICAL HEALTH: ON AVERAGE, HOW MANY DAYS PER WEEK DO YOU ENGAGE IN MODERATE TO STRENUOUS EXERCISE (LIKE A BRISK WALK)?: 3 DAYS

## 2025-05-19 ASSESSMENT — LIFESTYLE VARIABLES
HOW MANY STANDARD DRINKS CONTAINING ALCOHOL DO YOU HAVE ON A TYPICAL DAY: PATIENT DOES NOT DRINK
HOW MANY STANDARD DRINKS CONTAINING ALCOHOL DO YOU HAVE ON A TYPICAL DAY: 0
HOW OFTEN DO YOU HAVE A DRINK CONTAINING ALCOHOL: 1
HOW OFTEN DO YOU HAVE SIX OR MORE DRINKS ON ONE OCCASION: 1
HOW OFTEN DO YOU HAVE A DRINK CONTAINING ALCOHOL: NEVER

## 2025-05-19 ASSESSMENT — ENCOUNTER SYMPTOMS
COLOR CHANGE: 0
SHORTNESS OF BREATH: 0
SORE THROAT: 0
ABDOMINAL PAIN: 0
BACK PAIN: 1
COUGH: 0
ABDOMINAL DISTENTION: 0
EYE DISCHARGE: 0

## 2025-05-19 ASSESSMENT — PATIENT HEALTH QUESTIONNAIRE - PHQ9
SUM OF ALL RESPONSES TO PHQ QUESTIONS 1-9: 0
2. FEELING DOWN, DEPRESSED OR HOPELESS: NOT AT ALL
1. LITTLE INTEREST OR PLEASURE IN DOING THINGS: NOT AT ALL
SUM OF ALL RESPONSES TO PHQ QUESTIONS 1-9: 0

## 2025-05-19 NOTE — PROGRESS NOTES
Attending Physician Statement  I have discussed the care of Vincent Steiner including pertinent history and exam findings, with the resident. I have reviewed the key elements of all parts of the encounter with the resident.  I agree with the assessment, plan and orders as documented by the resident.  (GE Modifier)    MD ANGI Mar  Attending Physician, [facility]   Faculty, Internal Medicine Residency Program  [facility] Physician [facility]  5/19/2025, 9:51 AM    
without myelopathy or radiculopathy, lumbar region    Hx of right BKA (HCC)    Lumbar foraminal stenosis    Sacroiliac joint dysfunction of right side    Sacroiliac joint dysfunction of left side    Chronic renal disease, stage III (HCC) [464228]    Bilateral sacroiliitis    Scoliosis of lumbar spine    Adenoma of duodenum    Colonic stricture (HCC)    Abnormality of gait    Iron deficiency    Anal ulcer    Fourth degree hemorrhoids    Folic acid deficiency    Skin ulcer of perirectal region with fat layer exposed (HCC)    Subepithelial lesion of stomach       Health Maintenance Due   Topic Date Due    Annual Wellness Visit (Medicare [employer]vantage)  01/01/2025       Allergies   Allergen Reactions    Shellfish-Derived Products Anaphylaxis    Penicillins Other (See Comments)     UNKNOWN REACTION         Current Outpatient Medications   Medication Sig Dispense Refill    pantoprazole (PROTONIX) 20 MG tablet Take 2 tablets by mouth daily 30 tablet 4    hydroCHLOROthiazide (HYDRODIURIL) 25 MG tablet take 2 tablets by mouth once daily 60 tablet 4    folic acid (FOLVITE) 1 MG tablet Take 1 tablet by mouth daily 90 tablet 1    ferrous sulfate (IRON 325) 325 (65 Fe) MG tablet Take 1 tablet by mouth 2 times daily 30 tablet 2    cyclobenzaprine (FLEXERIL) 10 MG tablet Take 1 tablet by mouth in the morning, at noon, and at bedtime 90 tablet 3    carvedilol (COREG) 6.25 MG tablet take 1 tablet by mouth twice a day 60 tablet 4    atorvastatin (LIPITOR) 40 MG tablet Take 1 tablet by mouth daily 90 tablet 1    amLODIPine (NORVASC) 10 MG tablet Take 1 tablet by mouth daily 60 tablet 3    gabapentin (NEURONTIN) 300 MG capsule Take 1 capsule by mouth 3 times daily for 90 days. 270 capsule 0     No current facility-administered medications for this visit.       Social History     Tobacco Use    Smoking status: Never    Smokeless tobacco: Never   Vaping Use    Vaping status: Never Used   Substance Use Topics    Alcohol use: No    Drug use:

## 2025-08-14 DIAGNOSIS — G89.29 CHRONIC BILATERAL LOW BACK PAIN WITH BILATERAL SCIATICA: ICD-10-CM

## 2025-08-14 DIAGNOSIS — M54.42 CHRONIC BILATERAL LOW BACK PAIN WITH BILATERAL SCIATICA: ICD-10-CM

## 2025-08-14 DIAGNOSIS — I10 ESSENTIAL HYPERTENSION: ICD-10-CM

## 2025-08-14 DIAGNOSIS — M54.41 CHRONIC BILATERAL LOW BACK PAIN WITH BILATERAL SCIATICA: ICD-10-CM

## 2025-08-14 RX ORDER — CARVEDILOL 6.25 MG/1
6.25 TABLET ORAL 2 TIMES DAILY
Qty: 180 TABLET | Refills: 1 | Status: SHIPPED | OUTPATIENT
Start: 2025-08-14

## 2025-08-14 RX ORDER — GABAPENTIN 300 MG/1
CAPSULE ORAL
Qty: 270 CAPSULE | Refills: 2 | Status: SHIPPED | OUTPATIENT
Start: 2025-08-14 | End: 2025-11-12

## 2025-08-18 ENCOUNTER — OFFICE VISIT (OUTPATIENT)
Age: 50
End: 2025-08-18
Payer: MEDICARE

## 2025-08-18 VITALS
SYSTOLIC BLOOD PRESSURE: 113 MMHG | OXYGEN SATURATION: 96 % | DIASTOLIC BLOOD PRESSURE: 81 MMHG | WEIGHT: 146.6 LBS | TEMPERATURE: 97.1 F | HEART RATE: 80 BPM | HEIGHT: 61 IN | BODY MASS INDEX: 27.68 KG/M2

## 2025-08-18 DIAGNOSIS — E78.00 PURE HYPERCHOLESTEROLEMIA: ICD-10-CM

## 2025-08-18 DIAGNOSIS — G89.29 CHRONIC LEFT SHOULDER PAIN: ICD-10-CM

## 2025-08-18 DIAGNOSIS — M53.3 SACROILIAC JOINT DYSFUNCTION OF LEFT SIDE: ICD-10-CM

## 2025-08-18 DIAGNOSIS — M54.41 CHRONIC BILATERAL LOW BACK PAIN WITH BILATERAL SCIATICA: ICD-10-CM

## 2025-08-18 DIAGNOSIS — K21.9 GASTROESOPHAGEAL REFLUX DISEASE WITHOUT ESOPHAGITIS: ICD-10-CM

## 2025-08-18 DIAGNOSIS — K21.9 GASTROESOPHAGEAL REFLUX DISEASE, UNSPECIFIED WHETHER ESOPHAGITIS PRESENT: ICD-10-CM

## 2025-08-18 DIAGNOSIS — I10 ESSENTIAL HYPERTENSION: ICD-10-CM

## 2025-08-18 DIAGNOSIS — G89.29 CHRONIC BILATERAL LOW BACK PAIN WITH BILATERAL SCIATICA: ICD-10-CM

## 2025-08-18 DIAGNOSIS — K56.699 COLONIC STRICTURE (HCC): ICD-10-CM

## 2025-08-18 DIAGNOSIS — M47.816 SPONDYLOSIS WITHOUT MYELOPATHY OR RADICULOPATHY, LUMBAR REGION: ICD-10-CM

## 2025-08-18 DIAGNOSIS — M53.3 SACROILIAC JOINT DYSFUNCTION OF RIGHT SIDE: ICD-10-CM

## 2025-08-18 DIAGNOSIS — E61.1 IRON DEFICIENCY: ICD-10-CM

## 2025-08-18 DIAGNOSIS — M54.42 CHRONIC BILATERAL LOW BACK PAIN WITH BILATERAL SCIATICA: ICD-10-CM

## 2025-08-18 DIAGNOSIS — M25.511 ACUTE PAIN OF RIGHT SHOULDER: ICD-10-CM

## 2025-08-18 DIAGNOSIS — M25.512 CHRONIC LEFT SHOULDER PAIN: ICD-10-CM

## 2025-08-18 DIAGNOSIS — Z89.511 HX OF RIGHT BKA (HCC): ICD-10-CM

## 2025-08-18 DIAGNOSIS — K62.6 ANAL ULCER: ICD-10-CM

## 2025-08-18 DIAGNOSIS — Z00.00 MEDICARE ANNUAL WELLNESS VISIT, SUBSEQUENT: Primary | ICD-10-CM

## 2025-08-18 PROCEDURE — 3079F DIAST BP 80-89 MM HG: CPT

## 2025-08-18 PROCEDURE — G0439 PPPS, SUBSEQ VISIT: HCPCS

## 2025-08-18 PROCEDURE — 3074F SYST BP LT 130 MM HG: CPT

## 2025-08-18 PROCEDURE — 3017F COLORECTAL CA SCREEN DOC REV: CPT

## 2025-08-18 RX ORDER — PANTOPRAZOLE SODIUM 40 MG/1
40 TABLET, DELAYED RELEASE ORAL
Qty: 90 TABLET | Refills: 1 | Status: SHIPPED | OUTPATIENT
Start: 2025-08-18

## 2025-08-18 ASSESSMENT — PATIENT HEALTH QUESTIONNAIRE - PHQ9
SUM OF ALL RESPONSES TO PHQ QUESTIONS 1-9: 0
SUM OF ALL RESPONSES TO PHQ QUESTIONS 1-9: 0
2. FEELING DOWN, DEPRESSED OR HOPELESS: NOT AT ALL
SUM OF ALL RESPONSES TO PHQ QUESTIONS 1-9: 0
1. LITTLE INTEREST OR PLEASURE IN DOING THINGS: NOT AT ALL
SUM OF ALL RESPONSES TO PHQ QUESTIONS 1-9: 0

## 2025-08-18 ASSESSMENT — LIFESTYLE VARIABLES
HOW MANY STANDARD DRINKS CONTAINING ALCOHOL DO YOU HAVE ON A TYPICAL DAY: PATIENT DOES NOT DRINK
HOW OFTEN DO YOU HAVE A DRINK CONTAINING ALCOHOL: NEVER

## 2025-08-20 DIAGNOSIS — I10 ESSENTIAL HYPERTENSION: ICD-10-CM

## 2025-08-21 RX ORDER — HYDROCHLOROTHIAZIDE 25 MG/1
50 TABLET ORAL DAILY
Qty: 60 TABLET | Refills: 4 | Status: SHIPPED | OUTPATIENT
Start: 2025-08-21

## 2025-08-22 ENCOUNTER — HOSPITAL ENCOUNTER (OUTPATIENT)
Dept: GENERAL RADIOLOGY | Age: 50
Discharge: HOME | End: 2025-08-24

## 2025-08-22 DIAGNOSIS — M25.511 ACUTE PAIN OF RIGHT SHOULDER: ICD-10-CM

## 2025-08-22 DIAGNOSIS — G89.29 CHRONIC LEFT SHOULDER PAIN: ICD-10-CM

## 2025-08-22 DIAGNOSIS — M25.512 CHRONIC LEFT SHOULDER PAIN: ICD-10-CM

## 2025-08-25 ENCOUNTER — HOSPITAL ENCOUNTER (OUTPATIENT)
Dept: GENERAL RADIOLOGY | Age: 50
Discharge: HOME | End: 2025-08-27
Payer: MEDICARE

## 2025-08-25 PROCEDURE — 73030 X-RAY EXAM OF SHOULDER: CPT

## 2025-08-27 ENCOUNTER — RESULTS FOLLOW-UP (OUTPATIENT)
Age: 50
End: 2025-08-27

## (undated) DEVICE — SYRINGE 20ML LL S/C 50

## (undated) DEVICE — PROTECTOR EYE PT SELF ADH NS OPT GRD LF

## (undated) DEVICE — CANNULATED DRILL BIT

## (undated) DEVICE — SINGLE-USE BIOPSY FORCEPS: Brand: RADIAL JAW 4

## (undated) DEVICE — UNDERPANTS MAT L XL SEAMLESS CLR CODE WAISTBAND KNIT

## (undated) DEVICE — SINGLE SHOT EPIDURAL TRAY: Brand: MEDLINE INDUSTRIES, INC.

## (undated) DEVICE — SUTURE VCRL SZ 0 L36IN ABSRB UD L36MM CT-1 1/2 CIR J946H

## (undated) DEVICE — NEEDLE SPINAL 22GA L3.5IN SPINOCAN

## (undated) DEVICE — DRESSING PETRO W3XL8IN OIL EMUL N ADH GZ KNIT IMPREG CELOS

## (undated) DEVICE — GAUZE, BORDER, 3"X6", 1.5"X4"PAD, STERIL: Brand: MEDLINE INDUSTRIES, INC.

## (undated) DEVICE — SHEET, ORTHO, SPLIT, STERILE: Brand: MEDLINE

## (undated) DEVICE — SOLUTION PREP PAINT POV IOD FOR SKIN MUCOUS MEM

## (undated) DEVICE — TOWEL,OR,DSP,ST,BLUE,STD,4/PK,20PK/CS: Brand: MEDLINE

## (undated) DEVICE — PREP-RESISTANT MARKER W/ RULER: Brand: MEDLINE INDUSTRIES, INC.

## (undated) DEVICE — SNARE ENDOSCP L240CM LOOP W13MM DIA2.4MM SHT THROW SM OVL

## (undated) DEVICE — GAUZE,SPONGE,FLUFF,6"X6.75",STRL,5/TRAY: Brand: MEDLINE

## (undated) DEVICE — GOWN POLY REINF SONT XLG: Brand: MEDLINE INDUSTRIES, INC.

## (undated) DEVICE — SUTURE MCRYL SZ 4-0 L27IN ABSRB UD L19MM PS-2 1/2 CIR PRIM Y426H

## (undated) DEVICE — GLOVE SURG SZ 75 CRM LTX FREE POLYISOPRENE POLYMER BEAD ANTI

## (undated) DEVICE — 1010 S-DRAPE TOWEL DRAPE 10/BX: Brand: STERI-DRAPE™

## (undated) DEVICE — MHPB GEN MINOR PACK: Brand: MEDLINE INDUSTRIES, INC.

## (undated) DEVICE — SUTURE VICRYL + SZ 3-0 L27IN ABSRB UD L26MM SH 1/2 CIR VCP416H

## (undated) DEVICE — BITEBLOCK 54FR W/ DENT RIM BLOX

## (undated) DEVICE — INSERT CUSHION HEAD PRONEVIEW

## (undated) DEVICE — SURGICAL PROCEDURE KIT BASIN MAJ SET UP

## (undated) DEVICE — SINGLE DOSE EPI TY

## (undated) DEVICE — 3M™ IOBAN™ 2 ANTIMICROBIAL INCISE DRAPE 6650EZ: Brand: IOBAN™ 2

## (undated) DEVICE — POLYP TRAP: Brand: TRAPEASE®

## (undated) DEVICE — ADHESIVE SKIN CLSR 0.7ML TOP DERMBND ADV

## (undated) DEVICE — Device

## (undated) DEVICE — DEFENDO AIR WATER SUCTION AND BIOPSY VALVE KIT FOR  OLYMPUS: Brand: DEFENDO AIR/WATER/SUCTION AND BIOPSY VALVE

## (undated) DEVICE — BLANKET WRM W29.9XL79.1IN UP BODY FORC AIR MISTRAL-AIR

## (undated) DEVICE — NEEDLE SPNL L4.5IN OD22GA QNCKE TYP SPINOCAN

## (undated) DEVICE — DRAPE,REIN 53X77,STERILE: Brand: MEDLINE

## (undated) DEVICE — GLOVE ORANGE PI 7 1/2   MSG9075

## (undated) DEVICE — PREMIUM DRY TRAY LF: Brand: MEDLINE INDUSTRIES, INC.

## (undated) DEVICE — APPLICATOR MEDICATED 10.5 CC SOLUTION HI LT ORNG CHLORAPREP

## (undated) DEVICE — GLOVE SURG SZ 85 CRM LTX FREE POLYISOPRENE POLYMER BEAD ANTI

## (undated) DEVICE — C-ARMOR C-ARM EQUIPMENT COVERS CLEAR STERILE UNIVERSAL FIT 12 PER CASE: Brand: C-ARMOR

## (undated) DEVICE — GUIDE PIN 3.2MM

## (undated) DEVICE — Z DISCONTINUED APPLICATOR SURG PREP 0.35OZ 2% CHG 70% ISO ALC W/ HI LT

## (undated) DEVICE — GOWN,AURORA,NON-REINFORCED,2XL: Brand: MEDLINE

## (undated) DEVICE — TOWEL,OR,DSP,ST,BLUE,DLX,XR,4/PK,20PK/CS: Brand: MEDLINE

## (undated) DEVICE — SEALER LAP SM L18.8CM OPN JAW HAND/FOOT SWCH FORCETRIAD

## (undated) DEVICE — C-ARM: Brand: UNBRANDED

## (undated) DEVICE — GAUZE,SPONGE,4"X4",16PLY,STRL,LF,10/TRAY: Brand: MEDLINE

## (undated) DEVICE — SUTURE VCRL SZ 2-0 L36IN ABSRB UD L36MM CT-1 1/2 CIR J945H

## (undated) DEVICE — JELLY,LUBE,STERILE,FLIP TOP,TUBE,2-OZ: Brand: MEDLINE

## (undated) DEVICE — BANDAGE ADH W1XL3IN UNIV PLAS NAT GEN USE STRP

## (undated) DEVICE — SYRINGE MED 50ML LUERLOCK TIP

## (undated) DEVICE — ELECTRODE PT RET AD L9FT HI MOIST COND ADH HYDRGEL CORDED

## (undated) DEVICE — SUTURE VICRYL + SZ 2-0 L27IN ABSRB WHT SH 1/2 CIR TAPERCUT VCP417H

## (undated) DEVICE — SYRINGE, LUER LOCK, 10ML: Brand: MEDLINE

## (undated) DEVICE — EXTENSION SET IV 12 IN 0.45 CC INFUSION MINIBOR TBNG CLMP

## (undated) DEVICE — YANKAUER,FLEXIBLE HANDLE,REGLR CAPACITY: Brand: MEDLINE INDUSTRIES, INC.

## (undated) DEVICE — MEDICINE CUP, GRADUATED, STER: Brand: MEDLINE

## (undated) DEVICE — PAD,ABDOMINAL,5"X9",ST,LF,25/BX: Brand: MEDLINE INDUSTRIES, INC.

## (undated) DEVICE — MASTISOL ADHESIVE LIQ 2/3ML

## (undated) DEVICE — INTENDED FOR TISSUE SEPARATION, AND OTHER PROCEDURES THAT REQUIRE A SHARP SURGICAL BLADE TO PUNCTURE OR CUT.: Brand: BARD-PARKER ® CARBON RIB-BACK BLADES

## (undated) DEVICE — FORCEPS BX L240CM JAW DIA2.8MM L CAP W/ NDL MIC MESH TOOTH

## (undated) DEVICE — ZINACTIVE USE 2539609 APPLICATOR MEDICATED 10.5 CC SOLUTION HI LT ORNG CHLORAPREP

## (undated) DEVICE — SOLUTION IRRIG 1000ML 0.9% SOD CHL USP POUR PLAS BTL

## (undated) DEVICE — ELECTRODE ELECSURG NDL 2.8 INX7.2 CM COAT INSUL EDGE

## (undated) DEVICE — CONTAINER,SPECIMEN,4OZ,OR STRL: Brand: MEDLINE

## (undated) DEVICE — SYRINGE MED 5ML STD CLR PLAS LUERLOCK TIP N CTRL DISP

## (undated) DEVICE — HYPODERMIC SAFETY NEEDLE: Brand: MAGELLAN

## (undated) DEVICE — STRAP,POSITIONING,KNEE/BODY,FOAM,4X60": Brand: MEDLINE

## (undated) DEVICE — ADAPTER TBNG LUER STUB 15 GA INTMED

## (undated) DEVICE — FORCEPS BX L240CM WRK CHN 2.8MM STD CAP W/ NDL MIC MESH

## (undated) DEVICE — SYSTEM BX 25GA FN NDL SIX DST CUT EDG SHARKCORE

## (undated) DEVICE — ENDO KIT W/SYRINGE: Brand: MEDLINE INDUSTRIES, INC.

## (undated) DEVICE — APPLICATOR MEDICATED 26 CC SOLUTION HI LT ORNG CHLORAPREP

## (undated) DEVICE — NEEDLE SPNL 25GA L4 11/16IN BLU HUB DISP

## (undated) DEVICE — NEEDLE HYPO 25GA L1.5IN BLU POLYPR HUB S STL REG BVL STR

## (undated) DEVICE — GLOVE SURG SZ 8 L12IN THK75MIL DK GRN LTX FREE